# Patient Record
Sex: FEMALE | Race: WHITE | NOT HISPANIC OR LATINO | Employment: OTHER | URBAN - METROPOLITAN AREA
[De-identification: names, ages, dates, MRNs, and addresses within clinical notes are randomized per-mention and may not be internally consistent; named-entity substitution may affect disease eponyms.]

---

## 2017-02-02 ENCOUNTER — DOCUMENTATION ONLY (OUTPATIENT)
Dept: REHABILITATION | Facility: HOSPITAL | Age: 52
End: 2017-02-02

## 2017-02-02 NOTE — PROGRESS NOTES
PHYSICAL THERAPY DISCHARGE SUMMARY     Name: Brigid Jernigan  Clinic Number: [id number]    Diagnosis: MD, tibial fracture  Physician: clyde cedeño MD  Treatment Orders: PT Eval and Treat  Past Medical History   Diagnosis Date    Abnormal EKG     Myotonic dystrophy     Pacemaker     PAF (paroxysmal atrial fibrillation)     Sleep apnea        Initial visit: 912/16  Date of Last visit: 11/9/16  Date of Discharge Note:  2/2/17  Total Visits Received: 10  Missed Visits: 0  ASSESSMENT   Status Towards Goals Met:  Pt met all short term goals, 1 long term goal.    Goals Not achieved and why: Pt was making good progress but use up her approved insurance visits and did not return to therapy.    Discharge reason : insurance limitations    GOALS: Short Term Goals: 4 weeks  1.Report decreased L knee pain < / = 2 /10 to increase tolerance for ADLs. Met 10/3/16  2. Pt to demonstrate sit to stand transfer with CGA and RW in order to improve mobility at home. Met 10/3/16  3. Increased L knee extension ROM to > or = 0 degrees in order to improve ability to ambulate. Met 10/3/16  4. Pt to demonstrate stand pivot transfer with CGA in order to improve ability to perform ADLs at home. Met 10/3/16  5. Pt to tolerate HEP to improve ROM and independence with ADL's. Met 10/3/16  6. Pt to ambulate > or = 25 ft with RW and CGA in order to improve mobility in the home environment. Met 10/3/16      Long Term Goals: 8 weeks  1.Report decreased L knee pain < / = 1 /10 to increase tolerance for ADLs.  2.Pt to ambulate > or = 50 ft with RW and CGA in order to improve mobility in the home environment. Met 10/3/16  3.Pt to demonstrate sit to stand transfer with Mod I and RW in order to improve independence with mobility.   4. Pt to ambulate > or = 50 ft with RW and Mod I in order to improve mobility in the home environment  5. Pt to be Independent with HEP to improve ROM and independence with ADL's            PLAN   This patient is discharged  from Physical Therapy Services.       David Brock, PT 2/2/2017

## 2017-05-09 ENCOUNTER — CLINICAL SUPPORT (OUTPATIENT)
Dept: REHABILITATION | Facility: HOSPITAL | Age: 52
End: 2017-05-09
Attending: STUDENT IN AN ORGANIZED HEALTH CARE EDUCATION/TRAINING PROGRAM
Payer: COMMERCIAL

## 2017-05-09 DIAGNOSIS — R29.898 DECREASED ROM OF NECK: ICD-10-CM

## 2017-05-09 DIAGNOSIS — R53.1 DECREASED STRENGTH: ICD-10-CM

## 2017-05-09 DIAGNOSIS — Z74.09 IMPAIRED FUNCTIONAL MOBILITY, BALANCE, GAIT, AND ENDURANCE: ICD-10-CM

## 2017-05-09 PROCEDURE — 97163 PT EVAL HIGH COMPLEX 45 MIN: CPT | Mod: PO | Performed by: PHYSICAL THERAPIST

## 2017-05-09 PROCEDURE — 97110 THERAPEUTIC EXERCISES: CPT | Mod: PO | Performed by: PHYSICAL THERAPIST

## 2017-05-09 NOTE — PROGRESS NOTES
OUTPATIENT NEUROLOGICAL REHABILITATION  PHYSICAL THERAPY EVALUATION    Name: Brigid Jernigan  Clinic Number: [id number]    Diagnosis: G71.11 (ICD-10-CM) - Myotonic dystrophy  Physician: Leslee Moore MD  Treatment Orders: PT Eval and Treat  Past Medical History:   Diagnosis Date    Abnormal EKG     Myotonic dystrophy     Pacemaker     PAF (paroxysmal atrial fibrillation)     Sleep apnea        Evaluation Date: 5/9/2017  Visit #: 1  Plan of care expiration: 7/25/2017  Precautions: universal, fall, poor head control    History   Medical Diagnosis: myotonic dystrophy  PT Diagnosis: decreased head control, decreased strength, decreased motor control, impaired balance, impaired gait, decreased ROM  Chief complaint: poor head control, poor mobility, fall risk  History of Present Illness: Brigid is a 51 y.o. female that presents to [facility] secondary to impairments due to myotonic dystrophy, Dx 2007. PMHx: Pacemaker in 2008, cataract surgery. Broke tibia/ fibula 2016. Broken wrist 2016. Psoriatic arthritis      Prior Therapy: prior PT for broken tibia/fibula  Social History: puzzles, pt is cared for by sister  Place of Residence (Steps/Adaptations/Levels)/ assistance available: lives with sister, sister assists with care of 5-6 family members with same dx, uses outside lift to enter home, elevator inside  Previous functional status includes: prior to broken LE, pt did not use AD for ambulation, head control was not a significant issue  Current functional status:  RW use for ~9 months, unable to hold head up for >1 min  DME owned: CPAP, RW, elevating toilet seat, up easy to rise from chairs, bilateral AFOs  Work/Job description:  NA      Subjective   Pt stated goals:  to walk without anything  Family present/states: getting needed equipment  Pain: denies    Objective   - Follows commands: yes   - Speech: slurred speech    Mental status: alert, oriented to person, place, and  time  Appearance: Casually dressed  Behavior:  calm and cooperative  Attention Span and Concentration:  Normal    Dominant hand:  right     Posture Alignment :rounded shoulders, poor to no head control    Sensation:  Light Touch: Intact           Proprioception:   Intact    Tone: 0 - No increase in muscle tone  Limbs/muscles affected: hypotonic in BLE and cervical regions    Visual/Auditory: denies changes     Coordination:   - fine motor: NT  - UE coordination: NT    - LE coordination:  Unable to perform alternate toe taps    ROM:   CERVICAL PROM:  Flexion: excessive  Extension: excessive  Rotation:  Right= 5 degrees, left= 11 degrees  SB= 0 degrees [place]    UPPER EXTREMITY--AROM/PROM  (R) UE: limited as follows: shoulder limitations due to forward head  (L) UE: limited as follows: shoulder limitations due to forward head      RANGE OF MOTION--LOWER EXTREMITIES  (R) LE Hip: equal bilaterally   Knee: equal bilaterally   Ankle: equal bilaterally    (L) LE: Hip: equal bilaterally   Knee: equal bilaterally   Ankle: equal bilaterally    Strength: manual muscle test grades below   Cervical strength:   Extension: 2-/5  Deep cervical flexors: 2-/5  Flexion: 3/5  Rotation: 2-/5     Upper Extremity Strength  BUE strength at least 3/5    Lower Extremity Strength   RLE LLE   Hip Flexion: 3+/5 3+/5   Hip Extension:  2/5 2/5   Hip Abduction: NT NT   Hip Adduction: NT NT   Knee Extension: 4+/5 3+/5   Knee Flexion: 4/5 4-/5   Ankle Dorsiflexion: 0/5 0/5   Ankle Plantarflexion: 0/5 0/5   Ankle Inversion: 0/5 0/5   Ankle Eversion: 0/5 0/5     Abdominal Strength: 2-/5       Evaluation   Single Limb Stance R LE unable  (<10 sec = HIGH FALL RISK)   Single Limb Stance L LE unable  (<10 sec = HIGH FALL RISK)   30 second Chair Rise NT   5 times sit-stand NT     Postural control:  MCTSIB:  1. Eyes Open/feet together/Firm: >30 seconds with RW  2. Eyes Closed/feet together/Firm: 5 seconds with RW  3. Eyes Open/feet together/Foam: NT  4. Eyes  Closed/feet together/Foam: NT  Static sitting balance with BUE support= fair  Dynamic sitting balance with UE support= fair    Gait Assessment:   - AD used: RW  - Assistance: close supervision/ CGA  - Distance: 125 ft  - Curb: NT  - Ramp:  NT    Tinetti Balance Assessment  Balance:  1. Sitting Balance 0   Leans/ slides in chair= 0   Steady= 1  2. Rises from chair 1   Unable without help= 0   Able, uses arms= 1   Able without use of arms= 2  3. Attempts to rise 1   Unable without help= 0   Able, >1 attmept required-= 1   Able, 1 attempt= 2  4. Immediate standing balance (1st 5 seconds) 0   Unsteady (swaggers, moves feet, trunk sway)= 0   Steady but uses walker or other support= 1   Narrow base of support without walker or support= 2  5. Nudged 0   Begins to fall= 0   Staggers, grabs, catches self= 1   Steady= 2  6. Standing balance 1   Unsteady= 0   Steady but wide LEYDI or uses AD=1   Narrow LEYDI without AD=2  7. Eyes closed 0   Unsteady= 0   Steady= 1  8. Turning 360 degrees 0   Discontinuous steps= 0   Continuous steps= 1   Unsteady= 0   Steady= 1  9. Sitting down 0   Unsafe= 0   Uses arms or not in a smooth motion= 1   Safe, smooth motion= 2  Balance score= 3/16  Gait:  1. Initiation 0   hesitates or multiple attempts to start= 0   No hesitancy= 1  2. Step length 2   Step to= 0   One foot passes= 1   reciprocal pattern= 2  3. Step height 2    Neither foot clears floor= 0   One foot clears floor= 1   Both feet clear floor= 2  4. Step symmetry 1   Not symmetrical= 0   Appears symmetrical= 1  5. Step continuity 1   Not continuous= 0   Appears continuous= 1  6. Path 0   Marked deviation= 0   Mild/moderate deviation or uses A.D.= 1   Straight without A.D.= 2  7. Trunk 0   Marked sway or uses A.D.= 0   No sway, but flexes knees or back, spread arms out while walking= 1   No sway, no flexion, no use of UE, no use of A.D.= 2  8. Walking stance 0   Heels apart= 0   Heels almost touching while walking= 1  Gait score=  6/12  Total score= 9/28 , high fall risk       GAIT DEVIATIONS:  Brigid displays the following deviations with ambulation: moderate sway, decreased LE control    Impairments contributing to deviations: impaired motor control, decreased strength, impaired motor control.     Endurance Deficit: good for eval, fair cervical endurance with chin tuck      Evaluation   Timed Up and Go NT   Self Selected Walking Speed NT   Fast Walking Speed NT     Functional Mobility (Bed mobility, transfers)  Bed mobility: supervision  Supine to sit: Supervision  Sit to supine: supervision  Rolling: supervision  Transfers to bed: supervision  Transfers to toilet: min A per family  Sit to stand:  min A  Stand pivot:  Min A  Car transfers: NT  Wheelchair mobility: NT  Floor transfers: D per family    Written Home Exercises Provided: eval- chin tucks  Pt demo good understanding of the education provided. Brigid demonstrated fair return demonstration of activities.     Education provided re:role of PT, goals for PT, scheduling - pt verbalized understanding.     Brigid and family memebers verbalized good understanding of education provided.   Pt has no cultural, educational or language barriers to learning provided.    Pt participated in the following treatment today:  therapeutic exercise x 10 minutes to address cervical stability:   Instruction in chin tucks- performed with mod A in supine  Scapular retraction in supine    Assessment   This is a 51 y.o. female referred to outpatient physical therapy and presents with a medical diagnosis of myotonic dystrophy and demonstrates limitations as described in the problem list. Due to pt's deficits, pt is at significant risk for falls. Pt demonstrates a decrease in head control over the past several months. Pt is unable to hold head upright for an extended period of time (<5 min). PT is warranted to address impairments to improve safe mobility and head control. PT will assess pt for appropriate  DME as indicated. Pt's condition is unstable and unpredictable.     History  Co-morbidities and personal factors that may impact the plan of care Examination  Body Structures and Functions, activity limitations and participation restrictions that may impact the plan of care Clinical Presentation   Decision Making/ Complexity Score   Co-morbidities/ Personal Factors:   Pacemaker 2008, cataract surgery, Broke tibia/ fibula 2016, Broken wrist 2016, Psoriatic arthritis, family assistance is limited due caring for multiple people   Body Regions/ Body Systems:   Cervical, UE, LE ROM; strength, gait, sensation, proprioception, balance    Activity limitations/ Participation Restrictions:   1. Fall Risk - impaired balance   2. Weakness   3. Impaired muscle tone  4. Poor head control  5. Decreased ROM  6. Gait deviations   7. Decreased ambulation   8. Decreased activity tolerance   9. Decreased independence with daily activities   10. Requires skilled supervision to complete and progress HEP   11. Requires skilled PT for DME/ orthotic reccommendations         unstable high     Pt rehab potential is Fair. Pt will benefit from continuing skilled outpatient physical therapy to address the deficits listed below in the problem list, provide pt/family education and to maximize pt's level of independence in the home and community environment.     Pt's spiritual, cultural and educational needs considered and pt agreeable to plan of care and goals as stated below:     GOALS:   Short term goals: 6 weeks, pt agrees to goals set.  1. Pt will perform chin tucks in supine with supervision to improve independent with HEP.  2. Assist pt with obtaining appropriate cervical brace to improve head control.   3. Pt will demonstrate improved knee ext strength on left to 4/5 to improve safety with ambulation and sit<>stand.    4. Pt will demonstrate improved cervical rotation PROM to 20 degrees to improve functional head control.   5. Pt will  "perform sit<>stand consistently from 22" height surface with supervision to improve independence.     Long term goals: 12 weeks, pt agrees to goals set  6. Pt will perform basic HEP for gross strengthening with supervision to deter worsening of functional limitations.  7. Pt will demonstrate improved bilateral hip strength to 3/5 to improve balance and independence with mobility.   8. Pt will demonstrate improved cervical extension strength to 3+/5 to improve head control.   9. Pt will demonstrate improved cervical deep flexor strength to 3+/5 to improve head control.   10. Pt will demonstrate improve balance and gait by scoring 14/28 on Tinetti.  11. Pt will demonstrate improved cervical PROM for rotation to 40 degrees to improve functional head control.      Plan   Outpatient physical therapy 1-2 times weekly for 12 weeks to include: Pt Education, HEP, therapeutic exercises, gait training, neuromuscular re-education, therapeutic activities, manual therapy,  and modalities PRN to achieve established goals. Pt may be seen by PTA as part of the rehabilitation team.       Yadi Mercado, PT  05/09/2017      I certify the need for these services furnished under this plan of treatment and while under my care.  ____________________________________ Physician/Referring Practitioner   Date of Signature        "

## 2017-05-18 PROBLEM — R53.1 DECREASED STRENGTH: Status: ACTIVE | Noted: 2017-05-18

## 2017-05-18 PROBLEM — Z74.09 IMPAIRED FUNCTIONAL MOBILITY, BALANCE, GAIT, AND ENDURANCE: Status: ACTIVE | Noted: 2017-05-18

## 2017-05-18 PROBLEM — R29.898 DECREASED ROM OF NECK: Status: ACTIVE | Noted: 2017-05-18

## 2017-05-19 NOTE — PLAN OF CARE
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  Physician: Leslee Moore MD  Treatment Orders: PT Eval and Treat  Past Medical History:   Diagnosis Date    Abnormal EKG     Myotonic dystrophy     Pacemaker     PAF (paroxysmal atrial fibrillation)     Sleep apnea        Evaluation Date: 5/9/2017  Visit #: 1  Plan of care expiration: 7/25/2017  Precautions: universal, fall, poor head control    History   Medical Diagnosis: myotonic dystrophy  PT Diagnosis: decreased head control, decreased strength, decreased motor control, impaired balance, impaired gait, decreased ROM  Chief complaint: poor head control, poor mobility, fall risk  History of Present Illness: Brigid is a 51 y.o. female that presents to [facility] secondary to impairments due to myotonic dystrophy, Dx 2007. PMHx: Pacemaker in 2008, cataract surgery. Broke tibia/ fibula 2016. Broken wrist 2016. Psoriatic arthritis      Prior Therapy: prior PT for broken tibia/fibula  Social History: puzzles, pt is cared for by sister  Place of Residence (Steps/Adaptations/Levels)/ assistance available: lives with sister, sister assists with care of 5-6 family members with same dx, uses outside lift to enter home, elevator inside  Previous functional status includes: prior to broken LE, pt did not use AD for ambulation, head control was not a significant issue  Current functional status:  RW use for ~9 months, unable to hold head up for >1 min  DME owned: CPAP, RW, elevating toilet seat, up easy to rise from chairs, bilateral AFOs  Work/Job description:  NA      Subjective   Pt stated goals:  to walk without anything  Family present/states: getting needed equipment  Pain: denies    Objective   - Follows commands: yes   - Speech: slurred speech    Mental status: alert, oriented to person, place, and  time  Appearance: Casually dressed  Behavior:  calm and cooperative  Attention Span and Concentration:  Normal    Dominant hand:  right     Posture Alignment :rounded shoulders, poor to no head control    Sensation:  Light Touch: Intact           Proprioception:   Intact    Tone: 0 - No increase in muscle tone  Limbs/muscles affected: hypotonic in BLE and cervical regions    Visual/Auditory: denies changes     Coordination:   - fine motor: NT  - UE coordination: NT    - LE coordination:  Unable to perform alternate toe taps    ROM:   CERVICAL PROM:  Flexion: excessive  Extension: excessive  Rotation:  Right= 5 degrees, left= 11 degrees  SB= 0 degrees [place]    UPPER EXTREMITY--AROM/PROM  (R) UE: limited as follows: shoulder limitations due to forward head  (L) UE: limited as follows: shoulder limitations due to forward head      RANGE OF MOTION--LOWER EXTREMITIES  (R) LE Hip: equal bilaterally   Knee: equal bilaterally   Ankle: equal bilaterally    (L) LE: Hip: equal bilaterally   Knee: equal bilaterally   Ankle: equal bilaterally    Strength: manual muscle test grades below   Cervical strength:   Extension: 2-/5  Deep cervical flexors: 2-/5  Flexion: 3/5  Rotation: 2-/5     Upper Extremity Strength  BUE strength at least 3/5    Lower Extremity Strength   RLE LLE   Hip Flexion: 3+/5 3+/5   Hip Extension:  2/5 2/5   Hip Abduction: NT NT   Hip Adduction: NT NT   Knee Extension: 4+/5 3+/5   Knee Flexion: 4/5 4-/5   Ankle Dorsiflexion: 0/5 0/5   Ankle Plantarflexion: 0/5 0/5   Ankle Inversion: 0/5 0/5   Ankle Eversion: 0/5 0/5     Abdominal Strength: 2-/5       Evaluation   Single Limb Stance R LE unable  (<10 sec = HIGH FALL RISK)   Single Limb Stance L LE unable  (<10 sec = HIGH FALL RISK)   30 second Chair Rise NT   5 times sit-stand NT     Postural control:  MCTSIB:  1. Eyes Open/feet together/Firm: >30 seconds with RW  2. Eyes Closed/feet together/Firm: 5 seconds with RW  3. Eyes Open/feet together/Foam: NT  4. Eyes  Closed/feet together/Foam: NT  Static sitting balance with BUE support= fair  Dynamic sitting balance with UE support= fair    Gait Assessment:   - AD used: RW  - Assistance: close supervision/ CGA  - Distance: 125 ft  - Curb: NT  - Ramp:  NT    Tinetti Balance Assessment  Balance:  1. Sitting Balance 0   Leans/ slides in chair= 0   Steady= 1  2. Rises from chair 1   Unable without help= 0   Able, uses arms= 1   Able without use of arms= 2  3. Attempts to rise 1   Unable without help= 0   Able, >1 attmept required-= 1   Able, 1 attempt= 2  4. Immediate standing balance (1st 5 seconds) 0   Unsteady (swaggers, moves feet, trunk sway)= 0   Steady but uses walker or other support= 1   Narrow base of support without walker or support= 2  5. Nudged 0   Begins to fall= 0   Staggers, grabs, catches self= 1   Steady= 2  6. Standing balance 1   Unsteady= 0   Steady but wide LEYDI or uses AD=1   Narrow LEYDI without AD=2  7. Eyes closed 0   Unsteady= 0   Steady= 1  8. Turning 360 degrees 0   Discontinuous steps= 0   Continuous steps= 1   Unsteady= 0   Steady= 1  9. Sitting down 0   Unsafe= 0   Uses arms or not in a smooth motion= 1   Safe, smooth motion= 2  Balance score= 3/16  Gait:  1. Initiation 0   hesitates or multiple attempts to start= 0   No hesitancy= 1  2. Step length 2   Step to= 0   One foot passes= 1   reciprocal pattern= 2  3. Step height 2    Neither foot clears floor= 0   One foot clears floor= 1   Both feet clear floor= 2  4. Step symmetry 1   Not symmetrical= 0   Appears symmetrical= 1  5. Step continuity 1   Not continuous= 0   Appears continuous= 1  6. Path 0   Marked deviation= 0   Mild/moderate deviation or uses A.D.= 1   Straight without A.D.= 2  7. Trunk 0   Marked sway or uses A.D.= 0   No sway, but flexes knees or back, spread arms out while walking= 1   No sway, no flexion, no use of UE, no use of A.D.= 2  8. Walking stance 0   Heels apart= 0   Heels almost touching while walking= 1  Gait score=  6/12  Total score= 9/28 , high fall risk       GAIT DEVIATIONS:  Brigid displays the following deviations with ambulation: moderate sway, decreased LE control    Impairments contributing to deviations: impaired motor control, decreased strength, impaired motor control.     Endurance Deficit: good for eval, fair cervical endurance with chin tuck      Evaluation   Timed Up and Go NT   Self Selected Walking Speed NT   Fast Walking Speed NT     Functional Mobility (Bed mobility, transfers)  Bed mobility: supervision  Supine to sit: Supervision  Sit to supine: supervision  Rolling: supervision  Transfers to bed: supervision  Transfers to toilet: min A per family  Sit to stand:  min A  Stand pivot:  Min A  Car transfers: NT  Wheelchair mobility: NT  Floor transfers: D per family    Written Home Exercises Provided: eval- chin tucks  Pt demo good understanding of the education provided. Brigid demonstrated fair return demonstration of activities.     Education provided re:role of PT, goals for PT, scheduling - pt verbalized understanding.     Brigid and family memebers verbalized good understanding of education provided.   Pt has no cultural, educational or language barriers to learning provided.    Pt participated in the following treatment today:  therapeutic exercise x 10 minutes to address cervical stability:   Instruction in chin tucks- performed with mod A in supine  Scapular retraction in supine    Assessment   This is a 51 y.o. female referred to outpatient physical therapy and presents with a medical diagnosis of myotonic dystrophy and demonstrates limitations as described in the problem list. Due to pt's deficits, pt is at significant risk for falls. Pt demonstrates a decrease in head control over the past several months. Pt is unable to hold head upright for an extended period of time (<5 min). PT is warranted to address impairments to improve safe mobility and head control. PT will assess pt for appropriate  DME as indicated. Pt's condition is unstable and unpredictable.     History  Co-morbidities and personal factors that may impact the plan of care Examination  Body Structures and Functions, activity limitations and participation restrictions that may impact the plan of care Clinical Presentation   Decision Making/ Complexity Score   Co-morbidities/ Personal Factors:   Pacemaker 2008, cataract surgery, Broke tibia/ fibula 2016, Broken wrist 2016, Psoriatic arthritis, family assistance is limited due caring for multiple people   Body Regions/ Body Systems:   Cervical, UE, LE ROM; strength, gait, sensation, proprioception, balance    Activity limitations/ Participation Restrictions:   1. Fall Risk - impaired balance   2. Weakness   3. Impaired muscle tone  4. Poor head control  5. Decreased ROM  6. Gait deviations   7. Decreased ambulation   8. Decreased activity tolerance   9. Decreased independence with daily activities   10. Requires skilled supervision to complete and progress HEP   11. Requires skilled PT for DME/ orthotic reccommendations         unstable high     Pt rehab potential is Fair. Pt will benefit from continuing skilled outpatient physical therapy to address the deficits listed below in the problem list, provide pt/family education and to maximize pt's level of independence in the home and community environment.     Pt's spiritual, cultural and educational needs considered and pt agreeable to plan of care and goals as stated below:     GOALS:   Short term goals: 6 weeks, pt agrees to goals set.  1. Pt will perform chin tucks in supine with supervision to improve independent with HEP.  2. Assist pt with obtaining appropriate cervical brace to improve head control.   3. Pt will demonstrate improved knee ext strength on left to 4/5 to improve safety with ambulation and sit<>stand.    4. Pt will demonstrate improved cervical rotation PROM to 20 degrees to improve functional head control.   5. Pt will  "perform sit<>stand consistently from 22" height surface with supervision to improve independence.     Long term goals: 12 weeks, pt agrees to goals set  6. Pt will perform basic HEP for gross strengthening with supervision to deter worsening of functional limitations.  7. Pt will demonstrate improved bilateral hip strength to 3/5 to improve balance and independence with mobility.   8. Pt will demonstrate improved cervical extension strength to 3+/5 to improve head control.   9. Pt will demonstrate improved cervical deep flexor strength to 3+/5 to improve head control.   10. Pt will demonstrate improve balance and gait by scoring 14/28 on Tinetti.  11. Pt will demonstrate improved cervical PROM for rotation to 40 degrees to improve functional head control.      Plan   Outpatient physical therapy 1-2 times weekly for 12 weeks to include: Pt Education, HEP, therapeutic exercises, gait training, neuromuscular re-education, therapeutic activities, manual therapy,  and modalities PRN to achieve established goals. Pt may be seen by PTA as part of the rehabilitation team.       Yadi Mercado, PT  05/09/2017      I certify the need for these services furnished under this plan of treatment and while under my care.  ____________________________________ Physician/Referring Practitioner   Date of Signature          "

## 2017-06-05 ENCOUNTER — CLINICAL SUPPORT (OUTPATIENT)
Dept: REHABILITATION | Facility: HOSPITAL | Age: 52
End: 2017-06-05
Attending: STUDENT IN AN ORGANIZED HEALTH CARE EDUCATION/TRAINING PROGRAM
Payer: COMMERCIAL

## 2017-06-05 DIAGNOSIS — R53.1 DECREASED STRENGTH: ICD-10-CM

## 2017-06-05 DIAGNOSIS — R29.898 DECREASED ROM OF NECK: ICD-10-CM

## 2017-06-05 DIAGNOSIS — Z74.09 IMPAIRED FUNCTIONAL MOBILITY, BALANCE, GAIT, AND ENDURANCE: ICD-10-CM

## 2017-06-05 PROCEDURE — 97110 THERAPEUTIC EXERCISES: CPT | Mod: PO | Performed by: PHYSICAL THERAPIST

## 2017-06-05 PROCEDURE — 97140 MANUAL THERAPY 1/> REGIONS: CPT | Mod: PO | Performed by: PHYSICAL THERAPIST

## 2017-06-05 NOTE — PROGRESS NOTES
Physical Therapy Progress Note     Name: Brigid Jernigan  Clinic Number: [id number]  Diagnosis: G71.11 (ICD-10-CM) - Myotonic dystrophy  Physician: Leslee Moore MD  Treatment Orders: PT Eval and Treat  Past Medical History:   Diagnosis Date    Abnormal EKG     Myotonic dystrophy     Pacemaker     PAF (paroxysmal atrial fibrillation)     Sleep apnea        Precautions: standard  Visit #: 2  Date of Eval: 5/9/2017  Plan of Care Expiration: 7/25/2017    Subjective   Pt reports: no new complaints, pt's sister present during treatment. Pt reports inconsistent compliance with HEP.  Pain Scale:  denies    Objective     Patient received individual therapy to increase strength, endurance, ROM, flexibility, posture, core stabilization and head control with activities as follows:     Pt participated in therapeutic exercises x 24 minutes to address ROM and cervical alignment:   Supine: Chin tucks, mod A 2 x 10   Cervical SB PROM   Cervical rotation PROM   pec stretching   PROM RUE ER   Lat stretch with shoulder at 90 degrees flex with LTR   Right shoulder flexion AAROM to 90 degrees or above   Scapular retraction 2 x 10  Sitting: cervical extension with scap retraction with trunk support 2 x 10   Rows with cues to hold head up YTB 10x    Pt participated in manual therapy x 20 minutes to address cervical and right shoulder mobility to improve head control:   Inhibitive distraction for cervical alignment  Cervical upslides (decreased mobility right>left) to improve rotation  Right pec release  Right subscapularis release    Written Home Exercises: eval- chin tucks  Pt demo fair understanding of the education provided. Brigid demonstrated fair return demonstration of activities.     Education provided re: POC, HEP    Pt has no cultural, educational or language barriers to learning provided.    Assessment   Brigid tolerated treatment well. Pt reported pain/ stretching  "with right shoulder ER, cervical rotation, and pec stretching. Pt reported difficulty completing tasks in sitting due to decreased endurance for holding head up. Pt tolerated ~10 min sitting edge of mat with intermittent head control. PT used tball for trunk support when pt was sitting edge of mat. PT discussed pt obtaining [facility]- Cervical Thoracic Halo Brace to immediately address head control. Pt is agreeable to brace. Pt can benefit from continued skilled PT to address impairments to improve head control and improve safety and independence with mobility.     Pt prognosis is Good. Pt will continue to benefit from skilled outpatient physical therapy to address the deficits listed in the problem list, provide pt/family education and to maximize pt's level of independence in the home and community environment.     Activity limitations/ Participation Restrictions:   1. Fall Risk - impaired balance   2. Weakness   3. Impaired muscle tone  4. Poor head control  5. Decreased ROM  6. Gait deviations   7. Decreased ambulation   8. Decreased activity tolerance   9. Decreased independence with daily activities   10. Requires skilled supervision to complete and progress HEP   11. Requires skilled PT for DME/ orthotic recommendations    Pt's spiritual, cultural and educational needs considered and pt agreeable to plan of care and GOALS as stated below:   Short term goals: 6 weeks, pt agrees to goals set.  1. Pt will perform chin tucks in supine with supervision to improve independent with HEP.  2. Assist pt with obtaining appropriate cervical brace to improve head control.   3. Pt will demonstrate improved knee ext strength on left to 4/5 to improve safety with ambulation and sit<>stand.    4. Pt will demonstrate improved cervical rotation PROM to 20 degrees to improve functional head control.   5. Pt will perform sit<>stand consistently from 22" height surface with supervision to improve independence.      Long " term goals: 12 weeks, pt agrees to goals set  6. Pt will perform basic HEP for gross strengthening with supervision to deter worsening of functional limitations.  7. Pt will demonstrate improved bilateral hip strength to 3/5 to improve balance and independence with mobility.   8. Pt will demonstrate improved cervical extension strength to 3+/5 to improve head control.   9. Pt will demonstrate improved cervical deep flexor strength to 3+/5 to improve head control.   10. Pt will demonstrate improve balance and gait by scoring 14/28 on Tinetti.  11. Pt will demonstrate improved cervical PROM for rotation to 40 degrees to improve functional head control.     Plan   Continue PT 2x weekly under established Plan of Care, with treatment to include: pt education, HEP, therapeutic exercises, neuromuscular re-education/balance exercises, therapeutic activities, joint mobilizations, manual therapy, gait training PRN, and modalities PRN, to work towards established goals. Pt may be seen by PTA to carry out plan of care.     Yadi Mercado, PT   06/05/2017

## 2017-06-07 ENCOUNTER — CLINICAL SUPPORT (OUTPATIENT)
Dept: REHABILITATION | Facility: HOSPITAL | Age: 52
End: 2017-06-07
Attending: STUDENT IN AN ORGANIZED HEALTH CARE EDUCATION/TRAINING PROGRAM
Payer: COMMERCIAL

## 2017-06-07 DIAGNOSIS — R53.1 DECREASED STRENGTH: Primary | ICD-10-CM

## 2017-06-07 DIAGNOSIS — R29.898 DECREASED ROM OF NECK: ICD-10-CM

## 2017-06-07 DIAGNOSIS — Z74.09 IMPAIRED FUNCTIONAL MOBILITY, BALANCE, GAIT, AND ENDURANCE: ICD-10-CM

## 2017-06-07 PROCEDURE — 97110 THERAPEUTIC EXERCISES: CPT | Mod: PO | Performed by: PHYSICAL THERAPIST

## 2017-06-07 PROCEDURE — 97140 MANUAL THERAPY 1/> REGIONS: CPT | Mod: PO | Performed by: PHYSICAL THERAPIST

## 2017-06-07 NOTE — PROGRESS NOTES
"                                                    Physical Therapy Progress Note     Name: Brigid Jernigan  Clinic Number: [id number]  Diagnosis: G71.11 (ICD-10-CM) - Myotonic dystrophy  Physician: Leslee Moore MD  Treatment Orders: PT Eval and Treat  Past Medical History:   Diagnosis Date    Abnormal EKG     Myotonic dystrophy     Pacemaker     PAF (paroxysmal atrial fibrillation)     Sleep apnea        Precautions: standard  Visit #: 3  Date of Eval: 5/9/2017  Plan of Care Expiration: 7/25/2017    Subjective   Pt reports: "my back is kind of hurting"    Pain Scale:  Not reported     Objective     Patient received individual therapy to increase strength, endurance, ROM, flexibility, posture, core stabilization and head control with activities as follows:     Pt participated in therapeutic exercises x 40 minutes to address ROM and cervical alignment:   Supine:    Cervical SB PROM   Cervical rotation PROM   pec stretching   PROM RUE ER   Right shoulder flexion AAROM to 90 degrees or above   Scapular retraction 2 x 10   Suboccipital release    2 trials passing beach ball back and forth encouraging trunk and neck rotation ~ 3 minutes each trial until fatigue , wedge behind pt     NP Today:    Chin tucks, mod A 2 x 10   Lat stretch with shoulder at 90 degrees flex with LTR    Sitting: L and R Side bending against manual resistance 1 x 10   Extension against manual resistance 1 x 10    Rows with cues to hold head up YTB 10x    Pt participated in manual therapy x 10 minutes to address cervical and right shoulder mobility to improve head control:     Cervical upslides (decreased mobility right>left) to improve rotation  Right pec release  Right subscapularis release  Right scapular release    Written Home Exercises: eval- chin tucks  Pt demo fair understanding of the education provided. Brigid demonstrated fair return demonstration of activities.     Education provided re: POC, HEP    Pt has no cultural, " "educational or language barriers to learning provided.    Assessment   Brigid tolerated treatment well. With utilization of manual therapy, pt began to show improved cervical and thoracic rotation. Pt continues to have decreased mobility on R > L. Pt demonstrated a willingness to participate in therapy today and was encouraged to continue HEP. Pt can benefit from continued skilled PT to address impairments to improve head control and improve safety and independence with mobility.     Pt prognosis is Good. Pt will continue to benefit from skilled outpatient physical therapy to address the deficits listed in the problem list, provide pt/family education and to maximize pt's level of independence in the home and community environment.     Activity limitations/ Participation Restrictions:   1. Fall Risk - impaired balance   2. Weakness   3. Impaired muscle tone  4. Poor head control  5. Decreased ROM  6. Gait deviations   7. Decreased ambulation   8. Decreased activity tolerance   9. Decreased independence with daily activities   10. Requires skilled supervision to complete and progress HEP   11. Requires skilled PT for DME/ orthotic recommendations    Pt's spiritual, cultural and educational needs considered and pt agreeable to plan of care and GOALS as stated below:   Short term goals: 6 weeks, pt agrees to goals set.  1. Pt will perform chin tucks in supine with supervision to improve independent with HEP.  2. Assist pt with obtaining appropriate cervical brace to improve head control.   3. Pt will demonstrate improved knee ext strength on left to 4/5 to improve safety with ambulation and sit<>stand.    4. Pt will demonstrate improved cervical rotation PROM to 20 degrees to improve functional head control.   5. Pt will perform sit<>stand consistently from 22" height surface with supervision to improve independence.      Long term goals: 12 weeks, pt agrees to goals set  6. Pt will perform basic HEP for gross " strengthening with supervision to deter worsening of functional limitations.  7. Pt will demonstrate improved bilateral hip strength to 3/5 to improve balance and independence with mobility.   8. Pt will demonstrate improved cervical extension strength to 3+/5 to improve head control.   9. Pt will demonstrate improved cervical deep flexor strength to 3+/5 to improve head control.   10. Pt will demonstrate improve balance and gait by scoring 14/28 on Tinetti.  11. Pt will demonstrate improved cervical PROM for rotation to 40 degrees to improve functional head control.     Plan   Continue PT 2x weekly under established Plan of Care, with treatment to include: pt education, HEP, therapeutic exercises, neuromuscular re-education/balance exercises, therapeutic activities, joint mobilizations, manual therapy, gait training PRN, and modalities PRN, to work towards established goals. Pt may be seen by PTA to carry out plan of care.     Suresh Montiel, SPT   06/07/2017    I, Yadi Mercado, JORDYT, certify that I was present in the room directing the student in service delivery and guiding them using my skilled judgment. As the co-signing therapist I have reviewed the students documentation and am responsible for the treatment, assessment, and plan.     Yadi Mercado DPT  6/7/2017

## 2017-06-12 ENCOUNTER — CLINICAL SUPPORT (OUTPATIENT)
Dept: REHABILITATION | Facility: HOSPITAL | Age: 52
End: 2017-06-12
Attending: STUDENT IN AN ORGANIZED HEALTH CARE EDUCATION/TRAINING PROGRAM
Payer: COMMERCIAL

## 2017-06-12 DIAGNOSIS — R53.1 DECREASED STRENGTH: Primary | ICD-10-CM

## 2017-06-12 DIAGNOSIS — R29.898 DECREASED ROM OF NECK: ICD-10-CM

## 2017-06-12 DIAGNOSIS — Z74.09 IMPAIRED FUNCTIONAL MOBILITY, BALANCE, GAIT, AND ENDURANCE: ICD-10-CM

## 2017-06-12 PROCEDURE — 97110 THERAPEUTIC EXERCISES: CPT | Mod: PO | Performed by: PHYSICAL THERAPIST

## 2017-06-12 NOTE — PROGRESS NOTES
"                                                    Physical Therapy Progress Note     Name: Brigid Jernigan  Clinic Number: [id number]  Diagnosis: G71.11 (ICD-10-CM) - Myotonic dystrophy  Physician: Leslee Moore MD  Treatment Orders: PT Eval and Treat  Past Medical History:   Diagnosis Date    Abnormal EKG     Myotonic dystrophy     Pacemaker     PAF (paroxysmal atrial fibrillation)     Sleep apnea        Precautions: standard  Visit #: 4  Date of Eval: 5/9/2017  Plan of Care Expiration: 7/25/2017    Subjective   Pt reports: "I am doin ok"    Pain Scale:  Not reported     Objective     Patient received individual therapy to increase strength, endurance, ROM, flexibility, posture, core stabilization and head control with activities as follows:     Pt participated in therapeutic exercises x 45 minutes to address ROM and cervical alignment:   Supine:    Cervical SB PROM with overpressure    Cervical rotation PROM with overpressure    pec stretching   Suboccipital release    2 x 10 situps from wedge    2 x 2 minutes lying on foam roller placed on spine with wedge      NP Today:    Chin tucks, mod A 2 x 10   Lat stretch with shoulder at 90 degrees flex with LTR   PROM RUE ER    Right shoulder flexion AAROM to 90 degrees or above   2 trials passing beach ball back and forth encouraging trunk and neck rotation ~ 3 minutes each trial until fatigue , wedge behind pt    Sitting: L and R Side bending against manual resistance 1 x 10   Extension against manual resistance 1 x 10    Rows with cues to hold head up YTB 2 x 10   Rotation 2 x 10 with 1000 gram ball     Pt participated in manual therapy x 0 minutes to address cervical and right shoulder mobility to improve head control:     Cervical upslides (decreased mobility right>left) to improve rotation  Right pec release  Right subscapularis release  Right scapular release    Written Home Exercises: eval- chin tucks  Pt demo fair understanding of the education " "provided. Brigid demonstrated fair return demonstration of activities.     Education provided re: POC, HEP    Pt has no cultural, educational or language barriers to learning provided.    Assessment   Brigid tolerated treatment well. Pt completed core strengthening and rows with yellow theraband with improved sitting posture and cervical - thoracic extension. Pt shows slight improvement in cervical side bending and rotation. Pt is still has severe cervical ROM deficits and can benefit from continued skilled PT to address impairments to improve head control and improve safety and independence with mobility.     Pt prognosis is Good. Pt will continue to benefit from skilled outpatient physical therapy to address the deficits listed in the problem list, provide pt/family education and to maximize pt's level of independence in the home and community environment.     Activity limitations/ Participation Restrictions:   1. Fall Risk - impaired balance   2. Weakness   3. Impaired muscle tone  4. Poor head control  5. Decreased ROM  6. Gait deviations   7. Decreased ambulation   8. Decreased activity tolerance   9. Decreased independence with daily activities   10. Requires skilled supervision to complete and progress HEP   11. Requires skilled PT for DME/ orthotic recommendations    Pt's spiritual, cultural and educational needs considered and pt agreeable to plan of care and GOALS as stated below:   Short term goals: 6 weeks, pt agrees to goals set.  1. Pt will perform chin tucks in supine with supervision to improve independent with HEP.  2. Assist pt with obtaining appropriate cervical brace to improve head control.   3. Pt will demonstrate improved knee ext strength on left to 4/5 to improve safety with ambulation and sit<>stand.    4. Pt will demonstrate improved cervical rotation PROM to 20 degrees to improve functional head control.   5. Pt will perform sit<>stand consistently from 22" height surface with " supervision to improve independence.      Long term goals: 12 weeks, pt agrees to goals set  6. Pt will perform basic HEP for gross strengthening with supervision to deter worsening of functional limitations.  7. Pt will demonstrate improved bilateral hip strength to 3/5 to improve balance and independence with mobility.   8. Pt will demonstrate improved cervical extension strength to 3+/5 to improve head control.   9. Pt will demonstrate improved cervical deep flexor strength to 3+/5 to improve head control.   10. Pt will demonstrate improve balance and gait by scoring 14/28 on Tinetti.  11. Pt will demonstrate improved cervical PROM for rotation to 40 degrees to improve functional head control.     Plan   Continue PT 2x weekly under established Plan of Care, with treatment to include: pt education, HEP, therapeutic exercises, neuromuscular re-education/balance exercises, therapeutic activities, joint mobilizations, manual therapy, gait training PRN, and modalities PRN, to work towards established goals. Pt may be seen by PTA to carry out plan of care.     Suresh Montiel, SPT   06/12/2017    I, Yadi Mercado, DPT, certify that I was present in the room directing the student in service delivery and guiding them using my skilled judgment. As the co-signing therapist I have reviewed the students documentation and am responsible for the treatment, assessment, and plan.     Yadi Mercado DPT  6/12/2017

## 2017-06-14 ENCOUNTER — CLINICAL SUPPORT (OUTPATIENT)
Dept: REHABILITATION | Facility: HOSPITAL | Age: 52
End: 2017-06-14
Attending: STUDENT IN AN ORGANIZED HEALTH CARE EDUCATION/TRAINING PROGRAM
Payer: COMMERCIAL

## 2017-06-14 DIAGNOSIS — R29.898 DECREASED ROM OF NECK: ICD-10-CM

## 2017-06-14 DIAGNOSIS — R53.1 DECREASED STRENGTH: Primary | ICD-10-CM

## 2017-06-14 DIAGNOSIS — Z74.09 IMPAIRED FUNCTIONAL MOBILITY, BALANCE, GAIT, AND ENDURANCE: ICD-10-CM

## 2017-06-14 PROCEDURE — 97110 THERAPEUTIC EXERCISES: CPT | Mod: PO | Performed by: PHYSICAL THERAPIST

## 2017-06-14 NOTE — PROGRESS NOTES
"                                                    Physical Therapy Progress Note     Name: Brigid Jernigan  Clinic Number: [id number]  Diagnosis: G71.11 (ICD-10-CM) - Myotonic dystrophy  Physician: Leslee Moore MD  Treatment Orders: PT Eval and Treat  Past Medical History:   Diagnosis Date    Abnormal EKG     Myotonic dystrophy     Pacemaker     PAF (paroxysmal atrial fibrillation)     Sleep apnea        Precautions: standard  Visit #: 5  Date of Eval: 5/9/2017  Plan of Care Expiration: 7/25/2017    Subjective   Pt reports: "I am a little sore "    Pain Scale:  Not reported     Objective     Patient received individual therapy to increase strength, endurance, ROM, flexibility, posture, core stabilization and head control with activities as follows:     Pt participated in therapeutic exercises x 45 minutes to address ROM and cervical alignment:   Supine:    Cervical SB PROM with overpressure    Cervical rotation PROM with overpressure    pec stretching   Suboccipital release    2 x 10 situps with ~ 15 degree incline    2 x 2 minutes lying on half foam roller placed on spine with pec stretch and overpressure of neck into extension    Lat stretch with shoulder at 90 degrees flex with LTR   Right shoulder flexion AAROM to 90 degrees or above   Thoracic rotation with overpressure while maintaining upright sitting posture      Sitting: L and R Side bending against manual resistance 1 x 10   Extension against manual resistance 1 x 10    Rows with cues to hold head up OTB 2 x 10      NP Today:   Rotation 2 x 10 with 1000 gram ball    Chin tucks, mod A 2 x 10   PROM RUE ER    2 trials passing beach ball back and forth encouraging trunk and neck rotation  ~ 3 minutes each trial until fatigue , wedge behind pt    Written Home Exercises: eval- chin tucks  Pt demo fair understanding of the education provided. Brigid demonstrated fair return demonstration of activities.     Education provided re: POC, HEP    Pt has no " "cultural, educational or language barriers to learning provided.    Assessment   Brigid tolerated treatment fairly well. Pt continues to show small improvements in sitting posture, requiring less verbal and tactile cuing than previously. Pt appears to be gaining some L and R cervical side bending ROM. Pt was encouraged to focus on periodically self correcting sitting posture at home. Pt was able to demonstrate proper sitting posture when asked. Pt will continue to benefit from skilled PT to address impairments to improve head control and improve safety and independence with mobility.     Pt prognosis is Good. Pt will continue to benefit from skilled outpatient physical therapy to address the deficits listed in the problem list, provide pt/family education and to maximize pt's level of independence in the home and community environment.     Activity limitations/ Participation Restrictions:   1. Fall Risk - impaired balance   2. Weakness   3. Impaired muscle tone  4. Poor head control  5. Decreased ROM  6. Gait deviations   7. Decreased ambulation   8. Decreased activity tolerance   9. Decreased independence with daily activities   10. Requires skilled supervision to complete and progress HEP   11. Requires skilled PT for DME/ orthotic recommendations    Pt's spiritual, cultural and educational needs considered and pt agreeable to plan of care and GOALS as stated below:   Short term goals: 6 weeks, pt agrees to goals set.  1. Pt will perform chin tucks in supine with supervision to improve independent with HEP.  2. Assist pt with obtaining appropriate cervical brace to improve head control.   3. Pt will demonstrate improved knee ext strength on left to 4/5 to improve safety with ambulation and sit<>stand.    4. Pt will demonstrate improved cervical rotation PROM to 20 degrees to improve functional head control.   5. Pt will perform sit<>stand consistently from 22" height surface with supervision to improve " independence.      Long term goals: 12 weeks, pt agrees to goals set  6. Pt will perform basic HEP for gross strengthening with supervision to deter worsening of functional limitations.  7. Pt will demonstrate improved bilateral hip strength to 3/5 to improve balance and independence with mobility.   8. Pt will demonstrate improved cervical extension strength to 3+/5 to improve head control.   9. Pt will demonstrate improved cervical deep flexor strength to 3+/5 to improve head control.   10. Pt will demonstrate improve balance and gait by scoring 14/28 on Tinetti.  11. Pt will demonstrate improved cervical PROM for rotation to 40 degrees to improve functional head control.     Plan   Continue PT 2x weekly under established Plan of Care, with treatment to include: pt education, HEP, therapeutic exercises, neuromuscular re-education/balance exercises, therapeutic activities, joint mobilizations, manual therapy, gait training PRN, and modalities PRN, to work towards established goals. Pt may be seen by PTA to carry out plan of care.     Suresh Montiel, SPT   06/14/2017    I, Yadi Mercado, DPT, certify that I was present in the room directing the student in service delivery and guiding them using my skilled judgment. As the co-signing therapist I have reviewed the students documentation and am responsible for the treatment, assessment, and plan.     Yadi Mercado DPT  6/14/2017

## 2017-06-26 ENCOUNTER — CLINICAL SUPPORT (OUTPATIENT)
Dept: REHABILITATION | Facility: HOSPITAL | Age: 52
End: 2017-06-26
Attending: STUDENT IN AN ORGANIZED HEALTH CARE EDUCATION/TRAINING PROGRAM
Payer: COMMERCIAL

## 2017-06-26 DIAGNOSIS — R29.898 DECREASED ROM OF NECK: ICD-10-CM

## 2017-06-26 DIAGNOSIS — R53.1 DECREASED STRENGTH: ICD-10-CM

## 2017-06-26 DIAGNOSIS — Z74.09 IMPAIRED FUNCTIONAL MOBILITY, BALANCE, GAIT, AND ENDURANCE: ICD-10-CM

## 2017-06-26 PROCEDURE — 97110 THERAPEUTIC EXERCISES: CPT | Mod: PO | Performed by: PHYSICAL THERAPIST

## 2017-06-26 NOTE — PROGRESS NOTES
"                                                    Physical Therapy Progress Note     Name: Brigid Jernigan  Clinic Number: [id number]  Diagnosis: G71.11 (ICD-10-CM) - Myotonic dystrophy  Physician: Leslee Moore MD  Treatment Orders: PT Eval and Treat  Past Medical History:   Diagnosis Date    Abnormal EKG     Myotonic dystrophy     Pacemaker     PAF (paroxysmal atrial fibrillation)     Sleep apnea        Precautions: standard  Visit #: 6  Date of Eval: 5/9/2017  Plan of Care Expiration: 7/25/2017    Subjective   Pt reports: " I practiced sitting up once last week. That was it. "    Pain Scale:  Not reported     Objective     Patient received individual therapy to increase strength, endurance, ROM, flexibility, posture, core stabilization and head control with activities as follows:     Pt participated in therapeutic exercises x 45 minutes to address ROM and cervical alignment:   Supine:    Cervical SB PROM with overpressure    Cervical rotation PROM with overpressure    Cervical Extension with overpressure    pec stretching   Suboccipital release    Chin tucks, 1 x 10   L and R Side bending against manual resistance 1 x 10   Extension against manual resistance 1 x 10    Subscapularis release         Sitting:    Rows with verbal cues to hold head up OTB 3 x 10 - rows were completed with shoulder abduction w/ OTB   Completed 1 x 10 scaption w/ OTB          NP Today:   Rotation 2 x 10 with 1000 gram ball    2 trials passing beach ball back and forth encouraging trunk and neck rotation  ~ 3 minutes each trial until fatigue ,  wedge behind pt   2 x 10 situps with ~ 15 degree incline    2 x 2 minutes lying on half foam roller placed on spine with pec stretch and overpressure of neck into extension    Lat stretch with shoulder at 90 degrees flex with LTR   Right shoulder flexion AAROM to 90 degrees or above   Thoracic rotation with overpressure while maintaining upright sitting posture     Written Home " Exercises: eval- chin tucks, rows with OTB  Pt demo fair understanding of the education provided. Brigid demonstrated fair return demonstration of activities.     Education provided re: POC, HEP    Pt has no cultural, educational or language barriers to learning provided.    Assessment   Brigid tolerated treatment fairly well. Pt has gained increase ROM with R cervical side bending, moving past midline. Pt required less VC to correct sitting posture today. While assessing scapular movement in scaption, pt's R scapula was noted to have decreased upward rotation compared to L. Also, pt's R subscapularis was significantly tighter than L, a likely cause of limited ROM within her R shoulder. Provided pt with OTB to complete sitting rows at home, and instructed to perform 1 x 10 twice a day. Pt will continue to benefit from skilled PT to address impairments to improve head control and improve safety and independence with mobility.     Pt prognosis is Good. Pt will continue to benefit from skilled outpatient physical therapy to address the deficits listed in the problem list, provide pt/family education and to maximize pt's level of independence in the home and community environment.     Activity limitations/ Participation Restrictions:   1. Fall Risk - impaired balance   2. Weakness   3. Impaired muscle tone  4. Poor head control  5. Decreased ROM  6. Gait deviations   7. Decreased ambulation   8. Decreased activity tolerance   9. Decreased independence with daily activities   10. Requires skilled supervision to complete and progress HEP   11. Requires skilled PT for DME/ orthotic recommendations    Pt's spiritual, cultural and educational needs considered and pt agreeable to plan of care and GOALS as stated below:   Short term goals: 6 weeks, pt agrees to goals set.  1. Pt will perform chin tucks in supine with supervision to improve independent with HEP.  2. Assist pt with obtaining appropriate cervical brace to  "improve head control.   3. Pt will demonstrate improved knee ext strength on left to 4/5 to improve safety with ambulation and sit<>stand.    4. Pt will demonstrate improved cervical rotation PROM to 20 degrees to improve functional head control.   5. Pt will perform sit<>stand consistently from 22" height surface with supervision to improve independence.      Long term goals: 12 weeks, pt agrees to goals set  6. Pt will perform basic HEP for gross strengthening with supervision to deter worsening of functional limitations.  7. Pt will demonstrate improved bilateral hip strength to 3/5 to improve balance and independence with mobility.   8. Pt will demonstrate improved cervical extension strength to 3+/5 to improve head control.   9. Pt will demonstrate improved cervical deep flexor strength to 3+/5 to improve head control.   10. Pt will demonstrate improve balance and gait by scoring 14/28 on Tinetti.  11. Pt will demonstrate improved cervical PROM for rotation to 40 degrees to improve functional head control.     Plan   Continue PT 2x weekly under established Plan of Care, with treatment to include: pt education, HEP, therapeutic exercises, neuromuscular re-education/balance exercises, therapeutic activities, joint mobilizations, manual therapy, gait training PRN, and modalities PRN, to work towards established goals. Pt may be seen by PTA to carry out plan of care.       Suresh Montiel, SPT 6/26/2017    I, JORDY HensonT, certify that I was present in the room directing the student in service delivery and guiding them using my skilled judgment. As the co-signing therapist I have reviewed the students documentation and am responsible for the treatment, assessment, and plan.     Yadi Mercado DPT  6/26/2017  "

## 2017-06-28 ENCOUNTER — CLINICAL SUPPORT (OUTPATIENT)
Dept: REHABILITATION | Facility: HOSPITAL | Age: 52
End: 2017-06-28
Attending: STUDENT IN AN ORGANIZED HEALTH CARE EDUCATION/TRAINING PROGRAM
Payer: COMMERCIAL

## 2017-06-28 DIAGNOSIS — R53.1 DECREASED STRENGTH: ICD-10-CM

## 2017-06-28 DIAGNOSIS — Z74.09 IMPAIRED FUNCTIONAL MOBILITY, BALANCE, GAIT, AND ENDURANCE: ICD-10-CM

## 2017-06-28 DIAGNOSIS — R29.898 DECREASED ROM OF NECK: ICD-10-CM

## 2017-06-28 PROCEDURE — 97110 THERAPEUTIC EXERCISES: CPT | Mod: PO | Performed by: PHYSICAL THERAPIST

## 2017-06-28 NOTE — PROGRESS NOTES
"                                                    Physical Therapy Progress Note     Name: Brigid Jernigan  Clinic Number: [id number]  Diagnosis: G71.11 (ICD-10-CM) - Myotonic dystrophy  Physician: Leslee Moore MD  Treatment Orders: PT Eval and Treat  Past Medical History:   Diagnosis Date    Abnormal EKG     Myotonic dystrophy     Pacemaker     PAF (paroxysmal atrial fibrillation)     Sleep apnea        Precautions: standard  Visit #: 7  Date of Eval: 5/9/2017  Plan of Care Expiration: 7/25/2017    Subjective   Pt reports: " I did my exercises once yesterday. "    Pain Scale:  Not reported     Objective     Patient received individual therapy to increase strength, endurance, ROM, flexibility, posture, core stabilization and head control with activities as follows:     Pt participated in therapeutic exercises x 45 minutes to address ROM and cervical alignment:       Supine:    pec stretching lying on foam roller ~ 5 minutes    2 x 15 situps with ~ 15 degree incline       Sitting:    Rows with verbal cues to hold head up OTB 3 x 15   Completed 1 x 15 scaption w/ OTB   1 x 8 biceps 3# BUE   1 x 8 tricep extension 3# BUE         Standing:    Scaption with 3# 1 x 8 (used 1 UE for support)    Stood with BUE support for ~ 5 minutes *focus on standing straight   Side step onto 2" block onto R LE 1 x 10    Using Ballet bar practiced standing w/ good posture and taking side steps over ~ 60 feet        NP Today:   Rotation 2 x 10 with 1000 gram ball    2 trials passing beach ball back and forth encouraging trunk and neck rotation  ~ 3 minutes each trial until fatigue , wedge behind pt   Lat stretch with shoulder at 90 degrees flex with LTR   Right shoulder flexion AAROM to 90 degrees or above   Thoracic rotation with overpressure while maintaining upright sitting posture    Cervical SB PROM with overpressure    Cervical rotation PROM with overpressure    Cervical Extension with overpressure    Suboccipital release " "   Chin tucks, 1 x 10   L and R Side bending against manual resistance 1 x 10   Extension against manual resistance 1 x 10    Subscapularis release     Written Home Exercises: eval- chin tucks, rows with OTB  Pt demo fair understanding of the education provided. Brigid demonstrated fair return demonstration of activities.     Education provided re: POC, HEP    Pt has no cultural, educational or language barriers to learning provided.    Assessment   Brigid tolerated treatment fairly well. Pt showed improved sitting posture while completing rows today. Pt reported that all exercises completed in standing were very "tiring". Pt required numerous verbal cues to correct standing posture, and also encouragement to place body weight into L LE. Pt did not report pain withimn the L LE but stated it felt "weak". Plan to begin incorporating more standing activities to encourage weight bearing in L LE and to promote strengthening to aid in ADLs. Pt will continue to benefit from skilled PT to address impairments to improve head control and improve safety and independence with mobility.     Pt prognosis is Good. Pt will continue to benefit from skilled outpatient physical therapy to address the deficits listed in the problem list, provide pt/family education and to maximize pt's level of independence in the home and community environment.     Activity limitations/ Participation Restrictions:   1. Fall Risk - impaired balance   2. Weakness   3. Impaired muscle tone  4. Poor head control  5. Decreased ROM  6. Gait deviations   7. Decreased ambulation   8. Decreased activity tolerance   9. Decreased independence with daily activities   10. Requires skilled supervision to complete and progress HEP   11. Requires skilled PT for DME/ orthotic recommendations    Pt's spiritual, cultural and educational needs considered and pt agreeable to plan of care and GOALS as stated below:   Short term goals: 6 weeks, pt agrees to goals " "set.  1. Pt will perform chin tucks in supine with supervision to improve independent with HEP.  2. Assist pt with obtaining appropriate cervical brace to improve head control.   3. Pt will demonstrate improved knee ext strength on left to 4/5 to improve safety with ambulation and sit<>stand.    4. Pt will demonstrate improved cervical rotation PROM to 20 degrees to improve functional head control.   5. Pt will perform sit<>stand consistently from 22" height surface with supervision to improve independence.      Long term goals: 12 weeks, pt agrees to goals set  6. Pt will perform basic HEP for gross strengthening with supervision to deter worsening of functional limitations.  7. Pt will demonstrate improved bilateral hip strength to 3/5 to improve balance and independence with mobility.   8. Pt will demonstrate improved cervical extension strength to 3+/5 to improve head control.   9. Pt will demonstrate improved cervical deep flexor strength to 3+/5 to improve head control.   10. Pt will demonstrate improve balance and gait by scoring 14/28 on Tinetti.  11. Pt will demonstrate improved cervical PROM for rotation to 40 degrees to improve functional head control.     Plan   Continue PT 2x weekly under established Plan of Care, with treatment to include: pt education, HEP, therapeutic exercises, neuromuscular re-education/balance exercises, therapeutic activities, joint mobilizations, manual therapy, gait training PRN, and modalities PRN, to work towards established goals. Pt may be seen by PTA to carry out plan of care.       Suresh Montiel, SPT 6/28/2017    I, JORDY HensonT, certify that I was present in the room directing the student in service delivery and guiding them using my skilled judgment. As the co-signing therapist I have reviewed the students documentation and am responsible for the treatment, assessment, and plan.     Yadi Mercado DPT  6/28/2017  "

## 2017-07-05 ENCOUNTER — CLINICAL SUPPORT (OUTPATIENT)
Dept: REHABILITATION | Facility: HOSPITAL | Age: 52
End: 2017-07-05
Attending: STUDENT IN AN ORGANIZED HEALTH CARE EDUCATION/TRAINING PROGRAM
Payer: COMMERCIAL

## 2017-07-05 DIAGNOSIS — Z74.09 IMPAIRED FUNCTIONAL MOBILITY, BALANCE, GAIT, AND ENDURANCE: ICD-10-CM

## 2017-07-05 DIAGNOSIS — G71.11 MYOTONIC DYSTROPHY: Primary | ICD-10-CM

## 2017-07-05 DIAGNOSIS — R53.1 DECREASED STRENGTH: ICD-10-CM

## 2017-07-05 DIAGNOSIS — R29.898 DECREASED ROM OF NECK: ICD-10-CM

## 2017-07-05 PROCEDURE — 97110 THERAPEUTIC EXERCISES: CPT | Mod: PO | Performed by: PHYSICAL THERAPIST

## 2017-07-05 NOTE — PROGRESS NOTES
"                                                    Physical Therapy Progress Note     Name: Brigid Jernigan  Clinic Number: [id number]  Diagnosis: G71.11 (ICD-10-CM) - Myotonic dystrophy  Physician: Leslee Moore MD  Treatment Orders: PT Eval and Treat  Past Medical History:   Diagnosis Date    Abnormal EKG     Myotonic dystrophy     Pacemaker     PAF (paroxysmal atrial fibrillation)     Sleep apnea        Precautions: standard  Visit #: 8  Date of Eval: 5/9/2017  Plan of Care Expiration: 7/25/2017    Subjective   Pt reports: " I had a good fourth of July. "    Pain Scale:  Not reported     Objective     Patient received individual therapy to increase strength, endurance, ROM, flexibility, posture, core stabilization and head control with activities as follows:     Pt participated in therapeutic exercises x 45 minutes to address ROM and cervical alignment:     Cervical rotation PROM: 40 degrees (L) ; 38 degrees (R)      RLE eval 5/9/17 RLE 7/5/17 LLE eval 5/9/17 LLE 7/5/17   Hip Flexion: 3+/5 3+/5 3+/5 3+/5   Hip Extension:  2/5 3+/5 2/5 3+/5   Hip Abduction: NT 3+/5 NT 3+/5   Hip Adduction: NT 4/5 NT 3+/5   Knee Extension: 4+/5 5/5 3+/5 3+/5   Knee Flexion: 4/5 4+/5 4-/5 4-/5   Ankle Dorsiflexion: 0/5 3/5 0/5 3/5   Ankle Plantarflexion: 0/5 3/5 0/5 3/5   Ankle Inversion: 0/5 3+/5 0/5 3+/5   Ankle Eversion: 0/5 2+/5 0/5 2+/5       Sit to Stand from 22" = Completed w/ use of hands. Required 2 attemps    Chin Tucks in Supine: Y/N? Y    Tinetti Balance Assessment  Balance:  1. Sitting Balance 0   Leans/ slides in chair= 0   Steady= 1  2. Rises from chair 1   Unable without help= 0   Able, uses arms= 1   Able without use of arms= 2  3. Attempts to rise 1   Unable without help= 0   Able, >1 attmept required-= 1   Able, 1 attempt= 2  4. Immediate standing balance (1st 5 seconds) 1   Unsteady (swaggers, moves feet, trunk sway)= 0   Steady but uses walker or other support= 1   Narrow base of support without walker " "or support= 2  5. Nudged 1   Begins to fall= 0   Staggers, grabs, catches self= 1   Steady= 2  6. Standing balance 1   Unsteady= 0   Steady but wide LEYDI or uses AD=1   Narrow LEYDI without AD=2  7. Eyes closed 1   Unsteady= 0   Steady= 1  8. Turning 360 degrees 0   Discontinuous steps= 0   Continuous steps= 1   Unsteady= 0   Steady= 1  9. Sitting down 1   Unsafe= 0   Uses arms or not in a smooth motion= 1   Safe, smooth motion= 2  Balance score= 7  Gait:  1. Initiation 1   hesitates or multiple attempts to start= 0   No hesitancy= 1  2. Step length 1   Step to= 0   One foot passes= 1   reciprocal pattern= 2  3. Step height 2    Neither foot clears floor= 0   One foot clears floor= 1   Both feet clear floor= 2  4. Step symmetry 0   Not symmetrical= 0   Appears symmetrical= 1  5. Step continuity 1   Not continuous= 0   Appears continuous= 1  6. Path 0   Marked deviation= 0   Mild/moderate deviation or uses A.D.= 1   Straight without A.D.= 2  7. Trunk 0   Marked sway or uses A.D.= 0   No sway, but flexes knees or back, spread arms out while walking= 1   No sway, no flexion, no use of UE, no use of A.D.= 2  8. Walking stance 0   Heels apart= 0   Heels almost touching while walking= 1  Gait score= 5  Total score= 12/28 , high fall risk        Supine:    2 x 10 SLR    2 x 10 LAQ     Leg press 105# 2 x 10      NP Today:    pec stretching lying on foam roller ~ 5 minutes    2 x 15 situps with ~ 15 degree incline       Sitting:       NP Today:    Rows with verbal cues to hold head up OTB 3 x 15   Completed 1 x 15 scaption w/ OTB   1 x 8 biceps 3# BUE   1 x 8 tricep extension 3# BUE     Standing:    NP Today:    Scaption with 3# 1 x 8 (used 1 UE for support)    Stood with BUE support for ~ 5 minutes *focus on standing straight   Side step onto 2" block onto R LE 1 x 10    Using Ballet bar practiced standing w/ good posture and taking side steps over ~ 60 feet    Rotation 2 x 10 with 1000 gram ball    2 trials passing beach ball " back and forth encouraging trunk and neck rotation  ~ 3 minutes each trial until fatigue , wedge behind pt   Lat stretch with shoulder at 90 degrees flex with LTR   Right shoulder flexion AAROM to 90 degrees or above   Thoracic rotation with overpressure while maintaining upright sitting posture    Cervical SB PROM with overpressure    Cervical rotation PROM with overpressure    Cervical Extension with overpressure    Suboccipital release    Chin tucks, 1 x 10   L and R Side bending against manual resistance 1 x 10   Extension against manual resistance 1 x 10    Subscapularis release     Written Home Exercises: eval- chin tucks, rows with OTB  Pt demo fair understanding of the education provided. Brigid demonstrated fair return demonstration of activities.     Education provided re: POC, HEP    Pt has no cultural, educational or language barriers to learning provided.    Assessment   Brigid tolerated treatment fairly well. Pt has improved bilateral LE strength, cervical ROM in rotation, and was able to move from sit to stand with supervision. Added a new strength goal to reflect pt's improved LE strength. Pt has improved her tinetti score to a 12/28, keeping her within the high risk for falls category. Overall, pt is progressing towards her goals and believe pt will continue to benefit from UE and LE strengthening to improve her tinetti score, overall mobility in ADLs, and to address impairments to improve head control and improve safety and independence with mobility.     Pt prognosis is Good. Pt will continue to benefit from skilled outpatient physical therapy to address the deficits listed in the problem list, provide pt/family education and to maximize pt's level of independence in the home and community environment.     Activity limitations/ Participation Restrictions:   1. Fall Risk - impaired balance   2. Weakness   3. Impaired muscle tone  4. Poor head control  5. Decreased ROM  6. Gait deviations  "  7. Decreased ambulation   8. Decreased activity tolerance   9. Decreased independence with daily activities   10. Requires skilled supervision to complete and progress HEP   11. Requires skilled PT for DME/ orthotic recommendations    Pt's spiritual, cultural and educational needs considered and pt agreeable to plan of care and GOALS as stated below:   Short term goals: 6 weeks, pt agrees to goals set.  1. Pt will perform chin tucks in supine with supervision to improve independent with HEP. Met 7/5/17  2. Assist pt with obtaining appropriate cervical brace to improve head control. Ongoing- requested script for brace at least 2x  3. Pt will demonstrate improved knee ext strength on left to 4/5 to improve safety with ambulation and sit<>stand.  same  4. Pt will demonstrate improved cervical rotation PROM to 20 degrees to improve functional head control. Met 7/5/17  5. Pt will perform sit<>stand consistently from 22" height surface with supervision to improve independence. Met 7/5/17     Long term goals: 12 weeks, pt agrees to goals set  6. Pt will perform basic HEP for gross strengthening with supervision to deter worsening of functional limitations. Ongoing  7. Pt will demonstrate improved bilateral hip strength to 3/5 to improve balance and independence with mobility. Met 7/5/17  8. New 7/5/17: Pt will demonstrate improved bilateral hip strength to 4/5 to improve balance and independence with mobility.   9. Pt will demonstrate improved cervical extension strength to 3+/5 to improve head control.   10. Pt will demonstrate improved cervical deep flexor strength to 3+/5 to improve head control.   11. Pt will demonstrate improve balance and gait by scoring 14/28 on Tinetti. Improved- 12/28  12. Pt will demonstrate improved cervical PROM for rotation to 40 degrees to improve functional head control. Improved-  40 degrees (L) ; 38 degrees (R)     Plan   Continue PT 2x weekly under established Plan of Care, with " treatment to include: pt education, HEP, therapeutic exercises, neuromuscular re-education/balance exercises, therapeutic activities, joint mobilizations, manual therapy, gait training PRN, and modalities PRN, to work towards established goals. Pt may be seen by PTA to carry out plan of care.       Suresh Montiel, SPT 7/5/2017    I, Yadi Mercado, DPT, certify that I was present in the room directing the student in service delivery and guiding them using my skilled judgment. As the co-signing therapist I have reviewed the students documentation and am responsible for the treatment, assessment, and plan.     Yadi Mercado DPT  7/5/2017

## 2017-07-05 NOTE — PLAN OF CARE
"                                                    Physical Therapy Progress Note     Name: Brigid Jernigan  Clinic Number: [id number]  Diagnosis: G71.11 (ICD-10-CM) - Myotonic dystrophy  Physician: Leslee Moore MD  Treatment Orders: PT Eval and Treat  Past Medical History:   Diagnosis Date    Abnormal EKG     Myotonic dystrophy     Pacemaker     PAF (paroxysmal atrial fibrillation)     Sleep apnea        Precautions: standard  Visit #: 8  Date of Eval: 5/9/2017  Plan of Care Expiration: 7/25/2017    Subjective   Pt reports: " I had a good fourth of July. "    Pain Scale:  Not reported     Objective     Patient received individual therapy to increase strength, endurance, ROM, flexibility, posture, core stabilization and head control with activities as follows:     Pt participated in therapeutic exercises x 45 minutes to address ROM and cervical alignment:     Cervical rotation PROM: 40 degrees (L) ; 38 degrees (R)      RLE eval 5/9/17 RLE 7/5/17 LLE eval 5/9/17 LLE 7/5/17   Hip Flexion: 3+/5 3+/5 3+/5 3+/5   Hip Extension:  2/5 3+/5 2/5 3+/5   Hip Abduction: NT 3+/5 NT 3+/5   Hip Adduction: NT 4/5 NT 3+/5   Knee Extension: 4+/5 5/5 3+/5 3+/5   Knee Flexion: 4/5 4+/5 4-/5 4-/5   Ankle Dorsiflexion: 0/5 3/5 0/5 3/5   Ankle Plantarflexion: 0/5 3/5 0/5 3/5   Ankle Inversion: 0/5 3+/5 0/5 3+/5   Ankle Eversion: 0/5 2+/5 0/5 2+/5       Sit to Stand from 22" = Completed w/ use of hands. Required 2 attemps    Chin Tucks in Supine: Y/N? Y    Tinetti Balance Assessment  Balance:  1. Sitting Balance 0   Leans/ slides in chair= 0   Steady= 1  2. Rises from chair 1   Unable without help= 0   Able, uses arms= 1   Able without use of arms= 2  3. Attempts to rise 1   Unable without help= 0   Able, >1 attmept required-= 1   Able, 1 attempt= 2  4. Immediate standing balance (1st 5 seconds) 1   Unsteady (swaggers, moves feet, trunk sway)= 0   Steady but uses walker or other support= 1   Narrow base of support without walker " "or support= 2  5. Nudged 1   Begins to fall= 0   Staggers, grabs, catches self= 1   Steady= 2  6. Standing balance 1   Unsteady= 0   Steady but wide LEYDI or uses AD=1   Narrow LEYDI without AD=2  7. Eyes closed 1   Unsteady= 0   Steady= 1  8. Turning 360 degrees 0   Discontinuous steps= 0   Continuous steps= 1   Unsteady= 0   Steady= 1  9. Sitting down 1   Unsafe= 0   Uses arms or not in a smooth motion= 1   Safe, smooth motion= 2  Balance score= 7  Gait:  1. Initiation 1   hesitates or multiple attempts to start= 0   No hesitancy= 1  2. Step length 1   Step to= 0   One foot passes= 1   reciprocal pattern= 2  3. Step height 2    Neither foot clears floor= 0   One foot clears floor= 1   Both feet clear floor= 2  4. Step symmetry 0   Not symmetrical= 0   Appears symmetrical= 1  5. Step continuity 1   Not continuous= 0   Appears continuous= 1  6. Path 0   Marked deviation= 0   Mild/moderate deviation or uses A.D.= 1   Straight without A.D.= 2  7. Trunk 0   Marked sway or uses A.D.= 0   No sway, but flexes knees or back, spread arms out while walking= 1   No sway, no flexion, no use of UE, no use of A.D.= 2  8. Walking stance 0   Heels apart= 0   Heels almost touching while walking= 1  Gait score= 5  Total score= 12/28 , high fall risk        Supine:    2 x 10 SLR    2 x 10 LAQ     Leg press 105# 2 x 10      NP Today:    pec stretching lying on foam roller ~ 5 minutes    2 x 15 situps with ~ 15 degree incline       Sitting:       NP Today:    Rows with verbal cues to hold head up OTB 3 x 15   Completed 1 x 15 scaption w/ OTB   1 x 8 biceps 3# BUE   1 x 8 tricep extension 3# BUE     Standing:    NP Today:    Scaption with 3# 1 x 8 (used 1 UE for support)    Stood with BUE support for ~ 5 minutes *focus on standing straight   Side step onto 2" block onto R LE 1 x 10    Using Ballet bar practiced standing w/ good posture and taking side steps over ~ 60 feet    Rotation 2 x 10 with 1000 gram ball    2 trials passing beach ball " back and forth encouraging trunk and neck rotation  ~ 3 minutes each trial until fatigue , wedge behind pt   Lat stretch with shoulder at 90 degrees flex with LTR   Right shoulder flexion AAROM to 90 degrees or above   Thoracic rotation with overpressure while maintaining upright sitting posture    Cervical SB PROM with overpressure    Cervical rotation PROM with overpressure    Cervical Extension with overpressure    Suboccipital release    Chin tucks, 1 x 10   L and R Side bending against manual resistance 1 x 10   Extension against manual resistance 1 x 10    Subscapularis release     Written Home Exercises: eval- chin tucks, rows with OTB  Pt demo fair understanding of the education provided. Brigid demonstrated fair return demonstration of activities.     Education provided re: POC, HEP    Pt has no cultural, educational or language barriers to learning provided.    Assessment   Brigid tolerated treatment fairly well. Pt has improved bilateral LE strength, cervical ROM in rotation, and was able to move from sit to stand with supervision. Added a new strength goal to reflect pt's improved LE strength. Pt has improved her tinetti score to a 12/28, keeping her within the high risk for falls category. Overall, pt is progressing towards her goals and believe pt will continue to benefit from UE and LE strengthening to improve her tinetti score, overall mobility in ADLs, and to address impairments to improve head control and improve safety and independence with mobility.     Pt prognosis is Good. Pt will continue to benefit from skilled outpatient physical therapy to address the deficits listed in the problem list, provide pt/family education and to maximize pt's level of independence in the home and community environment.     Activity limitations/ Participation Restrictions:   1. Fall Risk - impaired balance   2. Weakness   3. Impaired muscle tone  4. Poor head control  5. Decreased ROM  6. Gait deviations  "  7. Decreased ambulation   8. Decreased activity tolerance   9. Decreased independence with daily activities   10. Requires skilled supervision to complete and progress HEP   11. Requires skilled PT for DME/ orthotic recommendations    Pt's spiritual, cultural and educational needs considered and pt agreeable to plan of care and GOALS as stated below:   Short term goals: 6 weeks, pt agrees to goals set.  1. Pt will perform chin tucks in supine with supervision to improve independent with HEP. Met 7/5/17  2. Assist pt with obtaining appropriate cervical brace to improve head control. Ongoing- requested script for brace at least 2x  3. Pt will demonstrate improved knee ext strength on left to 4/5 to improve safety with ambulation and sit<>stand.  same  4. Pt will demonstrate improved cervical rotation PROM to 20 degrees to improve functional head control. Met 7/5/17  5. Pt will perform sit<>stand consistently from 22" height surface with supervision to improve independence. Met 7/5/17     Long term goals: 12 weeks, pt agrees to goals set  6. Pt will perform basic HEP for gross strengthening with supervision to deter worsening of functional limitations. Ongoing  7. Pt will demonstrate improved bilateral hip strength to 3/5 to improve balance and independence with mobility. Met 7/5/17  8. New 7/5/17: Pt will demonstrate improved bilateral hip strength to 4/5 to improve balance and independence with mobility.   9. Pt will demonstrate improved cervical extension strength to 3+/5 to improve head control.   10. Pt will demonstrate improved cervical deep flexor strength to 3+/5 to improve head control.   11. Pt will demonstrate improve balance and gait by scoring 14/28 on Tinetti. Improved- 12/28  12. Pt will demonstrate improved cervical PROM for rotation to 40 degrees to improve functional head control. Improved-  40 degrees (L) ; 38 degrees (R)     Plan   Continue PT 2x weekly under established Plan of Care, with " treatment to include: pt education, HEP, therapeutic exercises, neuromuscular re-education/balance exercises, therapeutic activities, joint mobilizations, manual therapy, gait training PRN, and modalities PRN, to work towards established goals. Pt may be seen by PTA to carry out plan of care.       Suresh Montiel, SPT 7/5/2017    I, Yadi Mercado, DPT, certify that I was present in the room directing the student in service delivery and guiding them using my skilled judgment. As the co-signing therapist I have reviewed the students documentation and am responsible for the treatment, assessment, and plan.     Yadi Mercado DPT  7/5/2017

## 2017-07-06 PROBLEM — I10 ESSENTIAL HYPERTENSION: Status: ACTIVE | Noted: 2017-07-06

## 2017-07-06 PROBLEM — Z00.00 REGULAR CHECK-UP: Status: ACTIVE | Noted: 2017-07-06

## 2017-07-06 PROBLEM — E78.5 HYPERLIPIDEMIA: Status: ACTIVE | Noted: 2017-07-06

## 2017-07-07 ENCOUNTER — CLINICAL SUPPORT (OUTPATIENT)
Dept: REHABILITATION | Facility: HOSPITAL | Age: 52
End: 2017-07-07
Attending: STUDENT IN AN ORGANIZED HEALTH CARE EDUCATION/TRAINING PROGRAM
Payer: COMMERCIAL

## 2017-07-07 DIAGNOSIS — R29.898 DECREASED ROM OF NECK: ICD-10-CM

## 2017-07-07 DIAGNOSIS — R53.1 DECREASED STRENGTH: ICD-10-CM

## 2017-07-07 DIAGNOSIS — G71.11 MYOTONIC DYSTROPHY: Primary | ICD-10-CM

## 2017-07-07 DIAGNOSIS — Z74.09 IMPAIRED FUNCTIONAL MOBILITY, BALANCE, GAIT, AND ENDURANCE: ICD-10-CM

## 2017-07-07 PROCEDURE — 97110 THERAPEUTIC EXERCISES: CPT | Mod: PO | Performed by: PHYSICAL THERAPIST

## 2017-07-07 NOTE — PROGRESS NOTES
"                                                    Physical Therapy Progress Note     Name: Brigid Jernigan  Clinic Number: [id number]  Diagnosis: G71.11 (ICD-10-CM) - Myotonic dystrophy  Physician: Leslee Moore MD  Treatment Orders: PT Eval and Treat  Past Medical History:   Diagnosis Date    Abnormal EKG     Myotonic dystrophy     Pacemaker     PAF (paroxysmal atrial fibrillation)     Sleep apnea        Precautions: standard  Visit #: 9  Date of Eval: 5/9/2017  Plan of Care Expiration: 7/25/2017    Subjective   Pt reports: "I am tired . "    Pain Scale:  Not reported     Objective     Patient received individual therapy to increase strength, endurance, ROM, flexibility, posture, core stabilization and head control with activities as follows:     Pt participated in therapeutic exercises x 30 minutes to address ROM and cervical alignment:      Supine:   1 x 10 SLR   1 x 10 LAQ   pec stretching lying on foam roller ~ 5 minutes   Leg press 105# 1 x 10  Cervical SB PROM with overpressure   Cervical rotation PROM with overpressure   Suboccipital release      NP Today:   2 x 15 situps with ~ 15 degree incline      Sitting:   Rows with verbal cues to hold head up OTB 1 x 15   Thoracic/ trunk rotation X 10 L and R      NP Today:   Completed 1 x 15 scaption w/ OTB  1 x 8 biceps 3# BUE  1 x 8 tricep extension 3# BUE      Standing:   ~ 3 minutes practiced moving from sitting to standing without use of UE     NP Today:   Scaption with 3# 1 x 8 (used 1 UE for support)   Stood with BUE support for ~ 5 minutes *focus on standing straight  Side step onto 2" block onto R LE 1 x 10   Using Ballet bar practiced standing w/ good posture and taking side steps over ~ 60 feet   Rotation 2 x 10 with 1000 gram ball   2 trials passing beach ball back and forth encouraging trunk and neck rotation  ~ 3 minutes each trial until fatigue , wedge behind pt  Lat stretch with shoulder at 90 degrees flex with LTR  Right shoulder flexion " AAROM to 90 degrees or above  Thoracic rotation with overpressure while maintaining upright sitting posture   Cervical Extension with overpressure   Chin tucks, 1 x 10  L and R Side bending against manual resistance 1 x 10  Extension against manual resistance 1 x 10   Subscapularis release     Written Home Exercises: eval- chin tucks, rows with OTB  Pt demo fair understanding of the education provided. Brigid demonstrated fair return demonstration of activities.     Education provided re: POC, HEP    Pt has no cultural, educational or language barriers to learning provided.    Assessment   Brigid tolerated treatment. Pt continues to show small improvement in sitting posture, however still requires extensive verbal cueing to ambulate with an upright posture. Pt lacked motivation today, and begrudgingly participated. Pt will continue to benefit from LE strengthening to encourage more weight bearing in L LE and to possibly correct gait abnormality (pt's gait resembles an antalgic gait).     Pt prognosis is Good. Pt will continue to benefit from skilled outpatient physical therapy to address the deficits listed in the problem list, provide pt/family education and to maximize pt's level of independence in the home and community environment.     Activity limitations/ Participation Restrictions:   1. Fall Risk - impaired balance   2. Weakness   3. Impaired muscle tone  4. Poor head control  5. Decreased ROM  6. Gait deviations   7. Decreased ambulation   8. Decreased activity tolerance   9. Decreased independence with daily activities   10. Requires skilled supervision to complete and progress HEP   11. Requires skilled PT for DME/ orthotic recommendations    Pt's spiritual, cultural and educational needs considered and pt agreeable to plan of care and GOALS as stated below:   Short term goals: 6 weeks, pt agrees to goals set.  1. Pt will perform chin tucks in supine with supervision to improve independent with HEP. Met  "7/5/17  2. Assist pt with obtaining appropriate cervical brace to improve head control. Ongoing- requested script for brace at least 2x  3. Pt will demonstrate improved knee ext strength on left to 4/5 to improve safety with ambulation and sit<>stand.  same  4. Pt will demonstrate improved cervical rotation PROM to 20 degrees to improve functional head control. Met 7/5/17  5. Pt will perform sit<>stand consistently from 22" height surface with supervision to improve independence. Met 7/5/17     Long term goals: 12 weeks, pt agrees to goals set  6. Pt will perform basic HEP for gross strengthening with supervision to deter worsening of functional limitations. Ongoing  7. Pt will demonstrate improved bilateral hip strength to 3/5 to improve balance and independence with mobility. Met 7/5/17  8. New 7/5/17: Pt will demonstrate improved bilateral hip strength to 4/5 to improve balance and independence with mobility.   9. Pt will demonstrate improved cervical extension strength to 3+/5 to improve head control.   10. Pt will demonstrate improved cervical deep flexor strength to 3+/5 to improve head control.   11. Pt will demonstrate improve balance and gait by scoring 14/28 on Tinetti. Improved- 12/28  12. Pt will demonstrate improved cervical PROM for rotation to 40 degrees to improve functional head control. Improved-  40 degrees (L) ; 38 degrees (R)     Plan   Continue PT 2x weekly under established Plan of Care, with treatment to include: pt education, HEP, therapeutic exercises, neuromuscular re-education/balance exercises, therapeutic activities, joint mobilizations, manual therapy, gait training PRN, and modalities PRN, to work towards established goals. Pt may be seen by PTA to carry out plan of care.       Suresh Montiel, SPT 7/7/2017    I, Yadi Mercado, DPT, certify that I was present in the room directing the student in service delivery and guiding them using my skilled judgment. As the co-signing therapist I " have reviewed the students documentation and am responsible for the treatment, assessment, and plan.     Yadi Mercado DPT  7/7/2017

## 2017-07-11 PROBLEM — Z45.010 PACEMAKER AT END OF BATTERY LIFE: Status: ACTIVE | Noted: 2017-07-11

## 2017-07-12 ENCOUNTER — CLINICAL SUPPORT (OUTPATIENT)
Dept: REHABILITATION | Facility: HOSPITAL | Age: 52
End: 2017-07-12
Attending: STUDENT IN AN ORGANIZED HEALTH CARE EDUCATION/TRAINING PROGRAM
Payer: COMMERCIAL

## 2017-07-12 DIAGNOSIS — R29.898 DECREASED ROM OF NECK: ICD-10-CM

## 2017-07-12 DIAGNOSIS — Z74.09 IMPAIRED FUNCTIONAL MOBILITY, BALANCE, GAIT, AND ENDURANCE: ICD-10-CM

## 2017-07-12 DIAGNOSIS — R53.1 DECREASED STRENGTH: ICD-10-CM

## 2017-07-12 PROCEDURE — 97110 THERAPEUTIC EXERCISES: CPT | Mod: PO | Performed by: PHYSICAL THERAPIST

## 2017-07-12 NOTE — PROGRESS NOTES
"                                                    Physical Therapy Progress Note     Name: Brigid Jernigan  Clinic Number: [id number]  Diagnosis: G71.11 (ICD-10-CM) - Myotonic dystrophy  Physician: Leslee Moore MD  Treatment Orders: PT Eval and Treat  Past Medical History:   Diagnosis Date    Abnormal EKG     Myotonic dystrophy     Pacemaker     PAF (paroxysmal atrial fibrillation)     Sleep apnea        Precautions: standard  Visit #: 10  Date of Eval: 5/9/2017  Plan of Care Expiration: 7/25/2017    Subjective   Pt reports: "I am doin good today! "    Pain Scale:  Not reported     Objective     Patient received individual therapy to increase strength, endurance, ROM, flexibility, posture, core stabilization and head control with activities as follows:     Pt participated in therapeutic exercises x 50 minutes to address ROM and cervical alignment:      Supine:   Leg press 105# 2 x 10  2 x 1 minute SLR hamstring stretch each LE  2 x 1 minute quad stretch in sidelying each LE          NP Today:   2 x 15 situps with ~ 15 degree incline   1 x 10 SLR   1 x 10 LAQ   pec stretching lying on foam roller ~ 5 minutes  Cervical SB PROM with overpressure   Cervical rotation PROM with overpressure   Suboccipital release      Sitting:   3 x 15 Rows with verbal cues to hold head up @ 5#  1 x 8 D2 UE w/ 2# in L Ue. *could not complete exercise w/ R UE due to limited ROM  R Scapular abduction stretch X 30 seconds  X 8 minutes Nu Stepper @ 6.5 resistance      NP Today:   Completed 1 x 15 scaption w/ OTB  1 x 8 biceps 3# BUE  1 x 8 tricep extension 3# BUE   Thoracic/ trunk rotation X 10 L and R      Standing: n/a      Written Home Exercises: eval- chin tucks, rows with OTB  Pt demo fair understanding of the education provided. Brigid demonstrated fair return demonstration of activities.     Education provided re: POC, HEP    Pt has no cultural, educational or language barriers to learning provided.    Assessment   Brigid " tolerated treatment. Pt continues to show small improvement in sitting posture, especially while completing row exercise w/ 5# weight. Pt is only able to achieve 90 degrees of knee flexion in sidelying with forceful overpressure and will continue to benefit from quad stretching as her tight quads are likely contributing to her poor standing posture. While ambulating out of therapy, pt showed increased weight bearing in L LE, showing small improvement in her gait pattern. Believe pt will continue to benefit from skilled physical therapy to address muscle tension throughout as well as general weakness that impact her gait, sitting posture, and standing posture.     Pt prognosis is Good. Pt will continue to benefit from skilled outpatient physical therapy to address the deficits listed in the problem list, provide pt/family education and to maximize pt's level of independence in the home and community environment.     Activity limitations/ Participation Restrictions:   1. Fall Risk - impaired balance   2. Weakness   3. Impaired muscle tone  4. Poor head control  5. Decreased ROM  6. Gait deviations   7. Decreased ambulation   8. Decreased activity tolerance   9. Decreased independence with daily activities   10. Requires skilled supervision to complete and progress HEP   11. Requires skilled PT for DME/ orthotic recommendations    Pt's spiritual, cultural and educational needs considered and pt agreeable to plan of care and GOALS as stated below:   Short term goals: 6 weeks, pt agrees to goals set.  1. Pt will perform chin tucks in supine with supervision to improve independent with HEP. Met 7/5/17  2. Assist pt with obtaining appropriate cervical brace to improve head control. Ongoing- requested script for brace at least 2x  3. Pt will demonstrate improved knee ext strength on left to 4/5 to improve safety with ambulation and sit<>stand.  same  4. Pt will demonstrate improved cervical rotation PROM to 20 degrees to  "improve functional head control. Met 7/5/17  5. Pt will perform sit<>stand consistently from 22" height surface with supervision to improve independence. Met 7/5/17     Long term goals: 12 weeks, pt agrees to goals set  6. Pt will perform basic HEP for gross strengthening with supervision to deter worsening of functional limitations. Ongoing  7. Pt will demonstrate improved bilateral hip strength to 3/5 to improve balance and independence with mobility. Met 7/5/17  8. New 7/5/17: Pt will demonstrate improved bilateral hip strength to 4/5 to improve balance and independence with mobility.   9. Pt will demonstrate improved cervical extension strength to 3+/5 to improve head control.   10. Pt will demonstrate improved cervical deep flexor strength to 3+/5 to improve head control.   11. Pt will demonstrate improve balance and gait by scoring 14/28 on Tinetti. Improved- 12/28  12. Pt will demonstrate improved cervical PROM for rotation to 40 degrees to improve functional head control. Improved-  40 degrees (L) ; 38 degrees (R)     Plan   Continue PT 2x weekly under established Plan of Care, with treatment to include: pt education, HEP, therapeutic exercises, neuromuscular re-education/balance exercises, therapeutic activities, joint mobilizations, manual therapy, gait training PRN, and modalities PRN, to work towards established goals. Pt may be seen by PTA to carry out plan of care.       Suresh Montiel, SPT 7/12/2017    I, Yadi Mercado, DPT, certify that I was present in the room directing the student in service delivery and guiding them using my skilled judgment. As the co-signing therapist I have reviewed the students documentation and am responsible for the treatment, assessment, and plan.     Yadi Mercado DPT  7/12/2017  "

## 2017-07-18 ENCOUNTER — CLINICAL SUPPORT (OUTPATIENT)
Dept: REHABILITATION | Facility: HOSPITAL | Age: 52
End: 2017-07-18
Attending: STUDENT IN AN ORGANIZED HEALTH CARE EDUCATION/TRAINING PROGRAM
Payer: COMMERCIAL

## 2017-07-18 DIAGNOSIS — R29.898 DECREASED ROM OF NECK: ICD-10-CM

## 2017-07-18 DIAGNOSIS — Z74.09 IMPAIRED FUNCTIONAL MOBILITY, BALANCE, GAIT, AND ENDURANCE: ICD-10-CM

## 2017-07-18 DIAGNOSIS — R53.1 DECREASED STRENGTH: ICD-10-CM

## 2017-07-18 PROCEDURE — 97110 THERAPEUTIC EXERCISES: CPT | Mod: PO | Performed by: PHYSICAL THERAPIST

## 2017-07-18 NOTE — PROGRESS NOTES
"                                                    Physical Therapy Progress Note     Name: Brigid Jernigan  Clinic Number: [id number]  Diagnosis: G71.11 (ICD-10-CM) - Myotonic dystrophy  Physician: Leslee Moore MD  Treatment Orders: PT Eval and Treat  Past Medical History:   Diagnosis Date    Abnormal EKG     Myotonic dystrophy     Pacemaker     PAF (paroxysmal atrial fibrillation)     Sleep apnea        Precautions: standard  Visit #: 11  Date of Eval: 5/9/2017  Plan of Care Expiration: 8/22/2017  POC:     Subjective   Pt reports: "I am doing pretty good "    Pain Scale:  Not reported     Objective     Patient received individual therapy to increase strength, endurance, ROM, flexibility, posture, core stabilization and head control with activities as follows:     Pt participated in therapeutic exercises x 50 minutes to address ROM and cervical alignment:      Supine:   Leg press 105# 1 x 10  Single leg press 45# 1 x 10 BLE  2 x 1 minute SLR hamstring stretch each LE  2 x 1 minute quad stretch in sidelying each LE    LAQ 2 x 15 BLE  Cervical SB PROM with overpressure   Cervical rotation PROM with overpressure   Suboccipital release       NP Today:   2 x 15 situps with ~ 15 degree incline   1 x 10 SLR   1 x 10 LAQ   pec stretching lying on foam roller ~ 5 minutes    Standing:  *Attempted high knees w/i // bars  *pt fell to L following sudden L knee collapse after completing 3 steps w/i // bars     Sitting:      NP Today:   Completed 1 x 15 scaption w/ OTB  1 x 8 biceps 3# BUE  1 x 8 tricep extension 3# BUE   Thoracic/ trunk rotation X 10 L and R   3 x 15 Rows with verbal cues to hold head up @ 5#  1 x 8 D2 UE w/ 2# in L Ue. *could not complete exercise w/ R UE due to limited ROM  R Scapular abduction stretch X 30 seconds  X 8 minutes Nu Stepper @ 6.5 resistance      Written Home Exercises: eval- chin tucks, rows with OTB  Pt demo fair understanding of the education provided. Brigid demonstrated fair " return demonstration of activities.     Education provided re: POC, HEP    Pt has no cultural, educational or language barriers to learning provided.    Assessment   Brigid tolerated treatment. Pt's pacemaker was replaced on Friday 7/14/17 and therefore R shoulder flexion above 90 degrees and lifting > 8 lbs was contraindicated. Pt had moderate increase in hip and knee ROM during SL HSS and quad stretch in sidelying. Pt's strength continues to improve, as demonstrated by improved tolerance using leg press and ability to complete single leg press. Attempted to complete high knee activity w/i // bars to improve hip flexor strength today, however following 3 steps, pt's L knee suddenly collapsed causing her to fall to her L, pt was unable to regain standing balance. Believe pt's LOB was related to excessive verbal cuing causing some distraction, and possibly advancing patient from single leg press to high knee activity w/o sufficient recovery time. Overall, pt did tolerate all activity well and believe pt will continue to benefit from skilled physical therapy to address muscle tension throughout as well as general weakness that impact her gait, sitting posture, and standing posture. POC extended x 4 weeks to address remaining impairments.    Pt prognosis is Good. Pt will continue to benefit from skilled outpatient physical therapy to address the deficits listed in the problem list, provide pt/family education and to maximize pt's level of independence in the home and community environment.     Activity limitations/ Participation Restrictions:   1. Fall Risk - impaired balance   2. Weakness   3. Impaired muscle tone  4. Poor head control  5. Decreased ROM  6. Gait deviations   7. Decreased ambulation   8. Decreased activity tolerance   9. Decreased independence with daily activities   10. Requires skilled supervision to complete and progress HEP   11. Requires skilled PT for DME/ orthotic recommendations    Pt's  "spiritual, cultural and educational needs considered and pt agreeable to plan of care and GOALS as stated below:   Short term goals: 6 weeks, pt agrees to goals set.  1. Pt will perform chin tucks in supine with supervision to improve independent with HEP. Met 7/5/17  2. Assist pt with obtaining appropriate cervical brace to improve head control. Ongoing- requested script for brace at least 2x  3. Pt will demonstrate improved knee ext strength on left to 4/5 to improve safety with ambulation and sit<>stand.  same  4. Pt will demonstrate improved cervical rotation PROM to 20 degrees to improve functional head control. Met 7/5/17  5. Pt will perform sit<>stand consistently from 22" height surface with supervision to improve independence. Met 7/5/17     Long term goals: 12 weeks, pt agrees to goals set  6. Pt will perform basic HEP for gross strengthening with supervision to deter worsening of functional limitations. Ongoing  7. Pt will demonstrate improved bilateral hip strength to 3/5 to improve balance and independence with mobility. Met 7/5/17  8. New 7/5/17: Pt will demonstrate improved bilateral hip strength to 4/5 to improve balance and independence with mobility.   9. Pt will demonstrate improved cervical extension strength to 3+/5 to improve head control.   10. Pt will demonstrate improved cervical deep flexor strength to 3+/5 to improve head control.   11. Pt will demonstrate improve balance and gait by scoring 14/28 on Tinetti. Improved- 12/28  12. Pt will demonstrate improved cervical PROM for rotation to 40 degrees to improve functional head control. Improved-  40 degrees (L) ; 38 degrees (R)     Plan   Continue PT 2x weekly under established Plan of Care, with treatment to include: pt education, HEP, therapeutic exercises, neuromuscular re-education/balance exercises, therapeutic activities, joint mobilizations, manual therapy, gait training PRN, and modalities PRN, to work towards established goals. Pt " may be seen by PTA to carry out plan of care.       Suresh Montiel, SPT 7/18/2017    I, Yadi Mercado, DPT, certify that I was present in the room directing the student in service delivery and guiding them using my skilled judgment. As the co-signing therapist I have reviewed the students documentation and am responsible for the treatment, assessment, and plan.     Yadi Mercado, LUL  7/18/2017

## 2017-07-19 NOTE — PLAN OF CARE
"                                                    Physical Therapy Progress Note     Name: Brigid Jernigan  Clinic Number: [id number]  Diagnosis: G71.11 (ICD-10-CM) - Myotonic dystrophy  Physician: Leslee Moore MD  Treatment Orders: PT Eval and Treat  Past Medical History:   Diagnosis Date    Abnormal EKG     Myotonic dystrophy     Pacemaker     PAF (paroxysmal atrial fibrillation)     Sleep apnea        Precautions: standard  Visit #: 11  Date of Eval: 5/9/2017  Plan of Care Expiration: 8/22/2017  POC:     Subjective   Pt reports: "I am doing pretty good "    Pain Scale:  Not reported     Objective     Patient received individual therapy to increase strength, endurance, ROM, flexibility, posture, core stabilization and head control with activities as follows:     Pt participated in therapeutic exercises x 50 minutes to address ROM and cervical alignment:      Supine:   Leg press 105# 1 x 10  Single leg press 45# 1 x 10 BLE  2 x 1 minute SLR hamstring stretch each LE  2 x 1 minute quad stretch in sidelying each LE    LAQ 2 x 15 BLE  Cervical SB PROM with overpressure   Cervical rotation PROM with overpressure   Suboccipital release       NP Today:   2 x 15 situps with ~ 15 degree incline   1 x 10 SLR   1 x 10 LAQ   pec stretching lying on foam roller ~ 5 minutes    Standing:  *Attempted high knees w/i // bars  *pt fell to L following sudden L knee collapse after completing 3 steps w/i // bars     Sitting:      NP Today:   Completed 1 x 15 scaption w/ OTB  1 x 8 biceps 3# BUE  1 x 8 tricep extension 3# BUE   Thoracic/ trunk rotation X 10 L and R   3 x 15 Rows with verbal cues to hold head up @ 5#  1 x 8 D2 UE w/ 2# in L Ue. *could not complete exercise w/ R UE due to limited ROM  R Scapular abduction stretch X 30 seconds  X 8 minutes Nu Stepper @ 6.5 resistance      Written Home Exercises: eval- chin tucks, rows with OTB  Pt demo fair understanding of the education provided. Brigid demonstrated fair " return demonstration of activities.     Education provided re: POC, HEP    Pt has no cultural, educational or language barriers to learning provided.    Assessment   Brigid tolerated treatment. Pt's pacemaker was replaced on Friday 7/14/17 and therefore R shoulder flexion above 90 degrees and lifting > 8 lbs was contraindicated. Pt had moderate increase in hip and knee ROM during SL HSS and quad stretch in sidelying. Pt's strength continues to improve, as demonstrated by improved tolerance using leg press and ability to complete single leg press. Attempted to complete high knee activity w/i // bars to improve hip flexor strength today, however following 3 steps, pt's L knee suddenly collapsed causing her to fall to her L, pt was unable to regain standing balance. Believe pt's LOB was related to excessive verbal cuing causing some distraction, and possibly advancing patient from single leg press to high knee activity w/o sufficient recovery time. Overall, pt did tolerate all activity well and believe pt will continue to benefit from skilled physical therapy to address muscle tension throughout as well as general weakness that impact her gait, sitting posture, and standing posture. POC extended x 4 weeks to address remaining impairments.    Pt prognosis is Good. Pt will continue to benefit from skilled outpatient physical therapy to address the deficits listed in the problem list, provide pt/family education and to maximize pt's level of independence in the home and community environment.     Activity limitations/ Participation Restrictions:   1. Fall Risk - impaired balance   2. Weakness   3. Impaired muscle tone  4. Poor head control  5. Decreased ROM  6. Gait deviations   7. Decreased ambulation   8. Decreased activity tolerance   9. Decreased independence with daily activities   10. Requires skilled supervision to complete and progress HEP   11. Requires skilled PT for DME/ orthotic recommendations    Pt's  "spiritual, cultural and educational needs considered and pt agreeable to plan of care and GOALS as stated below:   Short term goals: 6 weeks, pt agrees to goals set.  1. Pt will perform chin tucks in supine with supervision to improve independent with HEP. Met 7/5/17  2. Assist pt with obtaining appropriate cervical brace to improve head control. Ongoing- requested script for brace at least 2x  3. Pt will demonstrate improved knee ext strength on left to 4/5 to improve safety with ambulation and sit<>stand.  same  4. Pt will demonstrate improved cervical rotation PROM to 20 degrees to improve functional head control. Met 7/5/17  5. Pt will perform sit<>stand consistently from 22" height surface with supervision to improve independence. Met 7/5/17     Long term goals: 12 weeks, pt agrees to goals set  6. Pt will perform basic HEP for gross strengthening with supervision to deter worsening of functional limitations. Ongoing  7. Pt will demonstrate improved bilateral hip strength to 3/5 to improve balance and independence with mobility. Met 7/5/17  8. New 7/5/17: Pt will demonstrate improved bilateral hip strength to 4/5 to improve balance and independence with mobility.   9. Pt will demonstrate improved cervical extension strength to 3+/5 to improve head control.   10. Pt will demonstrate improved cervical deep flexor strength to 3+/5 to improve head control.   11. Pt will demonstrate improve balance and gait by scoring 14/28 on Tinetti. Improved- 12/28  12. Pt will demonstrate improved cervical PROM for rotation to 40 degrees to improve functional head control. Improved-  40 degrees (L) ; 38 degrees (R)     Plan   Continue PT 2x weekly under established Plan of Care, with treatment to include: pt education, HEP, therapeutic exercises, neuromuscular re-education/balance exercises, therapeutic activities, joint mobilizations, manual therapy, gait training PRN, and modalities PRN, to work towards established goals. Pt " may be seen by PTA to carry out plan of care.       Suresh Montiel, SPT 7/18/2017    I, Yadi Mercado, DPT, certify that I was present in the room directing the student in service delivery and guiding them using my skilled judgment. As the co-signing therapist I have reviewed the students documentation and am responsible for the treatment, assessment, and plan.     Yadi Mercado, LUL  7/18/2017

## 2017-07-21 ENCOUNTER — CLINICAL SUPPORT (OUTPATIENT)
Dept: REHABILITATION | Facility: HOSPITAL | Age: 52
End: 2017-07-21
Attending: STUDENT IN AN ORGANIZED HEALTH CARE EDUCATION/TRAINING PROGRAM
Payer: COMMERCIAL

## 2017-07-21 DIAGNOSIS — R53.1 DECREASED STRENGTH: ICD-10-CM

## 2017-07-21 DIAGNOSIS — G71.11 MYOTONIC DYSTROPHY: Primary | ICD-10-CM

## 2017-07-21 DIAGNOSIS — R29.898 DECREASED ROM OF NECK: ICD-10-CM

## 2017-07-21 DIAGNOSIS — Z74.09 IMPAIRED FUNCTIONAL MOBILITY, BALANCE, GAIT, AND ENDURANCE: ICD-10-CM

## 2017-07-21 PROCEDURE — 97110 THERAPEUTIC EXERCISES: CPT | Mod: PO | Performed by: PHYSICAL THERAPIST

## 2017-07-21 NOTE — PROGRESS NOTES
"                                                    Physical Therapy Progress Note     Name: Brigid Jernigan  Clinic Number: [id number]  Diagnosis: G71.11 (ICD-10-CM) - Myotonic dystrophy  Physician: Leslee Moore MD  Treatment Orders: PT Eval and Treat  Past Medical History:   Diagnosis Date    Abnormal EKG     Myotonic dystrophy     Pacemaker     PAF (paroxysmal atrial fibrillation)     Sleep apnea        Precautions: standard  Visit #: 12  Date of Eval: 5/9/2017  Plan of Care Expiration: 8/22/2017      Subjective   Pt reports: "im doin ok I guess, just the same as I always am"    Pain Scale:  Not reported     Objective     Patient received individual therapy to increase strength, endurance, ROM, flexibility, posture, core stabilization and head control with activities as follows:     Pt participated in therapeutic exercises x 40 minutes to address ROM and cervical alignment:      Supine:   2 x 1 minute SLR hamstring stretch each LE  LAQ 2 x 15 BLE w/ 1.5# (L LE required min assist to complete)   SLR 2 x 15 w/ 1.5# (L Leg unable to complete with ankle weight)   Sitting edge of mat focus on good sitting posture completing 1 x 10 knee extension BLE   1 x 10 sit to stand from greta mat with min assist and contact guard     NP Today:   Cervical SB PROM with overpressure   Cervical rotation PROM with overpressure   Suboccipital release  pec stretching lying on foam roller ~ 5 minutes  2 x 1 minute quad stretch in sidelying each LE      Sitting:      NP Today:   Completed 1 x 15 scaption w/ OTB  Leg press 105# 1 x 10  Single leg press 45# 1 x 10 BLE  1 x 8 biceps 3# BUE  1 x 8 tricep extension 3# BUE   Thoracic/ trunk rotation X 10 L and R   3 x 15 Rows with verbal cues to hold head up @ 5#  1 x 8 D2 UE w/ 2# in L Ue. *could not complete exercise w/ R UE due to limited ROM  R Scapular abduction stretch X 30 seconds  X 8 minutes Nu Stepper @ 6.5 resistance      Written Home Exercises: eval- chin tucks, rows with " OTB  Pt demo fair understanding of the education provided. Brigid demonstrated fair return demonstration of activities.     Education provided re: POC, HEP    Pt has no cultural, educational or language barriers to learning provided.    Assessment   Brigid tolerated treatment fairly well today. Pt's sister reported pt was found to have a hairline fracture of left clavicle, no overhead lifting currently and limited UE use for 4-6 weeks. No other restrictions were noted. Her L LE continues to show a larger strength deficit compared to RLE. Pt's sitting posture has improved tremendously as she is able to sit with mild trunk extension without receiving verbal or tactile cues. Pt's activity tolerance remains low, but believe she still has great potential for strength and endurance improvements.     Pt prognosis is Good. Pt will continue to benefit from skilled outpatient physical therapy to address the deficits listed in the problem list, provide pt/family education and to maximize pt's level of independence in the home and community environment.     Activity limitations/ Participation Restrictions:   1. Fall Risk - impaired balance   2. Weakness   3. Impaired muscle tone  4. Poor head control  5. Decreased ROM  6. Gait deviations   7. Decreased ambulation   8. Decreased activity tolerance   9. Decreased independence with daily activities   10. Requires skilled supervision to complete and progress HEP   11. Requires skilled PT for DME/ orthotic recommendations    Pt's spiritual, cultural and educational needs considered and pt agreeable to plan of care and GOALS as stated below:   Short term goals: 6 weeks, pt agrees to goals set.  1. Pt will perform chin tucks in supine with supervision to improve independent with HEP. Met 7/5/17  2. Assist pt with obtaining appropriate cervical brace to improve head control. Ongoing- requested script for brace at least 2x  3. Pt will demonstrate improved knee ext strength on left to  "4/5 to improve safety with ambulation and sit<>stand.  same  4. Pt will demonstrate improved cervical rotation PROM to 20 degrees to improve functional head control. Met 7/5/17  5. Pt will perform sit<>stand consistently from 22" height surface with supervision to improve independence. Met 7/5/17     Long term goals: 12 weeks, pt agrees to goals set  6. Pt will perform basic HEP for gross strengthening with supervision to deter worsening of functional limitations. Ongoing  7. Pt will demonstrate improved bilateral hip strength to 3/5 to improve balance and independence with mobility. Met 7/5/17  8. New 7/5/17: Pt will demonstrate improved bilateral hip strength to 4/5 to improve balance and independence with mobility.   9. Pt will demonstrate improved cervical extension strength to 3+/5 to improve head control.   10. Pt will demonstrate improved cervical deep flexor strength to 3+/5 to improve head control.   11. Pt will demonstrate improve balance and gait by scoring 14/28 on Tinetti. Improved- 12/28  12. Pt will demonstrate improved cervical PROM for rotation to 40 degrees to improve functional head control. Improved-  40 degrees (L) ; 38 degrees (R)     Plan   Continue PT 2x weekly under established Plan of Care, with treatment to include: pt education, HEP, therapeutic exercises, neuromuscular re-education/balance exercises, therapeutic activities, joint mobilizations, manual therapy, gait training PRN, and modalities PRN, to work towards established goals. Pt may be seen by PTA to carry out plan of care.       Suresh Montiel, SPT 7/21/2017    I, JORDY HensonT, certify that I was present in the room directing the student in service delivery and guiding them using my skilled judgment. As the co-signing therapist I have reviewed the students documentation and am responsible for the treatment, assessment, and plan.     Yadi Mercado DPT  7/21/2017  "

## 2017-07-24 ENCOUNTER — CLINICAL SUPPORT (OUTPATIENT)
Dept: REHABILITATION | Facility: HOSPITAL | Age: 52
End: 2017-07-24
Attending: STUDENT IN AN ORGANIZED HEALTH CARE EDUCATION/TRAINING PROGRAM
Payer: COMMERCIAL

## 2017-07-24 DIAGNOSIS — R29.898 DECREASED ROM OF NECK: ICD-10-CM

## 2017-07-24 DIAGNOSIS — G71.11 MYOTONIC DYSTROPHY: Primary | ICD-10-CM

## 2017-07-24 DIAGNOSIS — Z74.09 IMPAIRED FUNCTIONAL MOBILITY, BALANCE, GAIT, AND ENDURANCE: ICD-10-CM

## 2017-07-24 DIAGNOSIS — R53.1 DECREASED STRENGTH: ICD-10-CM

## 2017-07-24 PROCEDURE — 97110 THERAPEUTIC EXERCISES: CPT | Mod: PO | Performed by: PHYSICAL THERAPIST

## 2017-07-24 NOTE — PROGRESS NOTES
"                                                    Physical Therapy Progress Note     Name: Brigid Jernigan  Clinic Number: [id number]  Diagnosis: G71.11 (ICD-10-CM) - Myotonic dystrophy  Physician: Leslee Moore MD  Treatment Orders: PT Eval and Treat  Past Medical History:   Diagnosis Date    Abnormal EKG     Myotonic dystrophy     Pacemaker     PAF (paroxysmal atrial fibrillation)     Sleep apnea        Precautions: standard  Visit #: 13  Date of Eval: 5/9/2017  Plan of Care Expiration: 8/22/2017      Subjective   Pt reports: "how are you doing today?"    Pain Scale:  5/10 L Shoulder pain      Objective     Patient received individual therapy to increase strength, endurance, ROM, flexibility, posture, core stabilization and head control with activities as follows:     Pt participated in therapeutic exercises x 47 minutes to address ROM and cervical alignment:      Supine:   2 x 1 minute SLR hamstring stretch each LE  Cervical SB PROM with overpressure   LAQ 2 x 15 BLE w/ 2# (L LE required min assist to complete)   SLR 2 x 15 (L Leg unable to complete with ankle weight)   2 x 15 bridges  *attempted leg press -> pt reported L shoulder pain to severe to complete exercise     Sitting:   X 8 minutes Nu Stepper @ 6.5 resistance       NP Today:   Cervical rotation PROM with overpressure   Suboccipital release  pec stretching lying on foam roller ~ 5 minutes  2 x 1 minute quad stretch in sidelying each LE    Completed 1 x 15 scaption w/ OTB  Leg press 105# 1 x 10  Single leg press 45# 1 x 10 BLE  1 x 8 biceps 3# BUE  1 x 8 tricep extension 3# BUE   Thoracic/ trunk rotation X 10 L and R   3 x 15 Rows with verbal cues to hold head up @ 5#  1 x 8 D2 UE w/ 2# in L Ue. *could not complete exercise w/ R UE due to limited ROM  R Scapular abduction stretch X 30 seconds    Written Home Exercises: eval- chin tucks, rows with OTB  Pt demo fair understanding of the education provided. Brigid demonstrated fair return " demonstration of activities.     Education provided re: POC, HEP    Pt has no cultural, educational or language barriers to learning provided.    Assessment   Brigid tolerated treatment fairly well. Pt is very cautious of her L shoulder, guarding with most exercises. Pt has begun to show an increase in hamstring flexibility with SLR. Pt did not report any increase in L UE pain using the Nu Stepper, and tolerated resistance well. Pt continues to show improvement in LE strength. Regarding posture, pt still requires numerous VC to stand upright. As pain within L UE resides, pt will benefit from a return to completing UE strengthening exercises.     Pt prognosis is Good. Pt will continue to benefit from skilled outpatient physical therapy to address the deficits listed in the problem list, provide pt/family education and to maximize pt's level of independence in the home and community environment.     Activity limitations/ Participation Restrictions:   1. Fall Risk - impaired balance   2. Weakness   3. Impaired muscle tone  4. Poor head control  5. Decreased ROM  6. Gait deviations   7. Decreased ambulation   8. Decreased activity tolerance   9. Decreased independence with daily activities   10. Requires skilled supervision to complete and progress HEP   11. Requires skilled PT for DME/ orthotic recommendations    Pt's spiritual, cultural and educational needs considered and pt agreeable to plan of care and GOALS as stated below:   Short term goals: 6 weeks, pt agrees to goals set.  1. Pt will perform chin tucks in supine with supervision to improve independent with HEP. Met 7/5/17  2. Assist pt with obtaining appropriate cervical brace to improve head control. Ongoing- requested script for brace at least 2x  3. Pt will demonstrate improved knee ext strength on left to 4/5 to improve safety with ambulation and sit<>stand.  same  4. Pt will demonstrate improved cervical rotation PROM to 20 degrees to improve functional  "head control. Met 7/5/17  5. Pt will perform sit<>stand consistently from 22" height surface with supervision to improve independence. Met 7/5/17     Long term goals: 12 weeks, pt agrees to goals set  6. Pt will perform basic HEP for gross strengthening with supervision to deter worsening of functional limitations. Ongoing  7. Pt will demonstrate improved bilateral hip strength to 3/5 to improve balance and independence with mobility. Met 7/5/17  8. New 7/5/17: Pt will demonstrate improved bilateral hip strength to 4/5 to improve balance and independence with mobility.   9. Pt will demonstrate improved cervical extension strength to 3+/5 to improve head control.   10. Pt will demonstrate improved cervical deep flexor strength to 3+/5 to improve head control.   11. Pt will demonstrate improve balance and gait by scoring 14/28 on Tinetti. Improved- 12/28  12. Pt will demonstrate improved cervical PROM for rotation to 40 degrees to improve functional head control. Improved-  40 degrees (L) ; 38 degrees (R)     Plan   Continue PT 2x weekly under established Plan of Care, with treatment to include: pt education, HEP, therapeutic exercises, neuromuscular re-education/balance exercises, therapeutic activities, joint mobilizations, manual therapy, gait training PRN, and modalities PRN, to work towards established goals. Pt may be seen by PTA to carry out plan of care.       Suresh Montiel, SPT 7/24/2017    I, Yadi Mercado, DPT, certify that I was present in the room directing the student in service delivery and guiding them using my skilled judgment. As the co-signing therapist I have reviewed the students documentation and am responsible for the treatment, assessment, and plan.     Yadi Mercado DPT  7/24/2017  "

## 2017-07-27 ENCOUNTER — CLINICAL SUPPORT (OUTPATIENT)
Dept: REHABILITATION | Facility: HOSPITAL | Age: 52
End: 2017-07-27
Attending: STUDENT IN AN ORGANIZED HEALTH CARE EDUCATION/TRAINING PROGRAM
Payer: COMMERCIAL

## 2017-07-27 DIAGNOSIS — R29.898 DECREASED ROM OF NECK: ICD-10-CM

## 2017-07-27 DIAGNOSIS — G71.11 MYOTONIC DYSTROPHY: Primary | ICD-10-CM

## 2017-07-27 DIAGNOSIS — Z74.09 IMPAIRED FUNCTIONAL MOBILITY, BALANCE, GAIT, AND ENDURANCE: ICD-10-CM

## 2017-07-27 DIAGNOSIS — R53.1 DECREASED STRENGTH: ICD-10-CM

## 2017-07-27 PROBLEM — S42.001A FRACTURE OF RIGHT CLAVICLE: Status: ACTIVE | Noted: 2017-07-27

## 2017-07-27 PROBLEM — T14.8XXA BRUISE: Status: ACTIVE | Noted: 2017-07-27

## 2017-07-27 PROCEDURE — 97110 THERAPEUTIC EXERCISES: CPT | Mod: PO | Performed by: PHYSICAL THERAPIST

## 2017-07-27 NOTE — PROGRESS NOTES
"                                                    Physical Therapy Progress Note     Name: Brigid Jernigan  Clinic Number: [id number]  Diagnosis: G71.11 (ICD-10-CM) - Myotonic dystrophy  Physician: Leslee Moore MD  Treatment Orders: PT Eval and Treat  Past Medical History:   Diagnosis Date    Abnormal EKG     Myotonic dystrophy     Pacemaker     PAF (paroxysmal atrial fibrillation)     Sleep apnea        Precautions: standard  Visit #: 14  Date of Eval: 5/9/2017  Plan of Care Expiration: 8/22/2017      Subjective   Pt reports: "uh I'm good today"    Pain Scale: not assessed      Objective     Patient received individual therapy to increase strength, endurance, ROM, flexibility, posture, core stabilization and head control with activities as follows:     Pt participated in therapeutic exercises x 45 minutes to address ROM and cervical alignment:      Supine:   2 x 1 minute SLR hamstring stretch each LE  2 x 1 minute quad stretch in sidelying (unable to complete stretch lying on L side due to shoulder pain)  LAQ 2 x 15 BLE w/ 2.5# (L LE required min assist to complete)   2 x 15 bridges      Sitting:   X 8 minutes Nu Stepper @ 6.5 resistance     2 x 15 rows with OTB    *patient provided education moving from low mat to standing     NP Today:   Cervical rotation PROM with overpressure   Suboccipital release  pec stretching lying on foam roller ~ 5 minutes  2 x 1 minute quad stretch in sidelying each LE    Leg press 105# 1 x 10  Single leg press 45# 1 x 10 BLE  1 x 8 biceps 3# BUE  1 x 8 tricep extension 3# BUE   Thoracic/ trunk rotation X 10 L and R   3 x 15 Rows with verbal cues to hold head up @ 5#  1 x 8 D2 UE w/ 2# in L Ue. *could not complete exercise w/ R UE due to limited ROM  R Scapular abduction stretch X 30 seconds    Written Home Exercises: eval- chin tucks, rows with OTB, hip flexor and knee extensor stretch in sidelying  Pt demo fair understanding of the education provided. Brigid demonstrated " fair return demonstration of activities.     Education provided re: POC, HEP    Pt has no cultural, educational or language barriers to learning provided.    Assessment   Brigdi tolerated treatment well today. She reported a noticeable reduction in her shoulder pain and was able to complete rows sitting edge of bed today. Patient continues to lack significant flexibility within her hip flexors and knee extensors and was provided with a HEP showing how to stretch these at home with assistance in sidelying. Pt continues to ambulate in and out of gym with a significant forward flexed posture (i.e. significant thoracic kyphosis, rounded shoulders, and cervical flexion) and limited single leg stance time on L lower extremity and will continue to benefit in physical therapy aimed at increasing lower extremity strength and stretching to correct poor posture.     Pt prognosis is Good. Pt will continue to benefit from skilled outpatient physical therapy to address the deficits listed in the problem list, provide pt/family education and to maximize pt's level of independence in the home and community environment.     Activity limitations/ Participation Restrictions:   1. Fall Risk - impaired balance   2. Weakness   3. Impaired muscle tone  4. Poor head control  5. Decreased ROM  6. Gait deviations   7. Decreased ambulation   8. Decreased activity tolerance   9. Decreased independence with daily activities   10. Requires skilled supervision to complete and progress HEP   11. Requires skilled PT for DME/ orthotic recommendations    Pt's spiritual, cultural and educational needs considered and pt agreeable to plan of care and GOALS as stated below:   Short term goals: 6 weeks, pt agrees to goals set.  1. Pt will perform chin tucks in supine with supervision to improve independent with HEP. Met 7/5/17  2. Assist pt with obtaining appropriate cervical brace to improve head control. Ongoing- requested script for brace at least  "2x  3. Pt will demonstrate improved knee ext strength on left to 4/5 to improve safety with ambulation and sit<>stand.  same  4. Pt will demonstrate improved cervical rotation PROM to 20 degrees to improve functional head control. Met 7/5/17  5. Pt will perform sit<>stand consistently from 22" height surface with supervision to improve independence. Met 7/5/17     Long term goals: 12 weeks, pt agrees to goals set  6. Pt will perform basic HEP for gross strengthening with supervision to deter worsening of functional limitations. Ongoing  7. Pt will demonstrate improved bilateral hip strength to 3/5 to improve balance and independence with mobility. Met 7/5/17  8. New 7/5/17: Pt will demonstrate improved bilateral hip strength to 4/5 to improve balance and independence with mobility.   9. Pt will demonstrate improved cervical extension strength to 3+/5 to improve head control.   10. Pt will demonstrate improved cervical deep flexor strength to 3+/5 to improve head control.   11. Pt will demonstrate improve balance and gait by scoring 14/28 on Tinetti. Improved- 12/28  12. Pt will demonstrate improved cervical PROM for rotation to 40 degrees to improve functional head control. Improved-  40 degrees (L) ; 38 degrees (R)     Plan   Continue PT 2x weekly under established Plan of Care, with treatment to include: pt education, HEP, therapeutic exercises, neuromuscular re-education/balance exercises, therapeutic activities, joint mobilizations, manual therapy, gait training PRN, and modalities PRN, to work towards established goals. Pt may be seen by PTA to carry out plan of care.       Suresh Montiel, SPT 7/27/2017    I, Yadi Mercado, DPT, certify that I was present in the room directing the student in service delivery and guiding them using my skilled judgment. As the co-signing therapist I have reviewed the students documentation and am responsible for the treatment, assessment, and plan.     Yadi Mercado, " DPT  7/27/2017

## 2017-07-31 ENCOUNTER — CLINICAL SUPPORT (OUTPATIENT)
Dept: REHABILITATION | Facility: HOSPITAL | Age: 52
End: 2017-07-31
Attending: STUDENT IN AN ORGANIZED HEALTH CARE EDUCATION/TRAINING PROGRAM
Payer: COMMERCIAL

## 2017-07-31 DIAGNOSIS — G71.11 MYOTONIC DYSTROPHY: Primary | ICD-10-CM

## 2017-07-31 DIAGNOSIS — R29.898 DECREASED ROM OF NECK: ICD-10-CM

## 2017-07-31 DIAGNOSIS — R53.1 DECREASED STRENGTH: ICD-10-CM

## 2017-07-31 DIAGNOSIS — Z74.09 IMPAIRED FUNCTIONAL MOBILITY, BALANCE, GAIT, AND ENDURANCE: ICD-10-CM

## 2017-07-31 PROCEDURE — 97110 THERAPEUTIC EXERCISES: CPT | Mod: PO | Performed by: PHYSICAL THERAPIST

## 2017-07-31 NOTE — PROGRESS NOTES
"                                                    Physical Therapy Progress Note     Name: Brigid Jernigan  Clinic Number: [id number]  Diagnosis: G71.11 (ICD-10-CM) - Myotonic dystrophy  Physician: Leslee Moore MD  Treatment Orders: PT Eval and Treat  Past Medical History:   Diagnosis Date    Abnormal EKG     Myotonic dystrophy     Pacemaker     PAF (paroxysmal atrial fibrillation)     Sleep apnea        Precautions: standard  Visit #: 15  Date of Eval: 5/9/2017  Plan of Care Expiration: 9/19/2017      Subjective   Pt reports: "I'm doin good"    Pain Scale: not assessed/ no significant complaints during session    Objective     Patient received individual therapy to increase strength, endurance, ROM, flexibility, posture, core stabilization and head control with activities as follows:     Pt participated in therapeutic exercises x 50 minutes to address ROM and cervical alignment:     Cervical rotation PROM: 44 degrees (L) ; 38 degrees (R)        RLE eval 5/9/17 RLE 7/5/17 RLE  7/31/17 LLE eval 5/9/17 LLE 7/5/17 LLE 7/31/17   Hip Flexion: 3+/5 3+/5 3+/5 3+/5 3+/5 4/5   Hip Extension:  2/5 3+/5 4/5 2/5 3+/5 4/5   Hip Abduction: NT 3+/5 3+/5 NT 3+/5 4-/5   Hip Adduction: NT 4/5 4+/5 NT 3+/5 4+/5   Knee Extension: 4+/5 5/5 4+/5 3+/5 3+/5 4/4   Knee Flexion: 4/5 4+/5 4+/5 4-/5 4-/5 4-/5   Ankle Dorsiflexion: 0/5 3/5 3/5 0/5 3/5 3/5   Ankle Plantarflexion: 0/5 3/5 3/5 0/5 3/5 3/5   Ankle Inversion: 0/5 3+/5 4/5 0/5 3+/5 3+/5   Ankle Eversion: 0/5 2+/5 2+/5 0/5 2+/5 3/5   Cervical Extensors   3+/5      Deep Cervical Flexors    4+/5                Sit to Stand from 22" = Completed w/ use of hands. Required 4 attemps     Chin Tucks in Supine: Y/N? Y    Tinetti Balance Assessment  Balance:  1. Sitting Balance 1   Leans/ slides in chair= 0   Steady= 1  2. Rises from chair 1   Unable without help= 0   Able, uses arms= 1   Able without use of arms= 2  3. Attempts to rise 1   Unable without help= 0   Able, >1 attmept " required-= 1   Able, 1 attempt= 2  4. Immediate standing balance (1st 5 seconds) 1   Unsteady (swaggers, moves feet, trunk sway)= 0   Steady but uses walker or other support= 1   Narrow base of support without walker or support= 2  5. Nudged 0   Begins to fall= 0   Staggers, grabs, catches self= 1   Steady= 2  6. Standing balance 1   Unsteady= 0   Steady but wide LEYDI or uses AD=1   Narrow LEYDI without AD=2  7. Eyes closed 0   Unsteady= 0   Steady= 1  8. Turning 360 degrees 0   Discontinuous steps= 0   Continuous steps= 1   Unsteady= 0   Steady= 1  9. Sitting down 2   Unsafe= 0   Uses arms or not in a smooth motion= 1   Safe, smooth motion= 2  Balance score= 7  Gait:  1. Initiation 1   hesitates or multiple attempts to start= 0   No hesitancy= 1  2. Step length 1   Step to= 0   One foot passes= 1   reciprocal pattern= 2  3. Step height 2    Neither foot clears floor= 0   One foot clears floor= 1   Both feet clear floor= 2  4. Step symmetry 0   Not symmetrical= 0   Appears symmetrical= 1  5. Step continuity 0   Not continuous= 0   Appears continuous= 1  6. Path 1   Marked deviation= 0   Mild/moderate deviation or uses A.D.= 1   Straight without A.D.= 2  7. Trunk 0   Marked sway or uses A.D.= 0   No sway, but flexes knees or back, spread arms out while walking= 1   No sway, no flexion, no use of UE, no use of A.D.= 2  8. Walking stance 0   Heels apart= 0   Heels almost touching while walking= 1  Gait score= 5  Total score= 12 , High fall risk    Supine:   1 x 1 minute SLR hamstring stretch each LE  1 x 1 minute quad stretch in sidelying (unable to complete stretch lying on L side due to shoulder pain)    Sitting:   Leg press 105# 1 x 10    NP Today:   X 8 minutes Nu Stepper @ 6.5 resistance     2 x 15 rows with OTB  LAQ 2 x 15 BLE w/ 2.5# (L LE required min assist to complete)   2 x 15 bridges  Cervical rotation PROM with overpressure   Suboccipital release  pec stretching lying on foam roller ~ 5 minutes  2 x 1 minute  quad stretch in sidelying each LE    Single leg press 45# 1 x 10 BLE  1 x 8 biceps 3# BUE  1 x 8 tricep extension 3# BUE   Thoracic/ trunk rotation X 10 L and R   3 x 15 Rows with verbal cues to hold head up @ 5#  1 x 8 D2 UE w/ 2# in L Ue. *could not complete exercise w/ R UE due to limited ROM  R Scapular abduction stretch X 30 seconds    Written Home Exercises: assisted quad/ hip flexor stretch in side lying  eval- chin tucks, rows with OTB, hip flexor and knee extensor stretch in sidelying  Pt demo fair understanding of the education provided. Brigid demonstrated fair return demonstration of activities.     Education provided re: POC, HEP    Pt has no cultural, educational or language barriers to learning provided.    Assessment   Brigid tolerated her reassessment well today. Her overall LE strength has mildly improved bilaterally and her L cervical ROM has gained an additional 4 degrees to 44 degrees. Her R cervical rotation remains unchanged and her ankle strength remains unchanged. Her tinetti score also remains unchanged, however she has begun to move from sit > stand and stand > sit with much better control. Despite these small advancements in strength and cervical ROM, believe patient is progressing well considering her diagnosis, age, and 2 x weekly engagement in physical therapy. If patient begins completing more exercises and stretches at home, in addition to therapy, she will make larger advancements as she has potential. POC extended x 4 weeks to allow for progress as needed. Pt noted to present with decreased bilateral ankle stability (right>left). Pt is noted to demonstrate significant supination/ IV with sit>stand with current OTC AFOs. This in combination with history of osteoporosis puts pt at risk for injury. Injury can be prevented by pt obtaining custom bilateral solid ankle AFOs.     Pt prognosis is Good. Pt will continue to benefit from skilled outpatient physical therapy to address the  "deficits listed in the problem list, provide pt/family education and to maximize pt's level of independence in the home and community environment.     Activity limitations/ Participation Restrictions:   1. Fall Risk - impaired balance   2. Weakness   3. Impaired muscle tone  4. Poor head control  5. Decreased ROM  6. Gait deviations   7. Decreased ambulation   8. Decreased activity tolerance   9. Decreased independence with daily activities   10. Requires skilled supervision to complete and progress HEP   11. Requires skilled PT for DME/ orthotic recommendations    Pt's spiritual, cultural and educational needs considered and pt agreeable to plan of care and GOALS as stated below:   Short term goals: 6 weeks, pt agrees to goals set. (As of 7/31/17)  1. Pt will perform chin tucks in supine with supervision to improve independent with HEP. Met 7/5/17  2. Assist pt with obtaining appropriate cervical brace to improve head control. Ongoing- requested script for brace  3. Pt will demonstrate improved knee ext strength on left to 4/5 to improve safety with ambulation and sit<>stand.  same  4. Pt will demonstrate improved cervical rotation PROM to 20 degrees to improve functional head control. Met 7/5/17  5. Pt will perform sit<>stand consistently from 22" height surface with supervision to improve independence. Met 7/5/17     Long term goals: 12 weeks, pt agrees to goals set  6. Pt will perform basic HEP for gross strengthening with supervision to deter worsening of functional limitations. Ongoing  7. Pt will demonstrate improved bilateral hip strength to 3/5 to improve balance and independence with mobility. Met 7/5/17  8. New 7/5/17: Pt will demonstrate improved bilateral hip strength to 4/5 to improve balance and independence with mobility.   9. Pt will demonstrate improved cervical extension strength to 3+/5 to improve head control. Met 7/31/17  10. Pt will demonstrate improved cervical deep flexor strength to 3+/5 to " improve head control. Met 7/31/17   11. Pt will demonstrate improve balance and gait by scoring 14/28 on Tinetti. Same- 12/28  12. Pt will demonstrate improved cervical PROM for rotation to 40 degrees to improve functional head control. Improved-  44 degrees (L) ; 38 degrees (R)     Plan   Continue PT 2x weekly under established Plan of Care, with treatment to include: pt education, HEP, therapeutic exercises, neuromuscular re-education/balance exercises, therapeutic activities, joint mobilizations, manual therapy, gait training PRN, and modalities PRN, to work towards established goals. Pt may be seen by PTA to carry out plan of care.       Suresh Montiel, SPT 7/31/2017    I, Yadi Mercado, DPT, certify that I was present in the room directing the student in service delivery and guiding them using my skilled judgment. As the co-signing therapist I have reviewed the students documentation and am responsible for the treatment, assessment, and plan.     Yadi Mercado DPT  7/31/2017

## 2017-07-31 NOTE — PLAN OF CARE
"                                                    Physical Therapy Progress Note     Name: Brigid Jernigan  Clinic Number: [id number]  Diagnosis: G71.11 (ICD-10-CM) - Myotonic dystrophy  Physician: Leslee Moore MD  Treatment Orders: PT Eval and Treat  Past Medical History:   Diagnosis Date    Abnormal EKG     Myotonic dystrophy     Pacemaker     PAF (paroxysmal atrial fibrillation)     Sleep apnea        Precautions: standard  Visit #: 15  Date of Eval: 5/9/2017  Plan of Care Expiration: 9/19/2017      Subjective   Pt reports: "I'm doin good"    Pain Scale: not assessed/ no significant complaints during session    Objective     Patient received individual therapy to increase strength, endurance, ROM, flexibility, posture, core stabilization and head control with activities as follows:     Pt participated in therapeutic exercises x 50 minutes to address ROM and cervical alignment:     Cervical rotation PROM: 44 degrees (L) ; 38 degrees (R)        RLE eval 5/9/17 RLE 7/5/17 RLE  7/31/17 LLE eval 5/9/17 LLE 7/5/17 LLE 7/31/17   Hip Flexion: 3+/5 3+/5 3+/5 3+/5 3+/5 4/5   Hip Extension:  2/5 3+/5 4/5 2/5 3+/5 4/5   Hip Abduction: NT 3+/5 3+/5 NT 3+/5 4-/5   Hip Adduction: NT 4/5 4+/5 NT 3+/5 4+/5   Knee Extension: 4+/5 5/5 4+/5 3+/5 3+/5 4/4   Knee Flexion: 4/5 4+/5 4+/5 4-/5 4-/5 4-/5   Ankle Dorsiflexion: 0/5 3/5 3/5 0/5 3/5 3/5   Ankle Plantarflexion: 0/5 3/5 3/5 0/5 3/5 3/5   Ankle Inversion: 0/5 3+/5 4/5 0/5 3+/5 3+/5   Ankle Eversion: 0/5 2+/5 2+/5 0/5 2+/5 3/5   Cervical Extensors   3+/5      Deep Cervical Flexors    4+/5                Sit to Stand from 22" = Completed w/ use of hands. Required 4 attemps     Chin Tucks in Supine: Y/N? Y    Tinetti Balance Assessment  Balance:  1. Sitting Balance 1   Leans/ slides in chair= 0   Steady= 1  2. Rises from chair 1   Unable without help= 0   Able, uses arms= 1   Able without use of arms= 2  3. Attempts to rise 1   Unable without help= 0   Able, >1 attmept " required-= 1   Able, 1 attempt= 2  4. Immediate standing balance (1st 5 seconds) 1   Unsteady (swaggers, moves feet, trunk sway)= 0   Steady but uses walker or other support= 1   Narrow base of support without walker or support= 2  5. Nudged 0   Begins to fall= 0   Staggers, grabs, catches self= 1   Steady= 2  6. Standing balance 1   Unsteady= 0   Steady but wide LEYDI or uses AD=1   Narrow LEYDI without AD=2  7. Eyes closed 0   Unsteady= 0   Steady= 1  8. Turning 360 degrees 0   Discontinuous steps= 0   Continuous steps= 1   Unsteady= 0   Steady= 1  9. Sitting down 2   Unsafe= 0   Uses arms or not in a smooth motion= 1   Safe, smooth motion= 2  Balance score= 7  Gait:  1. Initiation 1   hesitates or multiple attempts to start= 0   No hesitancy= 1  2. Step length 1   Step to= 0   One foot passes= 1   reciprocal pattern= 2  3. Step height 2    Neither foot clears floor= 0   One foot clears floor= 1   Both feet clear floor= 2  4. Step symmetry 0   Not symmetrical= 0   Appears symmetrical= 1  5. Step continuity 0   Not continuous= 0   Appears continuous= 1  6. Path 1   Marked deviation= 0   Mild/moderate deviation or uses A.D.= 1   Straight without A.D.= 2  7. Trunk 0   Marked sway or uses A.D.= 0   No sway, but flexes knees or back, spread arms out while walking= 1   No sway, no flexion, no use of UE, no use of A.D.= 2  8. Walking stance 0   Heels apart= 0   Heels almost touching while walking= 1  Gait score= 5  Total score= 12 , High fall risk    Supine:   1 x 1 minute SLR hamstring stretch each LE  1 x 1 minute quad stretch in sidelying (unable to complete stretch lying on L side due to shoulder pain)    Sitting:   Leg press 105# 1 x 10    NP Today:   X 8 minutes Nu Stepper @ 6.5 resistance     2 x 15 rows with OTB  LAQ 2 x 15 BLE w/ 2.5# (L LE required min assist to complete)   2 x 15 bridges  Cervical rotation PROM with overpressure   Suboccipital release  pec stretching lying on foam roller ~ 5 minutes  2 x 1 minute  quad stretch in sidelying each LE    Single leg press 45# 1 x 10 BLE  1 x 8 biceps 3# BUE  1 x 8 tricep extension 3# BUE   Thoracic/ trunk rotation X 10 L and R   3 x 15 Rows with verbal cues to hold head up @ 5#  1 x 8 D2 UE w/ 2# in L Ue. *could not complete exercise w/ R UE due to limited ROM  R Scapular abduction stretch X 30 seconds    Written Home Exercises: assisted quad/ hip flexor stretch in side lying  eval- chin tucks, rows with OTB, hip flexor and knee extensor stretch in sidelying  Pt demo fair understanding of the education provided. Brigid demonstrated fair return demonstration of activities.     Education provided re: POC, HEP    Pt has no cultural, educational or language barriers to learning provided.    Assessment   Brigid tolerated her reassessment well today. Her overall LE strength has mildly improved bilaterally and her L cervical ROM has gained an additional 4 degrees to 44 degrees. Her R cervical rotation remains unchanged and her ankle strength remains unchanged. Her tinetti score also remains unchanged, however she has begun to move from sit > stand and stand > sit with much better control. Despite these small advancements in strength and cervical ROM, believe patient is progressing well considering her diagnosis, age, and 2 x weekly engagement in physical therapy. If patient begins completing more exercises and stretches at home, in addition to therapy, she will make larger advancements as she has potential. POC extended x 4 weeks to allow for progress as needed. Pt noted to present with decreased bilateral ankle stability (right>left). Pt is noted to demonstrate significant supination/ IV with sit>stand with current OTC AFOs. This in combination with history of osteoporosis puts pt at risk for injury. Injury can be prevented by pt obtaining custom bilateral solid ankle AFOs.     Pt prognosis is Good. Pt will continue to benefit from skilled outpatient physical therapy to address the  "deficits listed in the problem list, provide pt/family education and to maximize pt's level of independence in the home and community environment.     Activity limitations/ Participation Restrictions:   1. Fall Risk - impaired balance   2. Weakness   3. Impaired muscle tone  4. Poor head control  5. Decreased ROM  6. Gait deviations   7. Decreased ambulation   8. Decreased activity tolerance   9. Decreased independence with daily activities   10. Requires skilled supervision to complete and progress HEP   11. Requires skilled PT for DME/ orthotic recommendations    Pt's spiritual, cultural and educational needs considered and pt agreeable to plan of care and GOALS as stated below:   Short term goals: 6 weeks, pt agrees to goals set. (As of 7/31/17)  1. Pt will perform chin tucks in supine with supervision to improve independent with HEP. Met 7/5/17  2. Assist pt with obtaining appropriate cervical brace to improve head control. Ongoing- requested script for brace  3. Pt will demonstrate improved knee ext strength on left to 4/5 to improve safety with ambulation and sit<>stand.  same  4. Pt will demonstrate improved cervical rotation PROM to 20 degrees to improve functional head control. Met 7/5/17  5. Pt will perform sit<>stand consistently from 22" height surface with supervision to improve independence. Met 7/5/17     Long term goals: 12 weeks, pt agrees to goals set  6. Pt will perform basic HEP for gross strengthening with supervision to deter worsening of functional limitations. Ongoing  7. Pt will demonstrate improved bilateral hip strength to 3/5 to improve balance and independence with mobility. Met 7/5/17  8. New 7/5/17: Pt will demonstrate improved bilateral hip strength to 4/5 to improve balance and independence with mobility.   9. Pt will demonstrate improved cervical extension strength to 3+/5 to improve head control. Met 7/31/17  10. Pt will demonstrate improved cervical deep flexor strength to 3+/5 to " improve head control. Met 7/31/17   11. Pt will demonstrate improve balance and gait by scoring 14/28 on Tinetti. Same- 12/28  12. Pt will demonstrate improved cervical PROM for rotation to 40 degrees to improve functional head control. Improved-  44 degrees (L) ; 38 degrees (R)     Plan   Continue PT 2x weekly under established Plan of Care, with treatment to include: pt education, HEP, therapeutic exercises, neuromuscular re-education/balance exercises, therapeutic activities, joint mobilizations, manual therapy, gait training PRN, and modalities PRN, to work towards established goals. Pt may be seen by PTA to carry out plan of care.       Suresh Montiel, SPT 7/31/2017    I, Yadi Mercado, DPT, certify that I was present in the room directing the student in service delivery and guiding them using my skilled judgment. As the co-signing therapist I have reviewed the students documentation and am responsible for the treatment, assessment, and plan.     Yadi Mercado DPT  7/31/2017

## 2017-08-02 ENCOUNTER — CLINICAL SUPPORT (OUTPATIENT)
Dept: REHABILITATION | Facility: HOSPITAL | Age: 52
End: 2017-08-02
Attending: STUDENT IN AN ORGANIZED HEALTH CARE EDUCATION/TRAINING PROGRAM
Payer: COMMERCIAL

## 2017-08-02 DIAGNOSIS — R53.1 DECREASED STRENGTH: ICD-10-CM

## 2017-08-02 DIAGNOSIS — G71.11 MYOTONIC DYSTROPHY: Primary | ICD-10-CM

## 2017-08-02 DIAGNOSIS — R29.898 DECREASED ROM OF NECK: ICD-10-CM

## 2017-08-02 DIAGNOSIS — Z74.09 IMPAIRED FUNCTIONAL MOBILITY, BALANCE, GAIT, AND ENDURANCE: ICD-10-CM

## 2017-08-02 PROCEDURE — 97110 THERAPEUTIC EXERCISES: CPT | Mod: PO | Performed by: PHYSICAL THERAPIST

## 2017-08-02 NOTE — PROGRESS NOTES
"                                                    Physical Therapy Progress Note     Name: Brigid Jernigan  Clinic Number: [id number]  Diagnosis: G71.11 (ICD-10-CM) - Myotonic dystrophy  Physician: Leslee Moore MD  Treatment Orders: PT Eval and Treat  Past Medical History:   Diagnosis Date    Abnormal EKG     Myotonic dystrophy     Pacemaker     PAF (paroxysmal atrial fibrillation)     Sleep apnea        Precautions: standard  Visit #: 16  Date of Eval: 5/9/2017  Plan of Care Expiration: 9/19/2017      Subjective   Pt reports: no new complaints  Pain Scale: denies at rest, reports TTP at left shoulder    Objective   Pt was >20 min late, accommodations made    Patient received individual therapy to increase strength, endurance, ROM, flexibility, posture, core stabilization and head control with activities as follows:     Pt participated in therapeutic exercises x 23 minutes to address ROM and cervical alignment:   Supine: Chin Tucks 20x, mod VC for correct technique, AAROM   Gentle upper trap stretch   Gentle cervical rotation   2 x 15 bridges   LTR with tball (feet strapped together)   Sit to Stand from 23" using UE support on Tball= 10x with min A, able to fully stand 3x, min A    Pt participated in the following supervised treatment with PT tech x 15 min  X 8 minutes Nu Stepper @ 6.5 resistance   2 x 15 rows with OTB      NP today:   1 x 1 minute SLR hamstring stretch each LE  1 x 1 minute quad stretch in sidelying (unable to complete stretch lying on L side due to shoulder pain)    LAQ 2 x 15 BLE w/ 2.5# (L LE required min assist to complete)   Suboccipital release  pec stretching lying on foam roller ~ 5 minutes  1 x 8 biceps 3# BUE  1 x 8 tricep extension 3# BUE   Thoracic/ trunk rotation X 10 L and R   1 x 8 D2 UE w/ 2# in L Ue. *could not complete exercise w/ R UE due to limited ROM  R Scapular abduction stretch X 30 seconds    Written Home Exercises: assisted quad/ hip flexor stretch in side " "lying  eval- chin tucks, rows with OTB, hip flexor and knee extensor stretch in sidelying  Pt demo fair understanding of the education provided. Brigid demonstrated fair return demonstration of activities.     Education provided re: POC, HEP    Pt has no cultural, educational or language barriers to learning provided.    Assessment   Brigid tolerated treatment well.  Pt was able to demonstrate improved hip clearance when performing bridging, indicating improved strength and ROM. PT focused on improving core and LE strength and stability to address sit<>stand (pt required assistance- due to inability to use LUE- to rise from waiting room chair). Pt demonstrated improved ability to perform sit<>stand with good anterior pelvic tilt from 23" height mat. Pt required assistance for shorter surfaces. Pt can benefit from continued skilled PT to address impairments listed below to improve independence and safety with mobility and help deter further functional decline.     Pt prognosis is Good. Pt will continue to benefit from skilled outpatient physical therapy to address the deficits listed in the problem list, provide pt/family education and to maximize pt's level of independence in the home and community environment.     Activity limitations/ Participation Restrictions:   1. Fall Risk - impaired balance   2. Weakness   3. Impaired muscle tone  4. Poor head control  5. Decreased ROM  6. Gait deviations   7. Decreased ambulation   8. Decreased activity tolerance   9. Decreased independence with daily activities   10. Requires skilled supervision to complete and progress HEP   11. Requires skilled PT for DME/ orthotic recommendations    Pt's spiritual, cultural and educational needs considered and pt agreeable to plan of care and GOALS as stated below:   Short term goals: 6 weeks, pt agrees to goals set. (As of 7/31/17)  1. Pt will perform chin tucks in supine with supervision to improve independent with HEP. Met " "7/5/17  2. Assist pt with obtaining appropriate cervical brace to improve head control. Ongoing- requested script for brace  3. Pt will demonstrate improved knee ext strength on left to 4/5 to improve safety with ambulation and sit<>stand.  same  4. Pt will demonstrate improved cervical rotation PROM to 20 degrees to improve functional head control. Met 7/5/17  5. Pt will perform sit<>stand consistently from 22" height surface with supervision to improve independence. Met 7/5/17     Long term goals: 12 weeks, pt agrees to goals set  6. Pt will perform basic HEP for gross strengthening with supervision to deter worsening of functional limitations. Ongoing  7. Pt will demonstrate improved bilateral hip strength to 3/5 to improve balance and independence with mobility. Met 7/5/17  8. New 7/5/17: Pt will demonstrate improved bilateral hip strength to 4/5 to improve balance and independence with mobility.   9. Pt will demonstrate improved cervical extension strength to 3+/5 to improve head control. Met 7/31/17  10. Pt will demonstrate improved cervical deep flexor strength to 3+/5 to improve head control. Met 7/31/17   11. Pt will demonstrate improve balance and gait by scoring 14/28 on Tinetti. Same- 12/28  12. Pt will demonstrate improved cervical PROM for rotation to 40 degrees to improve functional head control. Improved-  44 degrees (L) ; 38 degrees (R)     Plan   Continue PT 2x weekly under established Plan of Care, with treatment to include: pt education, HEP, therapeutic exercises, neuromuscular re-education/balance exercises, therapeutic activities, joint mobilizations, manual therapy, gait training PRN, and modalities PRN, to work towards established goals. Pt may be seen by PTA to carry out plan of care.       Yadi Mercado DPT  8/2/2017  "

## 2017-08-07 ENCOUNTER — CLINICAL SUPPORT (OUTPATIENT)
Dept: REHABILITATION | Facility: HOSPITAL | Age: 52
End: 2017-08-07
Attending: STUDENT IN AN ORGANIZED HEALTH CARE EDUCATION/TRAINING PROGRAM
Payer: COMMERCIAL

## 2017-08-07 DIAGNOSIS — Z74.09 IMPAIRED FUNCTIONAL MOBILITY, BALANCE, GAIT, AND ENDURANCE: ICD-10-CM

## 2017-08-07 DIAGNOSIS — R29.898 DECREASED ROM OF NECK: ICD-10-CM

## 2017-08-07 DIAGNOSIS — R53.1 DECREASED STRENGTH: ICD-10-CM

## 2017-08-07 PROCEDURE — 97110 THERAPEUTIC EXERCISES: CPT | Mod: PO | Performed by: PHYSICAL THERAPIST

## 2017-08-07 NOTE — PROGRESS NOTES
"                                                    Physical Therapy Progress Note     Name: Brigid Jernigan  Clinic Number: [id number]  Diagnosis: G71.11 (ICD-10-CM) - Myotonic dystrophy  Physician: Luis Felipe Vo MD  Treatment Orders: PT Eval and Treat  Past Medical History:   Diagnosis Date    Abnormal EKG     Myotonic dystrophy     Pacemaker     PAF (paroxysmal atrial fibrillation)     Sleep apnea        Precautions: standard  Visit #: 17  Date of Eval: 5/9/2017  Plan of Care Expiration: 9/19/2017      Subjective   Pt reports: no new complaints  Pain Scale: denies at rest, reports TTP at left shoulder    Objective       Patient received individual therapy to increase strength, endurance, ROM, flexibility, posture, core stabilization and head control with activities as follows:     Pt participated in therapeutic exercises x 47 minutes to address ROM and cervical alignment:   Supine: Chin Tucks 20x, mod VC for correct technique, AAROM   Gentle upper trap stretch   gentle pec stretch   AAROM cervical rotation   2 x 10 bridge with ball   LTR with tball (LE strapped together)10x   Suboccipital release   Sit to Stand from 23" using UE support on Tball= 10x with min A- CGA    X 10 minutes Nu Stepper @ 6.0 resistance     Sitting: 2 x 15 rows with GTB   2 x 15 shoulder ext with OTB      NP today:   1 x 1 minute SLR hamstring stretch each LE  1 x 1 minute quad stretch in sidelying (unable to complete stretch lying on L side due to shoulder pain)  LAQ 2 x 15 BLE w/ 2.5# (L LE required min assist to complete)   Suboccipital release  pec stretching lying on foam roller ~ 5 minutes  1 x 8 biceps 3# BUE  1 x 8 tricep extension 3# BUE     Written Home Exercises: assisted quad/ hip flexor stretch in side lying  eval- chin tucks, rows with OTB, hip flexor and knee extensor stretch in sidelying  Pt demo fair understanding of the education provided. Brigid demonstrated fair return demonstration of activities.     Education " provided re: POC, HEP    Pt has no cultural, educational or language barriers to learning provided.    Assessment   Brigid tolerated treatment well.  Pt demonstrated improved ability to perform sit<>stand with UE support on large tball. Pt was not able to perform static standing without UE support due to weakness. Pt continues to demonstrate poor head control in standing (significant forward head and/ or cervical flexion). Pt is able to partially correct, but cannot maintain (especially once she begins to move around). Pt can benefit from continued skilled PT to address impairments listed below to improve independence and safety with mobility and help deter further functional decline.     Pt prognosis is Good. Pt will continue to benefit from skilled outpatient physical therapy to address the deficits listed in the problem list, provide pt/family education and to maximize pt's level of independence in the home and community environment.     Activity limitations/ Participation Restrictions:   1. Fall Risk - impaired balance   2. Weakness   3. Impaired muscle tone  4. Poor head control  5. Decreased ROM  6. Gait deviations   7. Decreased ambulation   8. Decreased activity tolerance   9. Decreased independence with daily activities   10. Requires skilled supervision to complete and progress HEP   11. Requires skilled PT for DME/ orthotic recommendations    Pt's spiritual, cultural and educational needs considered and pt agreeable to plan of care and GOALS as stated below:   Short term goals: 6 weeks, pt agrees to goals set. (As of 7/31/17)  1. Pt will perform chin tucks in supine with supervision to improve independent with HEP. Met 7/5/17  2. Assist pt with obtaining appropriate cervical brace to improve head control. Ongoing- requested script for brace  3. Pt will demonstrate improved knee ext strength on left to 4/5 to improve safety with ambulation and sit<>stand.  same  4. Pt will demonstrate improved cervical  "rotation PROM to 20 degrees to improve functional head control. Met 7/5/17  5. Pt will perform sit<>stand consistently from 22" height surface with supervision to improve independence. Met 7/5/17     Long term goals: 12 weeks, pt agrees to goals set  6. Pt will perform basic HEP for gross strengthening with supervision to deter worsening of functional limitations. Ongoing  7. Pt will demonstrate improved bilateral hip strength to 3/5 to improve balance and independence with mobility. Met 7/5/17  8. New 7/5/17: Pt will demonstrate improved bilateral hip strength to 4/5 to improve balance and independence with mobility.   9. Pt will demonstrate improved cervical extension strength to 3+/5 to improve head control. Met 7/31/17  10. Pt will demonstrate improved cervical deep flexor strength to 3+/5 to improve head control. Met 7/31/17   11. Pt will demonstrate improve balance and gait by scoring 14/28 on Tinetti. Same- 12/28  12. Pt will demonstrate improved cervical PROM for rotation to 40 degrees to improve functional head control. Improved-  44 degrees (L) ; 38 degrees (R)     Plan   Continue PT 2x weekly under established Plan of Care, with treatment to include: pt education, HEP, therapeutic exercises, neuromuscular re-education/balance exercises, therapeutic activities, joint mobilizations, manual therapy, gait training PRN, and modalities PRN, to work towards established goals. Pt may be seen by PTA to carry out plan of care.       Yadi Mercado DPT  8/7/2017  "

## 2017-08-14 ENCOUNTER — CLINICAL SUPPORT (OUTPATIENT)
Dept: REHABILITATION | Facility: HOSPITAL | Age: 52
End: 2017-08-14
Attending: STUDENT IN AN ORGANIZED HEALTH CARE EDUCATION/TRAINING PROGRAM
Payer: COMMERCIAL

## 2017-08-14 DIAGNOSIS — Z74.09 IMPAIRED FUNCTIONAL MOBILITY, BALANCE, GAIT, AND ENDURANCE: ICD-10-CM

## 2017-08-14 DIAGNOSIS — R29.898 DECREASED ROM OF NECK: ICD-10-CM

## 2017-08-14 DIAGNOSIS — G71.11 MYOTONIC DYSTROPHY: Primary | ICD-10-CM

## 2017-08-14 DIAGNOSIS — R53.1 DECREASED STRENGTH: ICD-10-CM

## 2017-08-14 PROCEDURE — 97110 THERAPEUTIC EXERCISES: CPT | Mod: PO | Performed by: PHYSICAL THERAPIST

## 2017-08-14 NOTE — PROGRESS NOTES
"                                                    Physical Therapy Progress Note     Name: Brigid Jernigan  Clinic Number: [id number]  Diagnosis: G71.11 (ICD-10-CM) - Myotonic dystrophy  Physician: Luis Felipe Vo MD  Treatment Orders: PT Eval and Treat  Past Medical History:   Diagnosis Date    Abnormal EKG     Myotonic dystrophy     Pacemaker     PAF (paroxysmal atrial fibrillation)     Sleep apnea        Precautions: standard  Visit #: 18  Date of Eval: 5/9/2017  Plan of Care Expiration: 9/19/2017      Subjective   Pt reports: no new complaints  Pain Scale: denies at rest, reports continued left shoulder pain with increased use of UE, none reported at rest    Objective       Patient received individual therapy to increase strength, endurance, ROM, flexibility, posture, core stabilization and head control with activities as follows:     Pt participated in therapeutic exercises x 42 minutes to address ROM and cervical alignment:   Supine: Chin Tucks 20x, min VC for correct technique, AAROM   Gentle upper trap stretch   gentle pec stretch   AAROM cervical rotation   2 x 10 bridge with ball   LTR with tball 10x   SLR 2# R, 0# L10x   Suboccipital release   Sit to Stand (full unsupported standing) from 23" using UE support on Tball= 10x with min A- CGA    Sitting: 2 x 15 rows with GTB   2 x 10 shoulder ext with GTB    NP today:   X 10 minutes Nu Stepper @ 6.0 resistance     Written Home Exercises: assisted quad/ hip flexor stretch in side lying  eval- chin tucks, rows with OTB, hip flexor and knee extensor stretch in sidelying  Pt demo fair understanding of the education provided. Brigid demonstrated fair return demonstration of activities.     Education provided re: POC, HEP    Pt has no cultural, educational or language barriers to learning provided.    Assessment   Brigid tolerated treatment well. Pt was able to perform lower trunk rotation without having legs strapped together on ball. Pt also " demonstrated improved ability to perform full upright standing without support. Pt is unable to maintain unsupported standing for 30 sec, but is able to obtain with minimal assist. Pt demonstrated good tolerance to increased resistance for shoulder extension exercise and addition of SLR. Pt demonstrates a quad lag on left without resistance. Pt demonstrated decreased forward flexed/ rounded shoulders posture at conclusion of session. Forward head continues. Pt can benefit from continued skilled PT to address impairments listed below to improve independence and safety with mobility and help deter further functional decline.     Pt prognosis is Good. Pt will continue to benefit from skilled outpatient physical therapy to address the deficits listed in the problem list, provide pt/family education and to maximize pt's level of independence in the home and community environment.     Activity limitations/ Participation Restrictions:   1. Fall Risk - impaired balance   2. Weakness   3. Impaired muscle tone  4. Poor head control  5. Decreased ROM  6. Gait deviations   7. Decreased ambulation   8. Decreased activity tolerance   9. Decreased independence with daily activities   10. Requires skilled supervision to complete and progress HEP   11. Requires skilled PT for DME/ orthotic recommendations    Pt's spiritual, cultural and educational needs considered and pt agreeable to plan of care and GOALS as stated below:   Short term goals: 6 weeks, pt agrees to goals set. (As of 7/31/17)  1. Pt will perform chin tucks in supine with supervision to improve independent with HEP. Met 7/5/17  2. Assist pt with obtaining appropriate cervical brace to improve head control. Ongoing- requested script for brace  3. Pt will demonstrate improved knee ext strength on left to 4/5 to improve safety with ambulation and sit<>stand.  same  4. Pt will demonstrate improved cervical rotation PROM to 20 degrees to improve functional head control.  "Met 7/5/17  5. Pt will perform sit<>stand consistently from 22" height surface with supervision to improve independence. Met 7/5/17     Long term goals: 12 weeks, pt agrees to goals set  6. Pt will perform basic HEP for gross strengthening with supervision to deter worsening of functional limitations. Ongoing  7. Pt will demonstrate improved bilateral hip strength to 3/5 to improve balance and independence with mobility. Met 7/5/17  8. New 7/5/17: Pt will demonstrate improved bilateral hip strength to 4/5 to improve balance and independence with mobility.   9. Pt will demonstrate improved cervical extension strength to 3+/5 to improve head control. Met 7/31/17  10. Pt will demonstrate improved cervical deep flexor strength to 3+/5 to improve head control. Met 7/31/17   11. Pt will demonstrate improve balance and gait by scoring 14/28 on Tinetti. Same- 12/28  12. Pt will demonstrate improved cervical PROM for rotation to 40 degrees to improve functional head control. Improved-  44 degrees (L) ; 38 degrees (R)     Plan   Continue PT 2x weekly under established Plan of Care, with treatment to include: pt education, HEP, therapeutic exercises, neuromuscular re-education/balance exercises, therapeutic activities, joint mobilizations, manual therapy, gait training PRN, and modalities PRN, to work towards established goals. Pt may be seen by PTA to carry out plan of care.       Yadi Mercado DPT  8/14/2017  "

## 2017-08-17 ENCOUNTER — CLINICAL SUPPORT (OUTPATIENT)
Dept: REHABILITATION | Facility: HOSPITAL | Age: 52
End: 2017-08-17
Attending: STUDENT IN AN ORGANIZED HEALTH CARE EDUCATION/TRAINING PROGRAM
Payer: COMMERCIAL

## 2017-08-17 DIAGNOSIS — Z74.09 IMPAIRED FUNCTIONAL MOBILITY, BALANCE, GAIT, AND ENDURANCE: ICD-10-CM

## 2017-08-17 DIAGNOSIS — R29.898 DECREASED ROM OF NECK: ICD-10-CM

## 2017-08-17 DIAGNOSIS — G71.11 MYOTONIC DYSTROPHY: Primary | ICD-10-CM

## 2017-08-17 DIAGNOSIS — R53.1 DECREASED STRENGTH: ICD-10-CM

## 2017-08-17 PROCEDURE — 97110 THERAPEUTIC EXERCISES: CPT | Mod: PO | Performed by: PHYSICAL THERAPIST

## 2017-08-17 NOTE — PROGRESS NOTES
"                                                    Physical Therapy Progress Note     Name: Brigid Jernigan  Clinic Number: [id number]  Diagnosis: G71.11 (ICD-10-CM) - Myotonic dystrophy  Physician: Luis Felipe Vo MD  Treatment Orders: PT Eval and Treat  Past Medical History:   Diagnosis Date    Abnormal EKG     Myotonic dystrophy     Pacemaker     PAF (paroxysmal atrial fibrillation)     Sleep apnea        Precautions: standard  Visit #: 19  Date of Eval: 5/9/2017  Plan of Care Expiration: 9/19/2017      Subjective   Pt reports: no new complaints  Pain Scale: denies at rest, reports continued left shoulder pain with increased use of UE, none reported at rest    Objective       Patient received individual therapy to increase strength, endurance, ROM, flexibility, posture, core stabilization and head control with activities as follows:     Pt participated in therapeutic exercises x 38 minutes to address ROM and cervical alignment:   Supine: Chin Tucks 20x, min VC for correct technique, AAROM   Gentle upper trap stretch   gentle pec stretch   AAROM cervical rotation   2 x 10 bridge with ball   LTR with tball 10x   SLR 3# R, 1# L10x   Suboccipital release   Sit to Stand (full unsupported standing) from 23" using UE support on Tball= 10x with close supervision    Sitting: 2 x 15 rows with GTB   2 x 10 shoulder ext with GTB    NP today:   X 10 minutes Nu Stepper @ 6.0 resistance     Written Home Exercises: assisted quad/ hip flexor stretch in side lying  eval- chin tucks, rows with OTB, hip flexor and knee extensor stretch in sidelying  Pt demo fair understanding of the education provided. Brigid demonstrated fair return demonstration of activities.     Education provided re: POC, HEP    Pt has no cultural, educational or language barriers to learning provided.    Assessment   Brigid tolerated treatment well. Pt was giovani to tolerate lifting 1# on LLE for SLR, noted quad lag. Pt continues to demonstrate good " "improvements with ability to perform sit<>stand, but has difficulty performing from heights <23". Pt required mod assist for sit>stand from armless chair at conclusion of session today. Pt's sister provided with green tband to advance pt's HEP at home. Pt is demonstrating decreased thoracic kyphosis, but forward head continues. Pt can benefit from continued skilled PT to address impairments listed below to improve independence and safety with mobility and help deter further functional decline.     Pt prognosis is Good. Pt will continue to benefit from skilled outpatient physical therapy to address the deficits listed in the problem list, provide pt/family education and to maximize pt's level of independence in the home and community environment.     Activity limitations/ Participation Restrictions:   1. Fall Risk - impaired balance   2. Weakness   3. Impaired muscle tone  4. Poor head control  5. Decreased ROM  6. Gait deviations   7. Decreased ambulation   8. Decreased activity tolerance   9. Decreased independence with daily activities   10. Requires skilled supervision to complete and progress HEP   11. Requires skilled PT for DME/ orthotic recommendations    Pt's spiritual, cultural and educational needs considered and pt agreeable to plan of care and GOALS as stated below:   Short term goals: 6 weeks, pt agrees to goals set. (As of 7/31/17)  1. Pt will perform chin tucks in supine with supervision to improve independent with HEP. Met 7/5/17  2. Assist pt with obtaining appropriate cervical brace to improve head control. Ongoing- requested script for brace  3. Pt will demonstrate improved knee ext strength on left to 4/5 to improve safety with ambulation and sit<>stand.  same  4. Pt will demonstrate improved cervical rotation PROM to 20 degrees to improve functional head control. Met 7/5/17  5. Pt will perform sit<>stand consistently from 22" height surface with supervision to improve independence. Met " 7/5/17     Long term goals: 12 weeks, pt agrees to goals set  6. Pt will perform basic HEP for gross strengthening with supervision to deter worsening of functional limitations. Ongoing  7. Pt will demonstrate improved bilateral hip strength to 3/5 to improve balance and independence with mobility. Met 7/5/17  8. New 7/5/17: Pt will demonstrate improved bilateral hip strength to 4/5 to improve balance and independence with mobility.   9. Pt will demonstrate improved cervical extension strength to 3+/5 to improve head control. Met 7/31/17  10. Pt will demonstrate improved cervical deep flexor strength to 3+/5 to improve head control. Met 7/31/17   11. Pt will demonstrate improve balance and gait by scoring 14/28 on Tinetti. Same- 12/28  12. Pt will demonstrate improved cervical PROM for rotation to 40 degrees to improve functional head control. Improved-  44 degrees (L) ; 38 degrees (R)     Plan   Continue PT 2x weekly under established Plan of Care, with treatment to include: pt education, HEP, therapeutic exercises, neuromuscular re-education/balance exercises, therapeutic activities, joint mobilizations, manual therapy, gait training PRN, and modalities PRN, to work towards established goals. Pt may be seen by PTA to carry out plan of care.       Yadi Mercado DPT  8/17/2017

## 2017-08-21 ENCOUNTER — CLINICAL SUPPORT (OUTPATIENT)
Dept: REHABILITATION | Facility: HOSPITAL | Age: 52
End: 2017-08-21
Attending: STUDENT IN AN ORGANIZED HEALTH CARE EDUCATION/TRAINING PROGRAM
Payer: COMMERCIAL

## 2017-08-21 DIAGNOSIS — Z74.09 IMPAIRED FUNCTIONAL MOBILITY, BALANCE, GAIT, AND ENDURANCE: ICD-10-CM

## 2017-08-21 DIAGNOSIS — R29.898 DECREASED ROM OF NECK: ICD-10-CM

## 2017-08-21 DIAGNOSIS — R53.1 DECREASED STRENGTH: ICD-10-CM

## 2017-08-21 DIAGNOSIS — G71.11 MYOTONIC DYSTROPHY: Primary | ICD-10-CM

## 2017-08-21 PROCEDURE — 97110 THERAPEUTIC EXERCISES: CPT | Mod: PO | Performed by: PHYSICAL THERAPIST

## 2017-08-21 NOTE — PROGRESS NOTES
"                                                    Physical Therapy Progress Note     Name: Brigid Jernigan  Clinic Number: [id number]  Diagnosis: G71.11 (ICD-10-CM) - Myotonic dystrophy  Physician: Luis Felipe Vo MD  Treatment Orders: PT Eval and Treat  Past Medical History:   Diagnosis Date    Abnormal EKG     Myotonic dystrophy     Pacemaker     PAF (paroxysmal atrial fibrillation)     Sleep apnea        Precautions: standard  Visit #: 20  Date of Eval: 5/9/2017  Plan of Care Expiration: 9/19/2017      Subjective   Pt reports: no new complaints  Pain Scale: denies    Objective       Patient received individual therapy to increase strength, endurance, ROM, flexibility, posture, core stabilization and head control with activities as follows:     Pt participated in therapeutic exercises x 48 minutes to address ROM and cervical alignment:   Supine: Chin Tucks 20x with cervical isometric, min VC for correct technique, AAROM   Gentle upper trap stretch   gentle pec stretch   Gentle scalene stretch   AAROM cervical rotation   2 x 10 bridge with ball   LTR with tball 10x   SLR 3# R, 1# L10x   Suboccipital release   Sit to Stand (full unsupported standing) from 22" using UE support on Tball= 10x with close supervision   Static standing without UE support= 5 sec average    Sitting: 2 x 10 rows with GTB   2 x 10 shoulder ext with GTB    NP today:   X 10 minutes Nu Stepper @ 6.0 resistance     Written Home Exercises: assisted quad/ hip flexor stretch in side lying  eval- chin tucks, rows with OTB, hip flexor and knee extensor stretch in sidelying  Pt demo fair understanding of the education provided. Brigid demonstrated fair return demonstration of activities.     Education provided re: POC, HEP    Pt has no cultural, educational or language barriers to learning provided.    Assessment   Brigid tolerated treatment well. Pt was able to stand from a shorter surface height today (22"). Pt continues to have difficulty " "standing from average chair surface height due to decreased strength and motor control.  Pt is demonstrating decreased thoracic kyphosis especially in sitting, but forward head continues. Pt can benefit from continued skilled PT to address impairments listed below to improve independence and safety with mobility and help deter further functional decline.     Pt prognosis is Good. Pt will continue to benefit from skilled outpatient physical therapy to address the deficits listed in the problem list, provide pt/family education and to maximize pt's level of independence in the home and community environment.     Activity limitations/ Participation Restrictions:   1. Fall Risk - impaired balance   2. Weakness   3. Impaired muscle tone  4. Poor head control  5. Decreased ROM  6. Gait deviations   7. Decreased ambulation   8. Decreased activity tolerance   9. Decreased independence with daily activities   10. Requires skilled supervision to complete and progress HEP   11. Requires skilled PT for DME/ orthotic recommendations    Pt's spiritual, cultural and educational needs considered and pt agreeable to plan of care and GOALS as stated below:   Short term goals: 6 weeks, pt agrees to goals set. (As of 7/31/17)  1. Pt will perform chin tucks in supine with supervision to improve independent with HEP. Met 7/5/17  2. Assist pt with obtaining appropriate cervical brace to improve head control. Ongoing- requested script for brace  3. Pt will demonstrate improved knee ext strength on left to 4/5 to improve safety with ambulation and sit<>stand.  same  4. Pt will demonstrate improved cervical rotation PROM to 20 degrees to improve functional head control. Met 7/5/17  5. Pt will perform sit<>stand consistently from 22" height surface with supervision to improve independence. Met 7/5/17     Long term goals: 12 weeks, pt agrees to goals set  6. Pt will perform basic HEP for gross strengthening with supervision to deter worsening " of functional limitations. Ongoing  7. Pt will demonstrate improved bilateral hip strength to 3/5 to improve balance and independence with mobility. Met 7/5/17  8. New 7/5/17: Pt will demonstrate improved bilateral hip strength to 4/5 to improve balance and independence with mobility.   9. Pt will demonstrate improved cervical extension strength to 3+/5 to improve head control. Met 7/31/17  10. Pt will demonstrate improved cervical deep flexor strength to 3+/5 to improve head control. Met 7/31/17   11. Pt will demonstrate improve balance and gait by scoring 14/28 on Tinetti. Same- 12/28  12. Pt will demonstrate improved cervical PROM for rotation to 40 degrees to improve functional head control. Improved-  44 degrees (L) ; 38 degrees (R)     Plan   Continue PT 2x weekly under established Plan of Care, with treatment to include: pt education, HEP, therapeutic exercises, neuromuscular re-education/balance exercises, therapeutic activities, joint mobilizations, manual therapy, gait training PRN, and modalities PRN, to work towards established goals. Pt may be seen by PTA to carry out plan of care.       Yadi Mercado DPT  8/21/2017

## 2017-08-24 ENCOUNTER — CLINICAL SUPPORT (OUTPATIENT)
Dept: REHABILITATION | Facility: HOSPITAL | Age: 52
End: 2017-08-24
Attending: STUDENT IN AN ORGANIZED HEALTH CARE EDUCATION/TRAINING PROGRAM
Payer: COMMERCIAL

## 2017-08-24 DIAGNOSIS — G71.11 MYOTONIC DYSTROPHY: Primary | ICD-10-CM

## 2017-08-24 DIAGNOSIS — Z74.09 IMPAIRED FUNCTIONAL MOBILITY, BALANCE, GAIT, AND ENDURANCE: ICD-10-CM

## 2017-08-24 DIAGNOSIS — R53.1 DECREASED STRENGTH: ICD-10-CM

## 2017-08-24 DIAGNOSIS — R29.898 DECREASED ROM OF NECK: ICD-10-CM

## 2017-08-24 PROCEDURE — 97110 THERAPEUTIC EXERCISES: CPT | Mod: PO | Performed by: PHYSICAL THERAPIST

## 2017-08-24 PROCEDURE — 97530 THERAPEUTIC ACTIVITIES: CPT | Mod: PO | Performed by: PHYSICAL THERAPIST

## 2017-08-24 NOTE — PROGRESS NOTES
"                                                    Physical Therapy Progress Note     Name: Brigid Jernigan  Clinic Number: [id number]  Diagnosis: G71.11 (ICD-10-CM) - Myotonic dystrophy  Physician: Luis Felipe Vo MD  Treatment Orders: PT Eval and Treat  Past Medical History:   Diagnosis Date    Abnormal EKG     Myotonic dystrophy     Pacemaker     PAF (paroxysmal atrial fibrillation)     Sleep apnea        Precautions: standard  Visit #: 21  Date of Eval: 5/9/2017  Plan of Care Expiration: 9/19/2017      Subjective   Pt reports: no new complaints  Pain Scale: denies    Objective       Patient received individual therapy to increase strength, endurance, ROM, flexibility, posture, core stabilization and head control with activities as follows:     Pt participated in therapeutic exercises x 23 minutes to address ROM and cervical alignment:   Supine: Chin Tucks 20x with cervical isometric, min VC for correct technique,AAROM   Gentle upper trap stretch   gentle pec stretch   Gentle scalene stretch   AAROM cervical rotation   2 x 10 bridge with ball   LTR with tball 15x   SLR 3# R 15x, 1# L10x   Suboccipital release      NP today:   Sitting: 2 x 10 rows with GTB   2 x 10 shoulder ext with GTB  X 10 minutes Nu Stepper @ 6.0 resistance     Pt participated in therapeutic activity x 16 minutes  To improve posture, standing balance, and sit,>stand ability  Standing: Sit to Stand (full unsupported standing) from 22" using UE support on Tball= 10x with min-CGA     Static standing without UE support with fair- balance <30 sec   Standing at counter with UE support- reaching tasks eye level- fair balance x 10 min, mod VC/TC to improve WB on LLE   scap squeezes with hands on wall- min-mod A for facilitation    Written Home Exercises: assisted quad/ hip flexor stretch in side lying  eval- chin tucks, rows with OTB, hip flexor and knee extensor stretch in sidelying  Pt demo fair understanding of the education provided. " Brigid demonstrated fair return demonstration of activities.     Education provided re: POC, HEP    Pt has no cultural, educational or language barriers to learning provided.    Assessment   Brigid tolerated treatment well. PT addressed more standing today to incorporate improved posture in standing. Pt demonstrates decreased thoracic kyphosis in sitting, but this increases in standing. Forward head is present in any position. Pt continues to have difficulty standing from average chair surface height due to decreased strength and motor control.  Pt continues to demonstrate poor cervical mobility, limiting ability to attain a midline position. Pt tolerated the increase in standing activities during session well. Pt can benefit from continued skilled PT to address impairments listed below to improve independence and safety with mobility and help deter further functional decline.     Pt prognosis is Good. Pt will continue to benefit from skilled outpatient physical therapy to address the deficits listed in the problem list, provide pt/family education and to maximize pt's level of independence in the home and community environment.     Activity limitations/ Participation Restrictions:   1. Fall Risk - impaired balance   2. Weakness   3. Impaired muscle tone  4. Poor head control  5. Decreased ROM  6. Gait deviations   7. Decreased ambulation   8. Decreased activity tolerance   9. Decreased independence with daily activities   10. Requires skilled supervision to complete and progress HEP   11. Requires skilled PT for DME/ orthotic recommendations    Pt's spiritual, cultural and educational needs considered and pt agreeable to plan of care and GOALS as stated below:   Short term goals: 6 weeks, pt agrees to goals set. (As of 7/31/17)  1. Pt will perform chin tucks in supine with supervision to improve independent with HEP. Met 7/5/17  2. Assist pt with obtaining appropriate cervical brace to improve head control.  "Ongoing- requested script for brace  3. Pt will demonstrate improved knee ext strength on left to 4/5 to improve safety with ambulation and sit<>stand.  same  4. Pt will demonstrate improved cervical rotation PROM to 20 degrees to improve functional head control. Met 7/5/17  5. Pt will perform sit<>stand consistently from 22" height surface with supervision to improve independence. Met 7/5/17     Long term goals: 12 weeks, pt agrees to goals set  6. Pt will perform basic HEP for gross strengthening with supervision to deter worsening of functional limitations. Ongoing  7. Pt will demonstrate improved bilateral hip strength to 3/5 to improve balance and independence with mobility. Met 7/5/17  8. New 7/5/17: Pt will demonstrate improved bilateral hip strength to 4/5 to improve balance and independence with mobility.   9. Pt will demonstrate improved cervical extension strength to 3+/5 to improve head control. Met 7/31/17  10. Pt will demonstrate improved cervical deep flexor strength to 3+/5 to improve head control. Met 7/31/17   11. Pt will demonstrate improve balance and gait by scoring 14/28 on Tinetti. Same- 12/28  12. Pt will demonstrate improved cervical PROM for rotation to 40 degrees to improve functional head control. Improved-  44 degrees (L) ; 38 degrees (R)     Plan   Continue PT 2x weekly under established Plan of Care, with treatment to include: pt education, HEP, therapeutic exercises, neuromuscular re-education/balance exercises, therapeutic activities, joint mobilizations, manual therapy, gait training PRN, and modalities PRN, to work towards established goals. Pt may be seen by PTA to carry out plan of care.       Yadi Mercado DPT  8/24/2017  "

## 2017-08-31 ENCOUNTER — CLINICAL SUPPORT (OUTPATIENT)
Dept: REHABILITATION | Facility: HOSPITAL | Age: 52
End: 2017-08-31
Attending: STUDENT IN AN ORGANIZED HEALTH CARE EDUCATION/TRAINING PROGRAM
Payer: COMMERCIAL

## 2017-08-31 DIAGNOSIS — G71.11 MYOTONIC DYSTROPHY: Primary | ICD-10-CM

## 2017-08-31 DIAGNOSIS — R53.1 DECREASED STRENGTH: ICD-10-CM

## 2017-08-31 DIAGNOSIS — Z74.09 IMPAIRED FUNCTIONAL MOBILITY, BALANCE, GAIT, AND ENDURANCE: ICD-10-CM

## 2017-08-31 DIAGNOSIS — R29.898 DECREASED ROM OF NECK: ICD-10-CM

## 2017-08-31 PROCEDURE — 97530 THERAPEUTIC ACTIVITIES: CPT | Mod: PO | Performed by: PHYSICAL THERAPIST

## 2017-08-31 PROCEDURE — 97110 THERAPEUTIC EXERCISES: CPT | Mod: PO | Performed by: PHYSICAL THERAPIST

## 2017-08-31 NOTE — PROGRESS NOTES
"                                                    Physical Therapy Progress Note     Name: Brigid Jernigan  Clinic Number: [id number]  Diagnosis: G71.11 (ICD-10-CM) - Myotonic dystrophy  Physician: Luis Felipe Vo MD  Treatment Orders: PT Eval and Treat  Past Medical History:   Diagnosis Date    Abnormal EKG     Myotonic dystrophy     Pacemaker     PAF (paroxysmal atrial fibrillation)     Sleep apnea        Precautions: standard  Visit #: 22  Date of Eval: 5/9/2017  Plan of Care Expiration: 9/19/2017  Extended POC: 10/17/17      Subjective   Pt reports: per sister, pt went to MD was cleared to resume normal use of LUE, left clavicle demonstrated good healing.   Pain Scale: denies    Objective       Patient received individual therapy to increase strength, endurance, ROM, flexibility, posture, core stabilization and head control with activities as follows:     Pt participated in therapeutic exercises x 34 minutes to address ROM and cervical alignment:   Supine:upper trap stretch   gentle pec stretch   SCM stretch   AAROM cervical rotation   2 x 10 bridge with ball   LTR with tball 15x   Suboccipital release    Cervical rotation AROM in supine: L= 44 deg, R= 4 deg    Strength:   Hip flex: B 4-/5  Hip abd: B 4-/5  Hip ext: R 3/5, L 4-/5  Knee ext: R=5/5, L= 4-/5    NP today:   Sitting: 2 x 10 rows with GTB   2 x 10 shoulder ext with GTB  Chin Tucks 20x with cervical isometric, min VC for correct technique,AAROM  SLR 3# R 15x, 1# L10x  X 10 minutes Nu Stepper @ 6.0 resistance, BUE/LE    Pt participated in therapeutic activity x 12 minutes  To improve posture, standing balance, and sit,>stand ability  Standing: Sit to Stand (full unsupported standing) from 21" using UE support on Tball= 5x with min A    Tinetti Balance Assessment  Balance:  1. Sitting Balance 1   Leans/ slides in chair= 0   Steady= 1  2. Rises from chair 1   Unable without help= 0   Able, uses arms= 1   Able without use of arms= 2  3. Attempts " to rise 1   Unable without help= 0   Able, >1 attmept required-= 1   Able, 1 attempt= 2  4. Immediate standing balance (1st 5 seconds) 1   Unsteady (swaggers, moves feet, trunk sway)= 0   Steady but uses walker or other support= 1   Narrow base of support without walker or support= 2  5. Nudged 1   Begins to fall= 0   Staggers, grabs, catches self= 1   Steady= 2  6. Standing balance 1   Unsteady= 0   Steady but wide LEYDI or uses AD=1   Narrow LEYDI without AD=2  7. Eyes closed 1   Unsteady= 0   Steady= 1  8. Turning 360 degrees 0   Discontinuous steps= 0   Continuous steps= 1   Unsteady= 0   Steady= 1  9. Sitting down 1   Unsafe= 0   Uses arms or not in a smooth motion= 1   Safe, smooth motion= 2  Balance score= 8/16  Gait:  1. Initiation 1   hesitates or multiple attempts to start= 0   No hesitancy= 1  2. Step length 2   Step to= 0   One foot passes= 1   reciprocal pattern= 2  3. Step height 2    Neither foot clears floor= 0   One foot clears floor= 1   Both feet clear floor= 2  4. Step symmetry 1   Not symmetrical= 0   Appears symmetrical= 1  5. Step continuity 1   Not continuous= 0   Appears continuous= 1  6. Path 1   Marked deviation= 0   Mild/moderate deviation or uses A.D.= 1   Straight without A.D.= 2  7. Trunk 0   Marked sway or uses A.D.= 0   No sway, but flexes knees or back, spread arms out while walking= 1   No sway, no flexion, no use of UE, no use of A.D.= 2  8. Walking stance 1   Heels apart= 0   Heels almost touching while walking= 1  Gait score= 9/12  Total score= 17/28 , high fall risk    NP today: Standing at counter with UE support- reaching tasks eye level- fair balance x 10 min, mod VC/TC to improve WB on LLE   scap squeezes with hands on wall- min-mod A for facilitation    Written Home Exercises: assisted quad/ hip flexor stretch in side lying  eval- chin tucks, rows with OTB, hip flexor and knee extensor stretch in sidelying  Pt demo fair understanding of the education provided. Brigid  "demonstrated fair return demonstration of activities.     Education provided re: POC, HEP    Pt has no cultural, educational or language barriers to learning provided.    Assessment   Assessment period: 8/2/17 to 8/31/17. Brigid tolerates treatments well. Pt continues to have difficulty standing from average chair surface height due to decreased strength and motor control.  PT has progressed pt to practice standing from a 21" height. Pt met goal for improving balance/ ambulation via Tinetti (pt still classified as a high fall risk). Goal revised to decrease fall risk to moderate. Pt is demonstrating a good improvement in BLE strength. Impairments remain in bilateral hip ext and abd and left knee extension. Pt continues to demonstrate poor cervical mobility, limiting ability to attain a midline position. Head is held in slight left rotation- may by due to recent muscle guarding due to left clavicular fx. Per family, pt is cleared to resume normal activity with the LUE, fx demonstrated good healing. PT will begin more aggressive cervical ROM for rotation as tolerated. Pt is demonstrating a good improvement with decreased thoracic kyphosis, but significant forward head posture continues.  Initially PT recommended a cervical thoracic halo to address poor posture, now pt is more appropriate to receive a [place] collar to decrease forward head. Pt has not received custom AFOs yet either. PT recommended bilateral custom AFOs to decrease supination that occurs with sit<>stand and during gait. Pt's current AFOs do not prevent this motion. Pt can benefit from continued skilled PT to address impairments listed below to improve independence and safety with mobility and help deter further functional decline. POC extended x 4 weeks to allow for continued functional progress to improve consistency with pt's independence and decrease fall risk.     Pt prognosis is Good. Pt will continue to benefit from skilled outpatient physical " "therapy to address the deficits listed in the problem list, provide pt/family education and to maximize pt's level of independence in the home and community environment.     Activity limitations/ Participation Restrictions:   1. Fall Risk - impaired balance   2. Weakness   3. Impaired muscle tone  4. Poor head control  5. Decreased ROM  6. Gait deviations   7. Decreased ambulation   8. Decreased activity tolerance   9. Decreased independence with daily activities   10. Requires skilled supervision to complete and progress HEP   11. Requires skilled PT for DME/ orthotic recommendations    Pt's spiritual, cultural and educational needs considered and pt agreeable to plan of care and GOALS as stated below:   Short term goals: 6 weeks, pt agrees to goals set. (As of 8/31/17)  1. Pt will perform chin tucks in supine with supervision to improve independent with HEP. Met 7/5/17  2. Assist pt with obtaining appropriate cervical brace to improve head control. Ongoing- pt more appropriate for [place] Collar vs cervical thoracic halo  3. Pt will demonstrate improved knee ext strength on left to 4/5 to improve safety with ambulation and sit<>stand. partially met- met on R (5/5), L=4-/5  4. Pt will demonstrate improved cervical rotation PROM to 20 degrees to improve functional head control. Met 7/5/17  5. Pt will perform sit<>stand consistently from 22" height surface with supervision to improve independence. Met 7/5/17    Revised 8/31/17:  Pt will perform sit<>stand from 19" height chair to improve independence with sitting in various chairs around her home and decreased need for "pop up seat".- pt can stand from 21" height with min A, 22" height with supervision     Long term goals: 12 weeks, pt agrees to goals set  6. Pt will perform basic HEP for gross strengthening with supervision to deter worsening of functional limitations. Met 8/31/17- pt's sister reports good compliance with HEP  7. Pt will demonstrate improved bilateral " hip strength to 3/5 to improve balance and independence with mobility. Met 7/5/17  8. New 7/5/17: Pt will demonstrate improved bilateral hip strength to 4/5 to improve balance and independence with mobility. Improved- hip flex and abd 4-/5 W[place]KA, hip ext R= 3/5, L=4-/5  9. Pt will demonstrate improved cervical extension strength to 3+/5 to improve head control. Met 7/31/17  10. Pt will demonstrate improved cervical deep flexor strength to 3+/5 to improve head control. Met 7/31/17   11. Pt will demonstrate improve balance and gait by scoring 14/28 on Tinetti. Met 8/31/17- 17/28    Revised 8/31/17: pt will demonstrate improved balance/ gait and decreased fall risk to moderate by scoring 19/28 on Tinetti Assessment.   12. Pt will demonstrate improved cervical PROM for rotation to 40 degrees to improve functional head control. Improved-  44 degrees (L) ; 4 degrees (R) (decline on right)     Plan   Continue PT 2x weekly under established Plan of Care, with treatment to include: pt education, HEP, therapeutic exercises, neuromuscular re-education/balance exercises, therapeutic activities, joint mobilizations, manual therapy, gait training PRN, and modalities PRN, to work towards established goals. Pt may be seen by PTA to carry out plan of care.       Yadi Mercado DPT  8/31/2017

## 2017-09-01 NOTE — PLAN OF CARE
"                                                    Physical Therapy Progress Note     Name: Brigid Jernigan  Clinic Number: [id number]  Diagnosis: G71.11 (ICD-10-CM) - Myotonic dystrophy  Physician: Luis Felipe Vo MD  Treatment Orders: PT Eval and Treat  Past Medical History:   Diagnosis Date    Abnormal EKG     Myotonic dystrophy     Pacemaker     PAF (paroxysmal atrial fibrillation)     Sleep apnea        Precautions: standard  Visit #: 22  Date of Eval: 5/9/2017  Plan of Care Expiration: 9/19/2017  Extended POC: 10/17/17      Subjective   Pt reports: per sister, pt went to MD was cleared to resume normal use of LUE, left clavicle demonstrated good healing.   Pain Scale: denies    Objective       Patient received individual therapy to increase strength, endurance, ROM, flexibility, posture, core stabilization and head control with activities as follows:     Pt participated in therapeutic exercises x 34 minutes to address ROM and cervical alignment:   Supine:upper trap stretch   gentle pec stretch   SCM stretch   AAROM cervical rotation   2 x 10 bridge with ball   LTR with tball 15x   Suboccipital release    Cervical rotation AROM in supine: L= 44 deg, R= 4 deg    Strength:   Hip flex: B 4-/5  Hip abd: B 4-/5  Hip ext: R 3/5, L 4-/5  Knee ext: R=5/5, L= 4-/5    NP today:   Sitting: 2 x 10 rows with GTB   2 x 10 shoulder ext with GTB  Chin Tucks 20x with cervical isometric, min VC for correct technique,AAROM  SLR 3# R 15x, 1# L10x  X 10 minutes Nu Stepper @ 6.0 resistance, BUE/LE    Pt participated in therapeutic activity x 12 minutes  To improve posture, standing balance, and sit,>stand ability  Standing: Sit to Stand (full unsupported standing) from 21" using UE support on Tball= 5x with min A    Tinetti Balance Assessment  Balance:  1. Sitting Balance 1   Leans/ slides in chair= 0   Steady= 1  2. Rises from chair 1   Unable without help= 0   Able, uses arms= 1   Able without use of arms= 2  3. Attempts " to rise 1   Unable without help= 0   Able, >1 attmept required-= 1   Able, 1 attempt= 2  4. Immediate standing balance (1st 5 seconds) 1   Unsteady (swaggers, moves feet, trunk sway)= 0   Steady but uses walker or other support= 1   Narrow base of support without walker or support= 2  5. Nudged 1   Begins to fall= 0   Staggers, grabs, catches self= 1   Steady= 2  6. Standing balance 1   Unsteady= 0   Steady but wide LEYDI or uses AD=1   Narrow LEYDI without AD=2  7. Eyes closed 1   Unsteady= 0   Steady= 1  8. Turning 360 degrees 0   Discontinuous steps= 0   Continuous steps= 1   Unsteady= 0   Steady= 1  9. Sitting down 1   Unsafe= 0   Uses arms or not in a smooth motion= 1   Safe, smooth motion= 2  Balance score= 8/16  Gait:  1. Initiation 1   hesitates or multiple attempts to start= 0   No hesitancy= 1  2. Step length 2   Step to= 0   One foot passes= 1   reciprocal pattern= 2  3. Step height 2    Neither foot clears floor= 0   One foot clears floor= 1   Both feet clear floor= 2  4. Step symmetry 1   Not symmetrical= 0   Appears symmetrical= 1  5. Step continuity 1   Not continuous= 0   Appears continuous= 1  6. Path 1   Marked deviation= 0   Mild/moderate deviation or uses A.D.= 1   Straight without A.D.= 2  7. Trunk 0   Marked sway or uses A.D.= 0   No sway, but flexes knees or back, spread arms out while walking= 1   No sway, no flexion, no use of UE, no use of A.D.= 2  8. Walking stance 1   Heels apart= 0   Heels almost touching while walking= 1  Gait score= 9/12  Total score= 17/28 , high fall risk    NP today: Standing at counter with UE support- reaching tasks eye level- fair balance x 10 min, mod VC/TC to improve WB on LLE   scap squeezes with hands on wall- min-mod A for facilitation    Written Home Exercises: assisted quad/ hip flexor stretch in side lying  eval- chin tucks, rows with OTB, hip flexor and knee extensor stretch in sidelying  Pt demo fair understanding of the education provided. Brigid  "demonstrated fair return demonstration of activities.     Education provided re: POC, HEP    Pt has no cultural, educational or language barriers to learning provided.    Assessment   Assessment period: 8/2/17 to 8/31/17. Brigid tolerates treatments well. Pt continues to have difficulty standing from average chair surface height due to decreased strength and motor control.  PT has progressed pt to practice standing from a 21" height. Pt met goal for improving balance/ ambulation via Tinetti (pt still classified as a high fall risk). Goal revised to decrease fall risk to moderate. Pt is demonstrating a good improvement in BLE strength. Impairments remain in bilateral hip ext and abd and left knee extension. Pt continues to demonstrate poor cervical mobility, limiting ability to attain a midline position. Head is held in slight left rotation- may by due to recent muscle guarding due to left clavicular fx. Per family, pt is cleared to resume normal activity with the LUE, fx demonstrated good healing. PT will begin more aggressive cervical ROM for rotation as tolerated. Pt is demonstrating a good improvement with decreased thoracic kyphosis, but significant forward head posture continues.  Initially PT recommended a cervical thoracic halo to address poor posture, now pt is more appropriate to receive a [place] collar to decrease forward head. Pt has not received custom AFOs yet either. PT recommended bilateral custom AFOs to decrease supination that occurs with sit<>stand and during gait. Pt's current AFOs do not prevent this motion. Pt can benefit from continued skilled PT to address impairments listed below to improve independence and safety with mobility and help deter further functional decline. POC extended x 4 weeks to allow for continued functional progress to improve consistency with pt's independence and decrease fall risk.     Pt prognosis is Good. Pt will continue to benefit from skilled outpatient physical " "therapy to address the deficits listed in the problem list, provide pt/family education and to maximize pt's level of independence in the home and community environment.     Activity limitations/ Participation Restrictions:   1. Fall Risk - impaired balance   2. Weakness   3. Impaired muscle tone  4. Poor head control  5. Decreased ROM  6. Gait deviations   7. Decreased ambulation   8. Decreased activity tolerance   9. Decreased independence with daily activities   10. Requires skilled supervision to complete and progress HEP   11. Requires skilled PT for DME/ orthotic recommendations    Pt's spiritual, cultural and educational needs considered and pt agreeable to plan of care and GOALS as stated below:   Short term goals: 6 weeks, pt agrees to goals set. (As of 8/31/17)  1. Pt will perform chin tucks in supine with supervision to improve independent with HEP. Met 7/5/17  2. Assist pt with obtaining appropriate cervical brace to improve head control. Ongoing- pt more appropriate for [place] Collar vs cervical thoracic halo  3. Pt will demonstrate improved knee ext strength on left to 4/5 to improve safety with ambulation and sit<>stand. partially met- met on R (5/5), L=4-/5  4. Pt will demonstrate improved cervical rotation PROM to 20 degrees to improve functional head control. Met 7/5/17  5. Pt will perform sit<>stand consistently from 22" height surface with supervision to improve independence. Met 7/5/17    Revised 8/31/17:  Pt will perform sit<>stand from 19" height chair to improve independence with sitting in various chairs around her home and decreased need for "pop up seat".- pt can stand from 21" height with min A, 22" height with supervision     Long term goals: 12 weeks, pt agrees to goals set  6. Pt will perform basic HEP for gross strengthening with supervision to deter worsening of functional limitations. Met 8/31/17- pt's sister reports good compliance with HEP  7. Pt will demonstrate improved bilateral " hip strength to 3/5 to improve balance and independence with mobility. Met 7/5/17  8. New 7/5/17: Pt will demonstrate improved bilateral hip strength to 4/5 to improve balance and independence with mobility. Improved- hip flex and abd 4-/5 [place], hip ext R= 3/5, L=4-/5  9. Pt will demonstrate improved cervical extension strength to 3+/5 to improve head control. Met 7/31/17  10. Pt will demonstrate improved cervical deep flexor strength to 3+/5 to improve head control. Met 7/31/17   11. Pt will demonstrate improve balance and gait by scoring 14/28 on Tinetti. Met 8/31/17- 17/28    Revised 8/31/17: pt will demonstrate improved balance/ gait and decreased fall risk to moderate by scoring 19/28 on Tinetti Assessment.   12. Pt will demonstrate improved cervical PROM for rotation to 40 degrees to improve functional head control. Improved-  44 degrees (L) ; 4 degrees (R) (decline on right)     Plan   Continue PT 2x weekly under established Plan of Care, with treatment to include: pt education, HEP, therapeutic exercises, neuromuscular re-education/balance exercises, therapeutic activities, joint mobilizations, manual therapy, gait training PRN, and modalities PRN, to work towards established goals. Pt may be seen by PTA to carry out plan of care.       Yadi Mercado DPT  8/31/2017

## 2017-09-07 ENCOUNTER — CLINICAL SUPPORT (OUTPATIENT)
Dept: REHABILITATION | Facility: HOSPITAL | Age: 52
End: 2017-09-07
Attending: INTERNAL MEDICINE
Payer: COMMERCIAL

## 2017-09-07 DIAGNOSIS — Z74.09 IMPAIRED FUNCTIONAL MOBILITY, BALANCE, GAIT, AND ENDURANCE: ICD-10-CM

## 2017-09-07 DIAGNOSIS — R53.1 DECREASED STRENGTH: ICD-10-CM

## 2017-09-07 DIAGNOSIS — G71.11 MYOTONIC DYSTROPHY: Primary | ICD-10-CM

## 2017-09-07 DIAGNOSIS — R29.898 DECREASED ROM OF NECK: ICD-10-CM

## 2017-09-07 PROCEDURE — 97110 THERAPEUTIC EXERCISES: CPT | Mod: PO | Performed by: PHYSICAL THERAPIST

## 2017-09-07 PROCEDURE — 97530 THERAPEUTIC ACTIVITIES: CPT | Mod: PO | Performed by: PHYSICAL THERAPIST

## 2017-09-07 NOTE — PROGRESS NOTES
"                                                    Physical Therapy Progress Note     Name: Brigid Jernigan  Clinic Number: [id number]  Diagnosis: G71.11 (ICD-10-CM) - Myotonic dystrophy  Physician: Luis Felipe Vo MD  Treatment Orders: PT Eval and Treat  Past Medical History:   Diagnosis Date    Abnormal EKG     Myotonic dystrophy     Pacemaker     PAF (paroxysmal atrial fibrillation)     Sleep apnea        Precautions: standard  Visit #: 23  Date of Eval: 5/9/2017  Extended POC: 10/17/17      Subjective   Pt reports: per sister, pt has not been consistently performing HEP  Pain Scale: denies    Objective       Patient received individual therapy to increase strength, endurance, ROM, flexibility, posture, core stabilization and head control with activities as follows:     Pt participated in therapeutic exercises x 30 minutes to address ROM and cervical alignment:   Supine:upper trap stretch    pec stretch   SCM stretch   AAROM cervical rotation   2 x 10 bridge with ball   LTR with tball 15x   Suboccipital release   SLR 3# R 15x, 1# L10x    NP today:   Sitting: 2 x 10 rows with GTB   2 x 10 shoulder ext with GTB  Chin Tucks 20x with cervical isometric, min VC for correct technique,AAROM  SLR 3# R 15x, 1# L10x  X 10 minutes Nu Stepper @ 6.0 resistance, BUE/LE    Pt participated in therapeutic activity x 10 minutes  To improve posture, standing balance, and sit,>stand ability  Standing: Sit to Stand (full unsupported standing) from 23"- 21" using UE support on Tball= 10x with min A   Standing at counter with UE support- reaching tasks eye level- fair balance x 10 min, cues for RUE use to encourage increased weight bearing on LLE. Guiding A needed for reaching (for ER)    NP today: scap squeezes with hands on wall- min-mod A for facilitation    Written Home Exercises: assisted quad/ hip flexor stretch in side lying  eval- chin tucks, rows with OTB, hip flexor and knee extensor stretch in sidelying  Pt demo fair " "understanding of the education provided. Brigid demonstrated fair return demonstration of activities.     Education provided re: POC, HEP    Pt has no cultural, educational or language barriers to learning provided.    Assessment   Brigid tolerated treatment well. Pt continues to present with significant ROM limitations in right cervical rotation and bilateral side bending. Pt had increased difficulty standing from a 21" height, PT had to adjust to a higher height, then progress to lower. Pt demonstrated good performance of resisted SLR. PT is recommending a [place] Collar to improve head positioning. PT recommends bilateral custom AFOs to decrease supination that occurs with sit<>stand and during gait. Pt's current AFOs do not prevent this motion. Pt can benefit from continued skilled PT to address impairments listed below to improve independence and safety with mobility and help deter further functional decline.     Pt prognosis is Good. Pt will continue to benefit from skilled outpatient physical therapy to address the deficits listed in the problem list, provide pt/family education and to maximize pt's level of independence in the home and community environment.     Activity limitations/ Participation Restrictions:   1. Fall Risk - impaired balance   2. Weakness   3. Impaired muscle tone  4. Poor head control  5. Decreased ROM  6. Gait deviations   7. Decreased ambulation   8. Decreased activity tolerance   9. Decreased independence with daily activities   10. Requires skilled supervision to complete and progress HEP   11. Requires skilled PT for DME/ orthotic recommendations    Pt's spiritual, cultural and educational needs considered and pt agreeable to plan of care and GOALS as stated below:   Short term goals: 6 weeks, pt agrees to goals set. (As of 8/31/17)  1. Pt will perform chin tucks in supine with supervision to improve independent with HEP. Met 7/5/17  2. Assist pt with obtaining appropriate cervical " "brace to improve head control. Ongoing- pt more appropriate for [place] Collar vs cervical thoracic halo  3. Pt will demonstrate improved knee ext strength on left to 4/5 to improve safety with ambulation and sit<>stand. partially met- met on R (5/5), L=4-/5  4. Pt will demonstrate improved cervical rotation PROM to 20 degrees to improve functional head control. Met 7/5/17  5. Pt will perform sit<>stand consistently from 22" height surface with supervision to improve independence. Met 7/5/17    Revised 8/31/17:  Pt will perform sit<>stand from 19" height chair to improve independence with sitting in various chairs around her home and decreased need for "pop up seat".- pt can stand from 21" height with min A, 22" height with supervision     Long term goals: 12 weeks, pt agrees to goals set  6. Pt will perform basic HEP for gross strengthening with supervision to deter worsening of functional limitations. Met 8/31/17- pt's sister reports good compliance with HEP  7. Pt will demonstrate improved bilateral hip strength to 3/5 to improve balance and independence with mobility. Met 7/5/17  8. New 7/5/17: Pt will demonstrate improved bilateral hip strength to 4/5 to improve balance and independence with mobility. Improved- hip flex and abd 4-/5 WALESKA, hip ext R= 3/5, L=4-/5  9. Pt will demonstrate improved cervical extension strength to 3+/5 to improve head control. Met 7/31/17  10. Pt will demonstrate improved cervical deep flexor strength to 3+/5 to improve head control. Met 7/31/17   11. Pt will demonstrate improve balance and gait by scoring 14/28 on Tinetti. Met 8/31/17- 17/28    Revised 8/31/17: pt will demonstrate improved balance/ gait and decreased fall risk to moderate by scoring 19/28 on Tinetti Assessment.   12. Pt will demonstrate improved cervical PROM for rotation to 40 degrees to improve functional head control. Improved-  44 degrees (L) ; 4 degrees (R) (decline on right)     Plan   Continue PT 2x weekly under " established Plan of Care, with treatment to include: pt education, HEP, therapeutic exercises, neuromuscular re-education/balance exercises, therapeutic activities, joint mobilizations, manual therapy, gait training PRN, and modalities PRN, to work towards established goals. Pt may be seen by PTA to carry out plan of care.       Yadi Mercado DPT  9/7/2017

## 2017-09-11 ENCOUNTER — DOCUMENTATION ONLY (OUTPATIENT)
Dept: REHABILITATION | Facility: HOSPITAL | Age: 52
End: 2017-09-11

## 2017-09-11 ENCOUNTER — CLINICAL SUPPORT (OUTPATIENT)
Dept: REHABILITATION | Facility: HOSPITAL | Age: 52
End: 2017-09-11
Attending: COLON & RECTAL SURGERY
Payer: COMMERCIAL

## 2017-09-11 DIAGNOSIS — R29.898 DECREASED ROM OF NECK: ICD-10-CM

## 2017-09-11 DIAGNOSIS — Z74.09 IMPAIRED FUNCTIONAL MOBILITY, BALANCE, GAIT, AND ENDURANCE: ICD-10-CM

## 2017-09-11 DIAGNOSIS — R53.1 DECREASED STRENGTH: Primary | ICD-10-CM

## 2017-09-11 PROCEDURE — 97110 THERAPEUTIC EXERCISES: CPT | Mod: PO

## 2017-09-11 NOTE — PROGRESS NOTES
"                                                    Physical Therapy Progress Note     Name: Brigid Jernigan  Clinic Number: [id number]  Diagnosis: G71.11 (ICD-10-CM) - Myotonic dystrophy  Physician: Luis Felipe Vo MD  Treatment Orders: PT Eval and Treat  Past Medical History:   Diagnosis Date    Abnormal EKG     Myotonic dystrophy     Pacemaker     PAF (paroxysmal atrial fibrillation)     Sleep apnea        Precautions: standard  Visit #: 24  Date of Eval: 5/9/2017  Extended POC: 10/17/17      Subjective   Pt reports: " I'm doing pretty good."   Pain Scale: denies    Objective       Patient received individual therapy to increase strength, endurance, ROM, flexibility, posture, core stabilization and head control with activities as follows:     Pt participated in therapeutic exercises x 30 minutes to address ROM and cervical alignment:   X 10 minutes Nu Stepper @ 7.0 resistance, BUE/LE    Supine:upper trap stretch    pec stretch   SCM stretch   AAROM cervical rotation   Suboccipital release   2 x 10 bridge with ball   LTR with tball 15x   SLR 3# R 15x, 1# L10x      Written Home Exercises: assisted quad/ hip flexor stretch in side lying  eval- chin tucks, rows with OTB, hip flexor and knee extensor stretch in sidelying  Pt demo fair understanding of the education provided. Brigid demonstrated fair return demonstration of activities.     Education provided re: POC, HEP    Pt has no cultural, educational or language barriers to learning provided.    Assessment   Brigid tolerated treatment well and cont to be motivated.  Pt was able to increase resistance on the stepper this afternoon and cont with cervical stretching and strengthening.  Pt with very limited rotation to the R today.  No new exercises noted.  Cont with POC.     Pt prognosis is Good. Pt will continue to benefit from skilled outpatient physical therapy to address the deficits listed in the problem list, provide pt/family education and to maximize " "pt's level of independence in the home and community environment.     Activity limitations/ Participation Restrictions:   1. Fall Risk - impaired balance   2. Weakness   3. Impaired muscle tone  4. Poor head control  5. Decreased ROM  6. Gait deviations   7. Decreased ambulation   8. Decreased activity tolerance   9. Decreased independence with daily activities   10. Requires skilled supervision to complete and progress HEP   11. Requires skilled PT for DME/ orthotic recommendations    Pt's spiritual, cultural and educational needs considered and pt agreeable to plan of care and GOALS as stated below:   Short term goals: 6 weeks, pt agrees to goals set. (As of 8/31/17)  1. Pt will perform chin tucks in supine with supervision to improve independent with HEP. Met 7/5/17  2. Assist pt with obtaining appropriate cervical brace to improve head control. Ongoing- pt more appropriate for [place] Collar vs cervical thoracic halo  3. Pt will demonstrate improved knee ext strength on left to 4/5 to improve safety with ambulation and sit<>stand. partially met- met on R (5/5), L=4-/5  4. Pt will demonstrate improved cervical rotation PROM to 20 degrees to improve functional head control. Met 7/5/17  5. Pt will perform sit<>stand consistently from 22" height surface with supervision to improve independence. Met 7/5/17    Revised 8/31/17:  Pt will perform sit<>stand from 19" height chair to improve independence with sitting in various chairs around her home and decreased need for "pop up seat".- pt can stand from 21" height with min A, 22" height with supervision     Long term goals: 12 weeks, pt agrees to goals set  6. Pt will perform basic HEP for gross strengthening with supervision to deter worsening of functional limitations. Met 8/31/17- pt's sister reports good compliance with HEP  7. Pt will demonstrate improved bilateral hip strength to 3/5 to improve balance and independence with mobility. Met 7/5/17  8. New 7/5/17: Pt " will demonstrate improved bilateral hip strength to 4/5 to improve balance and independence with mobility. Improved- hip flex and abd 4-/5 W[place], hip ext R= 3/5, L=4-/5  9. Pt will demonstrate improved cervical extension strength to 3+/5 to improve head control. Met 7/31/17  10. Pt will demonstrate improved cervical deep flexor strength to 3+/5 to improve head control. Met 7/31/17   11. Pt will demonstrate improve balance and gait by scoring 14/28 on Tinetti. Met 8/31/17- 17/28    Revised 8/31/17: pt will demonstrate improved balance/ gait and decreased fall risk to moderate by scoring 19/28 on Tinetti Assessment.   12. Pt will demonstrate improved cervical PROM for rotation to 40 degrees to improve functional head control. Improved-  44 degrees (L) ; 4 degrees (R) (decline on right)     Plan   Continue PT 2x weekly under established Plan of Care, with treatment to include: pt education, HEP, therapeutic exercises, neuromuscular re-education/balance exercises, therapeutic activities, joint mobilizations, manual therapy, gait training PRN, and modalities PRN, to work towards established goals. Pt may be seen by PTA to carry out plan of care.       Yadi Mercado DPT  9/11/2017

## 2017-09-11 NOTE — PROGRESS NOTES
"I, JORDY HensonT, met with Mae Caicedo PTA to discuss this pt's POC. Address head positioning via inhibitive distraction and PROM as tolerated. Caution with overpressure on right shoulder due to pacemaker. Pt is also addressing core/ LE strength. Address sit<>stand from 21" height mat with use of large tball. Practice static standing balance without UE as tolerated. Practice standing at countertop with reaching tasks.     Yadi Mercado DPT  9/11/2017    Face to face consultation with supervision PT held on 09/11/2017    Mae Caicedo PTA      "

## 2017-09-18 ENCOUNTER — CLINICAL SUPPORT (OUTPATIENT)
Dept: REHABILITATION | Facility: HOSPITAL | Age: 52
End: 2017-09-18
Attending: INTERNAL MEDICINE
Payer: COMMERCIAL

## 2017-09-18 DIAGNOSIS — Z74.09 IMPAIRED FUNCTIONAL MOBILITY, BALANCE, GAIT, AND ENDURANCE: ICD-10-CM

## 2017-09-18 DIAGNOSIS — R53.1 DECREASED STRENGTH: Primary | ICD-10-CM

## 2017-09-18 DIAGNOSIS — R29.898 DECREASED ROM OF NECK: ICD-10-CM

## 2017-09-18 PROCEDURE — 97110 THERAPEUTIC EXERCISES: CPT | Mod: PO

## 2017-09-18 NOTE — PROGRESS NOTES
"                                                    Physical Therapy Progress Note     Name: Brigid Jernigan  Clinic Number: [id number]  Diagnosis: G71.11 (ICD-10-CM) - Myotonic dystrophy  Physician: Luis Felipe Vo MD  Treatment Orders: PT Eval and Treat  Past Medical History:   Diagnosis Date    Abnormal EKG     Myotonic dystrophy     Pacemaker     PAF (paroxysmal atrial fibrillation)     Sleep apnea        Precautions: standard  Visit #: 25  Date of Eval: 5/9/2017  Extended POC: 10/17/17      Subjective   Pt reports: " I'm disappointed in the SAints."  Sister reported : " Have you heard anything about the braces or AFO's?"  Pain Scale: denies    Objective       Patient received individual therapy to increase strength, endurance, ROM, flexibility, posture, core stabilization and head control with activities as follows:     Pt participated in therapeutic exercises x 30 minutes to address ROM and cervical alignment:   X 10 minutes Nu Stepper @ 7.0 resistance, BUE/LE    Supine:upper trap stretch    pec stretch   SCM stretch   AAROM cervical rotation   Suboccipital release   2 x 10 bridge with ball   LTR with tball 15x   SLR 3# R 15x, 1# L10x      Written Home Exercises: assisted quad/ hip flexor stretch in side lying  eval- chin tucks, rows with OTB, hip flexor and knee extensor stretch in sidelying  Pt demo fair understanding of the education provided. Brigid demonstrated fair return demonstration of activities.     Education provided re: POC, HEP    Pt has no cultural, educational or language barriers to learning provided.    Assessment   Brigid tolerated treatment well and cont to be motivated.  Pt was able to increase resistance on the stepper this afternoon and cont with cervical stretching and strengthening.  Pt was able to increase reps on B LE SLR in supine.    Cont with POC.     Pt prognosis is Good. Pt will continue to benefit from skilled outpatient physical therapy to address the deficits listed " "in the problem list, provide pt/family education and to maximize pt's level of independence in the home and community environment.     Activity limitations/ Participation Restrictions:   1. Fall Risk - impaired balance   2. Weakness   3. Impaired muscle tone  4. Poor head control  5. Decreased ROM  6. Gait deviations   7. Decreased ambulation   8. Decreased activity tolerance   9. Decreased independence with daily activities   10. Requires skilled supervision to complete and progress HEP   11. Requires skilled PT for DME/ orthotic recommendations    Pt's spiritual, cultural and educational needs considered and pt agreeable to plan of care and GOALS as stated below:   Short term goals: 6 weeks, pt agrees to goals set. (As of 8/31/17)  1. Pt will perform chin tucks in supine with supervision to improve independent with HEP. Met 7/5/17  2. Assist pt with obtaining appropriate cervical brace to improve head control. Ongoing- pt more appropriate for [place] Collar vs cervical thoracic halo  3. Pt will demonstrate improved knee ext strength on left to 4/5 to improve safety with ambulation and sit<>stand. partially met- met on R (5/5), L=4-/5  4. Pt will demonstrate improved cervical rotation PROM to 20 degrees to improve functional head control. Met 7/5/17  5. Pt will perform sit<>stand consistently from 22" height surface with supervision to improve independence. Met 7/5/17    Revised 8/31/17:  Pt will perform sit<>stand from 19" height chair to improve independence with sitting in various chairs around her home and decreased need for "pop up seat".- pt can stand from 21" height with min A, 22" height with supervision     Long term goals: 12 weeks, pt agrees to goals set  6. Pt will perform basic HEP for gross strengthening with supervision to deter worsening of functional limitations. Met 8/31/17- pt's sister reports good compliance with HEP  7. Pt will demonstrate improved bilateral hip strength to 3/5 to improve balance " and independence with mobility. Met 7/5/17  8. New 7/5/17: Pt will demonstrate improved bilateral hip strength to 4/5 to improve balance and independence with mobility. Improved- hip flex and abd 4-/5 WAL[place]KA, hip ext R= 3/5, L=4-/5  9. Pt will demonstrate improved cervical extension strength to 3+/5 to improve head control. Met 7/31/17  10. Pt will demonstrate improved cervical deep flexor strength to 3+/5 to improve head control. Met 7/31/17   11. Pt will demonstrate improve balance and gait by scoring 14/28 on Tinetti. Met 8/31/17- 17/28    Revised 8/31/17: pt will demonstrate improved balance/ gait and decreased fall risk to moderate by scoring 19/28 on Tinetti Assessment.   12. Pt will demonstrate improved cervical PROM for rotation to 40 degrees to improve functional head control. Improved-  44 degrees (L) ; 4 degrees (R) (decline on right)     Plan   Continue PT 2x weekly under established Plan of Care, with treatment to include: pt education, HEP, therapeutic exercises, neuromuscular re-education/balance exercises, therapeutic activities, joint mobilizations, manual therapy, gait training PRN, and modalities PRN, to work towards established goals. Pt may be seen by PTA to carry out plan of care.       Yadi Mercado DPT  9/18/2017

## 2017-09-21 ENCOUNTER — CLINICAL SUPPORT (OUTPATIENT)
Dept: REHABILITATION | Facility: HOSPITAL | Age: 52
End: 2017-09-21
Attending: INTERNAL MEDICINE
Payer: COMMERCIAL

## 2017-09-21 DIAGNOSIS — R53.1 DECREASED STRENGTH: ICD-10-CM

## 2017-09-21 DIAGNOSIS — R29.898 DECREASED ROM OF NECK: ICD-10-CM

## 2017-09-21 DIAGNOSIS — Z74.09 IMPAIRED FUNCTIONAL MOBILITY, BALANCE, GAIT, AND ENDURANCE: ICD-10-CM

## 2017-09-21 PROCEDURE — 97110 THERAPEUTIC EXERCISES: CPT | Mod: PO | Performed by: PHYSICAL THERAPIST

## 2017-09-21 NOTE — PROGRESS NOTES
Physical Therapy Progress Note     Name: Brigid Jernigan  Clinic Number: [id number]  Diagnosis: G71.11 (ICD-10-CM) - Myotonic dystrophy  Physician: Luis Felipe Vo MD  Treatment Orders: PT Eval and Treat  Past Medical History:   Diagnosis Date    Abnormal EKG     Myotonic dystrophy     Pacemaker     PAF (paroxysmal atrial fibrillation)     Sleep apnea        Precautions: standard  Visit #: 26  Date of Eval: 5/9/2017  Extended POC: 10/17/17      Subjective   Pt reports: no new complaints  Sister reported: new PCP is willing to write scripts for AFOs and cervical collar, PT provided info.  Pain Scale: denies    Objective       Patient received individual therapy to increase strength, endurance, ROM, flexibility, posture, core stabilization and head control with activities as follows:     Pt participated in therapeutic exercises x 45 minutes to address ROM and cervical alignment:   X 10 minutes [place]  Stepper @ 8.0 resistance, BUE/LE    Supine:upper trap stretch    pec stretch   AAROM cervical rotation   Suboccipital release   2 x 10 bridge with ball   LTR with tball 15x   SLR 3# R 15x, 1# L15x   standing at countertop reaching >eye level LUE without A, mod A for RUE due to decreased ROM and motor control.      Written Home Exercises: assisted quad/ hip flexor stretch in side lying  eval- chin tucks, rows with OTB, hip flexor and knee extensor stretch in sidelying  Pt demo fair understanding of the education provided. Brigid demonstrated fair return demonstration of activities.     Education provided re: POC, HEP    Pt has no cultural, educational or language barriers to learning provided.    Assessment   Brigid tolerated treatment well.  Pt demonstrates improved tolerance to SLR on LLE and was able to tolerate increased repititions. Pt demonstrates good ability to reach >eye level with LUE, but required moderate assistance for reaching with RUE. Rounded  "shoulders posture and decreased UE motor control/ coordination limits height of reach. Pt demonstrates poor RUE ER with reaching. Pt continues to demonstrate decreased ability to stand from an average chair height surface (20"- 23"). Pt can benefit from continued skilled PT to address remaining impairments to improve independence with mobility, decrease fall risk, and maintain mobility.     Pt prognosis is Good. Pt will continue to benefit from skilled outpatient physical therapy to address the deficits listed in the problem list, provide pt/family education and to maximize pt's level of independence in the home and community environment.     Activity limitations/ Participation Restrictions:   1. Fall Risk - impaired balance   2. Weakness   3. Impaired muscle tone  4. Poor head control  5. Decreased ROM  6. Gait deviations   7. Decreased ambulation   8. Decreased activity tolerance   9. Decreased independence with daily activities   10. Requires skilled supervision to complete and progress HEP   11. Requires skilled PT for DME/ orthotic recommendations    Pt's spiritual, cultural and educational needs considered and pt agreeable to plan of care and GOALS as stated below:   Short term goals: 6 weeks, pt agrees to goals set. (As of 8/31/17)  1. Pt will perform chin tucks in supine with supervision to improve independent with HEP. Met 7/5/17  2. Assist pt with obtaining appropriate cervical brace to improve head control. Ongoing- pt more appropriate for [place] Collar vs cervical thoracic halo  3. Pt will demonstrate improved knee ext strength on left to 4/5 to improve safety with ambulation and sit<>stand. partially met- met on R (5/5), L=4-/5  4. Pt will demonstrate improved cervical rotation PROM to 20 degrees to improve functional head control. Met 7/5/17  5. Pt will perform sit<>stand consistently from 22" height surface with supervision to improve independence. Met 7/5/17    Revised 8/31/17:  Pt will perform " "sit<>stand from 19" height chair to improve independence with sitting in various chairs around her home and decreased need for "pop up seat".- pt can stand from 21" height with min A, 22" height with supervision     Long term goals: 12 weeks, pt agrees to goals set  6. Pt will perform basic HEP for gross strengthening with supervision to deter worsening of functional limitations. Met 8/31/17- pt's sister reports good compliance with HEP  7. Pt will demonstrate improved bilateral hip strength to 3/5 to improve balance and independence with mobility. Met 7/5/17  8. New 7/5/17: Pt will demonstrate improved bilateral hip strength to 4/5 to improve balance and independence with mobility. Improved- hip flex and abd 4-/5 WAL[place], hip ext R= 3/5, L=4-/5  9. Pt will demonstrate improved cervical extension strength to 3+/5 to improve head control. Met 7/31/17  10. Pt will demonstrate improved cervical deep flexor strength to 3+/5 to improve head control. Met 7/31/17   11. Pt will demonstrate improve balance and gait by scoring 14/28 on Tinetti. Met 8/31/17- 17/28    Revised 8/31/17: pt will demonstrate improved balance/ gait and decreased fall risk to moderate by scoring 19/28 on Tinetti Assessment.   12. Pt will demonstrate improved cervical PROM for rotation to 40 degrees to improve functional head control. Improved-  44 degrees (L) ; 4 degrees (R) (decline on right)     Plan   Continue PT 2x weekly under established Plan of Care, with treatment to include: pt education, HEP, therapeutic exercises, neuromuscular re-education/balance exercises, therapeutic activities, joint mobilizations, manual therapy, gait training PRN, and modalities PRN, to work towards established goals. Pt may be seen by PTA to carry out plan of care.       Yadi Mercado, LUL  9/21/2017  "

## 2017-09-25 ENCOUNTER — CLINICAL SUPPORT (OUTPATIENT)
Dept: REHABILITATION | Facility: HOSPITAL | Age: 52
End: 2017-09-25
Attending: INTERNAL MEDICINE
Payer: COMMERCIAL

## 2017-09-25 DIAGNOSIS — R29.898 DECREASED ROM OF NECK: ICD-10-CM

## 2017-09-25 DIAGNOSIS — R53.1 DECREASED STRENGTH: ICD-10-CM

## 2017-09-25 DIAGNOSIS — Z74.09 IMPAIRED FUNCTIONAL MOBILITY, BALANCE, GAIT, AND ENDURANCE: ICD-10-CM

## 2017-09-25 PROCEDURE — 97110 THERAPEUTIC EXERCISES: CPT | Mod: PO | Performed by: PHYSICAL THERAPIST

## 2017-09-25 NOTE — PROGRESS NOTES
"                                                    Physical Therapy Progress Note     Name: Brigid Jernigan  Clinic Number: [id number]  Diagnosis: G71.11 (ICD-10-CM) - Myotonic dystrophy  Physician: Luis Felipe Vo MD  Treatment Orders: PT Eval and Treat  Past Medical History:   Diagnosis Date    Abnormal EKG     Myotonic dystrophy     Pacemaker     PAF (paroxysmal atrial fibrillation)     Sleep apnea        Precautions: standard  Visit #: 27  Date of Eval: 5/9/2017  Extended POC: 10/17/17      Subjective   Pt reports: no new complaints  Sister reported: she has not provided MD with DME info yet  Pain Scale: denies    Objective       Patient received individual therapy to increase strength, endurance, ROM, flexibility, posture, core stabilization and head control with activities as follows:     Pt participated in therapeutic exercises x 43 minutes to address ROM and cervical alignment:   X 10 minutes Scifit  Stepper @ 8.0 resistance, BUE/LE    Supine:upper trap stretch    pec stretch   AAROM cervical rotation   Suboccipital release   2 x 10 bridge with ball   LTR with tball 15x   SLR 3# R 15x, 1.5# L15x    sit<>stand from 22-21" mat with UE on tball, min A  Static standing without UE support x 15 sec prior to LOB    NP today:    standing at countertop reaching >eye level LUE without A, mod A for RUE due to decreased ROM and motor control.      Written Home Exercises: assisted quad/ hip flexor stretch in side lying  eval- chin tucks, rows with OTB, hip flexor and knee extensor stretch in sidelying  Pt demo fair understanding of the education provided. Brigid demonstrated fair return demonstration of activities.     Education provided re: POC, HEP    Pt has no cultural, educational or language barriers to learning provided.    Assessment   Brigid tolerated treatment well. Pt demonstrated good tolerance to increased resistance for left SLR. Pt demonstrated improved static unsupported standing balance to 15 " "sec.  Pt continues to demonstrate decreased ability to stand from an average chair height surface (20"- 23"). Pt can benefit from continued skilled PT to address remaining impairments to improve independence with mobility, decrease fall risk, and maintain mobility.     Pt prognosis is Good. Pt will continue to benefit from skilled outpatient physical therapy to address the deficits listed in the problem list, provide pt/family education and to maximize pt's level of independence in the home and community environment.     Activity limitations/ Participation Restrictions:   1. Fall Risk - impaired balance   2. Weakness   3. Impaired muscle tone  4. Poor head control  5. Decreased ROM  6. Gait deviations   7. Decreased ambulation   8. Decreased activity tolerance   9. Decreased independence with daily activities   10. Requires skilled supervision to complete and progress HEP   11. Requires skilled PT for DME/ orthotic recommendations    Pt's spiritual, cultural and educational needs considered and pt agreeable to plan of care and GOALS as stated below:   Short term goals: 6 weeks, pt agrees to goals set. (As of 8/31/17)  1. Pt will perform chin tucks in supine with supervision to improve independent with HEP. Met 7/5/17  2. Assist pt with obtaining appropriate cervical brace to improve head control. Ongoing- pt more appropriate for [place]lar vs cervical thoracic halo  3. Pt will demonstrate improved knee ext strength on left to 4/5 to improve safety with ambulation and sit<>stand. partially met- met on R (5/5), L=4-/5  4. Pt will demonstrate improved cervical rotation PROM to 20 degrees to improve functional head control. Met 7/5/17  5. Pt will perform sit<>stand consistently from 22" height surface with supervision to improve independence. Met 7/5/17    Revised 8/31/17:  Pt will perform sit<>stand from 19" height chair to improve independence with sitting in various chairs around her home and decreased need for " ""pop up seat".- pt can stand from 21" height with min A, 22" height with supervision     Long term goals: 12 weeks, pt agrees to goals set  6. Pt will perform basic HEP for gross strengthening with supervision to deter worsening of functional limitations. Met 8/31/17- pt's sister reports good compliance with HEP  7. Pt will demonstrate improved bilateral hip strength to 3/5 to improve balance and independence with mobility. Met 7/5/17  8. New 7/5/17: Pt will demonstrate improved bilateral hip strength to 4/5 to improve balance and independence with mobility. Improved- hip flex and abd 4-/5 [place], hip ext R= 3/5, L=4-/5  9. Pt will demonstrate improved cervical extension strength to 3+/5 to improve head control. Met 7/31/17  10. Pt will demonstrate improved cervical deep flexor strength to 3+/5 to improve head control. Met 7/31/17   11. Pt will demonstrate improve balance and gait by scoring 14/28 on Tinetti. Met 8/31/17- 17/28    Revised 8/31/17: pt will demonstrate improved balance/ gait and decreased fall risk to moderate by scoring 19/28 on Tinetti Assessment.   12. Pt will demonstrate improved cervical PROM for rotation to 40 degrees to improve functional head control. Improved-  44 degrees (L) ; 4 degrees (R) (decline on right)     Plan   Continue PT 2x weekly under established Plan of Care, with treatment to include: pt education, HEP, therapeutic exercises, neuromuscular re-education/balance exercises, therapeutic activities, joint mobilizations, manual therapy, gait training PRN, and modalities PRN, to work towards established goals. Pt may be seen by PTA to carry out plan of care.       Yadi Mercado, LUL  9/25/2017  "

## 2017-09-28 ENCOUNTER — CLINICAL SUPPORT (OUTPATIENT)
Dept: REHABILITATION | Facility: HOSPITAL | Age: 52
End: 2017-09-28
Attending: INTERNAL MEDICINE
Payer: COMMERCIAL

## 2017-09-28 DIAGNOSIS — R29.898 DECREASED ROM OF NECK: ICD-10-CM

## 2017-09-28 DIAGNOSIS — R53.1 DECREASED STRENGTH: ICD-10-CM

## 2017-09-28 DIAGNOSIS — Z74.09 IMPAIRED FUNCTIONAL MOBILITY, BALANCE, GAIT, AND ENDURANCE: ICD-10-CM

## 2017-09-28 PROCEDURE — 97110 THERAPEUTIC EXERCISES: CPT | Mod: PO | Performed by: PHYSICAL THERAPIST

## 2017-09-28 NOTE — PLAN OF CARE
"                                                    Physical Therapy Progress Note     Name: Brigid Jernigan  Clinic Number: [id number]  Diagnosis: G71.11 (ICD-10-CM) - Myotonic dystrophy  Physician: Luis Felipe Vo MD  Treatment Orders: PT Eval and Treat  Past Medical History:   Diagnosis Date    Abnormal EKG     Myotonic dystrophy     Pacemaker     PAF (paroxysmal atrial fibrillation)     Sleep apnea        Precautions: standard  Visit #: 28  Date of Eval: 5/9/2017  Extended POC: 11/14/17      Subjective   Pt reports: no new complaints  Sister reported: PCP is requesting LMN for requested DME, reports pt has poor compliance with HEP  Pain Scale: denies    Objective       Patient received individual therapy to increase strength, endurance, ROM, flexibility, posture, core stabilization and head control with activities as follows:     Pt participated in therapeutic exercises x 42 minutes to address ROM and cervical alignment:   X 10 minutes Scifit  Stepper @ 8.0 resistance, BUE/LE  Strength: knee ext: L- 4/5   Hip flex: R- 4-/5, L-4+/5   Hip ext: R- 3+/5, L- 3/5   Hip  Abd: R- 4/5, L- 4/5    Cervical AROM in supine: rotation: R= 5 deg>10 deg, L=30 deg>34 deg  Increased after PROM    Supine:upper trap stretch    pec stretch   AAROM cervical rotation   Suboccipital release   2 x 10 bridge with ball   LTR with tball 15x   SLR 3# 15x    Sit<>stand from 20" height mat with BUE use, CGA(multiple tries for success >5x)  Sit<>stand from 21" height mat with supervision    Tinetti Balance Assessment  Balance:  1. Sitting Balance 1   Leans/ slides in chair= 0   Steady= 1  2. Rises from chair 1   Unable without help= 0   Able, uses arms= 1   Able without use of arms= 2  3. Attempts to rise 1   Unable without help= 0   Able, >1 attmept required-= 1   Able, 1 attempt= 2  4. Immediate standing balance (1st 5 seconds) 1   Unsteady (swaggers, moves feet, trunk sway)= 0   Steady but uses walker or other support= 1   Narrow base " "of support without walker or support= 2  5. Nudged 0   Begins to fall= 0   Staggers, grabs, catches self= 1   Steady= 2  6. Standing balance 1   Unsteady= 0   Steady but wide LEYDI or uses AD=1   Narrow LEYDI without AD=2  7. Eyes closed 1   Unsteady= 0   Steady= 1  8. Turning 360 degrees 2   Discontinuous steps= 0   Continuous steps= 1   Unsteady= 0   Steady= 1  9. Sitting down 1   Unsafe= 0   Uses arms or not in a smooth motion= 1   Safe, smooth motion= 2  Balance score= 9/16  Gait:  1. Initiation 1   hesitates or multiple attempts to start= 0   No hesitancy= 1  2. Step length 2   Step to= 0   One foot passes= 1   reciprocal pattern= 2  3. Step height 2    Neither foot clears floor= 0   One foot clears floor= 1   Both feet clear floor= 2  4. Step symmetry 1   Not symmetrical= 0   Appears symmetrical= 1  5. Step continuity 1   Not continuous= 0   Appears continuous= 1  6. Path 1   Marked deviation= 0   Mild/moderate deviation or uses A.D.= 1   Straight without A.D.= 2  7. Trunk 0   Marked sway or uses A.D.= 0   No sway, but flexes knees or back, spread arms out while walking= 1   No sway, no flexion, no use of UE, no use of A.D.= 2  8. Walking stance 0   Heels apart= 0   Heels almost touching while walking= 1  Gait score= 8/12  Total score= 17/28 , high fall risk      NP  sit<>stand from 22-21" mat with UE on tball, min A  Static standing without UE support x 15 sec prior to LOB  standing at countertop reaching >eye level LUE without A, mod A for RUE due to decreased ROM and motor control.  LTR with tball 15x      Written Home Exercises: assisted quad/ hip flexor stretch in side lying  eval- chin tucks, rows with OTB, hip flexor and knee extensor stretch in sidelying  Pt demo fair understanding of the education provided. Brigid demonstrated fair return demonstration of activities.     Education provided re: POC, HEP    Pt has no cultural, educational or language barriers to learning provided.    Assessment " "  Assessment period: 9/7/17 to 9/28/17. Brigid tolerates treatments well. Pt's sister reports a decline in pt's participation with HEP. This is evident with cervical rotation ROM. Pt is holding head to left of midline (likely due to previous clavicular fx) due to muscle guarding/ tightness. Pt was encouraged to increase participation in AROM of cervical rotation to maintain progress made during PT sessions. Pt met goal for left knee ext strength, but did not meet goals for hip strength. Pt did demonstrate improvements in hip strength (albert. abd and flexion). Pt is demonstrating a good improvement with sit<>stand from lower heights. Pt can consistently stand from 21" height mat with BUE use and multiple attempts. PT is progressing pt to lower heights to address sit<>stand from normal height chairs. Pt can benefit from continued skilled PT to address remaining impairments to improve independence with mobility, decrease fall risk, and maintain mobility. POC extended x 4 weeks to address remaining goals to improve consistency of progress with mobility and to decrease pt's fall risk.     Pt prognosis is Good. Pt will continue to benefit from skilled outpatient physical therapy to address the deficits listed in the problem list, provide pt/family education and to maximize pt's level of independence in the home and community environment.     Activity limitations/ Participation Restrictions:   1. Fall Risk - impaired balance   2. Weakness   3. Impaired muscle tone  4. Poor head control  5. Decreased ROM  6. Gait deviations   7. Decreased ambulation   8. Decreased activity tolerance   9. Decreased independence with daily activities   10. Requires skilled supervision to complete and progress HEP   11. Requires skilled PT for DME/ orthotic recommendations    Pt's spiritual, cultural and educational needs considered and pt agreeable to plan of care and GOALS as stated below:   Short term goals: 6 weeks, pt agrees to goals set. " "(As of 9/28/17)  1. Pt will perform chin tucks in supine with supervision to improve independent with HEP. Met 7/5/17  2. Assist pt with obtaining appropriate cervical brace to improve head control. Ongoing- pt more appropriate for [place]lar vs cervical thoracic halo, LMN to be sent to PCP  3. Pt will demonstrate improved knee ext strength on left to 4/5 to improve safety with ambulation and sit<>stand. Met 9/28/17 L=4/5  4. Pt will demonstrate improved cervical rotation PROM to 20 degrees to improve functional head control. Met 7/5/17  5. Pt will perform sit<>stand consistently from 22" height surface with supervision to improve independence. Met 7/5/17    Revised 8/31/17:  Pt will perform sit<>stand from 19" height chair to improve independence with sitting in various chairs around her home and decreased need for "pop up seat". Improved- pt can stand from 21" height with BUE and a few attempts without A, 20" with CGA and multiple attempts (>5x)     Long term goals: 12 weeks, pt agrees to goals set  6. Pt will perform basic HEP for gross strengthening with supervision to deter worsening of functional limitations. Met 8/31/17- pt's sister reports poor current compliance  7. Pt will demonstrate improved bilateral hip strength to 3/5 to improve balance and independence with mobility. Met 7/5/17  8. New 7/5/17: Pt will demonstrate improved bilateral hip strength to 4/5 to improve balance and independence with mobility. Improved- hip flex R=4-/5, L=4+/5, hip ext R= 3+/5, L=3/5, hip abd B=4/5  9. Pt will demonstrate improved cervical extension strength to 3+/5 to improve head control. Met 7/31/17  10. Pt will demonstrate improved cervical deep flexor strength to 3+/5 to improve head control. Met 7/31/17   11. Pt will demonstrate improve balance and gait by scoring 14/28 on Tinetti. Met 8/31/17- 17/28    Revised 8/31/17: pt will demonstrate improved balance/ gait and decreased fall risk to moderate by scoring 19/28 on " Tinetti Assessment. same  12. Pt will demonstrate improved cervical PROM for rotation to 40 degrees to improve functional head control. Inconsistent-  30 degrees (L) ; 5 degrees (R). ROM increased by ~5 degrees after PROM.     Plan   Continue PT 2x weekly under established Plan of Care, with treatment to include: pt education, HEP, therapeutic exercises, neuromuscular re-education/balance exercises, therapeutic activities, joint mobilizations, manual therapy, gait training PRN, and modalities PRN, to work towards established goals. Pt may be seen by PTA to carry out plan of care.       Yadi Mercado DPT  9/28/2017

## 2017-10-02 ENCOUNTER — CLINICAL SUPPORT (OUTPATIENT)
Dept: REHABILITATION | Facility: HOSPITAL | Age: 52
End: 2017-10-02
Attending: STUDENT IN AN ORGANIZED HEALTH CARE EDUCATION/TRAINING PROGRAM
Payer: COMMERCIAL

## 2017-10-02 DIAGNOSIS — R53.1 DECREASED STRENGTH: ICD-10-CM

## 2017-10-02 DIAGNOSIS — Z74.09 IMPAIRED FUNCTIONAL MOBILITY, BALANCE, GAIT, AND ENDURANCE: ICD-10-CM

## 2017-10-02 DIAGNOSIS — R29.898 DECREASED ROM OF NECK: ICD-10-CM

## 2017-10-02 PROCEDURE — 97110 THERAPEUTIC EXERCISES: CPT | Mod: PO | Performed by: PHYSICAL THERAPIST

## 2017-10-02 NOTE — PROGRESS NOTES
"                                                  Physical Therapy Progress Note      Name: Brigid Jernigan  Clinic Number: [id number]  Diagnosis: G71.11 (ICD-10-CM) - Myotonic dystrophy  Physician: Luis Felipe Vo MD  Treatment Orders: PT Eval and Treat       Past Medical History:   Diagnosis Date    Abnormal EKG      Myotonic dystrophy      Pacemaker      PAF (paroxysmal atrial fibrillation)      Sleep apnea           Precautions: standard  Visit #: 29  Date of Eval: 5/9/2017  Extended POC: 11/14/17        Subjective   Pt reports: no new complaints  Sister reported: PCP is requesting LMN for requested DME, reports pt has poor compliance with HEP  Pain Scale: denies     Objective         Patient received individual therapy to increase strength, endurance, ROM, flexibility, posture, core stabilization and head control with activities as follows:      Pt participated in therapeutic exercises x 41 minutes to address ROM and cervical alignment:         Supine:upper trap stretch              pec stretch              AAROM cervical rotation              Suboccipital release              2 x 10 bridge with ball              LTR with tball 15x              SLR 3# 15x    Static standing without UE support x 10 sec prior to LOB  standing at countertop reaching >eye level min- mod A for RUE due to decreased ROM and motor control.    NP today:   Sit<>stand from 20" height mat with BUE use, CGA(multiple tries for success >5x)  Sit<>stand from 21" height mat with supervision   X 10 minutes Scifit  Stepper @ 8.0 resistance, BUE/LE          Written Home Exercises: assisted quad/ hip flexor stretch in side lying  eval- chin tucks, rows with OTB, hip flexor and knee extensor stretch in sidelying  Pt demo fair understanding of the education provided. Brigid demonstrated fair return demonstration of activities.      Education provided re: POC, HEP     Pt has no cultural, educational or language barriers to learning " provided.     Assessment   Brigid tolerated treatment well. Pt continues to have difficulty with reaching above eye level with RUE due to poor motor control and shoulder ROM. Static standing balance without UE support is ~same. Pt is demonstrating improved standing balance at countertop. Pt continues to demonstrate decreased cervical extension strength and endurance in sitting or standing. Pt can benefit from continued skilled PT to address remaining impairments to improve independence with mobility, decrease fall risk, and maintain mobility.      Pt prognosis is Good. Pt will continue to benefit from skilled outpatient physical therapy to address the deficits listed in the problem list, provide pt/family education and to maximize pt's level of independence in the home and community environment.      Activity limitations/ Participation Restrictions:   1. Fall Risk - impaired balance   2. Weakness   3. Impaired muscle tone  4. Poor head control  5. Decreased ROM  6. Gait deviations   7. Decreased ambulation   8. Decreased activity tolerance   9. Decreased independence with daily activities   10. Requires skilled supervision to complete and progress HEP   11. Requires skilled PT for DME/ orthotic recommendations     Pt's spiritual, cultural and educational needs considered and pt agreeable to plan of care and GOALS as stated below:   Short term goals: 6 weeks, pt agrees to goals set. (As of 9/28/17)  1. Pt will perform chin tucks in supine with supervision to improve independent with HEP. Met 7/5/17  2. Assist pt with obtaining appropriate cervical brace to improve head control. Ongoing- pt more appropriate for [place] Collar vs cervical thoracic halo, LMN to be sent to PCP  3. Pt will demonstrate improved knee ext strength on left to 4/5 to improve safety with ambulation and sit<>stand. Met 9/28/17 L=4/5  4. Pt will demonstrate improved cervical rotation PROM to 20 degrees to improve functional head control. Met  "7/5/17  5. Pt will perform sit<>stand consistently from 22" height surface with supervision to improve independence. Met 7/5/17                Revised 8/31/17:  Pt will perform sit<>stand from 19" height chair to improve independence with sitting in various chairs around her home and decreased need for "pop up seat". Improved- pt can stand from 21" height with BUE and a few attempts without A, 20" with CGA and multiple attempts (>5x)     Long term goals: 12 weeks, pt agrees to goals set  6. Pt will perform basic HEP for gross strengthening with supervision to deter worsening of functional limitations. Met 8/31/17- pt's sister reports poor current compliance  7. Pt will demonstrate improved bilateral hip strength to 3/5 to improve balance and independence with mobility. Met 7/5/17  8. New 7/5/17: Pt will demonstrate improved bilateral hip strength to 4/5 to improve balance and independence with mobility. Improved- hip flex R=4-/5, L=4+/5, hip ext R= 3+/5, L=3/5, hip abd B=4/5  9. Pt will demonstrate improved cervical extension strength to 3+/5 to improve head control. Met 7/31/17  10. Pt will demonstrate improved cervical deep flexor strength to 3+/5 to improve head control. Met 7/31/17   11. Pt will demonstrate improve balance and gait by scoring 14/28 on Tinetti. Met 8/31/17- 17/28                Revised 8/31/17: pt will demonstrate improved balance/ gait and decreased fall risk to moderate by scoring 19/28 on Tinetti Assessment. same  12. Pt will demonstrate improved cervical PROM for rotation to 40 degrees to improve functional head control. Inconsistent-  30 degrees (L) ; 5 degrees (R). ROM increased by ~5 degrees after PROM.     Plan   Continue PT 2x weekly under established Plan of Care, with treatment to include: pt education, HEP, therapeutic exercises, neuromuscular re-education/balance exercises, therapeutic activities, joint mobilizations, manual therapy, gait training PRN, and modalities PRN, to work " towards established goals. Pt may be seen by PTA to carry out plan of care.         Yadi Mercado, LUL  9/28/2017

## 2017-10-05 ENCOUNTER — CLINICAL SUPPORT (OUTPATIENT)
Dept: REHABILITATION | Facility: HOSPITAL | Age: 52
End: 2017-10-05
Attending: STUDENT IN AN ORGANIZED HEALTH CARE EDUCATION/TRAINING PROGRAM
Payer: COMMERCIAL

## 2017-10-05 DIAGNOSIS — Z74.09 IMPAIRED FUNCTIONAL MOBILITY, BALANCE, GAIT, AND ENDURANCE: ICD-10-CM

## 2017-10-05 DIAGNOSIS — R29.898 DECREASED ROM OF NECK: ICD-10-CM

## 2017-10-05 DIAGNOSIS — R53.1 DECREASED STRENGTH: ICD-10-CM

## 2017-10-05 PROCEDURE — 97110 THERAPEUTIC EXERCISES: CPT | Mod: PO | Performed by: PHYSICAL THERAPIST

## 2017-10-05 NOTE — PROGRESS NOTES
"                                                  Physical Therapy Progress Note      Name: Brigid Jernigan  Clinic Number: [id number]  Diagnosis: G71.11 (ICD-10-CM) - Myotonic dystrophy  Physician: Luis Felipe Vo MD  Treatment Orders: PT Eval and Treat       Past Medical History:   Diagnosis Date    Abnormal EKG      Myotonic dystrophy      Pacemaker      PAF (paroxysmal atrial fibrillation)      Sleep apnea           Precautions: standard  Visit #: 30  Date of Eval: 5/9/2017  Extended POC: 11/14/17        Subjective   Pt reports: "I'vev been taking some steps around the house without my walker"  Sister reported: pt has poor compliance with HEP  Pain Scale: denies     Objective         Patient received individual therapy to increase strength, endurance, ROM, flexibility, posture, core stabilization and head control with activities as follows:      Pt participated in therapeutic exercises x 45 minutes to address ROM and cervical alignment:    X 10 minutes Scifit  Stepper @ 8.0 resistance, BUE/LE    Supine:upper trap stretch              pec stretch              AAROM cervical rotation              resisted cervical ext OTB 10x              10x bridge with feet on ball              LTR with tball 15x              SLR 3# 15x    Static standing without UE support 1 min x 3  Sit<>stand from 21" height mat to RW with CGA and max VC- 5x    NP today:   Sit<>stand from 20" height mat with BUE use, CGA(multiple tries for success >5x)  standing at countertop reaching >eye level min- mod A for RUE due to decreased ROM and motor control.      Written Home Exercises: assisted quad/ hip flexor stretch in side lying  eval- chin tucks, rows with OTB, hip flexor and knee extensor stretch in sidelying  Pt demo fair understanding of the education provided. Brigid demonstrated fair return demonstration of activities.      Education provided re: POC, HEP     Pt has no cultural, educational or language barriers to learning " provided.     Assessment   Brigid tolerated treatment well. She demonstrated a good improvement in static standing balance without UE support. Pt requires use of RW to initially attain balance after standing. Pt continues to have difficulty with standing from average height chairs. Resisted cervical extension was added to treatment to address improving head control in standing/ sitting. Pt can benefit from continued skilled PT to address remaining impairments to improve independence with mobility, decrease fall risk, and maintain mobility.      Pt prognosis is Good. Pt will continue to benefit from skilled outpatient physical therapy to address the deficits listed in the problem list, provide pt/family education and to maximize pt's level of independence in the home and community environment.      Activity limitations/ Participation Restrictions:   1. Fall Risk - impaired balance   2. Weakness   3. Impaired muscle tone  4. Poor head control  5. Decreased ROM  6. Gait deviations   7. Decreased ambulation   8. Decreased activity tolerance   9. Decreased independence with daily activities   10. Requires skilled supervision to complete and progress HEP   11. Requires skilled PT for DME/ orthotic recommendations     Pt's spiritual, cultural and educational needs considered and pt agreeable to plan of care and GOALS as stated below:   Short term goals: 6 weeks, pt agrees to goals set. (As of 9/28/17)  1. Pt will perform chin tucks in supine with supervision to improve independent with HEP. Met 7/5/17  2. Assist pt with obtaining appropriate cervical brace to improve head control. Ongoing- pt more appropriate for [place] Collar vs cervical thoracic halo, LMN to be sent to PCP  3. Pt will demonstrate improved knee ext strength on left to 4/5 to improve safety with ambulation and sit<>stand. Met 9/28/17 L=4/5  4. Pt will demonstrate improved cervical rotation PROM to 20 degrees to improve functional head control. Met  "7/5/17  5. Pt will perform sit<>stand consistently from 22" height surface with supervision to improve independence. Met 7/5/17                Revised 8/31/17:  Pt will perform sit<>stand from 19" height chair to improve independence with sitting in various chairs around her home and decreased need for "pop up seat". Improved- pt can stand from 21" height with BUE and a few attempts without A, 20" with CGA and multiple attempts (>5x)     Long term goals: 12 weeks, pt agrees to goals set  6. Pt will perform basic HEP for gross strengthening with supervision to deter worsening of functional limitations. Met 8/31/17- pt's sister reports poor current compliance  7. Pt will demonstrate improved bilateral hip strength to 3/5 to improve balance and independence with mobility. Met 7/5/17  8. New 7/5/17: Pt will demonstrate improved bilateral hip strength to 4/5 to improve balance and independence with mobility. Improved- hip flex R=4-/5, L=4+/5, hip ext R= 3+/5, L=3/5, hip abd B=4/5  9. Pt will demonstrate improved cervical extension strength to 3+/5 to improve head control. Met 7/31/17  10. Pt will demonstrate improved cervical deep flexor strength to 3+/5 to improve head control. Met 7/31/17   11. Pt will demonstrate improve balance and gait by scoring 14/28 on Tinetti. Met 8/31/17- 17/28                Revised 8/31/17: pt will demonstrate improved balance/ gait and decreased fall risk to moderate by scoring 19/28 on Tinetti Assessment. same  12. Pt will demonstrate improved cervical PROM for rotation to 40 degrees to improve functional head control. Inconsistent-  30 degrees (L) ; 5 degrees (R). ROM increased by ~5 degrees after PROM.     Plan   Continue PT 2x weekly under established Plan of Care, with treatment to include: pt education, HEP, therapeutic exercises, neuromuscular re-education/balance exercises, therapeutic activities, joint mobilizations, manual therapy, gait training PRN, and modalities PRN, to work " towards established goals. Pt may be seen by PTA to carry out plan of care.         Yadi Mercado DPT  10/5/2017

## 2017-10-09 ENCOUNTER — CLINICAL SUPPORT (OUTPATIENT)
Dept: REHABILITATION | Facility: HOSPITAL | Age: 52
End: 2017-10-09
Attending: STUDENT IN AN ORGANIZED HEALTH CARE EDUCATION/TRAINING PROGRAM
Payer: COMMERCIAL

## 2017-10-09 DIAGNOSIS — R29.898 DECREASED ROM OF NECK: ICD-10-CM

## 2017-10-09 DIAGNOSIS — Z74.09 IMPAIRED FUNCTIONAL MOBILITY, BALANCE, GAIT, AND ENDURANCE: ICD-10-CM

## 2017-10-09 DIAGNOSIS — R53.1 DECREASED STRENGTH: ICD-10-CM

## 2017-10-09 PROBLEM — Z00.00 REGULAR CHECK-UP: Status: RESOLVED | Noted: 2017-07-06 | Resolved: 2017-10-09

## 2017-10-09 PROCEDURE — 97110 THERAPEUTIC EXERCISES: CPT | Mod: PO | Performed by: PHYSICAL THERAPIST

## 2017-10-09 NOTE — PROGRESS NOTES
"                                                  Physical Therapy Progress Note      Name: Brigid Jernigan  Clinic Number: [id number]  Diagnosis: G71.11 (ICD-10-CM) - Myotonic dystrophy  Physician: Luis Felipe Vo MD  Treatment Orders: PT Eval and Treat       Past Medical History:   Diagnosis Date    Abnormal EKG      Myotonic dystrophy      Pacemaker      PAF (paroxysmal atrial fibrillation)      Sleep apnea           Precautions: standard  Visit #: 31  Date of Eval: 5/9/2017  Extended POC: 11/14/17        Subjective   Pt reports: no new complaints  Sister reported: pt has poor compliance with HEP  Pain Scale: denies     Objective         Patient received individual therapy to increase strength, endurance, ROM, flexibility, posture, core stabilization and head control with activities as follows:      Pt participated in therapeutic exercises x 53 minutes to address ROM and cervical alignment:    X 10 minutes Scifit  Stepper @ 8.0 resistance, BUE/LE    Supine:upper trap stretch              pec stretch              AAROM cervical rotation              resisted cervical ext OTB 2 x 10              2 x 10 bridge with feet on ball              LTR with tball 15x              SLR 3# 15x  Sitting: rows with GTB 2 x 10   shoulder ext GTB 2 x 10    Sit<>stand from 21" height mat with large Tball with CGA- min A and max VC- 5x  Sit<>stadn from 21" height mat to RW with UE support, CGA- close supervision, no VC- 5x      NP today:   Sit<>stand from 20" height mat with BUE use, CGA(multiple tries for success >5x)  standing at countertop reaching >eye level min- mod A for RUE due to decreased ROM and motor control.  Static standing without UE support 1 min x 3      Written Home Exercises: assisted quad/ hip flexor stretch in side lying  eval- chin tucks, rows with OTB, hip flexor and knee extensor stretch in sidelying  Pt demo fair understanding of the education provided. Brigid demonstrated fair return demonstration of " "activities.      Education provided re: POC, HEP     Pt has no cultural, educational or language barriers to learning provided.     Assessment   Brigid tolerated treatment well. Pt demonstrates improved consistency with performing sit>stand from lower surfaces. Pt requires multiple attempts to complete. Pt can benefit from continued skilled PT to address remaining impairments to improve independence with mobility, decrease fall risk, and maintain mobility.      Pt prognosis is Good. Pt will continue to benefit from skilled outpatient physical therapy to address the deficits listed in the problem list, provide pt/family education and to maximize pt's level of independence in the home and community environment.      Activity limitations/ Participation Restrictions:   1. Fall Risk - impaired balance   2. Weakness   3. Impaired muscle tone  4. Poor head control  5. Decreased ROM  6. Gait deviations   7. Decreased ambulation   8. Decreased activity tolerance   9. Decreased independence with daily activities   10. Requires skilled supervision to complete and progress HEP   11. Requires skilled PT for DME/ orthotic recommendations     Pt's spiritual, cultural and educational needs considered and pt agreeable to plan of care and GOALS as stated below:   Short term goals: 6 weeks, pt agrees to goals set. (As of 9/28/17)  1. Pt will perform chin tucks in supine with supervision to improve independent with HEP. Met 7/5/17  2. Assist pt with obtaining appropriate cervical brace to improve head control. Ongoing- pt more appropriate for [place] Collar vs cervical thoracic halo, LMN to be sent to PCP  3. Pt will demonstrate improved knee ext strength on left to 4/5 to improve safety with ambulation and sit<>stand. Met 9/28/17 L=4/5  4. Pt will demonstrate improved cervical rotation PROM to 20 degrees to improve functional head control. Met 7/5/17  5. Pt will perform sit<>stand consistently from 22" height surface with supervision " "to improve independence. Met 7/5/17                Revised 8/31/17:  Pt will perform sit<>stand from 19" height chair to improve independence with sitting in various chairs around her home and decreased need for "pop up seat". Improved- pt can stand from 21" height with BUE and a few attempts without A, 20" with CGA and multiple attempts (>5x)     Long term goals: 12 weeks, pt agrees to goals set  6. Pt will perform basic HEP for gross strengthening with supervision to deter worsening of functional limitations. Met 8/31/17- pt's sister reports poor current compliance  7. Pt will demonstrate improved bilateral hip strength to 3/5 to improve balance and independence with mobility. Met 7/5/17  8. New 7/5/17: Pt will demonstrate improved bilateral hip strength to 4/5 to improve balance and independence with mobility. Improved- hip flex R=4-/5, L=4+/5, hip ext R= 3+/5, L=3/5, hip abd B=4/5  9. Pt will demonstrate improved cervical extension strength to 3+/5 to improve head control. Met 7/31/17  10. Pt will demonstrate improved cervical deep flexor strength to 3+/5 to improve head control. Met 7/31/17   11. Pt will demonstrate improve balance and gait by scoring 14/28 on Tinetti. Met 8/31/17- 17/28                Revised 8/31/17: pt will demonstrate improved balance/ gait and decreased fall risk to moderate by scoring 19/28 on Tinetti Assessment. same  12. Pt will demonstrate improved cervical PROM for rotation to 40 degrees to improve functional head control. Inconsistent-  30 degrees (L) ; 5 degrees (R). ROM increased by ~5 degrees after PROM.     Plan   Continue PT 2x weekly under established Plan of Care, with treatment to include: pt education, HEP, therapeutic exercises, neuromuscular re-education/balance exercises, therapeutic activities, joint mobilizations, manual therapy, gait training PRN, and modalities PRN, to work towards established goals. Pt may be seen by PTA to carry out plan of care.         Yadi " LUL Mercado  10/9/2017

## 2017-10-12 ENCOUNTER — CLINICAL SUPPORT (OUTPATIENT)
Dept: REHABILITATION | Facility: HOSPITAL | Age: 52
End: 2017-10-12
Attending: STUDENT IN AN ORGANIZED HEALTH CARE EDUCATION/TRAINING PROGRAM
Payer: COMMERCIAL

## 2017-10-12 DIAGNOSIS — R53.1 DECREASED STRENGTH: ICD-10-CM

## 2017-10-12 DIAGNOSIS — Z74.09 IMPAIRED FUNCTIONAL MOBILITY, BALANCE, GAIT, AND ENDURANCE: ICD-10-CM

## 2017-10-12 DIAGNOSIS — R29.898 DECREASED ROM OF NECK: ICD-10-CM

## 2017-10-12 PROCEDURE — 97110 THERAPEUTIC EXERCISES: CPT | Mod: PO | Performed by: PHYSICAL THERAPIST

## 2017-10-12 NOTE — PROGRESS NOTES
"                                                  Physical Therapy Progress Note      Name: Brigid Jernigan  Clinic Number: [id number]  Diagnosis: G71.11 (ICD-10-CM) - Myotonic dystrophy  Physician: Luis Felipe oV MD  Treatment Orders: PT Eval and Treat       Past Medical History:   Diagnosis Date    Abnormal EKG      Myotonic dystrophy      Pacemaker      PAF (paroxysmal atrial fibrillation)      Sleep apnea           Precautions: standard  Visit #: 31  Date of Eval: 5/9/2017  Extended POC: 11/14/17        Subjective   Pt reports: no new complaints  Sister reported: pt has poor compliance with HEP  Pain Scale: denies     Objective         Patient received individual therapy to increase strength, endurance, ROM, flexibility, posture, core stabilization and head control with activities as follows:      Pt participated in therapeutic exercises x 38 minutes to address ROM and cervical alignment:    X 10 minutes Scifit  Stepper @ 8.0 resistance, BUE/LE    Supine:upper trap stretch              pec stretch              AAROM cervical rotation              resisted cervical ext OTB 2 x 10              2 x 10 bridge with feet on ball              LTR with tball 15x              SLR 3# 15x   Leg press 100# 10x  Sit>stand from 20.5" height mat with multiple trials, min A        NP today:   standing at countertop reaching >eye level min- mod A for RUE due to decreased ROM and motor control.  Static standing without UE support 1 min x 3  X 10 minutes Scifit  Stepper @ 8.0 resistance, BUE/LE  Sitting: rows with GTB 2 x 10   shoulder ext GTB 2 x 10    Written Home Exercises: assisted quad/ hip flexor stretch in side lying  eval- chin tucks, rows with OTB, hip flexor and knee extensor stretch in sidelying  Pt demo fair understanding of the education provided. Brigid demonstrated fair return demonstration of activities.      Education provided re: POC, HEP     Pt has no cultural, educational or language barriers to " "learning provided.     Assessment   Brigid tolerated treatment well. Pt was able to successfully stand from a lower surface today.  Pt required multiple attempts to complete. Pt requires assistance to maintain forward lean. Pt can benefit from continued skilled PT to address remaining impairments to improve independence with mobility, decrease fall risk, and maintain mobility.      Pt prognosis is Good. Pt will continue to benefit from skilled outpatient physical therapy to address the deficits listed in the problem list, provide pt/family education and to maximize pt's level of independence in the home and community environment.      Activity limitations/ Participation Restrictions:   1. Fall Risk - impaired balance   2. Weakness   3. Impaired muscle tone  4. Poor head control  5. Decreased ROM  6. Gait deviations   7. Decreased ambulation   8. Decreased activity tolerance   9. Decreased independence with daily activities   10. Requires skilled supervision to complete and progress HEP   11. Requires skilled PT for DME/ orthotic recommendations     Pt's spiritual, cultural and educational needs considered and pt agreeable to plan of care and GOALS as stated below:   Short term goals: 6 weeks, pt agrees to goals set. (As of 9/28/17)  1. Pt will perform chin tucks in supine with supervision to improve independent with HEP. Met 7/5/17  2. Assist pt with obtaining appropriate cervical brace to improve head control. Ongoing- pt more appropriate for [place] Collar vs cervical thoracic halo, LMN to be sent to PCP  3. Pt will demonstrate improved knee ext strength on left to 4/5 to improve safety with ambulation and sit<>stand. Met 9/28/17 L=4/5  4. Pt will demonstrate improved cervical rotation PROM to 20 degrees to improve functional head control. Met 7/5/17  5. Pt will perform sit<>stand consistently from 22" height surface with supervision to improve independence. Met 7/5/17                Revised 8/31/17:  Pt will " "perform sit<>stand from 19" height chair to improve independence with sitting in various chairs around her home and decreased need for "pop up seat". Improved- pt can stand from 21" height with BUE and a few attempts without A, 20" with CGA and multiple attempts (>5x)     Long term goals: 12 weeks, pt agrees to goals set  6. Pt will perform basic HEP for gross strengthening with supervision to deter worsening of functional limitations. Met 8/31/17- pt's sister reports poor current compliance  7. Pt will demonstrate improved bilateral hip strength to 3/5 to improve balance and independence with mobility. Met 7/5/17  8. New 7/5/17: Pt will demonstrate improved bilateral hip strength to 4/5 to improve balance and independence with mobility. Improved- hip flex R=4-/5, L=4+/5, hip ext R= 3+/5, L=3/5, hip abd B=4/5  9. Pt will demonstrate improved cervical extension strength to 3+/5 to improve head control. Met 7/31/17  10. Pt will demonstrate improved cervical deep flexor strength to 3+/5 to improve head control. Met 7/31/17   11. Pt will demonstrate improve balance and gait by scoring 14/28 on Tinetti. Met 8/31/17- 17/28                Revised 8/31/17: pt will demonstrate improved balance/ gait and decreased fall risk to moderate by scoring 19/28 on Tinetti Assessment. same  12. Pt will demonstrate improved cervical PROM for rotation to 40 degrees to improve functional head control. Inconsistent-  30 degrees (L) ; 5 degrees (R). ROM increased by ~5 degrees after PROM.     Plan   Continue PT 2x weekly under established Plan of Care, with treatment to include: pt education, HEP, therapeutic exercises, neuromuscular re-education/balance exercises, therapeutic activities, joint mobilizations, manual therapy, gait training PRN, and modalities PRN, to work towards established goals. Pt may be seen by PTA to carry out plan of care.         Yadi Mercado DPT  10/12/2017           "

## 2017-10-16 ENCOUNTER — CLINICAL SUPPORT (OUTPATIENT)
Dept: REHABILITATION | Facility: HOSPITAL | Age: 52
End: 2017-10-16
Attending: STUDENT IN AN ORGANIZED HEALTH CARE EDUCATION/TRAINING PROGRAM
Payer: COMMERCIAL

## 2017-10-16 DIAGNOSIS — Z74.09 IMPAIRED FUNCTIONAL MOBILITY, BALANCE, GAIT, AND ENDURANCE: ICD-10-CM

## 2017-10-16 DIAGNOSIS — R53.1 DECREASED STRENGTH: ICD-10-CM

## 2017-10-16 DIAGNOSIS — R29.898 DECREASED ROM OF NECK: ICD-10-CM

## 2017-10-16 NOTE — PROGRESS NOTES
"                                                  Physical Therapy Progress Note      Name: Brigid Jernigan  Clinic Number: [id number]  Diagnosis: G71.11 (ICD-10-CM) - Myotonic dystrophy  Physician: Luis Felipe Vo MD  Treatment Orders: PT Eval and Treat       Past Medical History:   Diagnosis Date    Abnormal EKG      Myotonic dystrophy      Pacemaker      PAF (paroxysmal atrial fibrillation)      Sleep apnea           Precautions: standard  Visit #: 32  Date of Eval: 5/9/2017  Extended POC: 11/14/17        Subjective   Pt reports: no new complaints  Sister reported: pt has poor compliance with HEP  Pain Scale: denies     Objective         Patient received individual therapy to increase strength, endurance, ROM, flexibility, posture, core stabilization and head control with activities as follows:      Pt participated in therapeutic exercises x 55 minutes to address ROM and cervical alignment:   X 10 minutes Scifit  Stepper @ 8.0 resistance, BUE/LE  Supine:upper trap stretch              pec stretch              AAROM cervical rotation              resisted cervical ext OTB 2 x 10              2 x 10 bridge with feet on ball              LTR with tball 15x              SLR 3# 15x     Sit>stand from 20.5" height mat with multiple trials, CGA    Sitting: rows with GTB 2 x 10   shoulder ext GTB 2 x 10        NP today:   standing at countertop reaching >eye level min- mod A for RUE due to decreased ROM and motor control.  Static standing without UE support 1 min x 3  Leg press 100# 10x    Written Home Exercises: assisted quad/ hip flexor stretch in side lying  eval- chin tucks, rows with OTB, hip flexor and knee extensor stretch in sidelying  Pt demo fair understanding of the education provided. Brigid demonstrated fair return demonstration of activities.      Education provided re: POC, HEP     Pt has no cultural, educational or language barriers to learning provided.     Assessment   Brigid tolerated " "treatment well. Pt demonstrated improved ability to rise from shorted surface (20.5") with decreased attempts. Pt required more than 1 attempt to complete. Pt also demonstrates improved upright head and sitting posture when performing rowing with resistance.  Pt can benefit from continued skilled PT to address remaining impairments to improve independence with mobility, decrease fall risk, and maintain mobility.      Pt prognosis is Good. Pt will continue to benefit from skilled outpatient physical therapy to address the deficits listed in the problem list, provide pt/family education and to maximize pt's level of independence in the home and community environment.      Activity limitations/ Participation Restrictions:   1. Fall Risk - impaired balance   2. Weakness   3. Impaired muscle tone  4. Poor head control  5. Decreased ROM  6. Gait deviations   7. Decreased ambulation   8. Decreased activity tolerance   9. Decreased independence with daily activities   10. Requires skilled supervision to complete and progress HEP   11. Requires skilled PT for DME/ orthotic recommendations     Pt's spiritual, cultural and educational needs considered and pt agreeable to plan of care and GOALS as stated below:   Short term goals: 6 weeks, pt agrees to goals set. (As of 9/28/17)  1. Pt will perform chin tucks in supine with supervision to improve independent with HEP. Met 7/5/17  2. Assist pt with obtaining appropriate cervical brace to improve head control. Ongoing- pt more appropriate for [place] Collar vs cervical thoracic halo, LMN to be sent to PCP  3. Pt will demonstrate improved knee ext strength on left to 4/5 to improve safety with ambulation and sit<>stand. Met 9/28/17 L=4/5  4. Pt will demonstrate improved cervical rotation PROM to 20 degrees to improve functional head control. Met 7/5/17  5. Pt will perform sit<>stand consistently from 22" height surface with supervision to improve independence. Met 7/5/17             " "   Revised 8/31/17:  Pt will perform sit<>stand from 19" height chair to improve independence with sitting in various chairs around her home and decreased need for "pop up seat". Improved- pt can stand from 21" height with BUE and a few attempts without A, 20" with CGA and multiple attempts (>5x)     Long term goals: 12 weeks, pt agrees to goals set  6. Pt will perform basic HEP for gross strengthening with supervision to deter worsening of functional limitations. Met 8/31/17- pt's sister reports poor current compliance  7. Pt will demonstrate improved bilateral hip strength to 3/5 to improve balance and independence with mobility. Met 7/5/17  8. New 7/5/17: Pt will demonstrate improved bilateral hip strength to 4/5 to improve balance and independence with mobility. Improved- hip flex R=4-/5, L=4+/5, hip ext R= 3+/5, L=3/5, hip abd B=4/5  9. Pt will demonstrate improved cervical extension strength to 3+/5 to improve head control. Met 7/31/17  10. Pt will demonstrate improved cervical deep flexor strength to 3+/5 to improve head control. Met 7/31/17   11. Pt will demonstrate improve balance and gait by scoring 14/28 on Tinetti. Met 8/31/17- 17/28                Revised 8/31/17: pt will demonstrate improved balance/ gait and decreased fall risk to moderate by scoring 19/28 on Tinetti Assessment. same  12. Pt will demonstrate improved cervical PROM for rotation to 40 degrees to improve functional head control. Inconsistent-  30 degrees (L) ; 5 degrees (R). ROM increased by ~5 degrees after PROM.     Plan   Continue PT 2x weekly under established Plan of Care, with treatment to include: pt education, HEP, therapeutic exercises, neuromuscular re-education/balance exercises, therapeutic activities, joint mobilizations, manual therapy, gait training PRN, and modalities PRN, to work towards established goals. Pt may be seen by PTA to carry out plan of care.         Yadi Mercado DPT  10/16/2017           "

## 2017-10-19 ENCOUNTER — CLINICAL SUPPORT (OUTPATIENT)
Dept: REHABILITATION | Facility: HOSPITAL | Age: 52
End: 2017-10-19
Attending: STUDENT IN AN ORGANIZED HEALTH CARE EDUCATION/TRAINING PROGRAM
Payer: COMMERCIAL

## 2017-10-19 DIAGNOSIS — Z74.09 IMPAIRED FUNCTIONAL MOBILITY, BALANCE, GAIT, AND ENDURANCE: ICD-10-CM

## 2017-10-19 DIAGNOSIS — R29.898 DECREASED ROM OF NECK: ICD-10-CM

## 2017-10-19 DIAGNOSIS — R53.1 DECREASED STRENGTH: ICD-10-CM

## 2017-10-19 PROCEDURE — 97110 THERAPEUTIC EXERCISES: CPT | Mod: PO | Performed by: PHYSICAL THERAPIST

## 2017-10-19 NOTE — PROGRESS NOTES
"                                                  Physical Therapy Progress Note      Name: Brigid Jernigan  Clinic Number: [id number]  Diagnosis: G71.11 (ICD-10-CM) - Myotonic dystrophy  Physician: Luis Felipe Vo MD  Treatment Orders: PT Eval and Treat       Past Medical History:   Diagnosis Date    Abnormal EKG      Myotonic dystrophy      Pacemaker      PAF (paroxysmal atrial fibrillation)      Sleep apnea           Precautions: standard  Visit #: 33  Date of Eval: 5/9/2017  Extended POC: 11/14/17        Subjective   Pt reports: no new complaints  Sister reported: pt has poor compliance with HEP  Pain Scale: denies     Objective         Patient received individual therapy to increase strength, endurance, ROM, flexibility, posture, core stabilization and head control with activities as follows:      Pt participated in therapeutic exercises x 43 minutes to address ROM and cervical alignment:   X 10 minutes Scifit  Stepper @ 8.0 resistance, BUE/LE  Supine:upper trap stretch              pec stretch NP              AAROM cervical rotation              resisted cervical ext GTB 2 x 10              2 x 10 bridge with feet on ball              LTR with tball 15x NP              SLR 3# 15x     Sit>stand from 20.5" height mat with multiple trials, CGA  Static standing without UE support 1 min x 4    NP today:   standing at countertop reaching >eye level min- mod A for RUE due to decreased ROM and motor control.  Leg press 100# 10x    Sitting: rows with GTB 2 x 10   shoulder ext GTB 2 x 10    Written Home Exercises: assisted quad/ hip flexor stretch in side lying  eval- chin tucks, rows with OTB, hip flexor and knee extensor stretch in sidelying  Pt demo fair understanding of the education provided. Brigid demonstrated fair return demonstration of activities.      Education provided re: POC, HEP     Pt has no cultural, educational or language barriers to learning provided.     Assessment   Brigid tolerated " "treatment well. Pt continues to demonstrate improved ability to rise from shorted surface (20.5") with decreased attempts. Decrease in number of attempts and verbal cues as motor learning progressed throughout trials. Performed static standing without UE support with improved posture: decreased kyphosis and forward head position. Pt demonstrates increased weight bearing on RLE in standing. Pt can benefit from continued skilled PT to address remaining impairments to improve independence with mobility, decrease fall risk, and maintain mobility.      Pt prognosis is Good. Pt will continue to benefit from skilled outpatient physical therapy to address the deficits listed in the problem list, provide pt/family education and to maximize pt's level of independence in the home and community environment.      Activity limitations/ Participation Restrictions:   1. Fall Risk - impaired balance   2. Weakness   3. Impaired muscle tone  4. Poor head control  5. Decreased ROM  6. Gait deviations   7. Decreased ambulation   8. Decreased activity tolerance   9. Decreased independence with daily activities   10. Requires skilled supervision to complete and progress HEP   11. Requires skilled PT for DME/ orthotic recommendations     Pt's spiritual, cultural and educational needs considered and pt agreeable to plan of care and GOALS as stated below:   Short term goals: 6 weeks, pt agrees to goals set. (As of 9/28/17)  1. Pt will perform chin tucks in supine with supervision to improve independent with HEP. Met 7/5/17  2. Assist pt with obtaining appropriate cervical brace to improve head control. Ongoing- pt more appropriate for [place] Collar vs cervical thoracic halo, LMN to be sent to PCP  3. Pt will demonstrate improved knee ext strength on left to 4/5 to improve safety with ambulation and sit<>stand. Met 9/28/17 L=4/5  4. Pt will demonstrate improved cervical rotation PROM to 20 degrees to improve functional head control. Met " "7/5/17  5. Pt will perform sit<>stand consistently from 22" height surface with supervision to improve independence. Met 7/5/17                Revised 8/31/17:  Pt will perform sit<>stand from 19" height chair to improve independence with sitting in various chairs around her home and decreased need for "pop up seat". Improved- pt can stand from 21" height with BUE and a few attempts without A, 20" with CGA and multiple attempts (>5x)     Long term goals: 12 weeks, pt agrees to goals set  6. Pt will perform basic HEP for gross strengthening with supervision to deter worsening of functional limitations. Met 8/31/17- pt's sister reports poor current compliance  7. Pt will demonstrate improved bilateral hip strength to 3/5 to improve balance and independence with mobility. Met 7/5/17  8. New 7/5/17: Pt will demonstrate improved bilateral hip strength to 4/5 to improve balance and independence with mobility. Improved- hip flex R=4-/5, L=4+/5, hip ext R= 3+/5, L=3/5, hip abd B=4/5  9. Pt will demonstrate improved cervical extension strength to 3+/5 to improve head control. Met 7/31/17  10. Pt will demonstrate improved cervical deep flexor strength to 3+/5 to improve head control. Met 7/31/17   11. Pt will demonstrate improve balance and gait by scoring 14/28 on Tinetti. Met 8/31/17- 17/28                Revised 8/31/17: pt will demonstrate improved balance/ gait and decreased fall risk to moderate by scoring 19/28 on Tinetti Assessment. same  12. Pt will demonstrate improved cervical PROM for rotation to 40 degrees to improve functional head control. Inconsistent-  30 degrees (L) ; 5 degrees (R). ROM increased by ~5 degrees after PROM.     Plan   Continue PT 2x weekly under established Plan of Care, with treatment to include: pt education, HEP, therapeutic exercises, neuromuscular re-education/balance exercises, therapeutic activities, joint mobilizations, manual therapy, gait training PRN, and modalities PRN, to work " towards established goals. Pt may be seen by PTA to carry out plan of care.      Aretha Oliva, SPT       I, Yadi Mercado, LUL, certify that I was present in the room directing the student in service delivery and guiding them using my skilled judgment. As the co-signing therapist I have reviewed the students documentation and am responsible for the treatment, assessment, and plan.     Yadi Mercado DPT  10/20/2017        Yadi Mercado DPT  10/19/2017

## 2017-10-23 ENCOUNTER — CLINICAL SUPPORT (OUTPATIENT)
Dept: REHABILITATION | Facility: HOSPITAL | Age: 52
End: 2017-10-23
Attending: STUDENT IN AN ORGANIZED HEALTH CARE EDUCATION/TRAINING PROGRAM
Payer: COMMERCIAL

## 2017-10-23 DIAGNOSIS — Z74.09 IMPAIRED FUNCTIONAL MOBILITY, BALANCE, GAIT, AND ENDURANCE: ICD-10-CM

## 2017-10-23 DIAGNOSIS — R53.1 DECREASED STRENGTH: ICD-10-CM

## 2017-10-23 DIAGNOSIS — R29.898 DECREASED ROM OF NECK: ICD-10-CM

## 2017-10-23 PROCEDURE — 97110 THERAPEUTIC EXERCISES: CPT | Mod: PO | Performed by: PHYSICAL THERAPIST

## 2017-10-23 NOTE — PROGRESS NOTES
"                                                  Physical Therapy Progress Note      Name: Brigid Jernigan  Clinic Number: [id number]  Diagnosis: G71.11 (ICD-10-CM) - Myotonic dystrophy  Physician: Luis Felipe Vo MD  Treatment Orders: PT Eval and Treat       Past Medical History:   Diagnosis Date    Abnormal EKG      Myotonic dystrophy      Pacemaker      PAF (paroxysmal atrial fibrillation)      Sleep apnea           Precautions: standard  Visit #: 34  Date of Eval: 5/9/2017  Extended POC: 11/14/17        Subjective   Pt reports: no new complaints  Sister reported: pt has poor compliance with HEP  Pain Scale: denies     Objective         Patient received individual therapy to increase strength, endurance, ROM, flexibility, posture, core stabilization and head control with activities as follows:      Pt participated in therapeutic exercises x 50 minutes to address ROM and cervical alignment:   X 10 minutes Scifit  Stepper @ 8.0 resistance, BUE/LE  Supine:upper trap stretch              pec stretch NP              AAROM cervical rotation              SLR 3# 15x     Sit>stand from 20.5" height mat with multiple trials, CGA  Static standing without UE support 1 min x 4    Sitting: rows with GTB 2 x 10   shoulder ext GTB 2 x 10    NP today:   standing at countertop reaching >eye level min- mod A for RUE due to decreased ROM and motor control.  Leg press 100# 10x  resisted cervical ext GTB 2 x 10              2 x 10 bridge with feet on ball              LTR with tball 15x     Written Home Exercises: assisted quad/ hip flexor stretch in side lying  eval- chin tucks, rows with OTB, hip flexor and knee extensor stretch in sidelying  Pt demo fair understanding of the education provided. Brigid demonstrated fair return demonstration of activities.      Education provided re: POC, HEP     Pt has no cultural, educational or language barriers to learning provided.     Assessment   Brigid tolerated treatment well. Pt " with increased tightness of L upper trap today. Performed static standing without UE support with improved posture: decreased kyphosis and forward head position. Demonstrated carry over from previous treatment for proper mechanics to transition from sitting>standing as she only required 2-3 attempts today before being able to stand erect. Pt demonstrates increased weight bearing on RLE in standing. Today pt needed increased rest breaks when performing seated shoulder extension and row exercises, returning to rounded shoulder posture every 2-3 reps. Pt can benefit from continued skilled PT to address remaining impairments to improve independence with mobility, decrease fall risk, and maintain mobility.      Pt prognosis is Good. Pt will continue to benefit from skilled outpatient physical therapy to address the deficits listed in the problem list, provide pt/family education and to maximize pt's level of independence in the home and community environment.      Activity limitations/ Participation Restrictions:   1. Fall Risk - impaired balance   2. Weakness   3. Impaired muscle tone  4. Poor head control  5. Decreased ROM  6. Gait deviations   7. Decreased ambulation   8. Decreased activity tolerance   9. Decreased independence with daily activities   10. Requires skilled supervision to complete and progress HEP   11. Requires skilled PT for DME/ orthotic recommendations     Pt's spiritual, cultural and educational needs considered and pt agreeable to plan of care and GOALS as stated below:   Short term goals: 6 weeks, pt agrees to goals set. (As of 9/28/17)  1. Pt will perform chin tucks in supine with supervision to improve independent with HEP. Met 7/5/17  2. Assist pt with obtaining appropriate cervical brace to improve head control. Ongoing- pt more appropriate for [place] Collar vs cervical thoracic halo, LMN to be sent to PCP  3. Pt will demonstrate improved knee ext strength on left to 4/5 to improve safety with  "ambulation and sit<>stand. Met 9/28/17 L=4/5  4. Pt will demonstrate improved cervical rotation PROM to 20 degrees to improve functional head control. Met 7/5/17  5. Pt will perform sit<>stand consistently from 22" height surface with supervision to improve independence. Met 7/5/17                Revised 8/31/17:  Pt will perform sit<>stand from 19" height chair to improve independence with sitting in various chairs around her home and decreased need for "pop up seat". Improved- pt can stand from 21" height with BUE and a few attempts without A, 20" with CGA and multiple attempts (>5x)     Long term goals: 12 weeks, pt agrees to goals set  6. Pt will perform basic HEP for gross strengthening with supervision to deter worsening of functional limitations. Met 8/31/17- pt's sister reports poor current compliance  7. Pt will demonstrate improved bilateral hip strength to 3/5 to improve balance and independence with mobility. Met 7/5/17  8. New 7/5/17: Pt will demonstrate improved bilateral hip strength to 4/5 to improve balance and independence with mobility. Improved- hip flex R=4-/5, L=4+/5, hip ext R= 3+/5, L=3/5, hip abd B=4/5  9. Pt will demonstrate improved cervical extension strength to 3+/5 to improve head control. Met 7/31/17  10. Pt will demonstrate improved cervical deep flexor strength to 3+/5 to improve head control. Met 7/31/17   11. Pt will demonstrate improve balance and gait by scoring 14/28 on Tinetti. Met 8/31/17- 17/28                Revised 8/31/17: pt will demonstrate improved balance/ gait and decreased fall risk to moderate by scoring 19/28 on Tinetti Assessment. same  12. Pt will demonstrate improved cervical PROM for rotation to 40 degrees to improve functional head control. Inconsistent-  30 degrees (L) ; 5 degrees (R). ROM increased by ~5 degrees after PROM.     Plan   Continue PT 2x weekly under established Plan of Care, with treatment to include: pt education, HEP, therapeutic exercises, " neuromuscular re-education/balance exercises, therapeutic activities, joint mobilizations, manual therapy, gait training PRN, and modalities PRN, to work towards established goals. Pt may be seen by PTA to carry out plan of care.      Aretha Oliva, SPT       I, Yadi Mercado, JORDYT, certify that I was present in the room directing the student in service delivery and guiding them using my skilled judgment. As the co-signing therapist I have reviewed the students documentation and am responsible for the treatment, assessment, and plan.     Yadi Mercado DPT  10/23/2017

## 2017-10-30 ENCOUNTER — DOCUMENTATION ONLY (OUTPATIENT)
Dept: REHABILITATION | Facility: HOSPITAL | Age: 52
End: 2017-10-30

## 2017-10-30 ENCOUNTER — TELEPHONE (OUTPATIENT)
Dept: REHABILITATION | Facility: HOSPITAL | Age: 52
End: 2017-10-30

## 2017-11-02 ENCOUNTER — CLINICAL SUPPORT (OUTPATIENT)
Dept: REHABILITATION | Facility: HOSPITAL | Age: 52
End: 2017-11-02
Payer: COMMERCIAL

## 2017-11-02 DIAGNOSIS — R53.1 DECREASED STRENGTH: ICD-10-CM

## 2017-11-02 DIAGNOSIS — Z74.09 IMPAIRED FUNCTIONAL MOBILITY, BALANCE, GAIT, AND ENDURANCE: ICD-10-CM

## 2017-11-02 DIAGNOSIS — R29.898 DECREASED ROM OF NECK: ICD-10-CM

## 2017-11-02 PROCEDURE — 97110 THERAPEUTIC EXERCISES: CPT | Mod: PO | Performed by: PHYSICAL THERAPIST

## 2017-11-02 NOTE — PROGRESS NOTES
"                                                  Physical Therapy Progress Note      Name: Brigid Jernigan  Clinic Number: [id number]  Diagnosis: G71.11 (ICD-10-CM) - Myotonic dystrophy  Physician: Luis Felipe Vo MD  Treatment Orders: PT Eval and Treat       Past Medical History:   Diagnosis Date    Abnormal EKG      Myotonic dystrophy      Pacemaker      PAF (paroxysmal atrial fibrillation)      Sleep apnea           Precautions: standard  Visit #: 35  Date of Eval: 5/9/2017  Extended POC: 1/25/18        Subjective   Pt reports: no new complaints  Sister reported: pt has poor compliance with HEP  Pain Scale: denies     Objective         Patient received individual therapy to increase strength, endurance, ROM, flexibility, posture, core stabilization and head control with activities as follows:      Pt participated in therapeutic exercises x 51 minutes to address ROM and cervical alignment:     RLE  Hip ABD 4/5  Hip ADD 4-/5  Hip flex 3+/5  Hip ext: 3+/5    LLE:  Hip ABD 4/5  Hip ADD 4-/5  Hip flex: 4-/5  Hip Ext: 3+/5    Cervical Rotation:  R Rot: 13 degrees  L Rot: 45 degrees      Supine:upper trap stretch              pec stretch NP              AAROM cervical rotation              SLR 3# 15x     Sit>stand from 19" height mat with >10 trials, CGA  Sit>stand from 20" height mat with ~3 trials, CGA  Static standing without UE support x 1 min     Sitting: rows with GTB 2 x 10   shoulder ext GTB 2 x 10    Leg press 100# 10x    NP today:   X 10 minutes Scifit  Stepper @ 8.0 resistance, BUE/LE  standing at countertop reaching >eye level min- mod A for RUE due to decreased ROM and motor control.  resisted cervical ext GTB 2 x 10              2 x 10 bridge with feet on ball              LTR with tball 15x     Tinetti Balance Assessment  Balance:  1. Sitting Balance 1   Leans/ slides in chair= 0   Steady= 1  2. Rises from chair 1   Unable without help= 0   Able, uses arms= 1   Able without use of arms= 2  3. " Attempts to rise 1   Unable without help= 0   Able, >1 attmept required-= 1   Able, 1 attempt= 2  4. Immediate standing balance (1st 5 seconds) 1   Unsteady (swaggers, moves feet, trunk sway)= 0   Steady but uses walker or other support= 1   Narrow base of support without walker or support= 2  5. Nudged 1   Begins to fall= 0   Staggers, grabs, catches self= 1   Steady= 2  6. Standing balance 1   Unsteady= 0   Steady but wide LEYDI or uses AD=1   Narrow LEYDI without AD=2  7. Eyes closed 0   Unsteady= 0   Steady= 1  8. Turning 360 degrees 2   Discontinuous steps= 0   Continuous steps= 1   Unsteady= 0   Steady= 1  9. Sitting down 1   Unsafe= 0   Uses arms or not in a smooth motion= 1   Safe, smooth motion= 2  Balance score= 9  Gait:  1. Initiation 1   hesitates or multiple attempts to start= 0   No hesitancy= 1  2. Step length 2   Step to= 0   One foot passes= 1   reciprocal pattern= 2  3. Step height 2   Neither foot clears floor= 0   One foot clears floor= 1   Both feet clear floor= 2  4. Step symmetry 1   Not symmetrical= 0   Appears symmetrical= 1  5. Step continuity 1   Not continuous= 0   Appears continuous= 1  6. Path 1   Marked deviation= 0   Mild/moderate deviation or uses A.D.= 1   Straight without A.D.= 2  7. Trunk 0   Marked sway or uses A.D.= 0   No sway, but flexes knees or back, spread arms out while walking= 1   No sway, no flexion, no use of UE, no use of A.D.= 2  8. Walking stance 1   Heels apart= 0   Heels almost touching while walking= 1  Gait score= 9  Total score= 18 , high fall risk           Written Home Exercises: assisted quad/ hip flexor stretch in side lying  eval- chin tucks, rows with OTB, hip flexor and knee extensor stretch in sidelying  Pt demo fair understanding of the education provided. Brigid demonstrated fair return demonstration of activities.      Education provided re: POC, HEP     Pt has no cultural, educational or language barriers to learning provided.     Assessment   Brigid  tolerated treatment well. Pt has made improvements in ability to raise from a low surface and cervical PROM, but remains about the same when assessing hip strength, balance, and gait. Pt has poor compliance with HEP per sister over the last 2 weeks. Requires verbal cues for anterior weight shift and foot placement when raising from a lower surface. Pt continues to progress towards goals but requires extended amount of time to meet additional goals. POC extended for 12 weeks until 1/25/18. Pt can benefit from continued skilled PT to address remaining impairments to improve independence with mobility, decrease fall risk, and maintain mobility.      Pt prognosis is Good. Pt will continue to benefit from skilled outpatient physical therapy to address the deficits listed in the problem list, provide pt/family education and to maximize pt's level of independence in the home and community environment.      Activity limitations/ Participation Restrictions:   1. Fall Risk - impaired balance   2. Weakness   3. Impaired muscle tone  4. Poor head control  5. Decreased ROM  6. Gait deviations   7. Decreased ambulation   8. Decreased activity tolerance   9. Decreased independence with daily activities   10. Requires skilled supervision to complete and progress HEP   11. Requires skilled PT for DME/ orthotic recommendations     Pt's spiritual, cultural and educational needs considered and pt agreeable to plan of care and GOALS as stated below:   Short term goals: 6 weeks, pt agrees to goals set. (As of 11/2/17)  1. Pt will perform chin tucks in supine with supervision to improve independent with HEP. Met 7/5/17  2. Assist pt with obtaining appropriate cervical brace to improve head control. Ongoing- pt more appropriate for [facility], pt to be measured next week  3. Pt will demonstrate improved knee ext strength on left to 4/5 to improve safety with ambulation and sit<>stand. Met 9/28/17 L=4/5  4. Pt will demonstrate improved  "cervical rotation PROM to 20 degrees to improve functional head control. Met 7/5/17  5. Pt will perform sit<>stand consistently from 22" height surface with supervision to improve independence. Met 7/5/17                Revised 8/31/17:  Pt will perform sit<>stand from 19" height chair to improve independence with sitting in various chairs around her home and decreased need for "pop up seat". improved- pt can stand from 19" height with BUE and >10 attempts without A, 20" with CGA and 2-3 attempts     Long term goals: 12 weeks, pt agrees to goals set  6. Pt will perform basic HEP for gross strengthening with supervision to deter worsening of functional limitations. Met 8/31/17- pt's sister reports poor current compliance  7. Pt will demonstrate improved bilateral hip strength to 3/5 to improve balance and independence with mobility. Met 7/5/17  8. New 7/5/17: Pt will demonstrate improved bilateral hip strength to 4/5 to improve balance and independence with mobility. Same/slight decline- hip flex R=3+/5, L=4-/5, hip ext R= 3+/5, L=3+/5, hip abd B=4/5  9.       Pt will demonstrate improved cervical extension strength to 3+/5 to improve head control. Met 7/31/17  10. Pt will demonstrate improved cervical deep flexor strength to 3+/5 to improve head control. Met 7/31/17   11. Pt will demonstrate improve balance and gait by scoring 14/28 on Tinetti. Met 8/31/17- 17/28                Revised 8/31/17: pt will demonstrate improved balance/ gait and decreased fall risk to moderate by scoring 19/28 on Tinetti Assessment. Improved- 18/28          12. Pt will demonstrate improved cervical PROM for rotation to 40 degrees to improve functional head control. Improved- 45 degrees (L) ; 13 degrees (R).    Plan   Continue PT 2x weekly under established Plan of Care, with treatment to include: pt education, HEP, therapeutic exercises, neuromuscular re-education/balance exercises, therapeutic activities, joint mobilizations, manual " therapy, gait training PRN, and modalities PRN, to work towards established goals. Pt may be seen by PTA to carry out plan of care.      Aretha Oliva, SPT       I, JORDY HensonT, certify that I was present in the room directing the student in service delivery and guiding them using my skilled judgment. As the co-signing therapist I have reviewed the students documentation and am responsible for the treatment, assessment, and plan.     Yadi Mercado DPT  11/2/2017

## 2017-11-06 ENCOUNTER — CLINICAL SUPPORT (OUTPATIENT)
Dept: REHABILITATION | Facility: HOSPITAL | Age: 52
End: 2017-11-06
Payer: COMMERCIAL

## 2017-11-06 ENCOUNTER — DOCUMENTATION ONLY (OUTPATIENT)
Dept: REHABILITATION | Facility: HOSPITAL | Age: 52
End: 2017-11-06

## 2017-11-06 DIAGNOSIS — Z74.09 IMPAIRED FUNCTIONAL MOBILITY, BALANCE, GAIT, AND ENDURANCE: ICD-10-CM

## 2017-11-06 DIAGNOSIS — R53.1 DECREASED STRENGTH: ICD-10-CM

## 2017-11-06 DIAGNOSIS — R29.898 DECREASED ROM OF NECK: ICD-10-CM

## 2017-11-06 PROCEDURE — 97110 THERAPEUTIC EXERCISES: CPT | Mod: PO

## 2017-11-06 NOTE — PROGRESS NOTES
"I, JORDY HensonT, met with Mae Caicedo PTA to discuss this pt's POC. Continue to address cervical ROM (extension and rotation. Address resisted cervical extension, row and periscapular strengthening to address forward head posture. Continue to address LE/ core strengthening to improve sit<>stand from lower height surfaces (19-20")    Yadi Mercado DPT  11/6/2017      "

## 2017-11-06 NOTE — PROGRESS NOTES
"                                                  Physical Therapy Progress Note      Name: Brigid Jernigan  Clinic Number: [id number]  Diagnosis: G71.11 (ICD-10-CM) - Myotonic dystrophy  Physician: Luis Felipe Vo MD  Treatment Orders: PT Eval and Treat       Past Medical History:   Diagnosis Date    Abnormal EKG      Myotonic dystrophy      Pacemaker      PAF (paroxysmal atrial fibrillation)      Sleep apnea           Precautions: standard  Visit #: 36  Date of Eval: 5/9/2017  Extended POC: 1/25/18        Subjective   Pt reports: " I had to miss last week, I'm sorry about that."  Sister reported: " Can you tell me what appointment I have on Thursday, if its at 4 I can't make it."   Pain Scale: denies     Objective         Patient received individual therapy to increase strength, endurance, ROM, flexibility, posture, core stabilization and head control with activities as follows:      Pt participated in therapeutic exercises x 45 minutes to address ROM and cervical alignment:   X 11 min on Sci Fit recumbent stepper.  Level 8    Supine:upper trap stretch              pec stretch              AAROM cervical rotation              B LE SLR 3# 15x      Sitting: rows with GTB 2 x 10   shoulder ext GTB 2 x 10    Leg press 100# 20x    Written Home Exercises: assisted quad/ hip flexor stretch in side lying  eval- chin tucks, rows with OTB, hip flexor and knee extensor stretch in sidelying  Pt demo fair understanding of the education provided. Brigid demonstrated fair return demonstration of activities.      Education provided re: POC, HEP     Pt has no cultural, educational or language barriers to learning provided.     Assessment   Brigid tolerated treatment well and did not have any problems.  Pt was able to increase repetitions on leg press and resistance on the leg press.   Pt can benefit from continued skilled PT to address remaining impairments to improve independence with mobility, decrease fall risk, and " "maintain mobility.      Pt prognosis is Good. Pt will continue to benefit from skilled outpatient physical therapy to address the deficits listed in the problem list, provide pt/family education and to maximize pt's level of independence in the home and community environment.      Activity limitations/ Participation Restrictions:   1. Fall Risk - impaired balance   2. Weakness   3. Impaired muscle tone  4. Poor head control  5. Decreased ROM  6. Gait deviations   7. Decreased ambulation   8. Decreased activity tolerance   9. Decreased independence with daily activities   10. Requires skilled supervision to complete and progress HEP   11. Requires skilled PT for DME/ orthotic recommendations     Pt's spiritual, cultural and educational needs considered and pt agreeable to plan of care and GOALS as stated below:   Short term goals: 6 weeks, pt agrees to goals set. (As of 11/2/17)  1. Pt will perform chin tucks in supine with supervision to improve independent with HEP. Met 7/5/17  2. Assist pt with obtaining appropriate cervical brace to improve head control. Ongoing- pt more appropriate for [facility], pt to be measured next week  3. Pt will demonstrate improved knee ext strength on left to 4/5 to improve safety with ambulation and sit<>stand. Met 9/28/17 L=4/5  4. Pt will demonstrate improved cervical rotation PROM to 20 degrees to improve functional head control. Met 7/5/17  5. Pt will perform sit<>stand consistently from 22" height surface with supervision to improve independence. Met 7/5/17                Revised 8/31/17:  Pt will perform sit<>stand from 19" height chair to improve independence with sitting in various chairs around her home and decreased need for "pop up seat". improved- pt can stand from 19" height with BUE and >10 attempts without A, 20" with CGA and 2-3 attempts     Long term goals: 12 weeks, pt agrees to goals set  6. Pt will perform basic HEP for gross strengthening with supervision to " deter worsening of functional limitations. Met 8/31/17- pt's sister reports poor current compliance  7. Pt will demonstrate improved bilateral hip strength to 3/5 to improve balance and independence with mobility. Met 7/5/17  8. New 7/5/17: Pt will demonstrate improved bilateral hip strength to 4/5 to improve balance and independence with mobility. Same/slight decline- hip flex R=3+/5, L=4-/5, hip ext R= 3+/5, L=3+/5, hip abd B=4/5  9.       Pt will demonstrate improved cervical extension strength to 3+/5 to improve head control. Met 7/31/17  10. Pt will demonstrate improved cervical deep flexor strength to 3+/5 to improve head control. Met 7/31/17   11. Pt will demonstrate improve balance and gait by scoring 14/28 on Tinetti. Met 8/31/17- 17/28                Revised 8/31/17: pt will demonstrate improved balance/ gait and decreased fall risk to moderate by scoring 19/28 on Tinetti Assessment. Improved- 18/28          12. Pt will demonstrate improved cervical PROM for rotation to 40 degrees to improve functional head control. Improved- 45 degrees (L) ; 13 degrees (R).    Plan   Continue PT 2x weekly under established Plan of Care, with treatment to include: pt education, HEP, therapeutic exercises, neuromuscular re-education/balance exercises, therapeutic activities, joint mobilizations, manual therapy, gait training PRN, and modalities PRN, to work towards established goals. Pt may be seen by PTA to carry out plan of care.      Aertha Oliva, SPT       I, Yadi Mercado DPT, certify that I was present in the room directing the student in service delivery and guiding them using my skilled judgment. As the co-signing therapist I have reviewed the students documentation and am responsible for the treatment, assessment, and plan.     Yadi Mercado DPT  11/6/2017

## 2017-11-08 ENCOUNTER — CLINICAL SUPPORT (OUTPATIENT)
Dept: REHABILITATION | Facility: HOSPITAL | Age: 52
End: 2017-11-08
Payer: COMMERCIAL

## 2017-11-08 DIAGNOSIS — R53.1 DECREASED STRENGTH: ICD-10-CM

## 2017-11-08 DIAGNOSIS — Z74.09 IMPAIRED FUNCTIONAL MOBILITY, BALANCE, GAIT, AND ENDURANCE: ICD-10-CM

## 2017-11-08 DIAGNOSIS — R29.898 DECREASED ROM OF NECK: ICD-10-CM

## 2017-11-08 PROCEDURE — 97110 THERAPEUTIC EXERCISES: CPT | Mod: PO

## 2017-11-08 NOTE — PROGRESS NOTES
"                                                  Physical Therapy Progress Note      Name: Brigid Jernigan  Clinic Number: [id number]  Diagnosis: G71.11 (ICD-10-CM) - Myotonic dystrophy  Physician: Luis Felipe Vo MD  Treatment Orders: PT Eval and Treat       Past Medical History:   Diagnosis Date    Abnormal EKG      Myotonic dystrophy      Pacemaker      PAF (paroxysmal atrial fibrillation)      Sleep apnea           Precautions: standard  Visit #: 37  Date of Eval: 5/9/2017  Extended POC: 1/25/18        Subjective   Pt reports: " I'm all mixed up with the day and times since I changed it."   Sister reported: " She got measured for her new AFOs and neck  braces yesterday."   Pain Scale: denies     Objective         Patient received individual therapy to increase strength, endurance, ROM, flexibility, posture, core stabilization and head control with activities as follows:      Pt participated in therapeutic exercises x 45 minutes to address ROM and cervical alignment:   X 10 min on Sci Fit recumbent stepper.  Level 8    Supine:             B LE SLR 4# 15x    B LE SAQ over bolster with #4 lb on R LE, #3 on LLE cuff weights x 10 reps    2 x 10 bridge with feet on ball            LTR with tball 15x    DKTC with green swiss ball x 15 reps       Written Home Exercises: assisted quad/ hip flexor stretch in side lying  eval- chin tucks, rows with OTB, hip flexor and knee extensor stretch in sidelying  Pt demo fair understanding of the education provided. Brigid demonstrated fair return demonstration of activities.      Education provided re: POC, HEP     Pt has no cultural, educational or language barriers to learning provided.     Assessment   Brigid tolerated treatment well and did not have any problems.  Pt was able to increase weight on B LE SLR and was able to begin DKTC with green swiss ball to increase core strengthening.   Pt can benefit from continued skilled PT to address remaining impairments to " "improve independence with mobility, decrease fall risk, and maintain mobility.      Pt prognosis is Good. Pt will continue to benefit from skilled outpatient physical therapy to address the deficits listed in the problem list, provide pt/family education and to maximize pt's level of independence in the home and community environment.      Activity limitations/ Participation Restrictions:   1. Fall Risk - impaired balance   2. Weakness   3. Impaired muscle tone  4. Poor head control  5. Decreased ROM  6. Gait deviations   7. Decreased ambulation   8. Decreased activity tolerance   9. Decreased independence with daily activities   10. Requires skilled supervision to complete and progress HEP   11. Requires skilled PT for DME/ orthotic recommendations     Pt's spiritual, cultural and educational needs considered and pt agreeable to plan of care and GOALS as stated below:   Short term goals: 6 weeks, pt agrees to goals set. (As of 11/2/17)  1. Pt will perform chin tucks in supine with supervision to improve independent with HEP. Met 7/5/17  2. Assist pt with obtaining appropriate cervical brace to improve head control. Ongoing- pt more appropriate for [facility], pt to be measured next week  3. Pt will demonstrate improved knee ext strength on left to 4/5 to improve safety with ambulation and sit<>stand. Met 9/28/17 L=4/5  4. Pt will demonstrate improved cervical rotation PROM to 20 degrees to improve functional head control. Met 7/5/17  5. Pt will perform sit<>stand consistently from 22" height surface with supervision to improve independence. Met 7/5/17                Revised 8/31/17:  Pt will perform sit<>stand from 19" height chair to improve independence with sitting in various chairs around her home and decreased need for "pop up seat". improved- pt can stand from 19" height with BUE and >10 attempts without A, 20" with CGA and 2-3 attempts     Long term goals: 12 weeks, pt agrees to goals set  6. Pt will " perform basic HEP for gross strengthening with supervision to deter worsening of functional limitations. Met 8/31/17- pt's sister reports poor current compliance  7. Pt will demonstrate improved bilateral hip strength to 3/5 to improve balance and independence with mobility. Met 7/5/17  8. New 7/5/17: Pt will demonstrate improved bilateral hip strength to 4/5 to improve balance and independence with mobility. Same/slight decline- hip flex R=3+/5, L=4-/5, hip ext R= 3+/5, L=3+/5, hip abd B=4/5  9.       Pt will demonstrate improved cervical extension strength to 3+/5 to improve head control. Met 7/31/17  10. Pt will demonstrate improved cervical deep flexor strength to 3+/5 to improve head control. Met 7/31/17   11. Pt will demonstrate improve balance and gait by scoring 14/28 on Tinetti. Met 8/31/17- 17/28                Revised 8/31/17: pt will demonstrate improved balance/ gait and decreased fall risk to moderate by scoring 19/28 on Tinetti Assessment. Improved- 18/28          12. Pt will demonstrate improved cervical PROM for rotation to 40 degrees to improve functional head control. Improved- 45 degrees (L) ; 13 degrees (R).    Plan   Continue PT 2x weekly under established Plan of Care, with treatment to include: pt education, HEP, therapeutic exercises, neuromuscular re-education/balance exercises, therapeutic activities, joint mobilizations, manual therapy, gait training PRN, and modalities PRN, to work towards established goals. Pt may be seen by PTA to carry out plan of care.   Mae Caicedo, IRIS

## 2017-11-13 ENCOUNTER — CLINICAL SUPPORT (OUTPATIENT)
Dept: REHABILITATION | Facility: HOSPITAL | Age: 52
End: 2017-11-13
Payer: COMMERCIAL

## 2017-11-13 DIAGNOSIS — Z74.09 IMPAIRED FUNCTIONAL MOBILITY, BALANCE, GAIT, AND ENDURANCE: ICD-10-CM

## 2017-11-13 DIAGNOSIS — R53.1 DECREASED STRENGTH: ICD-10-CM

## 2017-11-13 DIAGNOSIS — R29.898 DECREASED ROM OF NECK: ICD-10-CM

## 2017-11-13 PROCEDURE — 97110 THERAPEUTIC EXERCISES: CPT | Mod: PO | Performed by: PHYSICAL THERAPIST

## 2017-11-13 NOTE — PROGRESS NOTES
Physical Therapy Progress Note      Name: Brigid Jernigan  Clinic Number: [id number]  Diagnosis: G71.11 (ICD-10-CM) - Myotonic dystrophy  Physician: Luis Felipe Vo MD  Treatment Orders: PT Eval and Treat       Past Medical History:   Diagnosis Date    Abnormal EKG      Myotonic dystrophy      Pacemaker      PAF (paroxysmal atrial fibrillation)      Sleep apnea           Precautions: standard  Visit #: 38  Date of Eval: 5/9/2017  Extended POC: 1/25/18        Subjective   Pt reports: Pt reports she is not compliant with HEP.  Sister reported:   Pain Scale: denies     Objective       Patient received individual therapy to increase strength, endurance, ROM, flexibility, posture, core stabilization and head control with activities as follows:      Pt participated in therapeutic exercises x 55 minutes to address ROM and cervical alignment:   X 8 min on Sci Fit recumbent stepper.  Level 9    Supine: AAROM cervical   cervical distraction   Upper trap stretch   pec stretch   GTB cervical extension x 20             B LE SLR 4# 15x  Sit<>stand from 20 inch height mat with multiple trials, CGA  Static standing without UE support 1 minute x 3    Sitting: pull downs with GTB 2 x 10   Rows with GTB 2 x 10      Written Home Exercises: assisted quad/ hip flexor stretch in side lying  eval- chin tucks, rows with OTB, hip flexor and knee extensor stretch in sidelying  Pt demo fair understanding of the education provided. Brigid demonstrated fair return demonstration of activities.      Education provided re: POC, HEP     Pt has no cultural, educational or language barriers to learning provided.     Assessment   Brigid tolerated treatment well and did not have any problems. Pt continues to require multiple attempts to perform sit<>stand from 20 inch surface or less. While standing pt tends to increase weight bearing on RLE by staggering LLE further away from body, verbal  "cues to correct LE positioning. Pt demonstrated fair upright posture while performing seated exercises today. Pt can benefit from continued skilled PT to address remaining impairments to improve independence with mobility, decrease fall risk, and maintain mobility.      Pt prognosis is Good. Pt will continue to benefit from skilled outpatient physical therapy to address the deficits listed in the problem list, provide pt/family education and to maximize pt's level of independence in the home and community environment.      Activity limitations/ Participation Restrictions:   Fall Risk - impaired balance   Weakness  Impaired muscle tonePoor head control  Decreased ROM  Gait deviations   Decreased ambulation   Decreased activity tolerance   Decreased independence with daily activities   Requires skilled supervision to complete and progress HEP   Requires skilled PT for DME/ orthotic recommendations     Pt's spiritual, cultural and educational needs considered and pt agreeable to plan of care and GOALS as stated below:   Short term goals: 6 weeks, pt agrees to goals set. (As of 11/2/17)  1. Pt will perform chin tucks in supine with supervision to improve independent with HEP. Met 7/5/17  2. Assist pt with obtaining appropriate cervical brace to improve head control. Ongoing- pt more appropriate for [facility], pt to be measured next week  3. Pt will demonstrate improved knee ext strength on left to 4/5 to improve safety with ambulation and sit<>stand. Met 9/28/17 L=4/5  4. Pt will demonstrate improved cervical rotation PROM to 20 degrees to improve functional head control. Met 7/5/17  5. Pt will perform sit<>stand consistently from 22" height surface with supervision to improve independence. Met 7/5/17                Revised 8/31/17:  Pt will perform sit<>stand from 19" height chair to improve independence with sitting in various chairs around her home and decreased need for "pop up seat". improved- pt can stand from " "19" height with BUE and >10 attempts without A, 20" with CGA and 2-3 attempts     Long term goals: 12 weeks, pt agrees to goals set  6. Pt will perform basic HEP for gross strengthening with supervision to deter worsening of functional limitations. Met 8/31/17- pt's sister reports poor current compliance  7. Pt will demonstrate improved bilateral hip strength to 3/5 to improve balance and independence with mobility. Met 7/5/17  8. New 7/5/17: Pt will demonstrate improved bilateral hip strength to 4/5 to improve balance and independence with mobility. Same/slight decline- hip flex R=3+/5, L=4-/5, hip ext R= 3+/5, L=3+/5, hip abd B=4/5  9.       Pt will demonstrate improved cervical extension strength to 3+/5 to improve head control. Met 7/31/17  10. Pt will demonstrate improved cervical deep flexor strength to 3+/5 to improve head control. Met 7/31/17   11. Pt will demonstrate improve balance and gait by scoring 14/28 on Tinetti. Met 8/31/17- 17/28                Revised 8/31/17: pt will demonstrate improved balance/ gait and decreased fall risk to moderate by scoring 19/28 on Tinetti Assessment. Improved- 18/28          12. Pt will demonstrate improved cervical PROM for rotation to 40 degrees to improve functional head control. Improved- 45 degrees (L) ; 13 degrees (R).    Plan   Continue PT 2x weekly under established Plan of Care, with treatment to include: pt education, HEP, therapeutic exercises, neuromuscular re-education/balance exercises, therapeutic activities, joint mobilizations, manual therapy, gait training PRN, and modalities PRN, to work towards established goals. Pt may be seen by PTA to carry out plan of care.     Aretha Oliva, SPT                   "

## 2017-11-20 ENCOUNTER — CLINICAL SUPPORT (OUTPATIENT)
Dept: REHABILITATION | Facility: HOSPITAL | Age: 52
End: 2017-11-20
Payer: COMMERCIAL

## 2017-11-20 DIAGNOSIS — R29.898 DECREASED ROM OF NECK: ICD-10-CM

## 2017-11-20 DIAGNOSIS — Z74.09 IMPAIRED FUNCTIONAL MOBILITY, BALANCE, GAIT, AND ENDURANCE: ICD-10-CM

## 2017-11-20 DIAGNOSIS — R53.1 DECREASED STRENGTH: ICD-10-CM

## 2017-11-20 PROCEDURE — 97110 THERAPEUTIC EXERCISES: CPT | Mod: PO

## 2017-11-20 NOTE — PROGRESS NOTES
"                                                    Physical Therapy Progress Note      Name: Brigid Jernigan  Clinic Number: [id number]  Diagnosis: G71.11 (ICD-10-CM) - Myotonic dystrophy  Physician: Luis Felipe Vo MD  Treatment Orders: PT Eval and Treat       Past Medical History:   Diagnosis Date    Abnormal EKG      Myotonic dystrophy      Pacemaker      PAF (paroxysmal atrial fibrillation)      Sleep apnea           Precautions: standard  Visit #: 39  Date of Eval: 5/9/2017  Extended POC: 1/25/18        Subjective   Pt reports: " I'm lazy I don't do anything at home."  Sister reported: " Is she approved for more appointments yet?"   Pain Scale: denies     Objective       Patient received individual therapy to increase strength, endurance, ROM, flexibility, posture, core stabilization and head control with activities as follows:      Pt participated in therapeutic exercises x 45 minutes to address ROM and cervical alignment:   X 10 min on Sci Fit recumbent stepper.  Level 9    Supine: AAROM cervical   cervical distraction   Upper trap stretch   pec stretch             B LE SLR 4# 15x  Sit to stand from low mat with Mod A to elevate B hips  Sit<>stand from gold chair  X 5 trials with 2 HR and CGA  Static standing without UE support 30 sec x 5 trials    Sitting: pull downs with GTB 2 x 10   Rows with GTB 2 x 10      Written Home Exercises: assisted quad/ hip flexor stretch in side lying  eval- chin tucks, rows with OTB, hip flexor and knee extensor stretch in sidelying  Pt demo fair understanding of the education provided. Brigid demonstrated fair return demonstration of activities.      Education provided re: POC, HEP     Pt has no cultural, educational or language barriers to learning provided.     Assessment   Brigid tolerated treatment well and did not have any problems. Pt was able to increase amount of sit to stand trials from gold chair, but cont to have difficulty with standing from low surface.  " "No change of exercises noted.  Pt can benefit from continued skilled PT to address remaining impairments to improve independence with mobility, decrease fall risk, and maintain mobility.      Pt prognosis is Good. Pt will continue to benefit from skilled outpatient physical therapy to address the deficits listed in the problem list, provide pt/family education and to maximize pt's level of independence in the home and community environment.      Activity limitations/ Participation Restrictions:   Fall Risk - impaired balance   Weakness  Impaired muscle tonePoor head control  Decreased ROM  Gait deviations   Decreased ambulation   Decreased activity tolerance   Decreased independence with daily activities   Requires skilled supervision to complete and progress HEP   Requires skilled PT for DME/ orthotic recommendations     Pt's spiritual, cultural and educational needs considered and pt agreeable to plan of care and GOALS as stated below:   Short term goals: 6 weeks, pt agrees to goals set. (As of 11/2/17)  1. Pt will perform chin tucks in supine with supervision to improve independent with HEP. Met 7/5/17  2. Assist pt with obtaining appropriate cervical brace to improve head control. Ongoing- pt more appropriate for [facility], pt to be measured next week  3. Pt will demonstrate improved knee ext strength on left to 4/5 to improve safety with ambulation and sit<>stand. Met 9/28/17 L=4/5  4. Pt will demonstrate improved cervical rotation PROM to 20 degrees to improve functional head control. Met 7/5/17  5. Pt will perform sit<>stand consistently from 22" height surface with supervision to improve independence. Met 7/5/17                Revised 8/31/17:  Pt will perform sit<>stand from 19" height chair to improve independence with sitting in various chairs around her home and decreased need for "pop up seat". improved- pt can stand from 19" height with BUE and >10 attempts without A, 20" with CGA and 2-3 " attempts     Long term goals: 12 weeks, pt agrees to goals set  6. Pt will perform basic HEP for gross strengthening with supervision to deter worsening of functional limitations. Met 8/31/17- pt's sister reports poor current compliance  7. Pt will demonstrate improved bilateral hip strength to 3/5 to improve balance and independence with mobility. Met 7/5/17  8. New 7/5/17: Pt will demonstrate improved bilateral hip strength to 4/5 to improve balance and independence with mobility. Same/slight decline- hip flex R=3+/5, L=4-/5, hip ext R= 3+/5, L=3+/5, hip abd B=4/5  9.       Pt will demonstrate improved cervical extension strength to 3+/5 to improve head control. Met 7/31/17  10. Pt will demonstrate improved cervical deep flexor strength to 3+/5 to improve head control. Met 7/31/17   11. Pt will demonstrate improve balance and gait by scoring 14/28 on Tinetti. Met 8/31/17- 17/28                Revised 8/31/17: pt will demonstrate improved balance/ gait and decreased fall risk to moderate by scoring 19/28 on Tinetti Assessment. Improved- 18/28          12. Pt will demonstrate improved cervical PROM for rotation to 40 degrees to improve functional head control. Improved- 45 degrees (L) ; 13 degrees (R).    Plan   Continue PT 2x weekly under established Plan of Care, with treatment to include: pt education, HEP, therapeutic exercises, neuromuscular re-education/balance exercises, therapeutic activities, joint mobilizations, manual therapy, gait training PRN, and modalities PRN, to work towards established goals. Pt may be seen by PTA to carry out plan of care.     Mae Caicedo, PTA

## 2017-11-27 ENCOUNTER — CLINICAL SUPPORT (OUTPATIENT)
Dept: REHABILITATION | Facility: HOSPITAL | Age: 52
End: 2017-11-27
Payer: COMMERCIAL

## 2017-11-27 DIAGNOSIS — Z74.09 IMPAIRED FUNCTIONAL MOBILITY, BALANCE, GAIT, AND ENDURANCE: ICD-10-CM

## 2017-11-27 DIAGNOSIS — R53.1 DECREASED STRENGTH: ICD-10-CM

## 2017-11-27 DIAGNOSIS — R29.898 DECREASED ROM OF NECK: ICD-10-CM

## 2017-11-27 PROCEDURE — 97530 THERAPEUTIC ACTIVITIES: CPT | Mod: PO

## 2017-11-27 PROCEDURE — 97110 THERAPEUTIC EXERCISES: CPT | Mod: PO

## 2017-11-27 NOTE — PROGRESS NOTES
"                                                    Physical Therapy Progress Note      Name: Brigid Jernigan  Clinic Number: [id number]  Diagnosis: G71.11 (ICD-10-CM) - Myotonic dystrophy  Physician: Luis Felipe Vo MD  Treatment Orders: PT Eval and Treat       Past Medical History:   Diagnosis Date    Abnormal EKG      Myotonic dystrophy      Pacemaker      PAF (paroxysmal atrial fibrillation)      Sleep apnea           Precautions: standard  Visit #: 40  Date of Eval: 5/9/2017  Extended POC: 1/25/18        Subjective   Pt reports: " I'm doing pretty good."   Sister reported: "Did she get approved for another appointment yet?"   Pain Scale: denies     Objective       Patient received individual therapy to increase strength, endurance, ROM, flexibility, posture, core stabilization and head control with activities as follows:      Pt participated in therapeutic exercises x 20 minutes to address ROM and cervical alignment:   X 10 min on Sci Fit recumbent stepper.  Level 9               B LE SLR 4# 2 x 10 reps   Bridges with 3 sec hold with B LE on green physioball   LTR with green physioball x 15 reps         Therapeutic activities to increase functional mobility x 23 min including:   Sit to stand from low mat with Mod A to elevate B hips  Sit to stand x 5 trials from low lying black mat ~20 inches with CGA  Sit to stand from low stepper seat with Min A to elevate B hips  Static standing without UE support 30 sec x 5 trials   30 sec, 41 sec, 35 sec, 61 sec  Sitting EOM x 5 mins while using B uE to hit a balloon back and forth with SBA  Standing balance with RW and CGA while hitting a balloon back and forth x 2 mins total.        Written Home Exercises: assisted quad/ hip flexor stretch in side lying  eval- chin tucks, rows with OTB, hip flexor and knee extensor stretch in sidelying  Pt demo fair understanding of the education provided. Brigid demonstrated fair return demonstration of activities. "      Education provided re: POC, HEP     Pt has no cultural, educational or language barriers to learning provided.     Assessment   Brigid tolerated treatment well and did not have any complaints.  Pt was initially anxious about standing balance with balloon tap, but was able to finally perform activity with some encouragement and assistance from therapist.  Pt was able to increase amount of time in standing, and had increased reps for supine therex.  Pt can benefit from continued skilled PT to address remaining impairments to improve independence with mobility, decrease fall risk, and maintain mobility.      Pt prognosis is Good. Pt will continue to benefit from skilled outpatient physical therapy to address the deficits listed in the problem list, provide pt/family education and to maximize pt's level of independence in the home and community environment.      Activity limitations/ Participation Restrictions:   Fall Risk - impaired balance   Weakness  Impaired muscle tonePoor head control  Decreased ROM  Gait deviations   Decreased ambulation   Decreased activity tolerance   Decreased independence with daily activities   Requires skilled supervision to complete and progress HEP   Requires skilled PT for DME/ orthotic recommendations     Pt's spiritual, cultural and educational needs considered and pt agreeable to plan of care and GOALS as stated below:   Short term goals: 6 weeks, pt agrees to goals set. (As of 11/2/17)  1. Pt will perform chin tucks in supine with supervision to improve independent with HEP. Met 7/5/17  2. Assist pt with obtaining appropriate cervical brace to improve head control. Ongoing- pt more appropriate for [facility], pt to be measured next week  3. Pt will demonstrate improved knee ext strength on left to 4/5 to improve safety with ambulation and sit<>stand. Met 9/28/17 L=4/5  4. Pt will demonstrate improved cervical rotation PROM to 20 degrees to improve functional head control. Met  "7/5/17  5. Pt will perform sit<>stand consistently from 22" height surface with supervision to improve independence. Met 7/5/17                Revised 8/31/17:  Pt will perform sit<>stand from 19" height chair to improve independence with sitting in various chairs around her home and decreased need for "pop up seat". improved- pt can stand from 19" height with BUE and >10 attempts without A, 20" with CGA and 2-3 attempts     Long term goals: 12 weeks, pt agrees to goals set  6. Pt will perform basic HEP for gross strengthening with supervision to deter worsening of functional limitations. Met 8/31/17- pt's sister reports poor current compliance  7. Pt will demonstrate improved bilateral hip strength to 3/5 to improve balance and independence with mobility. Met 7/5/17  8. New 7/5/17: Pt will demonstrate improved bilateral hip strength to 4/5 to improve balance and independence with mobility. Same/slight decline- hip flex R=3+/5, L=4-/5, hip ext R= 3+/5, L=3+/5, hip abd B=4/5  9.       Pt will demonstrate improved cervical extension strength to 3+/5 to improve head control. Met 7/31/17  10. Pt will demonstrate improved cervical deep flexor strength to 3+/5 to improve head control. Met 7/31/17   11. Pt will demonstrate improve balance and gait by scoring 14/28 on Tinetti. Met 8/31/17- 17/28                Revised 8/31/17: pt will demonstrate improved balance/ gait and decreased fall risk to moderate by scoring 19/28 on Tinetti Assessment. Improved- 18/28          12. Pt will demonstrate improved cervical PROM for rotation to 40 degrees to improve functional head control. Improved- 45 degrees (L) ; 13 degrees (R).    Plan   Continue PT 2x weekly under established Plan of Care, with treatment to include: pt education, HEP, therapeutic exercises, neuromuscular re-education/balance exercises, therapeutic activities, joint mobilizations, manual therapy, gait training PRN, and modalities PRN, to work towards established " goals. Pt may be seen by PTA to carry out plan of care.     Mae Caicedo, PTA

## 2017-12-04 ENCOUNTER — CLINICAL SUPPORT (OUTPATIENT)
Dept: REHABILITATION | Facility: HOSPITAL | Age: 52
End: 2017-12-04
Attending: STUDENT IN AN ORGANIZED HEALTH CARE EDUCATION/TRAINING PROGRAM
Payer: COMMERCIAL

## 2017-12-04 DIAGNOSIS — Z74.09 IMPAIRED FUNCTIONAL MOBILITY, BALANCE, GAIT, AND ENDURANCE: ICD-10-CM

## 2017-12-04 DIAGNOSIS — R53.1 DECREASED STRENGTH: ICD-10-CM

## 2017-12-04 DIAGNOSIS — R29.898 DECREASED ROM OF NECK: ICD-10-CM

## 2017-12-04 PROCEDURE — 97530 THERAPEUTIC ACTIVITIES: CPT | Mod: PO | Performed by: PHYSICAL THERAPIST

## 2017-12-04 PROCEDURE — 97110 THERAPEUTIC EXERCISES: CPT | Mod: PO | Performed by: PHYSICAL THERAPIST

## 2017-12-06 NOTE — PLAN OF CARE
"                                                    Physical Therapy Progress Note      Name: Brigid Jernigan  Clinic Number: [id number]  Diagnosis: G71.11 (ICD-10-CM) - Myotonic dystrophy  Physician: Luis Felipe Vo MD  Treatment Orders: PT Eval and Treat       Past Medical History:   Diagnosis Date    Abnormal EKG      Myotonic dystrophy      Pacemaker      PAF (paroxysmal atrial fibrillation)      Sleep apnea           Precautions: standard  Visit #: 41  Date of Eval: 5/9/2017  Extended POC: 1/25/18        Subjective   Pt reports: no new complaints  Sister reported: reports pt has not been performing HEP  Pain Scale: denies     Objective       Patient received individual therapy to increase strength, endurance, ROM, flexibility, posture, core stabilization and head control with activities as follows:      Pt participated in therapeutic exercises x 27 minutes to address ROM and cervical alignment:      RLE  Hip ABD 4/5  Hip ADD 4-/5  Hip flex 4-/5  Hip ext: 4-/5     LLE:  Hip ABD 4/5  Hip ADD 3+/5  Hip flex: 4/5  Hip Ext: 4-/5     Cervical Rotation:  R Rot: 5 degrees> 11 deg  L Rot: 52 degrees    B LE SLR 4# 2 x 10 reps  Clamshells BTB 2 x 10      NP today:   X 10 min on Sci Fit recumbent stepper.  Level 9            Bridges with 3 sec hold with B LE on green physioball   LTR with green physioball x 15 reps       Therapeutic activities to increase functional mobility x 15 min including:   Sit to stand x 3 trials from low lying black mat ~20 inches with close supervision  Sit>stand from 19" height to RW with multiple trials, min A    Static standing without UE support 2'34", fair balance    Tinetti Balance Assessment  Balance:  1. Sitting Balance 1   Leans/ slides in chair= 0   Steady= 1  2. Rises from chair 1   Unable without help= 0   Able, uses arms= 1   Able without use of arms= 2  3. Attempts to rise 1   Unable without help= 0   Able, >1 attmept required-= 1   Able, 1 attempt= 2  4. Immediate standing " balance (1st 5 seconds) 1   Unsteady (swaggers, moves feet, trunk sway)= 0   Steady but uses walker or other support= 1   Narrow base of support without walker or support= 2  5. Nudged 1   Begins to fall= 0   Staggers, grabs, catches self= 1   Steady= 2  6. Standing balance 1   Unsteady= 0   Steady but wide LEYDI or uses AD=1   Narrow LEYDI without AD=2  7. Eyes closed 1   Unsteady= 0   Steady= 1  8. Turning 360 degrees 0   Discontinuous steps= 0   Continuous steps= 1   Unsteady= 0   Steady= 1  9. Sitting down 1   Unsafe= 0   Uses arms or not in a smooth motion= 1   Safe, smooth motion= 2  Balance score= 8/16  Gait:  1. Initiation 1   hesitates or multiple attempts to start= 0   No hesitancy= 1  2. Step length 2   Step to= 0   One foot passes= 1   reciprocal pattern= 2  3. Step height 2    Neither foot clears floor= 0   One foot clears floor= 1   Both feet clear floor= 2  4. Step symmetry 1   Not symmetrical= 0   Appears symmetrical= 1  5. Step continuity 1   Not continuous= 0   Appears continuous= 1  6. Path 1   Marked deviation= 0   Mild/moderate deviation or uses A.D.= 1   Straight without A.D.= 2  7. Trunk 0   Marked sway or uses A.D.= 0   No sway, but flexes knees or back, spread arms out while walking= 1   No sway, no flexion, no use of UE, no use of A.D.= 2  8. Walking stance 0   Heels apart= 0   Heels almost touching while walking= 1  Gait score= 8/12  Total score= 16/28 , high fall risk         Written Home Exercises: 12/4/17- AROM cervical SB and rotation  assisted quad/ hip flexor stretch in side lying  eval- chin tucks, rows with OTB, hip flexor and knee extensor stretch in sidelying  Pt demo fair understanding of the education provided. Brigid demonstrated fair return demonstration of activities.      Education provided re: POC, HEP     Pt has no cultural, educational or language barriers to learning provided.     Assessment   Assessment period: 11/6/17 to 12/4/17. Brigid tolerates treatments well. PT  "continues to address pt's cervical ROM and ability to keep head upright. PT is also addressing pt's LE and core strength to improve balance and safety during standing/ ambulation. Pt is restricted to sitting on surfaces >20" tall due to inability to independently rise. Pt requires at least minimal assistance for sit>stand from heights <20". Improved static standing balance without UE support is noted. Pt continues to demonstrate increased tone on muscles of left side of neck, limiting ROM. Pt tends to hold head slightly to the left, but can correct with verbal cueing. Pt encouraged to improve participation in cervical AROM at home to prevent further ROM limitations. Pt can benefit from continued skilled PT to address remaining impairments to improve independence with mobility, decrease fall risk, and maintain mobility.      Pt prognosis is Good. Pt will continue to benefit from skilled outpatient physical therapy to address the deficits listed in the problem list, provide pt/family education and to maximize pt's level of independence in the home and community environment.      Activity limitations/ Participation Restrictions:   Fall Risk - impaired balance   Weakness  Impaired muscle tonePoor head control  Decreased ROM  Gait deviations   Decreased ambulation   Decreased activity tolerance   Decreased independence with daily activities   Requires skilled supervision to complete and progress HEP   Requires skilled PT for DME/ orthotic recommendations     Pt's spiritual, cultural and educational needs considered and pt agreeable to plan of care and GOALS as stated below:   Short term goals: 6 weeks, pt agrees to goals set. (As of 12/4/17)  1. Pt will perform chin tucks in supine with supervision to improve independent with HEP. Met 7/5/17  2. Assist pt with obtaining appropriate cervical brace to improve head control. Ongoing- pt measured, awaiting device  3. Pt will demonstrate improved knee ext strength on left to " "4/5 to improve safety with ambulation and sit<>stand. Met 9/28/17 L=4/5  4. Pt will demonstrate improved cervical rotation PROM to 20 degrees to improve functional head control. Met 7/5/17  5. Pt will perform sit<>stand consistently from 22" height surface with supervision to improve independence. Met 7/5/17                Revised 8/31/17:  Pt will perform sit<>stand from 19" height chair to improve independence with sitting in various chairs around her home and decreased need for "pop up seat". improved- pt can stand from 19" height with BUE and >10 attempts with min A, 20" with close superivsion and 2-3 attempts     Long term goals: 12 weeks, pt agrees to goals set  6. Pt will perform basic HEP for gross strengthening with supervision to deter worsening of functional limitations. Met 8/31/17- pt's sister reports poor current compliance  7. Pt will demonstrate improved bilateral hip strength to 3/5 to improve balance and independence with mobility. Met 7/5/17  8. New 7/5/17: Pt will demonstrate improved bilateral hip strength to 4/5 to improve balance and independence with mobility. Improved- hip flex R=4-/5, L=4/5, hip ext B= 4-/5, hip abd B=4/5  9.       Pt will demonstrate improved cervical extension strength to 3+/5 to improve head control. Met 7/31/17  10. Pt will demonstrate improved cervical deep flexor strength to 3+/5 to improve head control. Met 7/31/17   11. Pt will demonstrate improve balance and gait by scoring 14/28 on Tinetti. Met 8/31/17- 17/28                Revised 8/31/17: pt will demonstrate improved balance/ gait and decreased fall risk to moderate by scoring 19/28 on Tinetti Assessment. Same/ varies- 16/28 (-2 pts)          12. Pt will demonstrate improved cervical PROM for rotation to 40 degrees to improve functional head control. Improved/ partially met- 52 degrees (L) ; 11 degrees (R)- after stretching.      Plan   Continue PT 2x weekly under established Plan of Care, with treatment to " include: pt education, HEP, therapeutic exercises, neuromuscular re-education/balance exercises, therapeutic activities, joint mobilizations, manual therapy, gait training PRN, and modalities PRN, to work towards established goals. Pt may be seen by PTA to carry out plan of care.     Yadi Mercado, PT  12/4/2017    I certify the need for these services furnished under this plan of treatment and while under my care.  ____________________________________ Physician/Referring Practitioner   Date of Signature

## 2017-12-11 ENCOUNTER — CLINICAL SUPPORT (OUTPATIENT)
Dept: REHABILITATION | Facility: HOSPITAL | Age: 52
End: 2017-12-11
Attending: STUDENT IN AN ORGANIZED HEALTH CARE EDUCATION/TRAINING PROGRAM
Payer: COMMERCIAL

## 2017-12-11 DIAGNOSIS — R29.898 DECREASED ROM OF NECK: ICD-10-CM

## 2017-12-11 DIAGNOSIS — Z74.09 IMPAIRED FUNCTIONAL MOBILITY, BALANCE, GAIT, AND ENDURANCE: ICD-10-CM

## 2017-12-11 DIAGNOSIS — R53.1 DECREASED STRENGTH: ICD-10-CM

## 2017-12-11 PROCEDURE — 97110 THERAPEUTIC EXERCISES: CPT | Mod: PO | Performed by: PHYSICAL THERAPIST

## 2017-12-11 NOTE — PROGRESS NOTES
"    Physical Therapy Progress Note      Name: Brigid Jernigan  Clinic Number: [id number]  Diagnosis: G71.11 (ICD-10-CM) - Myotonic dystrophy  Physician: Luis Felipe Vo MD  Treatment Orders: PT Eval and Treat          Past Medical History:   Diagnosis Date    Abnormal EKG      Myotonic dystrophy      Pacemaker      PAF (paroxysmal atrial fibrillation)      Sleep apnea           Precautions: standard  Visit #: 42  Date of Eval: 5/9/2017  Extended POC: 1/25/18        Subjective   Pt reports: no new complaints. Pt attended session with new custom AFOs on  Sister reported: reports pt has received cervical collar and custom AFOs  Pain Scale: denies     Objective       Patient received individual therapy to increase strength, endurance, ROM, flexibility, posture, core stabilization and head control with activities as follows:      Pt participated in therapeutic exercises x 38 minutes to address ROM and cervical alignment:       B LE SLR 4# 2 x 10 reps  Clamshells BTB 2 x 10  Bridges with 3 sec hold with B LE on green [employer] ball  Leg press 100# at lower position (~90 deg hip/knee flex) 10x    Cervical PROM: upper trap PROM   Rotation PROM     NP today:   X 10 min on Sci Fit recumbent stepper.  Level 9            LTR with green physioball x 15 reps                 Therapeutic activities to increase functional mobility x 0 min including:   NP today:  Sit to stand x 3 trials from low lying black mat ~20 inches with close supervision  Sit>stand from 19" height to RW with multiple trials, min A  Static standing without UE support 2'34", fair balance        Written Home Exercises: 12/4/17- AROM cervical SB and rotation  assisted quad/ hip flexor stretch in side lying  eval- chin tucks, rows with OTB, hip flexor and knee extensor stretch in sidelying  Pt demo fair understanding of the education provided. Brigid demonstrated fair return demonstration of activities.      Education provided re: POC, HEP     Pt has no " cultural, educational or language barriers to learning provided.     Assessment   Brigid tolerated treatment well. Pt demonstrated an improved upright standing posture today (poor head control- full cervical flexion). Pt's sister reports pt's compliance with custom AFOs, but has not used cervical collar as instructed. Pt was noted to ambulate with decreased base of support and decreased bilateral knee hyperextension with use of new AFOs.  Decreased ankle supination/ inversion with sit<>stand was also noted as a result of pt wearing braces. PT is addressing pt's ability to rise from lower surfaces via leg press use at a lower interval. Pt can benefit from continued skilled PT to address remaining impairments to improve independence with mobility, decrease fall risk, and maintain mobility.      Pt prognosis is Good. Pt will continue to benefit from skilled outpatient physical therapy to address the deficits listed in the problem list, provide pt/family education and to maximize pt's level of independence in the home and community environment.      Activity limitations/ Participation Restrictions:   Fall Risk - impaired balance   Weakness  Impaired muscle tonePoor head control  Decreased ROM  Gait deviations   Decreased ambulation   Decreased activity tolerance   Decreased independence with daily activities   Requires skilled supervision to complete and progress HEP   Requires skilled PT for DME/ orthotic recommendations     Pt's spiritual, cultural and educational needs considered and pt agreeable to plan of care and GOALS as stated below:   Short term goals: 6 weeks, pt agrees to goals set. (As of 12/4/17)  1. Pt will perform chin tucks in supine with supervision to improve independent with HEP. Met 7/5/17  2. Assist pt with obtaining appropriate cervical brace to improve head control. Ongoing- pt measured, awaiting device  3. Pt will demonstrate improved knee ext strength on left to 4/5 to improve safety with  "ambulation and sit<>stand. Met 9/28/17 L=4/5  4. Pt will demonstrate improved cervical rotation PROM to 20 degrees to improve functional head control. Met 7/5/17  5. Pt will perform sit<>stand consistently from 22" height surface with supervision to improve independence. Met 7/5/17                Revised 8/31/17:  Pt will perform sit<>stand from 19" height chair to improve independence with sitting in various chairs around her home and decreased need for "pop up seat". improved- pt can stand from 19" height with BUE and >10 attempts with min A, 20" with close superivsion and 2-3 attempts     Long term goals: 12 weeks, pt agrees to goals set  6. Pt will perform basic HEP for gross strengthening with supervision to deter worsening of functional limitations. Met 8/31/17- pt's sister reports poor current compliance  7. Pt will demonstrate improved bilateral hip strength to 3/5 to improve balance and independence with mobility. Met 7/5/17  8. New 7/5/17: Pt will demonstrate improved bilateral hip strength to 4/5 to improve balance and independence with mobility. Improved- hip flex R=4-/5, L=4/5, hip ext B= 4-/5, hip abd B=4/5  9.       Pt will demonstrate improved cervical extension strength to 3+/5 to improve head control. Met 7/31/17  10. Pt will demonstrate improved cervical deep flexor strength to 3+/5 to improve head control. Met 7/31/17   11. Pt will demonstrate improve balance and gait by scoring 14/28 on Tinetti. Met 8/31/17- 17/28                Revised 8/31/17: pt will demonstrate improved balance/ gait and decreased fall risk to moderate by scoring 19/28 on Tinetti Assessment. Same/ varies- 16/28 (-2 pts)          12. Pt will demonstrate improved cervical PROM for rotation to 40 degrees to improve functional head control. Improved/ partially met- 52 degrees (L) ; 11 degrees (R)- after stretching.       Plan   Continue PT 2x weekly under established Plan of Care, with treatment to include: pt education, HEP, " therapeutic exercises, neuromuscular re-education/balance exercises, therapeutic activities, joint mobilizations, manual therapy, gait training PRN, and modalities PRN, to work towards established goals. Pt may be seen by PTA to carry out plan of care.      Yadi Mercado, PT  12/11/2017

## 2017-12-14 ENCOUNTER — CLINICAL SUPPORT (OUTPATIENT)
Dept: REHABILITATION | Facility: HOSPITAL | Age: 52
End: 2017-12-14
Attending: STUDENT IN AN ORGANIZED HEALTH CARE EDUCATION/TRAINING PROGRAM
Payer: COMMERCIAL

## 2017-12-14 DIAGNOSIS — Z74.09 IMPAIRED FUNCTIONAL MOBILITY, BALANCE, GAIT, AND ENDURANCE: ICD-10-CM

## 2017-12-14 DIAGNOSIS — R29.898 DECREASED ROM OF NECK: ICD-10-CM

## 2017-12-14 DIAGNOSIS — R53.1 DECREASED STRENGTH: ICD-10-CM

## 2017-12-14 PROCEDURE — 97530 THERAPEUTIC ACTIVITIES: CPT | Mod: PO | Performed by: PHYSICAL THERAPIST

## 2017-12-14 PROCEDURE — 97110 THERAPEUTIC EXERCISES: CPT | Mod: PO | Performed by: PHYSICAL THERAPIST

## 2017-12-14 NOTE — PROGRESS NOTES
"    Physical Therapy Progress Note      Name: Brigid Jernigan  Clinic Number: [id number]  Diagnosis: G71.11 (ICD-10-CM) - Myotonic dystrophy  Physician: Luis Felipe Vo MD  Treatment Orders: PT Eval and Treat          Past Medical History:   Diagnosis Date    Abnormal EKG      Myotonic dystrophy      Pacemaker      PAF (paroxysmal atrial fibrillation)      Sleep apnea           Precautions: standard  Visit #: 43  Date of Eval: 5/9/2017  Extended POC: 1/25/18        Subjective   Pt reports: no new complaints. Pt attended session with new custom AFOs on  Sister reported: reports pt has received cervical collar and custom AFOs  Pain Scale: denies     Objective       Patient received individual therapy to increase strength, endurance, ROM, flexibility, posture, core stabilization and head control with activities as follows:      Pt participated in therapeutic exercises x 29 minutes to address ROM and cervical alignment:   X 10 min on Sci Fit recumbent stepper.  Level 9  Supine: LTR with green physioball x 15 reps   Bridges with 3 sec hold with B LE on green physio ball 2 x 10   upper trap PROM   Rotation PROM   Inhibitive distraction   PROM cervical flex/ ext (poor available range)    Leg press 100# at lower position (~90 deg hip/knee flex) 15x    NP today:   B LE SLR 4# 2 x 10 reps  Clamshells BTB 2 x 10      Therapeutic activities to increase functional mobility x 13 min including:   Balloon volley with 1 UE support- 2 min + 1 min with fair balance  Sit to stand x 2 trials from ~20 inch height with close supervision- ~2 attempts for success  Sit>stand from 19" height to RW with min A to maintain forward weight shift (trunk)    NP today:      Static standing without UE support 2'34", fair balance        Written Home Exercises: 12/4/17- AROM cervical SB and rotation  assisted quad/ hip flexor stretch in side lying  eval- chin tucks, rows with OTB, hip flexor and knee extensor stretch in sidelying  Pt demo fair " "understanding of the education provided. Brigid demonstrated fair return demonstration of activities.      Education provided re: POC, HEP     Pt has no cultural, educational or language barriers to learning provided.     Assessment   Brigid tolerated treatment well.  Pt was able to tolerate 1-2 min of dynamic standing activities with 1 UE support. Pt demonstrated fair balance and had difficulty switching UE for support. Forward head posture makes reaching upward difficult. Pt tolerated increased reps for leg press and demonstrated improved strength of BLE with sit>stand from 19" height surface. PT requested pt wear cervical collar to next session for improved participation in standing activities.  Pt can benefit from continued skilled PT to address remaining impairments to improve independence with mobility, decrease fall risk, and maintain mobility.      Pt prognosis is Good. Pt will continue to benefit from skilled outpatient physical therapy to address the deficits listed in the problem list, provide pt/family education and to maximize pt's level of independence in the home and community environment.      Activity limitations/ Participation Restrictions:   Fall Risk - impaired balance   Weakness  Impaired muscle tonePoor head control  Decreased ROM  Gait deviations   Decreased ambulation   Decreased activity tolerance   Decreased independence with daily activities   Requires skilled supervision to complete and progress HEP   Requires skilled PT for DME/ orthotic recommendations     Pt's spiritual, cultural and educational needs considered and pt agreeable to plan of care and GOALS as stated below:   Short term goals: 6 weeks, pt agrees to goals set. (As of 12/4/17)  1. Pt will perform chin tucks in supine with supervision to improve independent with HEP. Met 7/5/17  2. Assist pt with obtaining appropriate cervical brace to improve head control. Ongoing- pt measured, awaiting device  3. Pt will demonstrate " "improved knee ext strength on left to 4/5 to improve safety with ambulation and sit<>stand. Met 9/28/17 L=4/5  4. Pt will demonstrate improved cervical rotation PROM to 20 degrees to improve functional head control. Met 7/5/17  5. Pt will perform sit<>stand consistently from 22" height surface with supervision to improve independence. Met 7/5/17                Revised 8/31/17:  Pt will perform sit<>stand from 19" height chair to improve independence with sitting in various chairs around her home and decreased need for "pop up seat". improved- pt can stand from 19" height with BUE and >10 attempts with min A, 20" with close superivsion and 2-3 attempts     Long term goals: 12 weeks, pt agrees to goals set  6. Pt will perform basic HEP for gross strengthening with supervision to deter worsening of functional limitations. Met 8/31/17- pt's sister reports poor current compliance  7. Pt will demonstrate improved bilateral hip strength to 3/5 to improve balance and independence with mobility. Met 7/5/17  8. New 7/5/17: Pt will demonstrate improved bilateral hip strength to 4/5 to improve balance and independence with mobility. Improved- hip flex R=4-/5, L=4/5, hip ext B= 4-/5, hip abd B=4/5  9.       Pt will demonstrate improved cervical extension strength to 3+/5 to improve head control. Met 7/31/17  10. Pt will demonstrate improved cervical deep flexor strength to 3+/5 to improve head control. Met 7/31/17   11. Pt will demonstrate improve balance and gait by scoring 14/28 on Tinetti. Met 8/31/17- 17/28                Revised 8/31/17: pt will demonstrate improved balance/ gait and decreased fall risk to moderate by scoring 19/28 on Tinetti Assessment. Same/ varies- 16/28 (-2 pts)          12. Pt will demonstrate improved cervical PROM for rotation to 40 degrees to improve functional head control. Improved/ partially met- 52 degrees (L) ; 11 degrees (R)- after stretching.       Plan   Continue PT 2x weekly under " established Plan of Care, with treatment to include: pt education, HEP, therapeutic exercises, neuromuscular re-education/balance exercises, therapeutic activities, joint mobilizations, manual therapy, gait training PRN, and modalities PRN, to work towards established goals. Pt may be seen by PTA to carry out plan of care.      Yadi Mercado, PT  12/14/2017

## 2017-12-18 ENCOUNTER — CLINICAL SUPPORT (OUTPATIENT)
Dept: REHABILITATION | Facility: HOSPITAL | Age: 52
End: 2017-12-18
Attending: STUDENT IN AN ORGANIZED HEALTH CARE EDUCATION/TRAINING PROGRAM
Payer: COMMERCIAL

## 2017-12-18 DIAGNOSIS — R53.1 DECREASED STRENGTH: ICD-10-CM

## 2017-12-18 DIAGNOSIS — Z74.09 IMPAIRED FUNCTIONAL MOBILITY, BALANCE, GAIT, AND ENDURANCE: ICD-10-CM

## 2017-12-18 DIAGNOSIS — R29.898 DECREASED ROM OF NECK: ICD-10-CM

## 2017-12-18 PROCEDURE — 97110 THERAPEUTIC EXERCISES: CPT | Mod: PO | Performed by: PHYSICAL THERAPIST

## 2017-12-18 PROCEDURE — 97530 THERAPEUTIC ACTIVITIES: CPT | Mod: PO | Performed by: PHYSICAL THERAPIST

## 2017-12-18 NOTE — PROGRESS NOTES
"    Physical Therapy Progress Note      Name: Brigid Jernigan  Clinic Number: [id number]  Diagnosis: G71.11 (ICD-10-CM) - Myotonic dystrophy  Physician: Luis Felipe Vo MD  Treatment Orders: PT Eval and Treat          Past Medical History:   Diagnosis Date    Abnormal EKG      Myotonic dystrophy      Pacemaker      PAF (paroxysmal atrial fibrillation)      Sleep apnea           Precautions: standard  Visit #: 42  Date of Eval: 5/9/2017  Extended POC: 1/25/18        Subjective   Pt reports: no new complaints. Pt brought cervical collar in  Sister reported: pt brought in cervical collar for assessment.  Pain Scale: denies     Objective       Patient received individual therapy to increase strength, endurance, ROM, flexibility, posture, core stabilization and head control with activities as follows:      Pt participated in therapeutic exercises x 28 minutes to address ROM and cervical alignment:   X 13 min on Sci Fit recumbent stepper.  Level 9- supervised use      B LE SLR 4# 2 x 15 reps  Clamshells BTB 2 x 10     NP today:   LTR with green physioball x 15 reps  Bridges with 3 sec hold with B LE on green physio ball  Leg press 100# at lower position (~90 deg hip/knee flex) 10x     Cervical PROM: upper trap PROM   Rotation PROM              Therapeutic activities to increase functional mobility x 15 min including:   Static standing without UE support 2'39", fair balance  Sit to stand from low mat + airfoam ~20 inches with min- mod A and multiple attempts     Reviewed application of cervical collar with return demo from pt's sister      Written Home Exercises: 12/4/17- AROM cervical SB and rotation  assisted quad/ hip flexor stretch in side lying  eval- chin tucks, rows with OTB, hip flexor and knee extensor stretch in sidelying  Pt demo fair understanding of the education provided. Brigid demonstrated fair return demonstration of activities.      Education provided re: 12/18/17- cervical collar " "application  POC, HEP     Pt has no cultural, educational or language barriers to learning provided.     Assessment   Brigid tolerated treatment well. Pt brought in cervical collar and wore >1/2 of the session. Pt was able to tolerate increased reps for SLR. Pt continues to have difficulty with sit<>stand from 20" height or less. Pt needs maximal verbal cues to lean head forward and down to unweight hips for sit>stand. Pt is successful with task if provided with min-mod assistance to maintain an anterior weight shift. Pt can benefit from continued skilled PT to address remaining impairments to improve independence with mobility, decrease fall risk, and maintain mobility.      Pt prognosis is Good. Pt will continue to benefit from skilled outpatient physical therapy to address the deficits listed in the problem list, provide pt/family education and to maximize pt's level of independence in the home and community environment.      Activity limitations/ Participation Restrictions:   Fall Risk - impaired balance   Weakness  Impaired muscle tonePoor head control  Decreased ROM  Gait deviations   Decreased ambulation   Decreased activity tolerance   Decreased independence with daily activities   Requires skilled supervision to complete and progress HEP   Requires skilled PT for DME/ orthotic recommendations     Pt's spiritual, cultural and educational needs considered and pt agreeable to plan of care and GOALS as stated below:   Short term goals: 6 weeks, pt agrees to goals set. (As of 12/4/17)  1. Pt will perform chin tucks in supine with supervision to improve independent with HEP. Met 7/5/17  2. Assist pt with obtaining appropriate cervical brace to improve head control. Ongoing- pt measured, awaiting device  3. Pt will demonstrate improved knee ext strength on left to 4/5 to improve safety with ambulation and sit<>stand. Met 9/28/17 L=4/5  4. Pt will demonstrate improved cervical rotation PROM to 20 degrees to " "improve functional head control. Met 7/5/17  5. Pt will perform sit<>stand consistently from 22" height surface with supervision to improve independence. Met 7/5/17                Revised 8/31/17:  Pt will perform sit<>stand from 19" height chair to improve independence with sitting in various chairs around her home and decreased need for "pop up seat". improved- pt can stand from 19" height with BUE and >10 attempts with min A, 20" with close superivsion and 2-3 attempts     Long term goals: 12 weeks, pt agrees to goals set  6. Pt will perform basic HEP for gross strengthening with supervision to deter worsening of functional limitations. Met 8/31/17- pt's sister reports poor current compliance  7. Pt will demonstrate improved bilateral hip strength to 3/5 to improve balance and independence with mobility. Met 7/5/17  8. New 7/5/17: Pt will demonstrate improved bilateral hip strength to 4/5 to improve balance and independence with mobility. Improved- hip flex R=4-/5, L=4/5, hip ext B= 4-/5, hip abd B=4/5  9.       Pt will demonstrate improved cervical extension strength to 3+/5 to improve head control. Met 7/31/17  10. Pt will demonstrate improved cervical deep flexor strength to 3+/5 to improve head control. Met 7/31/17   11. Pt will demonstrate improve balance and gait by scoring 14/28 on Tinetti. Met 8/31/17- 17/28                Revised 8/31/17: pt will demonstrate improved balance/ gait and decreased fall risk to moderate by scoring 19/28 on Tinetti Assessment. Same/ varies- 16/28 (-2 pts)          12. Pt will demonstrate improved cervical PROM for rotation to 40 degrees to improve functional head control. Improved/ partially met- 52 degrees (L) ; 11 degrees (R)- after stretching.       Plan   Continue PT 2x weekly under established Plan of Care, with treatment to include: pt education, HEP, therapeutic exercises, neuromuscular re-education/balance exercises, therapeutic activities, joint mobilizations, manual " therapy, gait training PRN, and modalities PRN, to work towards established goals. Pt may be seen by PTA to carry out plan of care.      Yadi Mercado, PT  12/18/2017

## 2017-12-20 PROBLEM — R09.89 CHEST CONGESTION: Status: ACTIVE | Noted: 2017-12-20

## 2017-12-21 ENCOUNTER — CLINICAL SUPPORT (OUTPATIENT)
Dept: REHABILITATION | Facility: HOSPITAL | Age: 52
End: 2017-12-21
Attending: STUDENT IN AN ORGANIZED HEALTH CARE EDUCATION/TRAINING PROGRAM
Payer: COMMERCIAL

## 2017-12-21 DIAGNOSIS — R29.898 DECREASED ROM OF NECK: ICD-10-CM

## 2017-12-21 DIAGNOSIS — Z74.09 IMPAIRED FUNCTIONAL MOBILITY, BALANCE, GAIT, AND ENDURANCE: ICD-10-CM

## 2017-12-21 DIAGNOSIS — R53.1 DECREASED STRENGTH: ICD-10-CM

## 2017-12-21 PROCEDURE — 97110 THERAPEUTIC EXERCISES: CPT | Mod: PO | Performed by: PHYSICAL THERAPIST

## 2017-12-21 NOTE — PROGRESS NOTES
"    Physical Therapy Progress Note      Name: Brigid Jernigan  Clinic Number: [id number]  Diagnosis: G71.11 (ICD-10-CM) - Myotonic dystrophy  Physician: Luis Felipe Vo MD  Treatment Orders: PT Eval and Treat          Past Medical History:   Diagnosis Date    Abnormal EKG      Myotonic dystrophy      Pacemaker      PAF (paroxysmal atrial fibrillation)      Sleep apnea           Precautions: standard  Visit #: 43  Date of Eval: 5/9/2017  Extended POC: 1/25/18        Subjective   Pt reports: no new complaints. Pt did not wear cervical collar.  Sister reported: same  Pain Scale: denies     Objective       Patient received individual therapy to increase strength, endurance, ROM, flexibility, posture, core stabilization and head control with activities as follows:      Pt participated in therapeutic exercises x 42 minutes to address ROM and cervical alignment:   X 10 min on Sci Fit recumbent stepper.  Level 9.5 BUE/LE  Supine: Bridges with 3 sec hold with B LE on green physio ball 2 x 10     LTR with green physioball x 15 reps   B LE SLR 4# 2 x 15 reps   Cervical PROM: upper trap PROM    Rotation PROM   Inhibitive distraction    Leg press 100# at lower position (~90 deg hip/knee flex) 15x       [facility] today:         [facility]       Therapeutic activities to increase functional mobility x 0 min including:   NP today:   Static standing without UE support 2'39", fair balance  Sit to stand from low mat + airfoam ~20 inches with min- mod A and multiple attempts     Reviewed application of cervical collar with return demo from pt's sister      Written Home Exercises: 12/4/17- AROM cervical SB and rotation  assisted quad/ hip flexor stretch in side lying  eval- chin tucks, rows with OTB, hip flexor and knee extensor stretch in sidelying  Pt demo fair understanding of the education provided. Brigid demonstrated fair return demonstration of activities.      Education provided re: 12/18/17- cervical collar " "application  POC, HEP     Pt has no cultural, educational or language barriers to learning provided.     Assessment   Brigid tolerated treatment well. Pt was able to tolerate increased resistance on recumbent stepper and increased reps on leg press. Pt continues to have difficulty rising from lower surfaces. Pt can consistently rise from a 21" height, but may require >1 attempt. Pt can benefit from continued skilled PT to address remaining impairments to improve independence with mobility, decrease fall risk, and maintain mobility.      Pt prognosis is Good. Pt will continue to benefit from skilled outpatient physical therapy to address the deficits listed in the problem list, provide pt/family education and to maximize pt's level of independence in the home and community environment.      Activity limitations/ Participation Restrictions:   Fall Risk - impaired balance   Weakness  Impaired muscle tonePoor head control  Decreased ROM  Gait deviations   Decreased ambulation   Decreased activity tolerance   Decreased independence with daily activities   Requires skilled supervision to complete and progress HEP   Requires skilled PT for DME/ orthotic recommendations     Pt's spiritual, cultural and educational needs considered and pt agreeable to plan of care and GOALS as stated below:   Short term goals: 6 weeks, pt agrees to goals set. (As of 12/4/17)  1. Pt will perform chin tucks in supine with supervision to improve independent with HEP. Met 7/5/17  2. Assist pt with obtaining appropriate cervical brace to improve head control. Ongoing- pt measured, awaiting device  3. Pt will demonstrate improved knee ext strength on left to 4/5 to improve safety with ambulation and sit<>stand. Met 9/28/17 L=4/5  4. Pt will demonstrate improved cervical rotation PROM to 20 degrees to improve functional head control. Met 7/5/17  5. Pt will perform sit<>stand consistently from 22" height surface with supervision to improve " "independence. Met 7/5/17                Revised 8/31/17:  Pt will perform sit<>stand from 19" height chair to improve independence with sitting in various chairs around her home and decreased need for "pop up seat". improved- pt can stand from 19" height with BUE and >10 attempts with min A, 20" with close superivsion and 2-3 attempts     Long term goals: 12 weeks, pt agrees to goals set  6. Pt will perform basic HEP for gross strengthening with supervision to deter worsening of functional limitations. Met 8/31/17- pt's sister reports poor current compliance  7. Pt will demonstrate improved bilateral hip strength to 3/5 to improve balance and independence with mobility. Met 7/5/17  8. New 7/5/17: Pt will demonstrate improved bilateral hip strength to 4/5 to improve balance and independence with mobility. Improved- hip flex R=4-/5, L=4/5, hip ext B= 4-/5, hip abd B=4/5  9.       Pt will demonstrate improved cervical extension strength to 3+/5 to improve head control. Met 7/31/17  10. Pt will demonstrate improved cervical deep flexor strength to 3+/5 to improve head control. Met 7/31/17   11. Pt will demonstrate improve balance and gait by scoring 14/28 on Tinetti. Met 8/31/17- 17/28                Revised 8/31/17: pt will demonstrate improved balance/ gait and decreased fall risk to moderate by scoring 19/28 on Tinetti Assessment. Same/ varies- 16/28 (-2 pts)          12. Pt will demonstrate improved cervical PROM for rotation to 40 degrees to improve functional head control. Improved/ partially met- 52 degrees (L) ; 11 degrees (R)- after stretching.       Plan   Continue PT 2x weekly under established Plan of Care, with treatment to include: pt education, HEP, therapeutic exercises, neuromuscular re-education/balance exercises, therapeutic activities, joint mobilizations, manual therapy, gait training PRN, and modalities PRN, to work towards established goals. Pt may be seen by PTA to carry out plan of care. "      Yadi Mercado, PT  12/21/2017

## 2017-12-26 ENCOUNTER — DOCUMENTATION ONLY (OUTPATIENT)
Dept: REHABILITATION | Facility: HOSPITAL | Age: 52
End: 2017-12-26

## 2017-12-26 NOTE — PROGRESS NOTES
I, JORDY HensonT, met with Mae Caicedo PTA to discuss this pt's POC. Continue to address LE strength. Uses leg press for 90/90 squats. Address standing balance/ tolerance as able. Continue to address cervical SB and rotation ROM.     aYdi Mercado DPT  12/26/2017    Face to face consultation with supervision PT held on 12/26/2017    Mae Caicedo PTA

## 2017-12-29 ENCOUNTER — CLINICAL SUPPORT (OUTPATIENT)
Dept: REHABILITATION | Facility: HOSPITAL | Age: 52
End: 2017-12-29
Attending: STUDENT IN AN ORGANIZED HEALTH CARE EDUCATION/TRAINING PROGRAM
Payer: COMMERCIAL

## 2017-12-29 DIAGNOSIS — R29.898 DECREASED ROM OF NECK: ICD-10-CM

## 2017-12-29 DIAGNOSIS — Z74.09 IMPAIRED FUNCTIONAL MOBILITY, BALANCE, GAIT, AND ENDURANCE: ICD-10-CM

## 2017-12-29 DIAGNOSIS — R53.1 DECREASED STRENGTH: ICD-10-CM

## 2017-12-29 PROCEDURE — 97530 THERAPEUTIC ACTIVITIES: CPT | Mod: PO | Performed by: PHYSICAL THERAPIST

## 2017-12-29 PROCEDURE — 97110 THERAPEUTIC EXERCISES: CPT | Mod: PO | Performed by: PHYSICAL THERAPIST

## 2017-12-29 NOTE — PROGRESS NOTES
"    Physical Therapy Progress Note      Name: Brigid Jernigan  Clinic Number: [id number]  Diagnosis: G71.11 (ICD-10-CM) - Myotonic dystrophy  Physician: Luis Felipe Vo MD  Treatment Orders: PT Eval and Treat          Past Medical History:   Diagnosis Date    Abnormal EKG      Myotonic dystrophy      Pacemaker      PAF (paroxysmal atrial fibrillation)      Sleep apnea           Precautions: standard  Visit #: 44  Date of Eval: 5/9/2017  Extended POC: 1/25/18        Subjective   Pt reports: no new complaints. Pt did not wear cervical collar.  Sister reported: same  Pain Scale: denies     Objective       Patient received individual therapy to increase strength, endurance, ROM, flexibility, posture, core stabilization and head control with activities as follows:      Pt participated in therapeutic exercises x 36 minutes to address ROM and cervical alignment:   X 10 min on Sci Fit recumbent stepper.  Level 9.5 BUE/LE  Supine: B LE SLR 5# 2 x 10 reps   Cervical PROM: upper trap PROM    Rotation PROM   pec PROM   Inhibitive distraction  Side lying: ClamshelAndel BTB 2 x 10    Leg press 100# at lower position (~90 deg hip/knee flex) 15x       NP today:        Bridges with 3 sec hold with B LE on green physio ball 2 x 10     LTR with green physioball x 15 reps  X 10 min on Sci Fit recumbent stepper.  Level 9.5 BUE/LE      Therapeutic activities to increase functional mobility x 10 min including:   Static standing without UE support 3'25"+ 3'30", fair balance  Sit to stand from ~20 inches with supervision and multiple attempts       Written Home Exercises: 12/4/17- AROM cervical SB and rotation  assisted quad/ hip flexor stretch in side lying  eval- chin tucks, rows with OTB, hip flexor and knee extensor stretch in sidelying  Pt demo fair understanding of the education provided. Brigid demonstrated fair return demonstration of activities.      Education provided re: 12/18/17- cervical collar application  POC, HEP     Pt " "has no cultural, educational or language barriers to learning provided.     Assessment   Brigid tolerated treatment well. Pt was able to tolerate increased resistance for SLR with decreased reps. Pt also demonstrated improved consistency for performing sit>stand from 20" height surface. Pt demonstrated improved static standing balance duration without UE support, demonstrating increased core and LE strength. Pt can benefit from continued skilled PT to address remaining impairments to improve independence with mobility, decrease fall risk, and maintain mobility.      Pt prognosis is Good. Pt will continue to benefit from skilled outpatient physical therapy to address the deficits listed in the problem list, provide pt/family education and to maximize pt's level of independence in the home and community environment.      Activity limitations/ Participation Restrictions:   Fall Risk - impaired balance   Weakness  Impaired muscle tonePoor head control  Decreased ROM  Gait deviations   Decreased ambulation   Decreased activity tolerance   Decreased independence with daily activities   Requires skilled supervision to complete and progress HEP   Requires skilled PT for DME/ orthotic recommendations     Pt's spiritual, cultural and educational needs considered and pt agreeable to plan of care and GOALS as stated below:   Short term goals: 6 weeks, pt agrees to goals set. (As of 12/4/17)  1. Pt will perform chin tucks in supine with supervision to improve independent with HEP. Met 7/5/17  2. Assist pt with obtaining appropriate cervical brace to improve head control. Ongoing- pt measured, awaiting device  3. Pt will demonstrate improved knee ext strength on left to 4/5 to improve safety with ambulation and sit<>stand. Met 9/28/17 L=4/5  4. Pt will demonstrate improved cervical rotation PROM to 20 degrees to improve functional head control. Met 7/5/17  5. Pt will perform sit<>stand consistently from 22" height surface with " "supervision to improve independence. Met 7/5/17                Revised 8/31/17:  Pt will perform sit<>stand from 19" height chair to improve independence with sitting in various chairs around her home and decreased need for "pop up seat". improved- pt can stand from 19" height with BUE and >10 attempts with min A, 20" with close superivsion and 2-3 attempts     Long term goals: 12 weeks, pt agrees to goals set  6. Pt will perform basic HEP for gross strengthening with supervision to deter worsening of functional limitations. Met 8/31/17- pt's sister reports poor current compliance  7. Pt will demonstrate improved bilateral hip strength to 3/5 to improve balance and independence with mobility. Met 7/5/17  8. New 7/5/17: Pt will demonstrate improved bilateral hip strength to 4/5 to improve balance and independence with mobility. Improved- hip flex R=4-/5, L=4/5, hip ext B= 4-/5, hip abd B=4/5  9.       Pt will demonstrate improved cervical extension strength to 3+/5 to improve head control. Met 7/31/17  10. Pt will demonstrate improved cervical deep flexor strength to 3+/5 to improve head control. Met 7/31/17   11. Pt will demonstrate improve balance and gait by scoring 14/28 on Tinetti. Met 8/31/17- 17/28                Revised 8/31/17: pt will demonstrate improved balance/ gait and decreased fall risk to moderate by scoring 19/28 on Tinetti Assessment. Same/ varies- 16/28 (-2 pts)          12. Pt will demonstrate improved cervical PROM for rotation to 40 degrees to improve functional head control. Improved/ partially met- 52 degrees (L) ; 11 degrees (R)- after stretching.       Plan   Continue PT 2x weekly under established Plan of Care, with treatment to include: pt education, HEP, therapeutic exercises, neuromuscular re-education/balance exercises, therapeutic activities, joint mobilizations, manual therapy, gait training PRN, and modalities PRN, to work towards established goals. Pt may be seen by PTA to carry " out plan of care.      Yadi Mercado, PT  12/29/2017

## 2018-01-08 ENCOUNTER — CLINICAL SUPPORT (OUTPATIENT)
Dept: REHABILITATION | Facility: HOSPITAL | Age: 53
End: 2018-01-08
Payer: COMMERCIAL

## 2018-01-08 DIAGNOSIS — Z74.09 IMPAIRED FUNCTIONAL MOBILITY, BALANCE, GAIT, AND ENDURANCE: ICD-10-CM

## 2018-01-08 DIAGNOSIS — R53.1 DECREASED STRENGTH: ICD-10-CM

## 2018-01-08 DIAGNOSIS — R29.898 DECREASED ROM OF NECK: ICD-10-CM

## 2018-01-08 PROCEDURE — 97530 THERAPEUTIC ACTIVITIES: CPT | Mod: PO | Performed by: PHYSICAL THERAPIST

## 2018-01-08 PROCEDURE — 97110 THERAPEUTIC EXERCISES: CPT | Mod: PO | Performed by: PHYSICAL THERAPIST

## 2018-01-09 NOTE — PLAN OF CARE
"    Physical Therapy Progress Note      Name: Brigid Jernigan  Clinic Number: [id number]  Diagnosis: G71.11 (ICD-10-CM) - Myotonic dystrophy  Physician: Luis Felipe Vo MD  Treatment Orders: PT Eval and Treat          Past Medical History:   Diagnosis Date    Abnormal EKG      Myotonic dystrophy      Pacemaker      PAF (paroxysmal atrial fibrillation)      Sleep apnea           Precautions: standard  Visit #: 1/12  (Visit total 2017= 44)  Date of Eval: 5/9/2017  Extended POC: 3/22/2018        Subjective   Pt reports: no new complaints. Pt did not wear cervical collar.  Sister reported: same  Pain Scale: denies     Objective       Patient received individual therapy to increase strength, endurance, ROM, flexibility, posture, core stabilization and head control with activities as follows:      Pt participated in therapeutic exercises x 34 minutes to address ROM and cervical alignment:   Strength:   hip flex R= 4/5, L= 4/5  Hip abd: R= 4-/5, L=4-/5,   Hip ext: R= 3+/5, L= 4-/5    Cervical rotation AROM: R= 15 deg, L= 54 deg    Supine:Bridges with 3 sec hold with B LE on green physio ball 2 x 10    LTR with green physioball x 15 reps   Cervical PROM: upper trap PROM    Rotation PROM   pec PROM   Inhibitive distraction  Side lying: Clamshells PTB 2 x 10    Leg press 100# at lower position (~90 deg hip/knee flex) 15x       NP today:           X 10 min on Sci Fit recumbent stepper.  Level 9.5 BUE/LE      Therapeutic activities to increase functional mobility x 13 min including:   Static standing without UE support 5", fair balance  Sit to stand from ~20 inches with CGA and multiple attempts     Tinetti Balance Assessment  Balance:  1. Sitting Balance 1   Leans/ slides in chair= 0   Steady= 1  2. Rises from chair 1   Unable without help= 0   Able, uses arms= 1   Able without use of arms= 2  3. Attempts to rise 1   Unable without help= 0   Able, >1 attmept required-= 1   Able, 1 attempt= 2  4. Immediate standing " balance (1st 5 seconds) 1   Unsteady (swaggers, moves feet, trunk sway)= 0   Steady but uses walker or other support= 1   Narrow base of support without walker or support= 2  5. Nudged 1   Begins to fall= 0   Staggers, grabs, catches self= 1   Steady= 2  6. Standing balance 1   Unsteady= 0   Steady but wide LEYDI or uses AD=1   Narrow LEYDI without AD=2  7. Eyes closed 1   Unsteady= 0   Steady= 1  8. Turning 360 degrees 0   Discontinuous steps= 0   Continuous steps= 1   Unsteady= 0   Steady= 1  9. Sitting down 1   Unsafe= 0   Uses arms or not in a smooth motion= 1   Safe, smooth motion= 2  Balance score= 8/16  Gait:  1. Initiation 1   hesitates or multiple attempts to start= 0   No hesitancy= 1  2. Step length 2   Step to= 0   One foot passes= 1   reciprocal pattern= 2  3. Step height 2    Neither foot clears floor= 0   One foot clears floor= 1   Both feet clear floor= 2  4. Step symmetry 1   Not symmetrical= 0   Appears symmetrical= 1  5. Step continuity 1   Not continuous= 0   Appears continuous= 1  6. Path 1   Marked deviation= 0   Mild/moderate deviation or uses A.D.= 1   Straight without A.D.= 2  7. Trunk 0   Marked sway or uses A.D.= 0   No sway, but flexes knees or back, spread arms out while walking= 1   No sway, no flexion, no use of UE, no use of A.D.= 2  8. Walking stance 0   Heels apart= 0   Heels almost touching while walking= 1  Gait score= 8/12  Total score= 16/28 , high fall risk         Written Home Exercises: 12/4/17- AROM cervical SB and rotation  assisted quad/ hip flexor stretch in side lying  eval- chin tucks, rows with OTB, hip flexor and knee extensor stretch in sidelying  Pt demo fair understanding of the education provided. Brigid demonstrated fair return demonstration of activities.      Education provided re: 12/18/17- cervical collar application  POC, HEP     Pt has no cultural, educational or language barriers to learning provided.     Assessment   Assessment period: 12/11/17 to 1/8/18.  "Brigid tolerates treatments well. Pt grossly denies compliance with HEP and use of cervical collar. Pt is consistently wearing new custom AFOs to therapy. Pt is demonstrating a good improvement in static standing balance without UE support. Pt is able to stand 5 min (statically) without UE support unassisted. Pt stands with a wide base of support; able to decrease with UE support. Pt's LE strength is ~the same and resistance for strengthening exercises was increased to address. Pt demonstrated improved static standing balance duration without UE support, demonstrating increased core and LE strength. Pt continues to demonstrate difficulty rising from surfaces 20" high or lower. Consistent ability to stand from 20" surface height has improved. Pt requires multiple attempts to perform independently. Right cervical AROM for rotation continues to gradually improve. Pt can benefit from continued skilled PT to address remaining impairments to improve independence with mobility, decrease fall risk, and maintain mobility. POC extended x 8 weeks to allow for continued functional progress towards goals.     Pt prognosis is Good. Pt will continue to benefit from skilled outpatient physical therapy to address the deficits listed in the problem list, provide pt/family education and to maximize pt's level of independence in the home and community environment.      Activity limitations/ Participation Restrictions:   Fall Risk - impaired balance   Weakness  Impaired muscle tonePoor head control  Decreased ROM  Gait deviations   Decreased ambulation   Decreased activity tolerance   Decreased independence with daily activities   Requires skilled supervision to complete and progress HEP   Requires skilled PT for DME/ orthotic recommendations     Pt's spiritual, cultural and educational needs considered and pt agreeable to plan of care and GOALS as stated below:   Short term goals: 6 weeks, pt agrees to goals set. (As of 1/9/18)  1. Pt " "will perform chin tucks in supine with supervision to improve independent with HEP. Met 7/5/17  2. Assist pt with obtaining appropriate cervical brace to improve head control. Met 1/9/18- pt has but is not consistently wearing  3. Pt will demonstrate improved knee ext strength on left to 4/5 to improve safety with ambulation and sit<>stand. Met 9/28/17 L=4/5  4. Pt will demonstrate improved cervical rotation PROM to 20 degrees to improve functional head control. Met 7/5/17  5. Pt will perform sit<>stand consistently from 22" height surface with supervision to improve independence. Met 7/5/17                Revised 8/31/17:  Pt will perform sit<>stand from 19" height chair to improve independence with sitting in various chairs around her home and decreased need for "pop up seat". improved- pt can consistently stand from 20" height with BUE and several attempts, supervision      Long term goals: 12 weeks, pt agrees to goals set  6. Pt will perform basic HEP for gross strengthening with supervision to deter worsening of functional limitations. Met 8/31/17- pt's sister reports poor current compliance  7. Pt will demonstrate improved bilateral hip strength to 3/5 to improve balance and independence with mobility. Met 7/5/17  8. New 7/5/17: Pt will demonstrate improved bilateral hip strength to 4/5 to improve balance and independence with mobility. ~Same/ varies- hip flex WALESKA= 4/5, hip ext R= 3+/5, L= 4-/5, hip abd B=4-/5  9.       Pt will demonstrate improved cervical extension strength to 3+/5 to improve head control. Met 7/31/17  10. Pt will demonstrate improved cervical deep flexor strength to 3+/5 to improve head control. Met 7/31/17   11. Pt will demonstrate improve balance and gait by scoring 14/28 on Tinetti. Met 8/31/17- 17/28                  Revised 8/31/17: pt will demonstrate improved balance/ gait and decreased fall risk to moderate by scoring 19/28 on Tinetti     Assessment. Same- 16/28                      "   12. Pt will demonstrate improved cervical PROM for rotation to 40 degrees to improve functional head control. Improved/ partially met-     54 degrees (L) ; 15 degrees (R)     Plan   Continue PT 2x weekly under established Plan of Care, with treatment to include: pt education, HEP, therapeutic exercises, neuromuscular re-education/balance exercises, therapeutic activities, joint mobilizations, manual therapy, gait training PRN, and modalities PRN, to work towards established goals. Pt may be seen by PTA to carry out plan of care.      Yadi Mercado, PT  1/8/2018    I certify the need for these services furnished under this plan of treatment and while under my care.  ____________________________________ Physician/Referring Practitioner   Date of Signature

## 2018-01-11 ENCOUNTER — CLINICAL SUPPORT (OUTPATIENT)
Dept: REHABILITATION | Facility: HOSPITAL | Age: 53
End: 2018-01-11
Payer: COMMERCIAL

## 2018-01-11 DIAGNOSIS — R29.898 DECREASED ROM OF NECK: ICD-10-CM

## 2018-01-11 DIAGNOSIS — Z74.09 IMPAIRED FUNCTIONAL MOBILITY, BALANCE, GAIT, AND ENDURANCE: ICD-10-CM

## 2018-01-11 DIAGNOSIS — R53.1 DECREASED STRENGTH: ICD-10-CM

## 2018-01-11 PROCEDURE — 97110 THERAPEUTIC EXERCISES: CPT | Mod: PO | Performed by: PHYSICAL THERAPIST

## 2018-01-11 NOTE — PROGRESS NOTES
"  Physical Therapy Progress Note      Name: Brigid Jernigan  Clinic Number: [id number]  Diagnosis: G71.11 (ICD-10-CM) - Myotonic dystrophy  Physician: Luis Felipe Vo MD  Treatment Orders: PT Eval and Treat          Past Medical History:   Diagnosis Date    Abnormal EKG      Myotonic dystrophy      Pacemaker      PAF (paroxysmal atrial fibrillation)      Sleep apnea           Precautions: standard  Visit #: 2/12  (Visit total 2017= 44)  Date of Eval: 5/9/2017  Extended POC: 3/22/2018        Subjective   Pt reports: no new complaints. Pt did not wear cervical collar.  Sister reported: same  Pain Scale: denies     Objective       Patient received individual therapy to increase strength, endurance, ROM, flexibility, posture, core stabilization and head control with activities as follows:      Pt participated in therapeutic exercises x 47 minutes to address ROM and cervical alignment:   X 8 min on Sci Fit recumbent stepper.  Level 11 BUE/LE     Supine: SLR 5# 2 x10   Bridges with 3 sec hold with B LE on green physio ball 2 x 10            LTR with green physioball x 20 reps            Cervical PROM: upper trap PROM                          Rotation PROM              pec PROM              Inhibitive distraction   R cervical AROM rotation= 9 deg     Leg press 105# at lower position (~90 deg hip/knee flex) 15x      Sit>stand from 21" height x1 attempt    NP today:   Side lying: Clamshells PTB 2 x 10    Therapeutic activities to increase functional mobility x 0 min including:   NP today:   Static standing without UE support 5", fair balance  Sit to stand from ~20 inches with CGA and multiple attempts     Written Home Exercises: 12/4/17- AROM cervical SB and rotation  assisted quad/ hip flexor stretch in side lying  eval- chin tucks, rows with OTB, hip flexor and knee extensor stretch in sidelying  Pt demo fair understanding of the education provided. Brigid demonstrated fair return demonstration of activities. " "     Education provided re: 12/18/17- cervical collar application  POC, HEP     Pt has no cultural, educational or language barriers to learning provided.     Assessment   Brigid tolerated treatment well. Pt reports compliance with resisted hip ABD at home. Pt was able to tolerate increased reps for trunk rotation and increased resistance for leg press. Pt was able to stand with UE support rather effortlessly from a 21" height with only 1 attempt.  Pt can benefit from continued skilled PT to address remaining impairments to improve independence with mobility, decrease fall risk, and maintain mobility.      Pt prognosis is Good. Pt will continue to benefit from skilled outpatient physical therapy to address the deficits listed in the problem list, provide pt/family education and to maximize pt's level of independence in the home and community environment.      Activity limitations/ Participation Restrictions:   Fall Risk - impaired balance   Weakness  Impaired muscle tonePoor head control  Decreased ROM  Gait deviations   Decreased ambulation   Decreased activity tolerance   Decreased independence with daily activities   Requires skilled supervision to complete and progress HEP   Requires skilled PT for DME/ orthotic recommendations     Pt's spiritual, cultural and educational needs considered and pt agreeable to plan of care and GOALS as stated below:   Short term goals: 6 weeks, pt agrees to goals set. (As of 1/9/18)  1. Pt will perform chin tucks in supine with supervision to improve independent with HEP. Met 7/5/17  2. Assist pt with obtaining appropriate cervical brace to improve head control. Met 1/9/18- pt has but is not consistently wearing  3. Pt will demonstrate improved knee ext strength on left to 4/5 to improve safety with ambulation and sit<>stand. Met 9/28/17 L=4/5  4. Pt will demonstrate improved cervical rotation PROM to 20 degrees to improve functional head control. Met 7/5/17  5. Pt will perform " "sit<>stand consistently from 22" height surface with supervision to improve independence. Met 7/5/17                Revised 8/31/17:  Pt will perform sit<>stand from 19" height chair to improve independence with sitting in various chairs around her home and decreased need for "pop up seat". improved- pt can consistently stand from 20" height with BUE and several attempts, supervision      Long term goals: 12 weeks, pt agrees to goals set  6. Pt will perform basic HEP for gross strengthening with supervision to deter worsening of functional limitations. Met 8/31/17- pt's sister reports poor current compliance  7. Pt will demonstrate improved bilateral hip strength to 3/5 to improve balance and independence with mobility. Met 7/5/17  8. New 7/5/17: Pt will demonstrate improved bilateral hip strength to 4/5 to improve balance and independence with mobility. ~Same/ varies- hip flex WALESKA= 4/5, hip ext R= 3+/5, L= 4-/5, hip abd B=4-/5  9.       Pt will demonstrate improved cervical extension strength to 3+/5 to improve head control. Met 7/31/17  10. Pt will demonstrate improved cervical deep flexor strength to 3+/5 to improve head control. Met 7/31/17   11. Pt will demonstrate improve balance and gait by scoring 14/28 on Tinetti. Met 8/31/17- 17/28                                      Revised 8/31/17: pt will demonstrate improved balance/ gait and decreased fall risk to moderate by scoring 19/28 on Tinetti                                            Assessment. Same- 16/28                        12. Pt will demonstrate improved cervical PROM for rotation to 40 degrees to improve functional head control. Improved/ partially met-                                                54 degrees (L) ; 15 degrees (R)     Plan   Continue PT 2x weekly under established Plan of Care, with treatment to include: pt education, HEP, therapeutic exercises, neuromuscular re-education/balance exercises, therapeutic activities, joint " mobilizations, manual therapy, gait training PRN, and modalities PRN, to work towards established goals. Pt may be seen by PTA to carry out plan of care.      Yadi Mercado, PT   1/11/2018

## 2018-01-15 ENCOUNTER — CLINICAL SUPPORT (OUTPATIENT)
Dept: REHABILITATION | Facility: HOSPITAL | Age: 53
End: 2018-01-15
Payer: COMMERCIAL

## 2018-01-15 DIAGNOSIS — R29.898 DECREASED ROM OF NECK: ICD-10-CM

## 2018-01-15 DIAGNOSIS — Z74.09 IMPAIRED FUNCTIONAL MOBILITY, BALANCE, GAIT, AND ENDURANCE: ICD-10-CM

## 2018-01-15 DIAGNOSIS — R53.1 DECREASED STRENGTH: ICD-10-CM

## 2018-01-15 PROCEDURE — 97110 THERAPEUTIC EXERCISES: CPT | Mod: PO | Performed by: PHYSICAL THERAPIST

## 2018-01-15 NOTE — PROGRESS NOTES
"  Physical Therapy Progress Note      Name: Brigid Jernigan  Clinic Number: [id number]  Diagnosis: G71.11 (ICD-10-CM) - Myotonic dystrophy  Physician: Luis Felipe Vo MD  Treatment Orders: PT Eval and Treat          Past Medical History:   Diagnosis Date    Abnormal EKG      Myotonic dystrophy      Pacemaker      PAF (paroxysmal atrial fibrillation)      Sleep apnea           Precautions: standard  Visit #: 3/12  (Visit total 2017= 44)  Date of Eval: 5/9/2017  Extended POC: 3/22/2018        Subjective   Pt reports: no new complaints. Pt did not wear cervical collar.  Sister reported: same  Pain Scale: denies     Objective       Patient received individual therapy to increase strength, endurance, ROM, flexibility, posture, core stabilization and head control with activities as follows:      Pt participated in therapeutic exercises x 38 minutes to address ROM and cervical alignment:   X 8 min on Sci Fit recumbent stepper.  Level 11 BUE/LE     Supine: SLR 5# 2 x10            Cervical PROM: upper trap PROM                          Rotation PROM              pec PROM              Inhibitive distraction   R cervical AROM rotation= 7 deg  Side lying: Clouli PTB 2 x 10     Leg press 105# at lower position (~90 deg hip/knee flex) 15x      Sit>stand from 20" height, min A, mod VC for technique    Mini squats with RW and min A 10x    NP today:   Bridges with 3 sec hold with B LE on green physio ball 2 x 10            LTR with green physioball x 20 reps  Static standing without UE support 5", fair balance    Therapeutic activities to increase functional mobility x 0 min including:          Written Home Exercises: 12/4/17- AROM cervical SB and rotation  assisted quad/ hip flexor stretch in side lying  eval- chin tucks, rows with OTB, hip flexor and knee extensor stretch in sidelying  Pt demo fair understanding of the education provided. Brigid demonstrated fair return demonstration of activities.      Education " "provided re: 12/18/17- cervical collar application  POC, HEP     Pt has no cultural, educational or language barriers to learning provided.     Assessment   Brigid tolerated treatment well. Pt was able to unweight hips from a 20" height surface, minimal assistance was provided to rise. Pt continues to require verbal cueing for correct technique for sit>stand. Cervical right rotation is ~same. PT will continue to address LE strength to improve standing ability from various surfaces/ heights.  Pt can benefit from continued skilled PT to address remaining impairments to improve independence with mobility, decrease fall risk, and maintain mobility.      Pt prognosis is Good. Pt will continue to benefit from skilled outpatient physical therapy to address the deficits listed in the problem list, provide pt/family education and to maximize pt's level of independence in the home and community environment.      Activity limitations/ Participation Restrictions:   Fall Risk - impaired balance   Weakness  Impaired muscle tonePoor head control  Decreased ROM  Gait deviations   Decreased ambulation   Decreased activity tolerance   Decreased independence with daily activities   Requires skilled supervision to complete and progress HEP   Requires skilled PT for DME/ orthotic recommendations     Pt's spiritual, cultural and educational needs considered and pt agreeable to plan of care and GOALS as stated below:   Short term goals: 6 weeks, pt agrees to goals set. (As of 1/9/18)  1. Pt will perform chin tucks in supine with supervision to improve independent with HEP. Met 7/5/17  2. Assist pt with obtaining appropriate cervical brace to improve head control. Met 1/9/18- pt has but is not consistently wearing  3. Pt will demonstrate improved knee ext strength on left to 4/5 to improve safety with ambulation and sit<>stand. Met 9/28/17 L=4/5  4. Pt will demonstrate improved cervical rotation PROM to 20 degrees to improve functional " "head control. Met 7/5/17  5. Pt will perform sit<>stand consistently from 22" height surface with supervision to improve independence. Met 7/5/17                Revised 8/31/17:  Pt will perform sit<>stand from 19" height chair to improve independence with sitting in various chairs around her home and decreased need for "pop up seat". improved- pt can consistently stand from 20" height with BUE and several attempts, supervision      Long term goals: 12 weeks, pt agrees to goals set  6. Pt will perform basic HEP for gross strengthening with supervision to deter worsening of functional limitations. Met 8/31/17- pt's sister reports poor current compliance  7. Pt will demonstrate improved bilateral hip strength to 3/5 to improve balance and independence with mobility. Met 7/5/17  8. New 7/5/17: Pt will demonstrate improved bilateral hip strength to 4/5 to improve balance and independence with mobility. ~Same/ varies- hip flex WALESKA= 4/5, hip ext R= 3+/5, L= 4-/5, hip abd B=4-/5  9.       Pt will demonstrate improved cervical extension strength to 3+/5 to improve head control. Met 7/31/17  10. Pt will demonstrate improved cervical deep flexor strength to 3+/5 to improve head control. Met 7/31/17   11. Pt will demonstrate improve balance and gait by scoring 14/28 on Tinetti. Met 8/31/17- 17/28                                      Revised 8/31/17: pt will demonstrate improved balance/ gait and decreased fall risk to moderate by scoring 19/28 on Tinetti                                            Assessment. Same- 16/28                        12. Pt will demonstrate improved cervical PROM for rotation to 40 degrees to improve functional head control. Improved/ partially met-                                                54 degrees (L) ; 15 degrees (R)     Plan   Continue PT 2x weekly under established Plan of Care, with treatment to include: pt education, HEP, therapeutic exercises, neuromuscular re-education/balance " exercises, therapeutic activities, joint mobilizations, manual therapy, gait training PRN, and modalities PRN, to work towards established goals. Pt may be seen by PTA to carry out plan of care.      Yadi Mercado, PT   1/15/2018

## 2018-01-22 ENCOUNTER — DOCUMENTATION ONLY (OUTPATIENT)
Dept: REHABILITATION | Facility: HOSPITAL | Age: 53
End: 2018-01-22

## 2018-01-22 ENCOUNTER — CLINICAL SUPPORT (OUTPATIENT)
Dept: REHABILITATION | Facility: HOSPITAL | Age: 53
End: 2018-01-22
Payer: COMMERCIAL

## 2018-01-22 DIAGNOSIS — R29.898 DECREASED ROM OF NECK: ICD-10-CM

## 2018-01-22 DIAGNOSIS — Z74.09 IMPAIRED FUNCTIONAL MOBILITY, BALANCE, GAIT, AND ENDURANCE: ICD-10-CM

## 2018-01-22 DIAGNOSIS — R53.1 DECREASED STRENGTH: ICD-10-CM

## 2018-01-22 PROCEDURE — 97110 THERAPEUTIC EXERCISES: CPT | Mod: PO | Performed by: PHYSICAL THERAPIST

## 2018-01-22 NOTE — PROGRESS NOTES
"  Physical Therapy Progress Note      Name: Brigid Jernigan  Clinic Number: [id number]  Diagnosis: G71.11 (ICD-10-CM) - Myotonic dystrophy  Physician: Luis Felipe Vo MD  Treatment Orders: PT Eval and Treat          Past Medical History:   Diagnosis Date    Abnormal EKG      Myotonic dystrophy      Pacemaker      PAF (paroxysmal atrial fibrillation)      Sleep apnea           Precautions: standard  Visit #: 4/12  (Visit total 2017= 44)  Date of Eval: 5/9/2017  Extended POC: 3/22/2018        Subjective   Pt reports: no new complaints. Pt did not wear cervical collar.  Sister reported: same  Pain Scale: denies     Objective       Patient received individual therapy to increase strength, endurance, ROM, flexibility, posture, core stabilization and head control with activities as follows:      Pt participated in therapeutic exercises x 54 minutes to address ROM and cervical alignment:   X 10 min on Sci Fit recumbent stepper.  Level 11 BUE/LE     Supine: SLR 5# 2 x10            Cervical PROM: upper trap PROM                          Rotation PROM              pec PROM              Inhibitive distraction   Bridges with 3 sec hold with B LE on green physio ball 2 x 10            LTR with green physioball x 20 reps    Leg press 105# at lower position (~90 deg hip/knee flex) 10x      NP today:   Sit>stand from 20" height, min A, mod VC for technique  Mini squats with RW and min A 10x  Bridges with 3 sec hold with B LE on green physio ball 2 x 10            LTR with green physioball x 20 reps  Static standing without UE support 5", fair balance  Side lying: Clamshells PTB 2 x 10   Therapeutic activities to increase functional mobility x 0 min including:          Written Home Exercises: 12/4/17- AROM cervical SB and rotation  assisted quad/ hip flexor stretch in side lying  eval- chin tucks, rows with OTB, hip flexor and knee extensor stretch in sidelying  Pt demo fair understanding of the education provided. Brigid " demonstrated fair return demonstration of activities.      Education provided re: 12/18/17- cervical collar application  POC, HEP     Pt has no cultural, educational or language barriers to learning provided.     Assessment   Brigid tolerated treatment well. Pt demonstrated good tolerance to increased duration on stepper (with increased resistance). PT focused on cervical ROM today. Pt continues to be very tight in upper traps and cervical AROM for rotation and side bending are poor.  PT will continue to address LE strength to improve standing ability from various surfaces/ heights.  Pt can benefit from continued skilled PT to address remaining impairments to improve independence with mobility, decrease fall risk, and maintain mobility.      Pt prognosis is Good. Pt will continue to benefit from skilled outpatient physical therapy to address the deficits listed in the problem list, provide pt/family education and to maximize pt's level of independence in the home and community environment.      Activity limitations/ Participation Restrictions:   Fall Risk - impaired balance   Weakness  Impaired muscle tone  Poor head control  Decreased ROM  Gait deviations   Decreased ambulation   Decreased activity tolerance   Decreased independence with daily activities   Requires skilled supervision to complete and progress HEP   Requires skilled PT for DME/ orthotic recommendations     Pt's spiritual, cultural and educational needs considered and pt agreeable to plan of care and GOALS as stated below:   Short term goals: 6 weeks, pt agrees to goals set. (As of 1/9/18)  1. Pt will perform chin tucks in supine with supervision to improve independent with HEP. Met 7/5/17  2. Assist pt with obtaining appropriate cervical brace to improve head control. Met 1/9/18- pt has but is not consistently wearing  3. Pt will demonstrate improved knee ext strength on left to 4/5 to improve safety with ambulation and sit<>stand. Met  "9/28/17 L=4/5  4. Pt will demonstrate improved cervical rotation PROM to 20 degrees to improve functional head control. Met 7/5/17  5. Pt will perform sit<>stand consistently from 22" height surface with supervision to improve independence. Met 7/5/17                Revised 8/31/17:  Pt will perform sit<>stand from 19" height chair to improve independence with sitting in various chairs around her home and decreased need for "pop up seat". improved- pt can consistently stand from 20" height with BUE and several attempts, supervision      Long term goals: 12 weeks, pt agrees to goals set  6. Pt will perform basic HEP for gross strengthening with supervision to deter worsening of functional limitations. Met 8/31/17- pt's sister reports poor current compliance  7. Pt will demonstrate improved bilateral hip strength to 3/5 to improve balance and independence with mobility. Met 7/5/17  8. New 7/5/17: Pt will demonstrate improved bilateral hip strength to 4/5 to improve balance and independence with mobility. ~Same/ varies- hip flex WALESKA= 4/5, hip ext R= 3+/5, L= 4-/5, hip abd B=4-/5  9.       Pt will demonstrate improved cervical extension strength to 3+/5 to improve head control. Met 7/31/17  10. Pt will demonstrate improved cervical deep flexor strength to 3+/5 to improve head control. Met 7/31/17   11. Pt will demonstrate improve balance and gait by scoring 14/28 on Tinetti. Met 8/31/17- 17/28                                      Revised 8/31/17: pt will demonstrate improved balance/ gait and decreased fall risk to moderate by scoring 19/28 on Tinetti                                            Assessment. Same- 16/28                        12. Pt will demonstrate improved cervical PROM for rotation to 40 degrees to improve functional head control. Improved/ partially met-                                                54 degrees (L) ; 15 degrees (R)     Plan   Continue PT 2x weekly under established Plan of Care, with " treatment to include: pt education, HEP, therapeutic exercises, neuromuscular re-education/balance exercises, therapeutic activities, joint mobilizations, manual therapy, gait training PRN, and modalities PRN, to work towards established goals. Pt may be seen by PTA to carry out plan of care.      Yadi Mercado, PT   1/22/2018

## 2018-01-22 NOTE — PROGRESS NOTES
I, Yadi Mercado DPT, met with Mae Caicedo PTA to discuss this pt's POC. Continue with cervical PROM, emphasis on improving right rotation after neutral cervical position is achieved. Continue with LE strengthening to address gait. Pt is able to tolerate leg press with increased resistance and reps. Put leg press with hips flexed at least 90 deg to improve sit>stand from lower surfaces. Practice standing balance with decreased UE support as able.       Yadi Mercado DPT  1/22/2018      Face to face consultation with supervision PT held on 01/23/2018    Mae Caicedo PTA

## 2018-01-24 ENCOUNTER — CLINICAL SUPPORT (OUTPATIENT)
Dept: REHABILITATION | Facility: HOSPITAL | Age: 53
End: 2018-01-24
Payer: COMMERCIAL

## 2018-01-24 DIAGNOSIS — R53.1 DECREASED STRENGTH: ICD-10-CM

## 2018-01-24 DIAGNOSIS — Z74.09 IMPAIRED FUNCTIONAL MOBILITY, BALANCE, GAIT, AND ENDURANCE: ICD-10-CM

## 2018-01-24 DIAGNOSIS — R29.898 DECREASED ROM OF NECK: ICD-10-CM

## 2018-01-24 PROCEDURE — 97110 THERAPEUTIC EXERCISES: CPT | Mod: PO

## 2018-01-24 NOTE — PROGRESS NOTES
"  Physical Therapy Progress Note      Name: Brigid Jernigan  Clinic Number: [id number]  Diagnosis: G71.11 (ICD-10-CM) - Myotonic dystrophy  Physician: Luis Felipe Vo MD  Treatment Orders: PT Eval and Treat          Past Medical History:   Diagnosis Date    Abnormal EKG      Myotonic dystrophy      Pacemaker      PAF (paroxysmal atrial fibrillation)      Sleep apnea           Precautions: standard  Visit #: 5/12  (Visit total 2017= 44)  Date of Eval: 5/9/2017  Extended POC: 3/22/2018        Subjective   Pt reports: " I'm doing pretty good"   Sister reported: same  Pain Scale: denies     Objective       Patient received individual therapy to increase strength, endurance, ROM, flexibility, posture, core stabilization and head control with activities as follows:      Pt participated in therapeutic exercises x 53 minutes to address ROM and cervical alignment:   X 10 min on Sci Fit recumbent stepper.  Level 11 BUE/LE     Supine:             Cervical PROM: upper trap PROM            Rotation PROM   Cervical distraction 4 x 30 sec             pec PROM 2 x 30 sec      SLR 5# 2 x10   Bridges with 3 sec hold with B LE on green physio ball 2 x 10            LTR with green physioball x 20 reps  Hooklying:    Clamshells PTB 2 x 10     Leg press 105# at lower position (~90 deg hip/knee flex) 15x      Therapeutic activities to increase functional mobility x 0 min including:          Written Home Exercises: 12/4/17- AROM cervical SB and rotation  assisted quad/ hip flexor stretch in side lying  eval- chin tucks, rows with OTB, hip flexor and knee extensor stretch in sidelying  Pt demo fair understanding of the education provided. Brigid demonstrated fair return demonstration of activities.      Education provided re: 12/18/17- cervical collar application  POC, HEP     Pt has no cultural, educational or language barriers to learning provided.     Assessment   Brigid tolerated treatment well with no complaints.  Pt was able " "to increase reps on supine therex and reports some relief with stretching.  No other changes noted.  Cont with POC.    Pt prognosis is Good. Pt will continue to benefit from skilled outpatient physical therapy to address the deficits listed in the problem list, provide pt/family education and to maximize pt's level of independence in the home and community environment.      Activity limitations/ Participation Restrictions:   Fall Risk - impaired balance   Weakness  Impaired muscle tone  Poor head control  Decreased ROM  Gait deviations   Decreased ambulation   Decreased activity tolerance   Decreased independence with daily activities   Requires skilled supervision to complete and progress HEP   Requires skilled PT for DME/ orthotic recommendations     Pt's spiritual, cultural and educational needs considered and pt agreeable to plan of care and GOALS as stated below:   Short term goals: 6 weeks, pt agrees to goals set. (As of 1/9/18)  1. Pt will perform chin tucks in supine with supervision to improve independent with HEP. Met 7/5/17  2. Assist pt with obtaining appropriate cervical brace to improve head control. Met 1/9/18- pt has but is not consistently wearing  3. Pt will demonstrate improved knee ext strength on left to 4/5 to improve safety with ambulation and sit<>stand. Met 9/28/17 L=4/5  4. Pt will demonstrate improved cervical rotation PROM to 20 degrees to improve functional head control. Met 7/5/17  5. Pt will perform sit<>stand consistently from 22" height surface with supervision to improve independence. Met 7/5/17                Revised 8/31/17:  Pt will perform sit<>stand from 19" height chair to improve independence with sitting in various chairs around her home and decreased need for "pop up seat". improved- pt can consistently stand from 20" height with BUE and several attempts, supervision      Long term goals: 12 weeks, pt agrees to goals set  6. Pt will perform basic HEP for gross strengthening " with supervision to deter worsening of functional limitations. Met 8/31/17- pt's sister reports poor current compliance  7. Pt will demonstrate improved bilateral hip strength to 3/5 to improve balance and independence with mobility. Met 7/5/17  8. New 7/5/17: Pt will demonstrate improved bilateral hip strength to 4/5 to improve balance and independence with mobility. ~Same/ varies- hip flex WALESKA= 4/5, hip ext R= 3+/5, L= 4-/5, hip abd B=4-/5  9.       Pt will demonstrate improved cervical extension strength to 3+/5 to improve head control. Met 7/31/17  10. Pt will demonstrate improved cervical deep flexor strength to 3+/5 to improve head control. Met 7/31/17   11. Pt will demonstrate improve balance and gait by scoring 14/28 on Tinetti. Met 8/31/17- 17/28                                      Revised 8/31/17: pt will demonstrate improved balance/ gait and decreased fall risk to moderate by scoring 19/28 on Tinetti                                            Assessment. Same- 16/28                        12. Pt will demonstrate improved cervical PROM for rotation to 40 degrees to improve functional head control. Improved/ partially met-                                                54 degrees (L) ; 15 degrees (R)     Plan   Continue PT 2x weekly under established Plan of Care, with treatment to include: pt education, HEP, therapeutic exercises, neuromuscular re-education/balance exercises, therapeutic activities, joint mobilizations, manual therapy, gait training PRN, and modalities PRN, to work towards established goals. Pt may be seen by PTA to carry out plan of care.   Mae Caicedo, PTA     1/24/2018

## 2018-01-29 ENCOUNTER — CLINICAL SUPPORT (OUTPATIENT)
Dept: REHABILITATION | Facility: HOSPITAL | Age: 53
End: 2018-01-29
Payer: COMMERCIAL

## 2018-01-29 DIAGNOSIS — Z74.09 IMPAIRED FUNCTIONAL MOBILITY, BALANCE, GAIT, AND ENDURANCE: ICD-10-CM

## 2018-01-29 DIAGNOSIS — R29.898 DECREASED ROM OF NECK: ICD-10-CM

## 2018-01-29 DIAGNOSIS — R53.1 DECREASED STRENGTH: ICD-10-CM

## 2018-01-29 PROCEDURE — 97530 THERAPEUTIC ACTIVITIES: CPT | Mod: PO | Performed by: PHYSICAL THERAPIST

## 2018-01-29 PROCEDURE — 97110 THERAPEUTIC EXERCISES: CPT | Mod: PO | Performed by: PHYSICAL THERAPIST

## 2018-01-29 NOTE — PROGRESS NOTES
"  Physical Therapy Progress Note      Name: Brigid Jernigan  Clinic Number: [id number]  Diagnosis: G71.11 (ICD-10-CM) - Myotonic dystrophy  Physician: Luis Felipe Vo MD  Treatment Orders: PT Eval and Treat          Past Medical History:   Diagnosis Date    Abnormal EKG      Myotonic dystrophy      Pacemaker      PAF (paroxysmal atrial fibrillation)      Sleep apnea           Precautions: standard  Visit #: 6/12    (Visit total 2017= 44)  Date of Eval: 5/9/2017  Extended POC: 3/22/2018        Subjective   Pt reports: " I'm doing pretty good"   Sister reported: reports pt is attempting ambulation without RW inside of home, but is dragging feet  Pain Scale: denies     Objective       Patient received individual therapy to increase strength, endurance, ROM, flexibility, posture, core stabilization and head control with activities as follows:      Pt participated in therapeutic exercises x 34 minutes to address ROM and cervical alignment:   X 11 min on Sci Fit recumbent stepper.  Level 11 BUE/LE     Supine:            Cervical PROM: upper trap PROM, Rotation PROM   Cervical distraction              SLR 5# 2 x10   Bridges with alternate LE kick (max VC) 10x            LTR with green physioball x 20 reps  Cervical R rotation (AROM supine)= 11 deg    NP today:   Hooklying:    Clamshells PTB 2 x 10   Leg press 105# at lower position (~90 deg hip/knee flex) 15x      Therapeutic activities to increase functional mobility x 10 min including:   Side stepping at ballet bar with 1-2 UE support, mod VC to maintain hips facing forward.   Forward gait with 1-2 UE support and min A       Written Home Exercises: 1/29/18- side stepping at counter with 1-2 UE support  12/4/17- AROM cervical SB and rotation  assisted quad/ hip flexor stretch in side lying  eval- chin tucks, rows with OTB, hip flexor and knee extensor stretch in sidelying  Pt demo fair understanding of the education provided. Brigid demonstrated fair return " demonstration of activities.      Education provided re: 12/18/17- cervical collar application  POC, HEP     Pt has no cultural, educational or language barriers to learning provided.     Assessment   Brigid tolerated treatment well with no complaints.  Pt's sister reports that she has been attempting to ambulate in the home without AD but is noted to drag both feet. PT addressed today at ballet bar. When attempting to take a right sided step, pt demonstrates significant trunk flexion and lean to left. PT instructed pt to perform side stepping at home with UE support to address stability in standing without UE support. Cervical rotation PROM is not significantly changed, but pt demonstrates improved ease of AROM for rotation. Pt can benefit from continued skilled PT to address impairments to improve safety, independence, and quality of life.    Pt prognosis is Good. Pt will continue to benefit from skilled outpatient physical therapy to address the deficits listed in the problem list, provide pt/family education and to maximize pt's level of independence in the home and community environment.      Activity limitations/ Participation Restrictions:   Fall Risk - impaired balance   Weakness  Impaired muscle tone  Poor head control  Decreased ROM  Gait deviations   Decreased ambulation   Decreased activity tolerance   Decreased independence with daily activities   Requires skilled supervision to complete and progress HEP   Requires skilled PT for DME/ orthotic recommendations     Pt's spiritual, cultural and educational needs considered and pt agreeable to plan of care and GOALS as stated below:   Short term goals: 6 weeks, pt agrees to goals set. (As of 1/9/18)  1. Pt will perform chin tucks in supine with supervision to improve independent with HEP. Met 7/5/17  2. Assist pt with obtaining appropriate cervical brace to improve head control. Met 1/9/18- pt has but is not consistently wearing  3. Pt will demonstrate  "improved knee ext strength on left to 4/5 to improve safety with ambulation and sit<>stand. Met 9/28/17 L=4/5  4. Pt will demonstrate improved cervical rotation PROM to 20 degrees to improve functional head control. Met 7/5/17  5. Pt will perform sit<>stand consistently from 22" height surface with supervision to improve independence. Met 7/5/17                Revised 8/31/17:  Pt will perform sit<>stand from 19" height chair to improve independence with sitting in various chairs around her home and decreased need for "pop up seat". improved- pt can consistently stand from 20" height with BUE and several attempts, supervision      Long term goals: 12 weeks, pt agrees to goals set  6. Pt will perform basic HEP for gross strengthening with supervision to deter worsening of functional limitations. Met 8/31/17- pt's sister reports poor current compliance  7. Pt will demonstrate improved bilateral hip strength to 3/5 to improve balance and independence with mobility. Met 7/5/17  8. New 7/5/17: Pt will demonstrate improved bilateral hip strength to 4/5 to improve balance and independence with mobility. ~Same/ varies- hip flex WALESKA= 4/5, hip ext R= 3+/5, L= 4-/5, hip abd B=4-/5  9.       Pt will demonstrate improved cervical extension strength to 3+/5 to improve head control. Met 7/31/17  10. Pt will demonstrate improved cervical deep flexor strength to 3+/5 to improve head control. Met 7/31/17   11. Pt will demonstrate improve balance and gait by scoring 14/28 on Tinetti. Met 8/31/17- 17/28                                      Revised 8/31/17: pt will demonstrate improved balance/ gait and decreased fall risk to moderate by scoring 19/28 on Tinetti                                            Assessment. Same- 16/28                        12. Pt will demonstrate improved cervical PROM for rotation to 40 degrees to improve functional head control. Improved/ partially met-                                                " 54 degrees (L) ; 15 degrees (R)     Plan   Continue PT 2x weekly under established Plan of Care, with treatment to include: pt education, HEP, therapeutic exercises, neuromuscular re-education/balance exercises, therapeutic activities, joint mobilizations, manual therapy, gait training PRN, and modalities PRN, to work towards established goals. Pt may be seen by PTA to carry out plan of care.   Yadi Mercado, PT     1/29/2018

## 2018-02-01 ENCOUNTER — CLINICAL SUPPORT (OUTPATIENT)
Dept: REHABILITATION | Facility: HOSPITAL | Age: 53
End: 2018-02-01
Attending: STUDENT IN AN ORGANIZED HEALTH CARE EDUCATION/TRAINING PROGRAM
Payer: COMMERCIAL

## 2018-02-01 DIAGNOSIS — Z74.09 IMPAIRED FUNCTIONAL MOBILITY, BALANCE, GAIT, AND ENDURANCE: ICD-10-CM

## 2018-02-01 DIAGNOSIS — R53.1 DECREASED STRENGTH: ICD-10-CM

## 2018-02-01 DIAGNOSIS — R29.898 DECREASED ROM OF NECK: ICD-10-CM

## 2018-02-01 PROCEDURE — 97110 THERAPEUTIC EXERCISES: CPT | Mod: PO | Performed by: PHYSICAL THERAPIST

## 2018-02-01 NOTE — PROGRESS NOTES
Physical Therapy Progress Note      Name: Brigid Jernigan  Clinic Number: [id number]  Diagnosis: G71.11 (ICD-10-CM) - Myotonic dystrophy  Physician: Luis Felipe Vo MD  Treatment Orders: PT Eval and Treat          Past Medical History:   Diagnosis Date    Abnormal EKG      Myotonic dystrophy      Pacemaker      PAF (paroxysmal atrial fibrillation)      Sleep apnea           Precautions: standard  Visit #: 7/12    (Visit total 2017= 44)  Date of Eval: 5/9/2017  Extended POC: 3/22/2018        Subjective   Pt reports: no new complaints  Sister reported: reports pt is attempting ambulation without RW inside of home, but is dragging feet  Pain Scale: denies     Objective       Patient received individual therapy to increase strength, endurance, ROM, flexibility, posture, core stabilization and head control with activities as follows:      Pt participated in therapeutic exercises x 55 minutes to address ROM and cervical alignment:   X 10 min on Sci Fit recumbent stepper.  Level 11 BUE/LE     Supine:    Bridging with hip abd PTB 10x   SLR 5# 2 x10   Double leg lift with ball between knees 10x           Cervical PROM: upper trap PROM, Rotation PROM, pec PROM   AAROM cervical flex 5x   AAROM cervical R rotation 10x   Cervical distraction                       LTR with green physioball x 20 reps  Cervical R rotation (AROM supine)= 10 deg  Leg press 105# at lower position (~90 deg hip/knee flex) 15x        NP today:   Hooklying:    Clamshells PTB 2 x 10     Bridges with alternate LE kick (max VC) 10x    Therapeutic activities to increase functional mobility x 0 min including:   Side stepping at ballet bar with 1-2 UE support, mod VC to maintain hips facing forward.   Forward gait with 1-2 UE support and min A       Written Home Exercises: 1/29/18- side stepping at counter with 1-2 UE support  12/4/17- AROM cervical SB and rotation  assisted quad/ hip flexor stretch in side lying  eval- chin tucks, rows with OTB, hip  flexor and knee extensor stretch in sidelying  Pt demo fair understanding of the education provided. Brigid demonstrated fair return demonstration of activities.      Education provided re: 12/18/17- cervical collar application  POC, HEP     Pt has no cultural, educational or language barriers to learning provided.     Assessment   Brigid tolerated treatment well with no complaints.  PT added AAROM for cervical flexion and rotation. PT also advanced core strengthening as pt tolerated. Significant cervical ROM limitations continue due to poor posture and muscle tightness. Pt can benefit from continued skilled PT to address impairments to improve safety, independence, and quality of life.    Pt prognosis is Good. Pt will continue to benefit from skilled outpatient physical therapy to address the deficits listed in the problem list, provide pt/family education and to maximize pt's level of independence in the home and community environment.      Activity limitations/ Participation Restrictions:   Fall Risk - impaired balance   Weakness  Impaired muscle tone  Poor head control  Decreased ROM  Gait deviations   Decreased ambulation   Decreased activity tolerance   Decreased independence with daily activities   Requires skilled supervision to complete and progress HEP   Requires skilled PT for DME/ orthotic recommendations     Pt's spiritual, cultural and educational needs considered and pt agreeable to plan of care and GOALS as stated below:   Short term goals: 6 weeks, pt agrees to goals set. (As of 1/9/18)  1. Pt will perform chin tucks in supine with supervision to improve independent with HEP. Met 7/5/17  2. Assist pt with obtaining appropriate cervical brace to improve head control. Met 1/9/18- pt has but is not consistently wearing  3. Pt will demonstrate improved knee ext strength on left to 4/5 to improve safety with ambulation and sit<>stand. Met 9/28/17 L=4/5  4. Pt will demonstrate improved cervical rotation  "PROM to 20 degrees to improve functional head control. Met 7/5/17  5. Pt will perform sit<>stand consistently from 22" height surface with supervision to improve independence. Met 7/5/17                Revised 8/31/17:  Pt will perform sit<>stand from 19" height chair to improve independence with sitting in various chairs around her home and decreased need for "pop up seat". improved- pt can consistently stand from 20" height with BUE and several attempts, supervision      Long term goals: 12 weeks, pt agrees to goals set  6. Pt will perform basic HEP for gross strengthening with supervision to deter worsening of functional limitations. Met 8/31/17- pt's sister reports poor current compliance  7. Pt will demonstrate improved bilateral hip strength to 3/5 to improve balance and independence with mobility. Met 7/5/17  8. New 7/5/17: Pt will demonstrate improved bilateral hip strength to 4/5 to improve balance and independence with mobility. ~Same/ varies- hip flex WALESKA= 4/5, hip ext R= 3+/5, L= 4-/5, hip abd B=4-/5  9.       Pt will demonstrate improved cervical extension strength to 3+/5 to improve head control. Met 7/31/17  10. Pt will demonstrate improved cervical deep flexor strength to 3+/5 to improve head control. Met 7/31/17   11. Pt will demonstrate improve balance and gait by scoring 14/28 on Tinetti. Met 8/31/17- 17/28                                      Revised 8/31/17: pt will demonstrate improved balance/ gait and decreased fall risk to moderate by scoring 19/28 on Tinetti                                            Assessment. Same- 16/28                        12. Pt will demonstrate improved cervical PROM for rotation to 40 degrees to improve functional head control. Improved/ partially met-                                                54 degrees (L) ; 15 degrees (R)     Plan   Continue PT 2x weekly under established Plan of Care, with treatment to include: pt education, HEP, therapeutic exercises, " neuromuscular re-education/balance exercises, therapeutic activities, joint mobilizations, manual therapy, gait training PRN, and modalities PRN, to work towards established goals. Pt may be seen by PTA to carry out plan of care.   Yadi Mercado, PT     2/1/2018

## 2018-02-05 ENCOUNTER — CLINICAL SUPPORT (OUTPATIENT)
Dept: REHABILITATION | Facility: HOSPITAL | Age: 53
End: 2018-02-05
Attending: STUDENT IN AN ORGANIZED HEALTH CARE EDUCATION/TRAINING PROGRAM
Payer: COMMERCIAL

## 2018-02-05 DIAGNOSIS — R29.898 DECREASED ROM OF NECK: ICD-10-CM

## 2018-02-05 DIAGNOSIS — R53.1 DECREASED STRENGTH: ICD-10-CM

## 2018-02-05 DIAGNOSIS — Z74.09 IMPAIRED FUNCTIONAL MOBILITY, BALANCE, GAIT, AND ENDURANCE: ICD-10-CM

## 2018-02-05 PROCEDURE — 97110 THERAPEUTIC EXERCISES: CPT | Mod: PO | Performed by: PHYSICAL THERAPIST

## 2018-02-05 PROCEDURE — 97530 THERAPEUTIC ACTIVITIES: CPT | Mod: PO | Performed by: PHYSICAL THERAPIST

## 2018-02-05 NOTE — PROGRESS NOTES
"  Physical Therapy Progress Note      Name: Brigid Jernigan  Clinic Number: [id number]  Diagnosis: G71.11 (ICD-10-CM) - Myotonic dystrophy  Physician: Luis Felipe Vo MD  Treatment Orders: PT Eval and Treat          Past Medical History:   Diagnosis Date    Abnormal EKG      Myotonic dystrophy      Pacemaker      PAF (paroxysmal atrial fibrillation)      Sleep apnea           Precautions: standard  Visit #: 8/12    (Visit total 2017= 44)  Date of Eval: 5/9/2017  Extended POC: 3/22/2018        Subjective   Pt reports: pt reports practicing side stepping at home   Sister reported: no new complaints  Pain Scale: denies     Objective       Patient received individual therapy to increase strength, endurance, ROM, flexibility, posture, core stabilization and head control with activities as follows:      Pt participated in therapeutic exercises x 34 minutes to address ROM and cervical alignment:   X 10 min on Sci Fit recumbent stepper.  Level 11 BUE/LE     Supine:    Bridging with hip abd PTB 10x   Double leg lift with ball between knees 10x           Cervical PROM: upper trap PROM, Rotation PROM, pec PROM   AAROM cervical flex 10x   AAROM cervical rotation 10x   Cervical distraction                      Cervical R rotation (AROM supine)= 10 deg  Leg press 105# at lower position (~90 deg hip/knee flex) 15x        NP today:   Clamshells PTB 2 x 10   Bridges with alternate LE kick (max VC) 10x  SLR 5# 2 x10  LTR with green physioball x 20 reps    Therapeutic activities to increase functional mobility x 11 min including:   Side stepping at ballet bar with 1 UE support, min VC to maintain hips facing forward- 2 laps  Forward gait with 1 UE support and min A- 1 lap    Sit>stand from 20" height mat with multiple trials and close supervision       Written Home Exercises: 1/29/18- side stepping at counter with 1-2 UE support  12/4/17- AROM cervical SB and rotation  assisted quad/ hip flexor stretch in side lying  eval- chin " tucks, rows with OTB, hip flexor and knee extensor stretch in sidelying  Pt demo fair understanding of the education provided. Brigid demonstrated fair return demonstration of activities.      Education provided re: 12/18/17- cervical collar application  POC, HEP     Pt has no cultural, educational or language barriers to learning provided.     Assessment   Brigid tolerated treatment well.  Pt reported fatigue with increased reps of cervical AROM. Pt demonstrated improved balance with gait with 1 UE support. Pt demonstrates anterior lean to compensate for poor left hip extension strength while in left stance. This is more evident with decreased UE support. PT will address posture in gait as able.  Pt can benefit from continued skilled PT to address impairments to improve safety, independence, and quality of life.    Pt prognosis is Good. Pt will continue to benefit from skilled outpatient physical therapy to address the deficits listed in the problem list, provide pt/family education and to maximize pt's level of independence in the home and community environment.      Activity limitations/ Participation Restrictions:   Fall Risk - impaired balance   Weakness  Impaired muscle tone  Poor head control  Decreased ROM  Gait deviations   Decreased ambulation   Decreased activity tolerance   Decreased independence with daily activities   Requires skilled supervision to complete and progress HEP   Requires skilled PT for DME/ orthotic recommendations     Pt's spiritual, cultural and educational needs considered and pt agreeable to plan of care and GOALS as stated below:   Short term goals: 6 weeks, pt agrees to goals set. (As of 1/9/18)  1. Pt will perform chin tucks in supine with supervision to improve independent with HEP. Met 7/5/17  2. Assist pt with obtaining appropriate cervical brace to improve head control. Met 1/9/18- pt has but is not consistently wearing  3. Pt will demonstrate improved knee ext strength on  "left to 4/5 to improve safety with ambulation and sit<>stand. Met 9/28/17 L=4/5  4. Pt will demonstrate improved cervical rotation PROM to 20 degrees to improve functional head control. Met 7/5/17  5. Pt will perform sit<>stand consistently from 22" height surface with supervision to improve independence. Met 7/5/17                Revised 8/31/17:  Pt will perform sit<>stand from 19" height chair to improve independence with sitting in various chairs around her home and decreased need for "pop up seat". improved- pt can consistently stand from 20" height with BUE and several attempts, supervision      Long term goals: 12 weeks, pt agrees to goals set  6. Pt will perform basic HEP for gross strengthening with supervision to deter worsening of functional limitations. Met 8/31/17- pt's sister reports poor current compliance  7. Pt will demonstrate improved bilateral hip strength to 3/5 to improve balance and independence with mobility. Met 7/5/17  8. New 7/5/17: Pt will demonstrate improved bilateral hip strength to 4/5 to improve balance and independence with mobility. ~Same/ varies- hip flex WALESKA= 4/5, hip ext R= 3+/5, L= 4-/5, hip abd B=4-/5  9.       Pt will demonstrate improved cervical extension strength to 3+/5 to improve head control. Met 7/31/17  10. Pt will demonstrate improved cervical deep flexor strength to 3+/5 to improve head control. Met 7/31/17   11. Pt will demonstrate improve balance and gait by scoring 14/28 on Tinetti. Met 8/31/17- 17/28                                      Revised 8/31/17: pt will demonstrate improved balance/ gait and decreased fall risk to moderate by scoring 19/28 on Tinetti                                            Assessment. Same- 16/28                        12. Pt will demonstrate improved cervical PROM for rotation to 40 degrees to improve functional head control. Improved/ partially met-                                                54 degrees (L) ; 15 degrees " (R)     Plan   Continue PT 2x weekly under established Plan of Care, with treatment to include: pt education, HEP, therapeutic exercises, neuromuscular re-education/balance exercises, therapeutic activities, joint mobilizations, manual therapy, gait training PRN, and modalities PRN, to work towards established goals. Pt may be seen by PTA to carry out plan of care.   Yadi Mercado, PT     2/5/2018

## 2018-02-08 ENCOUNTER — CLINICAL SUPPORT (OUTPATIENT)
Dept: REHABILITATION | Facility: HOSPITAL | Age: 53
End: 2018-02-08
Attending: STUDENT IN AN ORGANIZED HEALTH CARE EDUCATION/TRAINING PROGRAM
Payer: COMMERCIAL

## 2018-02-08 DIAGNOSIS — R53.1 DECREASED STRENGTH: ICD-10-CM

## 2018-02-08 DIAGNOSIS — Z74.09 IMPAIRED FUNCTIONAL MOBILITY, BALANCE, GAIT, AND ENDURANCE: ICD-10-CM

## 2018-02-08 DIAGNOSIS — R29.898 DECREASED ROM OF NECK: ICD-10-CM

## 2018-02-08 PROCEDURE — 97112 NEUROMUSCULAR REEDUCATION: CPT | Mod: PO | Performed by: PHYSICAL THERAPIST

## 2018-02-08 PROCEDURE — 97110 THERAPEUTIC EXERCISES: CPT | Mod: PO | Performed by: PHYSICAL THERAPIST

## 2018-02-09 NOTE — PLAN OF CARE
"  Physical Therapy Progress Note      Name: Brigid Jernigan  Clinic Number: [id number]  Diagnosis: G71.11 (ICD-10-CM) - Myotonic dystrophy  Physician: Luis Felipe Vo MD  Treatment Orders: PT Eval and Treat          Past Medical History:   Diagnosis Date    Abnormal EKG      Myotonic dystrophy      Pacemaker      PAF (paroxysmal atrial fibrillation)      Sleep apnea           Precautions: standard  Visit #: 8/12    (Visit total 2017= 44)  Date of Eval: 5/9/2017  Extended POC: 3/22/2018        Subjective   Pt reports: pt reports practicing side stepping at home   Sister reported: reports pt can intermittently rise from dining chair at home without pop-up seat assist  Pain Scale: denies     Objective       Patient received individual therapy to increase strength, endurance, ROM, flexibility, posture, core stabilization and head control with activities as follows:      Pt participated in therapeutic exercises x 29 minutes to address ROM and cervical alignment:   X 10 min on Sci Fit recumbent stepper.  Level 11 BUE/LE     Cervical rotation: R= 13 deg, L= 44 degrees prior to stretching  ~same after PROM     hip flex WALESKA= 4+/5, hip ext R= 4-/5, L= 3+/5, hip abd R=4/5, L=4+/5    Supine:            Cervical PROM: upper trap PROM, Rotation PROM, pec PROM   AAROM cervical flex 10x   AAROM cervical rotation 10x   Cervical distraction                      Cervical R rotation (AROM supine)  Leg press 110# at lower position (~90 deg hip/knee flex) 15x        NP today:   Clamshells PTB 2 x 10   Bridges with alternate LE kick (max VC) 10x  SLR 5# 2 x10  LTR with green physioball x 20 reps  Bridging with hip abd PTB 10x  Double leg lift with ball between knees 10x    Therapeutic activities to increase functional mobility x 20 min including:   Forward gait with 1 UE support and  CGA-  3 laps, VC to keep trunk upright    Sit>stand from 20" height mat with 2 attempts prior to success  Sit>stand from 19" height- unable without " assistance    .Tinetti Balance Assessment  Balance:  1. Sitting Balance 1   Leans/ slides in chair= 0   Steady= 1  2. Rises from chair 1   Unable without help= 0   Able, uses arms= 1   Able without use of arms= 2  3. Attempts to rise 1   Unable without help= 0   Able, >1 attmept required-= 1   Able, 1 attempt= 2  4. Immediate standing balance (1st 5 seconds) 1   Unsteady (swaggers, moves feet, trunk sway)= 0   Steady but uses walker or other support= 1   Narrow base of support without walker or support= 2  5. Nudged 1   Begins to fall= 0   Staggers, grabs, catches self= 1   Steady= 2  6. Standing balance 1   Unsteady= 0   Steady but wide LEYDI or uses AD=1   Narrow LEYDI without AD=2  7. Eyes closed 0   Unsteady= 0   Steady= 1  8. Turning 360 degrees 0   Discontinuous steps= 0   Continuous steps= 1   Unsteady= 0   Steady= 1  9. Sitting down 1   Unsafe= 0   Uses arms or not in a smooth motion= 1   Safe, smooth motion= 2  Balance score= 7/16  Gait:  1. Initiation 1   hesitates or multiple attempts to start= 0   No hesitancy= 1  2. Step length 2   Step to= 0   One foot passes= 1   reciprocal pattern= 2  3. Step height 2    Neither foot clears floor= 0   One foot clears floor= 1   Both feet clear floor= 2  4. Step symmetry 1   Not symmetrical= 0   Appears symmetrical= 1  5. Step continuity 1   Not continuous= 0   Appears continuous= 1  6. Path 1   Marked deviation= 0   Mild/moderate deviation or uses A.D.= 1   Straight without A.D.= 2  7. Trunk 0   Marked sway or uses A.D.= 0   No sway, but flexes knees or back, spread arms out while walking= 1   No sway, no flexion, no use of UE, no use of A.D.= 2  8. Walking stance 0   Heels apart= 0   Heels almost touching while walking= 1  Gait score= 8/12  Total score= 15/28 , high fall risk    NP today:    Side stepping at ballet bar with 1 UE support, min VC to maintain hips facing forward- 2 laps     Written Home Exercises: 1/29/18- side stepping at counter with 1-2 UE  "support  12/4/17- AROM cervical SB and rotation  assisted quad/ hip flexor stretch in side lying  eval- chin tucks, rows with OTB, hip flexor and knee extensor stretch in sidelying  Pt demo fair understanding of the education provided. Brigid demonstrated fair return demonstration of activities.      Education provided re: 12/18/17- cervical collar application  POC, HEP     Pt has no cultural, educational or language barriers to learning provided.     Assessment   Assessment period: 1/11/2018 to 2/8/2018. Brigid tolerates treatments well and continues to demonstrate progress towards goals.  Pt reports only intermittent compliance with HEP.Pt's sister reports that pt can occasionally rise from her dining chair without use of pop-up seat. Pt's sister also noted that pt is attempting "some" ambulation in the home without AD. Pt is not safe at this time to ambulate without an AD due to poor trunk stability and ambulatory balance with 1 UE support. PT is addressing ambulation with 1 UE support to improve pt's balance and strength for ambulation without AD.  Pt's LE continues to improve, allowing pt to rise from 20" height surface with only 2-3 attempts. Pt is unable to rise from a 19" high surface without assistance. PT is addressing sit<>stand with use of leg press at a lower setting to encourage increased ROM and recumbent stepper. Pt continues to demonstrate increased stance time on RLE and anterior/lateral trunk lean during left stance, poor hip extension bilaterally for terminal stance. Pt instructed to increase bridging at home to improve hip extension strength form improved balance and mobility.  Pt can benefit from continued skilled PT to address impairments to improve safety, independence, and quality of life.    Pt prognosis is Good. Pt will continue to benefit from skilled outpatient physical therapy to address the deficits listed in the problem list, provide pt/family education and to maximize pt's level of " "independence in the home and community environment.      Activity limitations/ Participation Restrictions:   Fall Risk - impaired balance   Weakness  Impaired muscle tone  Poor head control  Decreased ROM  Gait deviations   Decreased ambulation   Decreased activity tolerance   Decreased independence with daily activities   Requires skilled supervision to complete and progress HEP   Requires skilled PT for DME/ orthotic recommendations     Pt's spiritual, cultural and educational needs considered and pt agreeable to plan of care and GOALS as stated below:   Short term goals: 6 weeks, pt agrees to goals set. (As of 2/8/18)  1. Pt will perform chin tucks in supine with supervision to improve independent with HEP. Met 7/5/17  2. Assist pt with obtaining appropriate cervical brace to improve head control. Met 1/9/18- pt has but is not consistently wearing  3. Pt will demonstrate improved knee ext strength on left to 4/5 to improve safety with ambulation and sit<>stand. Met 9/28/17   4. Pt will demonstrate improved cervical rotation PROM to 20 degrees to improve functional head control. Met 7/5/17  5. Pt will perform sit<>stand consistently from 22" height surface with supervision to improve independence. Met 7/5/17                Revised 8/31/17:  Pt will perform sit<>stand from 19" height chair to improve independence with sitting in various chairs around her home and decreased need for "pop up seat". improved- pt can consistently stand from 20" height with BUE and 2-3 attempts, supervision without verbal cues     Long term goals: 12 weeks, pt agrees to goals set  6. Pt will perform basic HEP for gross strengthening with supervision to deter worsening of functional limitations. Met 8/31/17- pt's sister reports poor current compliance  7. Pt will demonstrate improved bilateral hip strength to 3/5 to improve balance and independence with mobility. Met 7/5/17  8. New 7/5/17: Pt will demonstrate improved bilateral hip " strength to 4/5 to improve balance and independence with mobility. improved-  WALESKA= 4+/5, hip ext R= 4-/5, L= 3+/5, hip abd R=4/5, L=4+/5  9. Pt will demonstrate improved cervical extension strength to 3+/5 to improve head control. Met 7/31/17  10. Pt will demonstrate improved cervical deep flexor strength to 3+/5 to improve head control. Met 7/31/17   11. Pt will demonstrate improve balance and gait by scoring 14/28 on Tinetti. Met 8/31/17- 17/28                                      Revised 8/31/17: pt will demonstrate improved balance/ gait and decreased fall risk to moderate by scoring 19/28 on Tinetti                                            Assessment. ~Same- 15/28                        12. Pt will demonstrate improved cervical PROM for rotation to 40 degrees to improve functional head control. ~same as last assessment                                                44 degrees (L) ; 13-15 degrees (R)     Plan   Continue PT 2x weekly under established Plan of Care, with treatment to include: pt education, HEP, therapeutic exercises, neuromuscular re-education/balance exercises, therapeutic activities, joint mobilizations, manual therapy, gait training PRN, and modalities PRN, to work towards established goals. Pt may be seen by PTA to carry out plan of care.     Yadi Mercado, PT     2/8/2018    I certify the need for these services furnished under this plan of treatment and while under my care.  ____________________________________ Physician/Referring Practitioner   Date of Signature

## 2018-02-12 PROBLEM — J31.0 RHINITIS: Status: ACTIVE | Noted: 2018-02-12

## 2018-02-15 ENCOUNTER — CLINICAL SUPPORT (OUTPATIENT)
Dept: REHABILITATION | Facility: HOSPITAL | Age: 53
End: 2018-02-15
Attending: STUDENT IN AN ORGANIZED HEALTH CARE EDUCATION/TRAINING PROGRAM
Payer: COMMERCIAL

## 2018-02-15 DIAGNOSIS — R53.1 DECREASED STRENGTH: ICD-10-CM

## 2018-02-15 DIAGNOSIS — Z74.09 IMPAIRED FUNCTIONAL MOBILITY, BALANCE, GAIT, AND ENDURANCE: ICD-10-CM

## 2018-02-15 DIAGNOSIS — R29.898 DECREASED ROM OF NECK: ICD-10-CM

## 2018-02-15 PROCEDURE — 97110 THERAPEUTIC EXERCISES: CPT | Mod: PO | Performed by: PHYSICAL THERAPIST

## 2018-02-15 NOTE — PROGRESS NOTES
"Physical Therapy Progress Note      Name: Brigid Jernigan  Clinic Number: [id number]  Diagnosis: G71.11 (ICD-10-CM) - Myotonic dystrophy  Physician: Luis Felipe Vo MD  Treatment Orders: PT Eval and Treat          Past Medical History:   Diagnosis Date    Abnormal EKG      Myotonic dystrophy      Pacemaker      PAF (paroxysmal atrial fibrillation)      Sleep apnea           Precautions: standard  Visit #: 9/12     (Visit total 2017= 44)  Date of Eval: 5/9/2017  Extended POC: 3/22/2018        Subjective   Pt reports: pt reports practicing side stepping at home   Sister reported: no new complaints. Brought in cervical collar today  Pain Scale: denies     Objective       Patient received individual therapy to increase strength, endurance, ROM, flexibility, posture, core stabilization and head control with activities as follows:      Pt participated in therapeutic exercises x 47 minutes to address ROM and cervical alignment:   X 10 min on Sci Fit recumbent stepper.  Level 11 BUE/LE     Cervical AROM rotation (supine): R= 15 deg    Supine:            Cervical PROM: upper trap PROM, Rotation PROM             AROM deep cervical flex 10x            AAROM cervical rotation 5x            Cervical distraction                      SLR R 5#, L 4# 2 x10   LTR with green physioball x 20 reps   Bridging with hip abd PTB 10x   Double leg lift with ball between knees 10x    Leg press 110# at lower position (~90 deg hip/knee flex) 15x        Sit>stand from 20" height mat with 1 attempt for success    NP today:   Clamshells PTB 2 x 10   Bridges with alternate LE kick (max VC) 10x     Therapeutic activities to increase functional mobility x 0 min including:   NP today:   Forward gait with 1 UE support and  CGA-  3 laps, VC to keep trunk upright  Sit>stand from 19" height- unable without assistance    Side stepping at ballet bar with 1 UE support, min VC to maintain hips facing forward- 2 laps      Written Home Exercises: 1/29/18- " "side stepping at counter with 1-2 UE support  12/4/17- AROM cervical SB and rotation  assisted quad/ hip flexor stretch in side lying  eval- chin tucks, rows with OTB, hip flexor and knee extensor stretch in sidelying  Pt demo fair understanding of the education provided. Brigid demonstrated fair return demonstration of activities.      Education provided re: 12/18/17- cervical collar application  POC, HEP     Pt has no cultural, educational or language barriers to learning provided.     Assessment   Brigid tolerated treatment well. Pt demonstrated a slight improvement in right cervical AROM for rotation. Pt continues to demonstrate significant ROM limitations in cervical spine. Pt was able to demonstrate improved strength of deep cervical flexors with good repetition of chin tucks off of bed. PT had to decrease resistance for L SLR due to increase compensation, pt was able to perform with 4#. Pt demonstrated ability to rise from a 20" height surface with UE use with 1 attempt. Pt wore cervical collar for stepper only.  Pt can benefit from continued skilled PT to address impairments to improve safety, independence, and quality of life.     Pt prognosis is Good. Pt will continue to benefit from skilled outpatient physical therapy to address the deficits listed in the problem list, provide pt/family education and to maximize pt's level of independence in the home and community environment.      Activity limitations/ Participation Restrictions:   Fall Risk - impaired balance   Weakness  Impaired muscle tone  Poor head control  Decreased ROM  Gait deviations   Decreased ambulation   Decreased activity tolerance   Decreased independence with daily activities   Requires skilled supervision to complete and progress HEP   Requires skilled PT for DME/ orthotic recommendations     Pt's spiritual, cultural and educational needs considered and pt agreeable to plan of care and GOALS as stated below:   Short term goals: 6 " "weeks, pt agrees to goals set. (As of 2/8/18)  1. Pt will perform chin tucks in supine with supervision to improve independent with HEP. Met 7/5/17  2. Assist pt with obtaining appropriate cervical brace to improve head control. Met 1/9/18- pt has but is not consistently wearing  3. Pt will demonstrate improved knee ext strength on left to 4/5 to improve safety with ambulation and sit<>stand. Met 9/28/17   4. Pt will demonstrate improved cervical rotation PROM to 20 degrees to improve functional head control. Met 7/5/17  5. Pt will perform sit<>stand consistently from 22" height surface with supervision to improve independence. Met 7/5/17                Revised 8/31/17:  Pt will perform sit<>stand from 19" height chair to improve independence with sitting in various chairs around her home and decreased need for "pop up seat". improved- pt can consistently stand from 20" height with BUE and 2-3 attempts, supervision without verbal cues     Long term goals: 12 weeks, pt agrees to goals set  6. Pt will perform basic HEP for gross strengthening with supervision to deter worsening of functional limitations. Met 8/31/17- pt's sister reports poor current compliance  7. Pt will demonstrate improved bilateral hip strength to 3/5 to improve balance and independence with mobility. Met 7/5/17  8. New 7/5/17: Pt will demonstrate improved bilateral hip strength to 4/5 to improve balance and independence with mobility. improved-  WALESKA= 4+/5, hip ext R= 4-/5, L= 3+/5, hip abd R=4/5, L=4+/5  9. Pt will demonstrate improved cervical extension strength to 3+/5 to improve head control. Met 7/31/17  10. Pt will demonstrate improved cervical deep flexor strength to 3+/5 to improve head control. Met 7/31/17   11. Pt will demonstrate improve balance and gait by scoring 14/28 on Tinetti. Met 8/31/17- 17/28                                      Revised 8/31/17: pt will demonstrate improved balance/ gait and decreased fall risk to moderate by " scoring 19/28 on Tinetti                                            Assessment. ~Same- 15/28                        12. Pt will demonstrate improved cervical PROM for rotation to 40 degrees to improve functional head control. ~same as last assessment                                                44 degrees (L) ; 13-15 degrees (R)     Plan   Continue PT 2x weekly under established Plan of Care, with treatment to include: pt education, HEP, therapeutic exercises, neuromuscular re-education/balance exercises, therapeutic activities, joint mobilizations, manual therapy, gait training PRN, and modalities PRN, to work towards established goals. Pt may be seen by PTA to carry out plan of care.      Yadi Mercado, PT   2/15/2018

## 2018-02-19 ENCOUNTER — CLINICAL SUPPORT (OUTPATIENT)
Dept: REHABILITATION | Facility: HOSPITAL | Age: 53
End: 2018-02-19
Attending: STUDENT IN AN ORGANIZED HEALTH CARE EDUCATION/TRAINING PROGRAM
Payer: COMMERCIAL

## 2018-02-19 DIAGNOSIS — R53.1 DECREASED STRENGTH: ICD-10-CM

## 2018-02-19 DIAGNOSIS — R29.898 DECREASED ROM OF NECK: ICD-10-CM

## 2018-02-19 DIAGNOSIS — Z74.09 IMPAIRED FUNCTIONAL MOBILITY, BALANCE, GAIT, AND ENDURANCE: ICD-10-CM

## 2018-02-19 PROCEDURE — 97110 THERAPEUTIC EXERCISES: CPT | Mod: PO

## 2018-02-19 NOTE — PROGRESS NOTES
"Physical Therapy Progress Note      Name: Brigid Jernigan  Clinic Number: [id number]  Diagnosis: G71.11 (ICD-10-CM) - Myotonic dystrophy  Physician: Luis Felipe Vo MD  Treatment Orders: PT Eval and Treat          Past Medical History:   Diagnosis Date    Abnormal EKG      Myotonic dystrophy      Pacemaker      PAF (paroxysmal atrial fibrillation)      Sleep apnea           Precautions: standard  Visit #: 10/12     (Visit total 2017= 44)  Date of Eval: 5/9/2017  Extended POC: 3/22/2018        Subjective   Pt reports: " I'm doing pretty good."   Sister reported: " Can you put her brace on her?"   Pain Scale: denies     Objective    Sister arrived with cervical brace in hand.    Therapist donned on cervical brace for stepper   Patient received individual therapy to increase strength, endurance, ROM, flexibility, posture, core stabilization and head control with activities as follows:      Pt participated in therapeutic exercises x 45 minutes to address ROM and cervical alignment:   X 10 min on Nu Step recumbent stepper.  Level 19 BUE/LE       Supine:            Cervical PROM: upper trap PROM, Rotation PROM             AROM deep cervical flex 10x            AAROM cervical rotation 5x            Cervical distraction                      SLR R 5#, L 4# 2 x10   LTR with green physioball x 20 reps   Bridging with hip abd PTB 10x   Double leg lift with ball between knees 10x         Sit>stand from 20" height mat with 1 attempt for success       Therapeutic activities to increase functional mobility x 0 min including:        Written Home Exercises: 1/29/18- side stepping at counter with 1-2 UE support  12/4/17- AROM cervical SB and rotation  assisted quad/ hip flexor stretch in side lying  eval- chin tucks, rows with OTB, hip flexor and knee extensor stretch in sidelying  Pt demo fair understanding of the education provided. Brigid demonstrated fair return demonstration of activities.      Education provided re: " "12/18/17- cervical collar application  POC, HEP     Pt has no cultural, educational or language barriers to learning provided.     Assessment   Brigid tolerated treatment well and did not have any problems.  Pt cont with B LE hip and core strengthening.  No significant changes noted.   Pt can benefit from continued skilled PT to address impairments to improve safety, independence, and quality of life.     Pt prognosis is Good. Pt will continue to benefit from skilled outpatient physical therapy to address the deficits listed in the problem list, provide pt/family education and to maximize pt's level of independence in the home and community environment.      Activity limitations/ Participation Restrictions:   Fall Risk - impaired balance   Weakness  Impaired muscle tone  Poor head control  Decreased ROM  Gait deviations   Decreased ambulation   Decreased activity tolerance   Decreased independence with daily activities   Requires skilled supervision to complete and progress HEP   Requires skilled PT for DME/ orthotic recommendations     Pt's spiritual, cultural and educational needs considered and pt agreeable to plan of care and GOALS as stated below:   Short term goals: 6 weeks, pt agrees to goals set. (As of 2/8/18)  1. Pt will perform chin tucks in supine with supervision to improve independent with HEP. Met 7/5/17  2. Assist pt with obtaining appropriate cervical brace to improve head control. Met 1/9/18- pt has but is not consistently wearing  3. Pt will demonstrate improved knee ext strength on left to 4/5 to improve safety with ambulation and sit<>stand. Met 9/28/17   4. Pt will demonstrate improved cervical rotation PROM to 20 degrees to improve functional head control. Met 7/5/17  5. Pt will perform sit<>stand consistently from 22" height surface with supervision to improve independence. Met 7/5/17                Revised 8/31/17:  Pt will perform sit<>stand from 19" height chair to improve independence " "with sitting in various chairs around her home and decreased need for "pop up seat". improved- pt can consistently stand from 20" height with BUE and 2-3 attempts, supervision without verbal cues     Long term goals: 12 weeks, pt agrees to goals set  6. Pt will perform basic HEP for gross strengthening with supervision to deter worsening of functional limitations. Met 8/31/17- pt's sister reports poor current compliance  7. Pt will demonstrate improved bilateral hip strength to 3/5 to improve balance and independence with mobility. Met 7/5/17  8. New 7/5/17: Pt will demonstrate improved bilateral hip strength to 4/5 to improve balance and independence with mobility. improved-  WALESKA= 4+/5, hip ext R= 4-/5, L= 3+/5, hip abd R=4/5, L=4+/5  9. Pt will demonstrate improved cervical extension strength to 3+/5 to improve head control. Met 7/31/17  10. Pt will demonstrate improved cervical deep flexor strength to 3+/5 to improve head control. Met 7/31/17   11. Pt will demonstrate improve balance and gait by scoring 14/28 on Tinetti. Met 8/31/17- 17/28                                      Revised 8/31/17: pt will demonstrate improved balance/ gait and decreased fall risk to moderate by scoring 19/28 on Tinetti                                            Assessment. ~Same- 15/28                        12. Pt will demonstrate improved cervical PROM for rotation to 40 degrees to improve functional head control. ~same as last assessment                                                44 degrees (L) ; 13-15 degrees (R)     Plan   Continue PT 2x weekly under established Plan of Care, with treatment to include: pt education, HEP, therapeutic exercises, neuromuscular re-education/balance exercises, therapeutic activities, joint mobilizations, manual therapy, gait training PRN, and modalities PRN, to work towards established goals. Pt may be seen by PTA to carry out plan of care.      Mae Caicedo, PTA     2/19/2018         "

## 2018-02-23 ENCOUNTER — DOCUMENTATION ONLY (OUTPATIENT)
Dept: REHABILITATION | Facility: HOSPITAL | Age: 53
End: 2018-02-23

## 2018-02-23 NOTE — PROGRESS NOTES
"I, JORDY HensonT, met with Mae Caicedo PTA to discuss this pt's POC. Address cervical ROM- albert. R rotation. Continue with LE and core strengthening, gait with decreased UE support, sit<>stand from 19-20" height surface.    Yadi Mercado DPT  2/23/2018    Face to face consultation with supervision PT held on 02/23/2018    Mae Caicedo PTA        "

## 2018-02-26 ENCOUNTER — CLINICAL SUPPORT (OUTPATIENT)
Dept: REHABILITATION | Facility: HOSPITAL | Age: 53
End: 2018-02-26
Attending: STUDENT IN AN ORGANIZED HEALTH CARE EDUCATION/TRAINING PROGRAM
Payer: COMMERCIAL

## 2018-02-26 DIAGNOSIS — R53.1 DECREASED STRENGTH: ICD-10-CM

## 2018-02-26 DIAGNOSIS — Z74.09 IMPAIRED FUNCTIONAL MOBILITY, BALANCE, GAIT, AND ENDURANCE: ICD-10-CM

## 2018-02-26 DIAGNOSIS — R29.898 DECREASED ROM OF NECK: ICD-10-CM

## 2018-02-26 PROCEDURE — 97140 MANUAL THERAPY 1/> REGIONS: CPT | Mod: PO

## 2018-02-26 PROCEDURE — 97110 THERAPEUTIC EXERCISES: CPT | Mod: PO

## 2018-02-26 NOTE — PROGRESS NOTES
"Physical Therapy Progress Note      Name: Brigid Jernigan  Clinic Number: [id number]  Diagnosis: G71.11 (ICD-10-CM) - Myotonic dystrophy  Physician: Luis Felipe Vo MD  Treatment Orders: PT Eval and Treat          Past Medical History:   Diagnosis Date    Abnormal EKG      Myotonic dystrophy      Pacemaker      PAF (paroxysmal atrial fibrillation)      Sleep apnea           Precautions: standard  Visit #: 11/12     (Visit total 2017= 44)  Date of Eval: 5/9/2017  Extended POC: 3/22/2018        Subjective   Pt reports: "I'm good."   Sister reported: " We left her brace at home.  She won't wear it unless I bring it here."   Pain Scale: denies     Objective        Patient received individual therapy to increase strength, endurance, ROM, flexibility, posture, core stabilization and head control with activities as follows:      Pt participated in therapeutic exercises x 53 minutes to address ROM and cervical alignment:   Sci Fit recumbent stepper x 10 min.  Level 11 BUE/LE       Supine:            Cervical PROM: upper trap PROM, Rotation PROM             AROM deep cervical flex 10x            AAROM cervical rotation 5x            Cervical distraction                      SLR R 5#, L 4# 2 x10   Double leg lift with ball between knees 10x   SKTC x 15 reps with pink cook band  :     Sit>stand x 2 trials from 20" height mat with 1 attempt for success  2 x 10 reps of squats on leg press with #115 lbs applied.      Therapeutic activities to increase functional mobility x 0 min including:        Written Home Exercises: 1/29/18- side stepping at counter with 1-2 UE support  12/4/17- AROM cervical SB and rotation  assisted quad/ hip flexor stretch in side lying  eval- chin tucks, rows with OTB, hip flexor and knee extensor stretch in sidelying  Pt demo fair understanding of the education provided. Brigid demonstrated fair return demonstration of activities.      Education provided re: 12/18/17- cervical collar " "application  POC, HEP     Pt has no cultural, educational or language barriers to learning provided.     Assessment   Brigid tolerated treatment well and did not have any problems.  Pt with increased weight on leg press and began single knee to chest with pink cook band to increase leg and core strength.  Pt can benefit from continued skilled PT to address impairments to improve safety, independence, and quality of life.     Pt prognosis is Good. Pt will continue to benefit from skilled outpatient physical therapy to address the deficits listed in the problem list, provide pt/family education and to maximize pt's level of independence in the home and community environment.      Activity limitations/ Participation Restrictions:   Fall Risk - impaired balance   Weakness  Impaired muscle tone  Poor head control  Decreased ROM  Gait deviations   Decreased ambulation   Decreased activity tolerance   Decreased independence with daily activities   Requires skilled supervision to complete and progress HEP   Requires skilled PT for DME/ orthotic recommendations     Pt's spiritual, cultural and educational needs considered and pt agreeable to plan of care and GOALS as stated below:   Short term goals: 6 weeks, pt agrees to goals set. (As of 2/8/18)  1. Pt will perform chin tucks in supine with supervision to improve independent with HEP. Met 7/5/17  2. Assist pt with obtaining appropriate cervical brace to improve head control. Met 1/9/18- pt has but is not consistently wearing  3. Pt will demonstrate improved knee ext strength on left to 4/5 to improve safety with ambulation and sit<>stand. Met 9/28/17   4. Pt will demonstrate improved cervical rotation PROM to 20 degrees to improve functional head control. Met 7/5/17  5. Pt will perform sit<>stand consistently from 22" height surface with supervision to improve independence. Met 7/5/17                Revised 8/31/17:  Pt will perform sit<>stand from 19" height chair to " "improve independence with sitting in various chairs around her home and decreased need for "pop up seat". improved- pt can consistently stand from 20" height with BUE and 2-3 attempts, supervision without verbal cues     Long term goals: 12 weeks, pt agrees to goals set  6. Pt will perform basic HEP for gross strengthening with supervision to deter worsening of functional limitations. Met 8/31/17- pt's sister reports poor current compliance  7. Pt will demonstrate improved bilateral hip strength to 3/5 to improve balance and independence with mobility. Met 7/5/17  8. New 7/5/17: Pt will demonstrate improved bilateral hip strength to 4/5 to improve balance and independence with mobility. improved-  WALESKA= 4+/5, hip ext R= 4-/5, L= 3+/5, hip abd R=4/5, L=4+/5  9. Pt will demonstrate improved cervical extension strength to 3+/5 to improve head control. Met 7/31/17  10. Pt will demonstrate improved cervical deep flexor strength to 3+/5 to improve head control. Met 7/31/17   11. Pt will demonstrate improve balance and gait by scoring 14/28 on Tinetti. Met 8/31/17- 17/28                                      Revised 8/31/17: pt will demonstrate improved balance/ gait and decreased fall risk to moderate by scoring 19/28 on Tinetti                                            Assessment. ~Same- 15/28                        12. Pt will demonstrate improved cervical PROM for rotation to 40 degrees to improve functional head control. ~same as last assessment                                                44 degrees (L) ; 13-15 degrees (R)     Plan   Continue PT 2x weekly under established Plan of Care, with treatment to include: pt education, HEP, therapeutic exercises, neuromuscular re-education/balance exercises, therapeutic activities, joint mobilizations, manual therapy, gait training PRN, and modalities PRN, to work towards established goals. Pt may be seen by PTA to carry out plan of care.      Mae Caicedo, " PTA     2/26/2018

## 2018-03-01 ENCOUNTER — CLINICAL SUPPORT (OUTPATIENT)
Dept: REHABILITATION | Facility: HOSPITAL | Age: 53
End: 2018-03-01
Attending: STUDENT IN AN ORGANIZED HEALTH CARE EDUCATION/TRAINING PROGRAM
Payer: COMMERCIAL

## 2018-03-01 DIAGNOSIS — R53.1 DECREASED STRENGTH: ICD-10-CM

## 2018-03-01 DIAGNOSIS — R29.898 DECREASED ROM OF NECK: ICD-10-CM

## 2018-03-01 DIAGNOSIS — Z74.09 IMPAIRED FUNCTIONAL MOBILITY, BALANCE, GAIT, AND ENDURANCE: ICD-10-CM

## 2018-03-01 PROCEDURE — 97530 THERAPEUTIC ACTIVITIES: CPT | Mod: PO | Performed by: PHYSICAL THERAPIST

## 2018-03-01 PROCEDURE — 97110 THERAPEUTIC EXERCISES: CPT | Mod: PO | Performed by: PHYSICAL THERAPIST

## 2018-03-01 NOTE — PROGRESS NOTES
"Physical Therapy Progress Note      Name: Brigid Jernigan  Clinic Number: [id number]  Diagnosis: G71.11 (ICD-10-CM) - Myotonic dystrophy  Physician: Luis Felipe Vo MD  Treatment Orders: PT Eval and Treat          Past Medical History:   Diagnosis Date    Abnormal EKG      Myotonic dystrophy      Pacemaker      PAF (paroxysmal atrial fibrillation)      Sleep apnea           Precautions: standard  Visit #: 11/12     (Visit total 2017= 44)  Date of Eval: 5/9/2017  Extended POC: 3/22/2018        Subjective   Pt reports: no new complaints  Sister reported: brought her neck brace in  Pain Scale: denies     Objective        Patient received individual therapy to increase strength, endurance, ROM, flexibility, posture, core stabilization and head control with activities as follows:      Pt participated in therapeutic exercises x 33 minutes to address ROM and cervical alignment:   Sci Fit recumbent stepper x 10 min.  Level 11 BUE/LE    Supine: bridging with feet on ball 15x   LTR with feet on ball 15x   SKTC x 15 reps with pink cook band  2 x 10 reps of squats on leg press with #115 lbs applied.     NP today:    Sit>stand x 2 trials from 20" height mat with 1 attempt for success  Cervical PROM: upper trap PROM, Rotation PROM             AROM deep cervical flex 10x            AAROM cervical rotation 5x            Cervical distraction                      SLR R 5#, L 4# 2 x10   Double leg lift with ball between knees 10x     Therapeutic activities to increase functional mobility x 12 min including:   ballet bar: gait with 1 UE support- 6 x 15ft with mod VC to improve L pelvic advancement   Side stepping with BUE support 4 x 15ft      Written Home Exercises: 1/29/18- side stepping at counter with 1-2 UE support  12/4/17- AROM cervical SB and rotation  assisted quad/ hip flexor stretch in side lying  eval- chin tucks, rows with OTB, hip flexor and knee extensor stretch in sidelying  Pt demo fair understanding of the " education provided. Brigid demonstrated fair return demonstration of activities.      Education provided re: 12/18/17- cervical collar application  POC, HEP     Pt has no cultural, educational or language barriers to learning provided.     Assessment   Brigid tolerated treatment well. Pt demonstrates decreased ability to support body on LLE. Pt does not adequately advance left hip for initial contact- terminal stance. Pt also demonstrates significant flexion at hips with weight bearing on LLE. Pt is demonstrating improved upright trunk posture with decreased thoracic kyphosis in sitting and while ambulating with RW.  Posture worsens with fatigue. Pt can benefit from continued skilled PT to address impairments to improve safety, independence, and quality of life.     Pt prognosis is Good. Pt will continue to benefit from skilled outpatient physical therapy to address the deficits listed in the problem list, provide pt/family education and to maximize pt's level of independence in the home and community environment.      Activity limitations/ Participation Restrictions:   Fall Risk - impaired balance   Weakness  Impaired muscle tone  Poor head control  Decreased ROM  Gait deviations   Decreased ambulation   Decreased activity tolerance   Decreased independence with daily activities   Requires skilled supervision to complete and progress HEP   Requires skilled PT for DME/ orthotic recommendations     Pt's spiritual, cultural and educational needs considered and pt agreeable to plan of care and GOALS as stated below:   Short term goals: 6 weeks, pt agrees to goals set. (As of 2/8/18)  1. Pt will perform chin tucks in supine with supervision to improve independent with HEP. Met 7/5/17  2. Assist pt with obtaining appropriate cervical brace to improve head control. Met 1/9/18- pt has but is not consistently wearing  3. Pt will demonstrate improved knee ext strength on left to 4/5 to improve safety with ambulation and  "sit<>stand. Met 9/28/17   4. Pt will demonstrate improved cervical rotation PROM to 20 degrees to improve functional head control. Met 7/5/17  5. Pt will perform sit<>stand consistently from 22" height surface with supervision to improve independence. Met 7/5/17                Revised 8/31/17:  Pt will perform sit<>stand from 19" height chair to improve independence with sitting in various chairs around her home and decreased need for "pop up seat". improved- pt can consistently stand from 20" height with BUE and 2-3 attempts, supervision without verbal cues     Long term goals: 12 weeks, pt agrees to goals set  6. Pt will perform basic HEP for gross strengthening with supervision to deter worsening of functional limitations. Met 8/31/17- pt's sister reports poor current compliance  7. Pt will demonstrate improved bilateral hip strength to 3/5 to improve balance and independence with mobility. Met 7/5/17  8. New 7/5/17: Pt will demonstrate improved bilateral hip strength to 4/5 to improve balance and independence with mobility. improved-  WALESKA= 4+/5, hip ext R= 4-/5, L= 3+/5, hip abd R=4/5, L=4+/5  9. Pt will demonstrate improved cervical extension strength to 3+/5 to improve head control. Met 7/31/17  10. Pt will demonstrate improved cervical deep flexor strength to 3+/5 to improve head control. Met 7/31/17   11. Pt will demonstrate improve balance and gait by scoring 14/28 on Tinetti. Met 8/31/17- 17/28                                      Revised 8/31/17: pt will demonstrate improved balance/ gait and decreased fall risk to moderate by scoring 19/28 on Tinetti                                            Assessment. ~Same- 15/28                        12. Pt will demonstrate improved cervical PROM for rotation to 40 degrees to improve functional head control. ~same as last assessment                                                44 degrees (L) ; 13-15 degrees (R)     Plan   Continue PT 2x weekly under established " Plan of Care, with treatment to include: pt education, HEP, therapeutic exercises, neuromuscular re-education/balance exercises, therapeutic activities, joint mobilizations, manual therapy, gait training PRN, and modalities PRN, to work towards established goals. Pt may be seen by PTA to carry out plan of care.      Yadi Mercado, PT     3/1/2018

## 2018-03-05 ENCOUNTER — CLINICAL SUPPORT (OUTPATIENT)
Dept: REHABILITATION | Facility: HOSPITAL | Age: 53
End: 2018-03-05
Attending: STUDENT IN AN ORGANIZED HEALTH CARE EDUCATION/TRAINING PROGRAM
Payer: COMMERCIAL

## 2018-03-05 DIAGNOSIS — Z74.09 IMPAIRED FUNCTIONAL MOBILITY, BALANCE, GAIT, AND ENDURANCE: ICD-10-CM

## 2018-03-05 DIAGNOSIS — R53.1 DECREASED STRENGTH: ICD-10-CM

## 2018-03-05 DIAGNOSIS — R29.898 DECREASED ROM OF NECK: ICD-10-CM

## 2018-03-05 PROCEDURE — 97110 THERAPEUTIC EXERCISES: CPT | Mod: PO

## 2018-03-05 NOTE — PROGRESS NOTES
"Physical Therapy Progress Note      Name: Brigid Jernigan  Clinic Number: [id number]  Diagnosis: G71.11 (ICD-10-CM) - Myotonic dystrophy  Physician: Luis Felipe Vo MD  Treatment Orders: PT Eval and Treat          Past Medical History:   Diagnosis Date    Abnormal EKG      Myotonic dystrophy      Pacemaker      PAF (paroxysmal atrial fibrillation)      Sleep apnea           Precautions: standard  Visit #: 12/12     (Visit total 2017= 44)  Date of Eval: 5/9/2017  Extended POC: 3/22/2018        Subjective   Pt reports: " I'm feeling good."   Sister reported: " We are late."  Pain Scale: denies     Objective     Pt 18 min late to tx session.  Able to accommodate.     Patient received individual therapy to increase strength, endurance, ROM, flexibility, posture, core stabilization and head control with activities as follows:      Pt participated in therapeutic exercises x 45 minutes to address ROM and cervical alignment:   Sci Fit recumbent stepper x 10 min.  Level 11 BUE/LE    Supine:    Cervical PROM: upper trap PROM, Rotation PROM           AROM deep cervical flex 10x          AAROM cervical rotation 5x   SLR R 5#, L 4# 2 x10   Double leg lift with ball between knees 10x   bridging with feet on ball 15x   LTR with feet on ball 15x   SKTC x 15 reps with pink Mira Dx band    Machine weights:    1 x 10 reps of squats on leg press with #120 lbs applied.    X 5 reps of squats on leg press with #115lbs applied.         Written Home Exercises: 1/29/18- side stepping at counter with 1-2 UE support  12/4/17- AROM cervical SB and rotation  assisted quad/ hip flexor stretch in side lying  eval- chin tucks, rows with OTB, hip flexor and knee extensor stretch in sidelying  Pt demo fair understanding of the education provided. Brigid demonstrated fair return demonstration of activities.      Education provided re: 12/18/17- cervical collar application  POC, HEP     Pt has no cultural, educational or language barriers to " "learning provided.     Assessment   Brigid tolerated treatment well and did not have any complaints.  Pt was able to progress to 120lbs on the leg press, but decreased reps noted.  Cont with B LE and core strengthening exercises.   Pt can benefit from continued skilled PT to address impairments to improve safety, independence, and quality of life.     Pt prognosis is Good. Pt will continue to benefit from skilled outpatient physical therapy to address the deficits listed in the problem list, provide pt/family education and to maximize pt's level of independence in the home and community environment.      Activity limitations/ Participation Restrictions:   Fall Risk - impaired balance   Weakness  Impaired muscle tone  Poor head control  Decreased ROM  Gait deviations   Decreased ambulation   Decreased activity tolerance   Decreased independence with daily activities   Requires skilled supervision to complete and progress HEP   Requires skilled PT for DME/ orthotic recommendations     Pt's spiritual, cultural and educational needs considered and pt agreeable to plan of care and GOALS as stated below:   Short term goals: 6 weeks, pt agrees to goals set. (As of 2/8/18)  1. Pt will perform chin tucks in supine with supervision to improve independent with HEP. Met 7/5/17  2. Assist pt with obtaining appropriate cervical brace to improve head control. Met 1/9/18- pt has but is not consistently wearing  3. Pt will demonstrate improved knee ext strength on left to 4/5 to improve safety with ambulation and sit<>stand. Met 9/28/17   4. Pt will demonstrate improved cervical rotation PROM to 20 degrees to improve functional head control. Met 7/5/17  5. Pt will perform sit<>stand consistently from 22" height surface with supervision to improve independence. Met 7/5/17                Revised 8/31/17:  Pt will perform sit<>stand from 19" height chair to improve independence with sitting in various chairs around her home and " "decreased need for "pop up seat". improved- pt can consistently stand from 20" height with BUE and 2-3 attempts, supervision without verbal cues     Long term goals: 12 weeks, pt agrees to goals set  6. Pt will perform basic HEP for gross strengthening with supervision to deter worsening of functional limitations. Met 8/31/17- pt's sister reports poor current compliance  7. Pt will demonstrate improved bilateral hip strength to 3/5 to improve balance and independence with mobility. Met 7/5/17  8. New 7/5/17: Pt will demonstrate improved bilateral hip strength to 4/5 to improve balance and independence with mobility. improved-  WALESKA= 4+/5, hip ext R= 4-/5, L= 3+/5, hip abd R=4/5, L=4+/5  9. Pt will demonstrate improved cervical extension strength to 3+/5 to improve head control. Met 7/31/17  10. Pt will demonstrate improved cervical deep flexor strength to 3+/5 to improve head control. Met 7/31/17   11. Pt will demonstrate improve balance and gait by scoring 14/28 on Tinetti. Met 8/31/17- 17/28                                      Revised 8/31/17: pt will demonstrate improved balance/ gait and decreased fall risk to moderate by scoring 19/28 on Tinetti                                            Assessment. ~Same- 15/28                        12. Pt will demonstrate improved cervical PROM for rotation to 40 degrees to improve functional head control. ~same as last assessment                                                44 degrees (L) ; 13-15 degrees (R)     Plan   Continue PT 2x weekly under established Plan of Care, with treatment to include: pt education, HEP, therapeutic exercises, neuromuscular re-education/balance exercises, therapeutic activities, joint mobilizations, manual therapy, gait training PRN, and modalities PRN, to work towards established goals. Pt may be seen by PTA to carry out plan of care.      Mae Caicedo, PTA     3/5/2018         "

## 2018-03-15 ENCOUNTER — CLINICAL SUPPORT (OUTPATIENT)
Dept: REHABILITATION | Facility: HOSPITAL | Age: 53
End: 2018-03-15
Attending: STUDENT IN AN ORGANIZED HEALTH CARE EDUCATION/TRAINING PROGRAM
Payer: COMMERCIAL

## 2018-03-15 DIAGNOSIS — R29.898 DECREASED ROM OF NECK: ICD-10-CM

## 2018-03-15 DIAGNOSIS — R53.1 DECREASED STRENGTH: ICD-10-CM

## 2018-03-15 DIAGNOSIS — Z74.09 IMPAIRED FUNCTIONAL MOBILITY, BALANCE, GAIT, AND ENDURANCE: ICD-10-CM

## 2018-03-15 PROCEDURE — 97110 THERAPEUTIC EXERCISES: CPT | Mod: PO | Performed by: PHYSICAL THERAPIST

## 2018-03-15 NOTE — PLAN OF CARE
Physical Therapy Progress Note      Name: Brigid Jernigan  Clinic Number: [id number]  Diagnosis: G71.11 (ICD-10-CM) - Myotonic dystrophy  Physician: Luis Felipe Vo MD  Treatment Orders: PT Eval and Treat          Past Medical History:   Diagnosis Date    Abnormal EKG      Myotonic dystrophy      Pacemaker      PAF (paroxysmal atrial fibrillation)      Sleep apnea           Precautions: standard  Visit #: 3/8    (previous visits 2018= 12)   (Visit total 2017= 44)  Date of Eval: 5/9/2017  Extended POC: 6/14/2018        Subjective   Pt reports: no new complaints  Pain Scale: denies     Objective        Patient received individual therapy to increase strength, endurance, ROM, flexibility, posture, core stabilization and head control with activities as follows:      Pt participated in therapeutic exercises x 45 minutes to address ROM and cervical alignment:     Sci Fit recumbent stepper x 10 min.  Level 11 BUE/LE         2 x 10 reps of squats on leg press with #120 lbs applied.           Ballet bar: side stepping with BUE support    March with emphasis on stance phase with 1 UE support     Supine: LTR with feet on ball 20x             KTC with physioball 20x      Cervical PROM: L rotation 40 degrees, R rotation 18 degrees    Tinetti Balance Assessment  Balance:  1. Sitting Balance 1   Leans/ slides in chair= 0   Steady= 1  2. Rises from chair 1   Unable without help= 0   Able, uses arms= 1   Able without use of arms= 2  3. Attempts to rise 1   Unable without help= 0   Able, >1 attmept required-= 1   Able, 1 attempt= 2  4. Immediate standing balance (1st 5 seconds) 1   Unsteady (swaggers, moves feet, trunk sway)= 0   Steady but uses walker or other support= 1   Narrow base of support without walker or support= 2  5. Nudged 2   Begins to fall= 0   Staggers, grabs, catches self= 1   Steady= 2  6. Standing balance 1   Unsteady= 0   Steady but wide LEYDI or uses AD=1   Narrow LEYDI without AD=2  7. Eyes closed  "1   Unsteady= 0   Steady= 1  8. Turning 360 degrees 1   Discontinuous steps= 0   Continuous steps= 1   Unsteady= 0   Steady= 1  9. Sitting down 1   Unsafe= 0   Uses arms or not in a smooth motion= 1   Safe, smooth motion= 2  Balance score= 10  Gait:  1. Initiation 1   hesitates or multiple attempts to start= 0   No hesitancy= 1  2. Step length 2, R foot passes L minimally   Step to= 0   One foot passes= 1   reciprocal pattern= 2  3. Step height 2    Neither foot clears floor= 0   One foot clears floor= 1   Both feet clear floor= 2  4. Step symmetry 0   Not symmetrical= 0   Appears symmetrical= 1  5. Step continuity 1   Not continuous= 0   Appears continuous= 1  6. Path 1   Marked deviation= 0   Mild/moderate deviation or uses A.D.= 1   Straight without A.D.= 2  7. Trunk 1   Marked sway or uses A.D.= 0   No sway, but flexes knees or back, spread arms out while walking= 1   No sway, no flexion, no use of UE, no use of A.D.= 2  8. Walking stance 0   Heels apart= 0   Heels almost touching while walking= 1  Gait score= 8  Total score= 18 , high fall risk         NP today:    Sit>stand x 2 trials from 20" height mat with 1 attempt for success  Cervical PROM: upper trap PROM, Rotation PROM             AROM deep cervical flex 10x            AAROM cervical rotation 5x            Cervical distraction                      SLR R 5#, L 4# 2 x10            Double leg lift with ball between knees 10x       Written Home Exercises: 1/29/18- side stepping at counter with 1-2 UE support  12/4/17- AROM cervical SB and rotation  assisted quad/ hip flexor stretch in side lying  eval- chin tucks, rows with OTB, hip flexor and knee extensor stretch in sidelying  Pt demo fair understanding of the education provided. Brigid demonstrated fair return demonstration of activities.      Education provided re: 12/18/17- cervical collar application  POC, HEP     Pt has no cultural, educational or language barriers to learning " "provided.     Assessment   Brigid tolerated treatment well, pt is highly motivated and demonstrates improvement in STS height to 20.5 in with Supervision, improvement in Tinetti balance assessment, and an increase in R cervical ROM. Pt's posture worsens with fatigue and her ambulating balance is unpredictable, close SBA at all times. Pt can benefit from continued skilled PT to address impairments such as cervical ROM, LLE strength, balance and functional ambulation in order to improve safety, independence, and quality of life.     Pt prognosis is Good. Pt will continue to benefit from skilled outpatient physical therapy to address the deficits listed in the problem list, provide pt/family education and to maximize pt's level of independence in the home and community environment.      Activity limitations/ Participation Restrictions:   Fall Risk - impaired balance   Weakness  Impaired muscle tone  Poor head control  Decreased ROM  Gait deviations   Decreased ambulation   Decreased activity tolerance   Decreased independence with daily activities   Requires skilled supervision to complete and progress HEP   Requires skilled PT for DME/ orthotic recommendations     Pt's spiritual, cultural and educational needs considered and pt agreeable to plan of care and GOALS as stated below:   Short term goals: 6 weeks, pt agrees to goals set. (As of 3/15/18)  1. Pt will perform chin tucks in supine with supervision to improve independent with HEP. Met 7/5/17  2. Assist pt with obtaining appropriate cervical brace to improve head control. Met 1/9/18- pt has but is not consistently wearing  3. Pt will demonstrate improved knee ext strength on left to 4/5 to improve safety with ambulation and sit<>stand. Met 9/28/17   4. Pt will demonstrate improved cervical rotation PROM to 20 degrees to improve functional head control. Met 7/5/17  5. Pt will perform sit<>stand consistently from 22" height surface with supervision to improve " "independence. Met 7/5/17                Revised 8/31/17:  Pt will perform sit<>stand from 19" height chair to improve independence with sitting in various chairs around her home and decreased need for "pop up seat". improved as of 3/15/18- pt stood from 20.5" height with BUE and 2-3 attempts, supervision without verbal cues      Long term goals: 12 weeks, pt agrees to goals set  6. Pt will perform basic HEP for gross strengthening with supervision to deter worsening of functional limitations. Met 8/31/17- pt's sister reports poor current compliance  7. Pt will demonstrate improved bilateral hip strength to 3/5 to improve balance and independence with mobility. Met 7/5/17  8. New 7/5/17: Pt will demonstrate improved bilateral hip strength to 4/5 to improve balance and independence with mobility. improved-  WALESKA= 4+/5, hip ext R= 4-/5, L= 3+/5, hip abd R=4/5, L=4+/5  9. Pt will demonstrate improved cervical extension strength to 3+/5 to improve head control. Met 7/31/17  10. Pt will demonstrate improved cervical deep flexor strength to 3+/5 to improve head control. Met 7/31/17   11. Pt will demonstrate improve balance and gait by scoring 14/28 on Tinetti. Met 8/31/17- 17/28                                      Revised 8/31/17: pt will demonstrate improved balance/ gait and decreased fall risk to moderate by scoring 19/28 on Tinetti                                            Assessment. Improved 3/15/18- 18/28                        12. Pt will demonstrate improved cervical PROM for rotation to 40 degrees to improve functional head control. ~same as last assessment                                                40 degrees (L) ; 18 degrees (R)     Plan   POC extended x 12 weeks to allow for continnued functional gains. Continue PT 2x weekly under established Plan of Care, with treatment to include: pt education, HEP, therapeutic exercises, neuromuscular re-education/balance exercises, therapeutic activities, joint " mobilizations, manual therapy, gait training PRN, and modalities PRN, to work towards established goals. Pt may be seen by PTA to carry out plan of care.     Gerardo Khan, SPT    I, Yadi Mercado, JORDYT, certify that I was directly present in the room directing the student in service delivery and guiding them using my skilled judgment. As the co-signing therapist I have reviewed the students documentation and am responsible for the treatment, assessment, and plan.     Yadi Mercado DPT  3/15/2018

## 2018-03-19 ENCOUNTER — CLINICAL SUPPORT (OUTPATIENT)
Dept: REHABILITATION | Facility: HOSPITAL | Age: 53
End: 2018-03-19
Attending: STUDENT IN AN ORGANIZED HEALTH CARE EDUCATION/TRAINING PROGRAM
Payer: COMMERCIAL

## 2018-03-19 DIAGNOSIS — R53.1 DECREASED STRENGTH: ICD-10-CM

## 2018-03-19 DIAGNOSIS — R29.898 DECREASED ROM OF NECK: ICD-10-CM

## 2018-03-19 DIAGNOSIS — Z74.09 IMPAIRED FUNCTIONAL MOBILITY, BALANCE, GAIT, AND ENDURANCE: ICD-10-CM

## 2018-03-19 PROCEDURE — 97110 THERAPEUTIC EXERCISES: CPT | Mod: PO

## 2018-03-19 NOTE — PROGRESS NOTES
"Physical Therapy Progress Note      Name: Brigid Jernigan  Clinic Number: [id number]  Diagnosis: G71.11 (ICD-10-CM) - Myotonic dystrophy  Physician: Luis Felipe Vo MD  Treatment Orders: PT Eval and Treat          Past Medical History:   Diagnosis Date    Abnormal EKG      Myotonic dystrophy      Pacemaker      PAF (paroxysmal atrial fibrillation)      Sleep apnea           Precautions: standard  Visit #: 3/8     (previous visits 2018= 12)   (Visit total 2017= 44)  Date of Eval: 5/9/2017  Extended POC: 6/14/2018        Subjective   Pt reports: " I'm doing good."   Sister reported:   Pain Scale: denies     Objective      Patient received individual therapy to increase strength, endurance, ROM, flexibility, posture, core stabilization and head control with activities as follows:      Pt participated in therapeutic exercises x 53 minutes to address ROM and cervical alignment:   Sci Fit recumbent stepper x 10 min.  Level 11 BUE/LE    Supine:    Cervical PROM: upper trap PROM, Rotation PROM           AROM deep cervical flex 10x          AAROM cervical rotation 5x   SLR R 5#, L 4# 2 x10   Double leg lift with ball between knees 10x   bridging with feet on ball 15x   LTR with feet on ball 15x      Machine weights:    1 x 15 reps of squats on leg press with #120 lbs applied.            Written Home Exercises: 1/29/18- side stepping at counter with 1-2 UE support  12/4/17- AROM cervical SB and rotation  assisted quad/ hip flexor stretch in side lying  eval- chin tucks, rows with OTB, hip flexor and knee extensor stretch in sidelying  Pt demo fair understanding of the education provided. Brigid demonstrated fair return demonstration of activities.      Education provided re: 12/18/17- cervical collar application  POC, HEP     Pt has no cultural, educational or language barriers to learning provided.     Assessment   Brigid tolerated treatment well and did not have any complaints. Pt with increased reps on leg press " "and no other changes noted.  Pt requested to only perform supine therex 2* fatigue.     Pt can benefit from continued skilled PT to address impairments to improve safety, independence, and quality of life.     Pt prognosis is Good. Pt will continue to benefit from skilled outpatient physical therapy to address the deficits listed in the problem list, provide pt/family education and to maximize pt's level of independence in the home and community environment.      Activity limitations/ Participation Restrictions:   Fall Risk - impaired balance   Weakness  Impaired muscle tone  Poor head control  Decreased ROM  Gait deviations   Decreased ambulation   Decreased activity tolerance   Decreased independence with daily activities   Requires skilled supervision to complete and progress HEP   Requires skilled PT for DME/ orthotic recommendations     Pt's spiritual, cultural and educational needs considered and pt agreeable to plan of care and GOALS as stated below:   Short term goals: 6 weeks, pt agrees to goals set. (As of 2/8/18)  1. Pt will perform chin tucks in supine with supervision to improve independent with HEP. Met 7/5/17  2. Assist pt with obtaining appropriate cervical brace to improve head control. Met 1/9/18- pt has but is not consistently wearing  3. Pt will demonstrate improved knee ext strength on left to 4/5 to improve safety with ambulation and sit<>stand. Met 9/28/17   4. Pt will demonstrate improved cervical rotation PROM to 20 degrees to improve functional head control. Met 7/5/17  5. Pt will perform sit<>stand consistently from 22" height surface with supervision to improve independence. Met 7/5/17                Revised 8/31/17:  Pt will perform sit<>stand from 19" height chair to improve independence with sitting in various chairs around her home and decreased need for "pop up seat". improved- pt can consistently stand from 20" height with BUE and 2-3 attempts, supervision without verbal " cues     Long term goals: 12 weeks, pt agrees to goals set  6. Pt will perform basic HEP for gross strengthening with supervision to deter worsening of functional limitations. Met 8/31/17- pt's sister reports poor current compliance  7. Pt will demonstrate improved bilateral hip strength to 3/5 to improve balance and independence with mobility. Met 7/5/17  8. New 7/5/17: Pt will demonstrate improved bilateral hip strength to 4/5 to improve balance and independence with mobility. improved-  WALESKA= 4+/5, hip ext R= 4-/5, L= 3+/5, hip abd R=4/5, L=4+/5  9. Pt will demonstrate improved cervical extension strength to 3+/5 to improve head control. Met 7/31/17  10. Pt will demonstrate improved cervical deep flexor strength to 3+/5 to improve head control. Met 7/31/17   11. Pt will demonstrate improve balance and gait by scoring 14/28 on Tinetti. Met 8/31/17- 17/28                                      Revised 8/31/17: pt will demonstrate improved balance/ gait and decreased fall risk to moderate by scoring 19/28 on Tinetti                                            Assessment. ~Same- 15/28                        12. Pt will demonstrate improved cervical PROM for rotation to 40 degrees to improve functional head control. ~same as last assessment                                                44 degrees (L) ; 13-15 degrees (R)     Plan   Continue PT 2x weekly under established Plan of Care, with treatment to include: pt education, HEP, therapeutic exercises, neuromuscular re-education/balance exercises, therapeutic activities, joint mobilizations, manual therapy, gait training PRN, and modalities PRN, to work towards established goals. Pt may be seen by PTA to carry out plan of care.      Mae Caicedo, PTA     3/19/2018

## 2018-03-22 ENCOUNTER — CLINICAL SUPPORT (OUTPATIENT)
Dept: REHABILITATION | Facility: HOSPITAL | Age: 53
End: 2018-03-22
Attending: STUDENT IN AN ORGANIZED HEALTH CARE EDUCATION/TRAINING PROGRAM
Payer: COMMERCIAL

## 2018-03-22 DIAGNOSIS — R53.1 DECREASED STRENGTH: ICD-10-CM

## 2018-03-22 DIAGNOSIS — Z74.09 IMPAIRED FUNCTIONAL MOBILITY, BALANCE, GAIT, AND ENDURANCE: ICD-10-CM

## 2018-03-22 DIAGNOSIS — R29.898 DECREASED ROM OF NECK: ICD-10-CM

## 2018-03-22 PROCEDURE — 97110 THERAPEUTIC EXERCISES: CPT | Mod: PO | Performed by: PHYSICAL THERAPIST

## 2018-03-22 NOTE — PROGRESS NOTES
Physical Therapy Progress Note      Name: Brigid Jernigan  Clinic Number: [id number]  Diagnosis: G71.11 (ICD-10-CM) - Myotonic dystrophy  Physician: Luis Felipe Vo MD  Treatment Orders: PT Eval and Treat          Past Medical History:   Diagnosis Date    Abnormal EKG      Myotonic dystrophy      Pacemaker      PAF (paroxysmal atrial fibrillation)      Sleep apnea           Precautions: standard  Visit #: 4/8     (previous visits 2018= 12)   (Visit total 2017= 44)  Date of Eval: 5/9/2017  Extended POC: 6/14/2018        Subjective   Pt reports: Pt reports she is doing good, no new complaints  Sister reported: Brought the cervical collar for Brigid to wear during therapy  Pain Scale: denies     Objective   Pt arrived 15 mins late to tx, able to accommodate.     Patient received individual therapy to increase strength, endurance, ROM, flexibility, posture, core stabilization and head control with activities as follows:      Pt participated in therapeutic exercises x 42 minutes to address ROM and cervical alignment:     Sci Fit recumbent stepper x 10 min.  Level 11 BUE/LE     Supine:          Cervical PROM: Rotation PROM           AROM deep cervical flex 10x   AAROM cervical extension x10          AAROM cervical rotation 10x            SLR R 5#, L 4# 2 x10         Double leg lift with ball between knees 15x         bridging with feet on ball 20x         LTR with feet on ball 20x     Machine weights:             1 x 10 reps of squats on leg press with #120 lbs applied, x5 reps with #125 lbs    Manual therapy x8 mins of the following:  B Upper Trap stretch 2 x 30 sec  Manual cervical distraction x 3 mins        Written Home Exercises: 1/29/18- side stepping at counter with 1-2 UE support  12/4/17- AROM cervical SB and rotation  assisted quad/ hip flexor stretch in side lying  eval- chin tucks, rows with OTB, hip flexor and knee extensor stretch in sidelying  Pt demo fair understanding of the education provided.  Brigid demonstrated fair return demonstration of activities.      Education provided re: 12/18/17- cervical collar application  POC, HEP     Pt has no cultural, educational or language barriers to learning provided.     Assessment   Brigid tolerated treatment well, sister brought the C-collar and pt tolerated wearing it during SciFit recumbent stepper but not for ther ex. Pt required [place] for last 10 reps of SLR on the LLE in order to prevent the hip from flexing. Pt completes all ther ex with minimal VC and no assist, added repetitions today and pt tolerated with no additional fatigue. Pt can benefit from continued skilled PT to address impairments to improve safety, independence, and quality of life.     Pt prognosis is Good. Pt will continue to benefit from skilled outpatient physical therapy to address the deficits listed in the problem list, provide pt/family education and to maximize pt's level of independence in the home and community environment.      Activity limitations/ Participation Restrictions:   Fall Risk - impaired balance   Weakness  Impaired muscle tone  Poor head control  Decreased ROM  Gait deviations   Decreased ambulation   Decreased activity tolerance   Decreased independence with daily activities   Requires skilled supervision to complete and progress HEP   Requires skilled PT for DME/ orthotic recommendations     Pt's spiritual, cultural and educational needs considered and pt agreeable to plan of care and GOALS as stated below:    Short term goals: 6 weeks, pt agrees to goals set. (As of 3/15/18)  1. Pt will perform chin tucks in supine with supervision to improve independent with HEP. Met 7/5/17  2. Assist pt with obtaining appropriate cervical brace to improve head control. Met 1/9/18- pt has but is not consistently wearing  3. Pt will demonstrate improved knee ext strength on left to 4/5 to improve safety with ambulation and sit<>stand. Met 9/28/17   4. Pt will demonstrate improved  "cervical rotation PROM to 20 degrees to improve functional head control. Met 7/5/17  5. Pt will perform sit<>stand consistently from 22" height surface with supervision to improve independence. Met 7/5/17                Revised 8/31/17:  Pt will perform sit<>stand from 19" height chair to improve independence with sitting in various chairs around her home and decreased need for "pop up seat". improved as of 3/15/18- pt stood from 20.5" height with BUE and 2-3 attempts, supervision without verbal cues      Long term goals: 12 weeks, pt agrees to goals set  6. Pt will perform basic HEP for gross strengthening with supervision to deter worsening of functional limitations. Met 8/31/17- pt's sister reports poor current compliance  7. Pt will demonstrate improved bilateral hip strength to 3/5 to improve balance and independence with mobility. Met 7/5/17  8. New 7/5/17: Pt will demonstrate improved bilateral hip strength to 4/5 to improve balance and independence with mobility. improved-  WALESKA= 4+/5, hip ext R= 4-/5, L= 3+/5, hip abd R=4/5, L=4+/5  9. Pt will demonstrate improved cervical extension strength to 3+/5 to improve head control. Met 7/31/17  10. Pt will demonstrate improved cervical deep flexor strength to 3+/5 to improve head control. Met 7/31/17   11. Pt will demonstrate improve balance and gait by scoring 14/28 on Tinetti. Met 8/31/17- 17/28                                      Revised 8/31/17: pt will demonstrate improved balance/ gait and decreased fall risk to moderate by scoring 19/28 on Tinetti                                            Assessment. Improved 3/15/18- 18/28                        12. Pt will demonstrate improved cervical PROM for rotation to 40 degrees to improve functional head control. ~same as last assessment                                                40 degrees (L) ; 18 degrees (R)  Plan   Continue PT 2x weekly under established Plan of Care, with treatment to include: pt education, " HEP, therapeutic exercises, neuromuscular re-education/balance exercises, therapeutic activities, joint mobilizations, manual therapy, gait training PRN, and modalities PRN, to work towards established goals. Pt may be seen by PTA to carry out plan of care.      Gerardo Khan, SPT    I, Yadi Mercado, DPT, certify that I was present in the room directing the student in service delivery and guiding them using my skilled judgment. As the co-signing therapist I have reviewed the students documentation and am responsible for the treatment, assessment, and plan.     Yadi Mercado DPT  3/22/2018

## 2018-03-27 ENCOUNTER — DOCUMENTATION ONLY (OUTPATIENT)
Dept: REHABILITATION | Facility: HOSPITAL | Age: 53
End: 2018-03-27

## 2018-03-27 NOTE — PROGRESS NOTES
I, JORDY HensonT, met with Mae Caicedo PTA to discuss this pt's POC. Continue with LE and core strengthening to improve sit<>stand ability and standing balance. continue to address cervical rotation ROM especially to right. Practice gait with 1 UE support at ballet bar with gait belt on. Encourage upright posture when stepping.    Yadi Mercado DPT  3/27/2018    Face to face consultation with supervision PT held on 03/27/2018    Mae Caicedo PTA

## 2018-03-28 ENCOUNTER — CLINICAL SUPPORT (OUTPATIENT)
Dept: REHABILITATION | Facility: HOSPITAL | Age: 53
End: 2018-03-28
Attending: STUDENT IN AN ORGANIZED HEALTH CARE EDUCATION/TRAINING PROGRAM
Payer: COMMERCIAL

## 2018-03-28 DIAGNOSIS — Z74.09 IMPAIRED FUNCTIONAL MOBILITY, BALANCE, GAIT, AND ENDURANCE: ICD-10-CM

## 2018-03-28 DIAGNOSIS — R53.1 DECREASED STRENGTH: ICD-10-CM

## 2018-03-28 DIAGNOSIS — R29.898 DECREASED ROM OF NECK: ICD-10-CM

## 2018-03-28 PROCEDURE — 97140 MANUAL THERAPY 1/> REGIONS: CPT | Mod: PO

## 2018-03-28 PROCEDURE — 97110 THERAPEUTIC EXERCISES: CPT | Mod: PO

## 2018-03-28 NOTE — PROGRESS NOTES
"Physical Therapy Progress Note      Name: Brigid Jernigan  Clinic Number: [id number]  Diagnosis: G71.11 (ICD-10-CM) - Myotonic dystrophy  Physician: Luis Felipe Vo MD  Treatment Orders: PT Eval and Treat          Past Medical History:   Diagnosis Date    Abnormal EKG      Myotonic dystrophy      Pacemaker      PAF (paroxysmal atrial fibrillation)      Sleep apnea           Precautions: standard  Visit #: 5/8     (previous visits 2018= 12)   (Visit total 2017= 44)  Date of Eval: 5/9/2017  Extended POC: 6/14/2018        Subjective   Pt reports: " Im good."   Sister reported: " I forgot to bring her collar and she won't wear it if I don't bring it."   Pain Scale: denies     Objective   Pt arrived 5 mins late to tx    Patient received individual therapy to increase strength, endurance, ROM, flexibility, posture, core stabilization and head control with activities as follows:      Pt participated in therapeutic exercises x 42 minutes to address ROM and cervical alignment:     Sci Fit recumbent stepper x 10 min.  Level 11 BUE/LE     Supine:          Cervical PROM: Rotation PROM           AROM deep cervical flex 10x   AAROM cervical extension x10          AAROM cervical rotation 10x            SLR R 5#, L 4# 2 x10         Double leg lift with ball between knees 15x         bridging with feet on ball 20x         LTR with feet on ball 20x     Machine weights:             1 x 10 reps of squats on leg press with #120 lbs applied, x5 reps with #125 lbs    Manual therapy x8 mins of the following:  B Upper Trap stretch 2 x 30 sec  Manual cervical distraction x 3 mins        Written Home Exercises: 1/29/18- side stepping at counter with 1-2 UE support  12/4/17- AROM cervical SB and rotation  assisted quad/ hip flexor stretch in side lying  eval- chin tucks, rows with OTB, hip flexor and knee extensor stretch in sidelying  Pt demo fair understanding of the education provided. Brigid demonstrated fair return demonstration " "of activities.      Education provided re: 12/18/17- cervical collar application  POC, HEP     Pt has no cultural, educational or language barriers to learning provided.     Assessment   Brigid tolerated treatment well and did not have any problems.  Pt required VC's for posture during gait 2* not having her C-collar donned on.  No changes noted to tx session.   Pt can benefit from continued skilled PT to address impairments to improve safety, independence, and quality of life.     Pt prognosis is Good. Pt will continue to benefit from skilled outpatient physical therapy to address the deficits listed in the problem list, provide pt/family education and to maximize pt's level of independence in the home and community environment.      Activity limitations/ Participation Restrictions:   Fall Risk - impaired balance   Weakness  Impaired muscle tone  Poor head control  Decreased ROM  Gait deviations   Decreased ambulation   Decreased activity tolerance   Decreased independence with daily activities   Requires skilled supervision to complete and progress HEP   Requires skilled PT for DME/ orthotic recommendations     Pt's spiritual, cultural and educational needs considered and pt agreeable to plan of care and GOALS as stated below:   Short term goals: 6 weeks, pt agrees to goals set. (As of 2/8/18)  1. Pt will perform chin tucks in supine with supervision to improve independent with HEP. Met 7/5/17  2. Assist pt with obtaining appropriate cervical brace to improve head control. Met 1/9/18- pt has but is not consistently wearing  3. Pt will demonstrate improved knee ext strength on left to 4/5 to improve safety with ambulation and sit<>stand. Met 9/28/17   4. Pt will demonstrate improved cervical rotation PROM to 20 degrees to improve functional head control. Met 7/5/17  5. Pt will perform sit<>stand consistently from 22" height surface with supervision to improve independence. Met 7/5/17                Revised " "8/31/17:  Pt will perform sit<>stand from 19" height chair to improve independence with sitting in various chairs around her home and decreased need for "pop up seat". improved- pt can consistently stand from 20" height with BUE and 2-3 attempts, supervision without verbal cues     Long term goals: 12 weeks, pt agrees to goals set  6. Pt will perform basic HEP for gross strengthening with supervision to deter worsening of functional limitations. Met 8/31/17- pt's sister reports poor current compliance  7. Pt will demonstrate improved bilateral hip strength to 3/5 to improve balance and independence with mobility. Met 7/5/17  8. New 7/5/17: Pt will demonstrate improved bilateral hip strength to 4/5 to improve balance and independence with mobility. improved-  WALESKA= 4+/5, hip ext R= 4-/5, L= 3+/5, hip abd R=4/5, L=4+/5  9. Pt will demonstrate improved cervical extension strength to 3+/5 to improve head control. Met 7/31/17  10. Pt will demonstrate improved cervical deep flexor strength to 3+/5 to improve head control. Met 7/31/17   11. Pt will demonstrate improve balance and gait by scoring 14/28 on Tinetti. Met 8/31/17- 17/28                                      Revised 8/31/17: pt will demonstrate improved balance/ gait and decreased fall risk to moderate by scoring 19/28 on Tinetti                                            Assessment. ~Same- 15/28                        12. Pt will demonstrate improved cervical PROM for rotation to 40 degrees to improve functional head control. ~same as last assessment                                                44 degrees (L) ; 13-15 degrees (R)     Plan   Continue PT 2x weekly under established Plan of Care, with treatment to include: pt education, HEP, therapeutic exercises, neuromuscular re-education/balance exercises, therapeutic activities, joint mobilizations, manual therapy, gait training PRN, and modalities PRN, to work towards established goals. Pt may be seen by PTA " to carry out plan of care.      Mae Caicedo, PTA  03/28/2018

## 2018-03-29 PROBLEM — N60.81: Status: ACTIVE | Noted: 2018-03-29

## 2018-04-02 ENCOUNTER — CLINICAL SUPPORT (OUTPATIENT)
Dept: REHABILITATION | Facility: HOSPITAL | Age: 53
End: 2018-04-02
Attending: STUDENT IN AN ORGANIZED HEALTH CARE EDUCATION/TRAINING PROGRAM
Payer: COMMERCIAL

## 2018-04-02 DIAGNOSIS — R53.1 DECREASED STRENGTH: ICD-10-CM

## 2018-04-02 DIAGNOSIS — R29.898 DECREASED ROM OF NECK: ICD-10-CM

## 2018-04-02 DIAGNOSIS — Z74.09 IMPAIRED FUNCTIONAL MOBILITY, BALANCE, GAIT, AND ENDURANCE: ICD-10-CM

## 2018-04-02 PROCEDURE — 97110 THERAPEUTIC EXERCISES: CPT | Mod: PO

## 2018-04-02 NOTE — PROGRESS NOTES
"Physical Therapy Progress Note      Name: Brigid Jernigan  Clinic Number: [id number]  Diagnosis: G71.11 (ICD-10-CM) - Myotonic dystrophy  Physician: Luis Felipe Vo MD  Treatment Orders: PT Eval and Treat          Past Medical History:   Diagnosis Date    Abnormal EKG      Myotonic dystrophy      Pacemaker      PAF (paroxysmal atrial fibrillation)      Sleep apnea           Precautions: standard  Visit #: 6/8     (previous visits 2018= 12)   (Visit total 2017= 44)  Date of Eval: 5/9/2017  Extended POC: 6/14/2018        Subjective   Pt reports: "I brought my collar my I'm not going to put it on."   Sister reported:    Pain Scale: denies     Objective   Pt arrived 5 mins late to tx    Patient received individual therapy to increase strength, endurance, ROM, flexibility, posture, core stabilization and head control with activities as follows:      Pt participated in therapeutic exercises x 42 minutes to address ROM and cervical alignment:     Sci Fit recumbent stepper x 10 min.  Level 11 BUE/LE     Supine:          Cervical PROM: Rotation PROM           AROM deep cervical flex 10x   AAROM cervical extension x10          AAROM cervical rotation 10x            SLR R 5#, L 4# 2 x10         Double leg lift with ball between knees 15x         bridging with feet on ball 20x         LTR with feet on ball 20x     Machine weights:             1 x 10 reps of squats on leg press with #120 lbs applied, x5 reps with #125 lbs    Manual therapy x8 mins of the following:  B Upper Trap stretch 2 x 30 sec  Manual cervical distraction x 3 mins        Written Home Exercises: 1/29/18- side stepping at counter with 1-2 UE support  12/4/17- AROM cervical SB and rotation  assisted quad/ hip flexor stretch in side lying  eval- chin tucks, rows with OTB, hip flexor and knee extensor stretch in sidelying  Pt demo fair understanding of the education provided. Brigid demonstrated fair return demonstration of activities.      Education " "provided re: 12/18/17- cervical collar application  POC, HEP     Pt has no cultural, educational or language barriers to learning provided.     Assessment   Brigid tolerated treatment well and did not have any problems.  Pt refused to wear C-Collar even though she had it with her.  Requested supine exercises but wants to do // bar exercises next tx session.    Remains motivated.  Pt can benefit from continued skilled PT to address impairments to improve safety, independence, and quality of life.     Pt prognosis is Good. Pt will continue to benefit from skilled outpatient physical therapy to address the deficits listed in the problem list, provide pt/family education and to maximize pt's level of independence in the home and community environment.      Activity limitations/ Participation Restrictions:   Fall Risk - impaired balance   Weakness  Impaired muscle tone  Poor head control  Decreased ROM  Gait deviations   Decreased ambulation   Decreased activity tolerance   Decreased independence with daily activities   Requires skilled supervision to complete and progress HEP   Requires skilled PT for DME/ orthotic recommendations     Pt's spiritual, cultural and educational needs considered and pt agreeable to plan of care and GOALS as stated below:   Short term goals: 6 weeks, pt agrees to goals set. (As of 2/8/18)  1. Pt will perform chin tucks in supine with supervision to improve independent with HEP. Met 7/5/17  2. Assist pt with obtaining appropriate cervical brace to improve head control. Met 1/9/18- pt has but is not consistently wearing  3. Pt will demonstrate improved knee ext strength on left to 4/5 to improve safety with ambulation and sit<>stand. Met 9/28/17   4. Pt will demonstrate improved cervical rotation PROM to 20 degrees to improve functional head control. Met 7/5/17  5. Pt will perform sit<>stand consistently from 22" height surface with supervision to improve independence. Met " "7/5/17                Revised 8/31/17:  Pt will perform sit<>stand from 19" height chair to improve independence with sitting in various chairs around her home and decreased need for "pop up seat". improved- pt can consistently stand from 20" height with BUE and 2-3 attempts, supervision without verbal cues     Long term goals: 12 weeks, pt agrees to goals set  6. Pt will perform basic HEP for gross strengthening with supervision to deter worsening of functional limitations. Met 8/31/17- pt's sister reports poor current compliance  7. Pt will demonstrate improved bilateral hip strength to 3/5 to improve balance and independence with mobility. Met 7/5/17  8. New 7/5/17: Pt will demonstrate improved bilateral hip strength to 4/5 to improve balance and independence with mobility. improved-  WALESKA= 4+/5, hip ext R= 4-/5, L= 3+/5, hip abd R=4/5, L=4+/5  9. Pt will demonstrate improved cervical extension strength to 3+/5 to improve head control. Met 7/31/17  10. Pt will demonstrate improved cervical deep flexor strength to 3+/5 to improve head control. Met 7/31/17   11. Pt will demonstrate improve balance and gait by scoring 14/28 on Tinetti. Met 8/31/17- 17/28                                      Revised 8/31/17: pt will demonstrate improved balance/ gait and decreased fall risk to moderate by scoring 19/28 on Tinetti                                            Assessment. ~Same- 15/28                        12. Pt will demonstrate improved cervical PROM for rotation to 40 degrees to improve functional head control. ~same as last assessment                                                44 degrees (L) ; 13-15 degrees (R)     Plan   Continue PT 2x weekly under established Plan of Care, with treatment to include: pt education, HEP, therapeutic exercises, neuromuscular re-education/balance exercises, therapeutic activities, joint mobilizations, manual therapy, gait training PRN, and modalities PRN, to work towards established " goals. Pt may be seen by PTA to carry out plan of care.      Mae Caicedo, PTA  04/02/2018

## 2018-04-04 NOTE — PLAN OF CARE
Physical Therapy Progress Note      Name: Brigid Jernigan  Clinic Number: [id number]  Diagnosis: G71.11 (ICD-10-CM) - Myotonic dystrophy  Physician: Luis Felipe Vo MD  Treatment Orders: PT Eval and Treat          Past Medical History:   Diagnosis Date    Abnormal EKG      Myotonic dystrophy      Pacemaker      PAF (paroxysmal atrial fibrillation)      Sleep apnea           Precautions: standard  Visit #: 4/8     (previous visits 2018= 12)   (Visit total 2017= 44)  Date of Eval: 5/9/2017  Extended POC: 6/14/2018        Subjective   Pt reports: Pt reports she is doing good, no new complaints  Sister reported: Brought the cervical collar for Brigid to wear during therapy  Pain Scale: denies     Objective   Pt arrived 15 mins late to tx, able to accommodate.     Patient received individual therapy to increase strength, endurance, ROM, flexibility, posture, core stabilization and head control with activities as follows:      Pt participated in therapeutic exercises x 42 minutes to address ROM and cervical alignment:     Sci Fit recumbent stepper x 10 min.  Level 11 BUE/LE     Supine:          Cervical PROM: Rotation PROM           AROM deep cervical flex 10x   AAROM cervical extension x10          AAROM cervical rotation 10x            SLR R 5#, L 4# 2 x10         Double leg lift with ball between knees 15x         bridging with feet on ball 20x         LTR with feet on ball 20x     Machine weights:             1 x 10 reps of squats on leg press with #120 lbs applied, x5 reps with #125 lbs    Manual therapy x8 mins of the following:  B Upper Trap stretch 2 x 30 sec  Manual cervical distraction x 3 mins        Written Home Exercises: 1/29/18- side stepping at counter with 1-2 UE support  12/4/17- AROM cervical SB and rotation  assisted quad/ hip flexor stretch in side lying  eval- chin tucks, rows with OTB, hip flexor and knee extensor stretch in sidelying  Pt demo fair understanding of the education provided.  Brigid demonstrated fair return demonstration of activities.      Education provided re: 12/18/17- cervical collar application  POC, HEP     Pt has no cultural, educational or language barriers to learning provided.     Assessment   Brigid tolerated treatment well, sister brought the C-collar and pt tolerated wearing it during SciFit recumbent stepper but not for ther ex. Pt required [place] for last 10 reps of SLR on the LLE in order to prevent the hip from flexing. Pt completes all ther ex with minimal VC and no assist, added repetitions today and pt tolerated with no additional fatigue. Pt can benefit from continued skilled PT to address impairments to improve safety, independence, and quality of life.     Pt prognosis is Good. Pt will continue to benefit from skilled outpatient physical therapy to address the deficits listed in the problem list, provide pt/family education and to maximize pt's level of independence in the home and community environment.      Activity limitations/ Participation Restrictions:   Fall Risk - impaired balance   Weakness  Impaired muscle tone  Poor head control  Decreased ROM  Gait deviations   Decreased ambulation   Decreased activity tolerance   Decreased independence with daily activities   Requires skilled supervision to complete and progress HEP   Requires skilled PT for DME/ orthotic recommendations     Pt's spiritual, cultural and educational needs considered and pt agreeable to plan of care and GOALS as stated below:    Short term goals: 6 weeks, pt agrees to goals set. (As of 3/15/18)  1. Pt will perform chin tucks in supine with supervision to improve independent with HEP. Met 7/5/17  2. Assist pt with obtaining appropriate cervical brace to improve head control. Met 1/9/18- pt has but is not consistently wearing  3. Pt will demonstrate improved knee ext strength on left to 4/5 to improve safety with ambulation and sit<>stand. Met 9/28/17   4. Pt will demonstrate improved  "cervical rotation PROM to 20 degrees to improve functional head control. Met 7/5/17  5. Pt will perform sit<>stand consistently from 22" height surface with supervision to improve independence. Met 7/5/17                Revised 8/31/17:  Pt will perform sit<>stand from 19" height chair to improve independence with sitting in various chairs around her home and decreased need for "pop up seat". improved as of 3/15/18- pt stood from 20.5" height with BUE and 2-3 attempts, supervision without verbal cues      Long term goals: 12 weeks, pt agrees to goals set  6. Pt will perform basic HEP for gross strengthening with supervision to deter worsening of functional limitations. Met 8/31/17- pt's sister reports poor current compliance  7. Pt will demonstrate improved bilateral hip strength to 3/5 to improve balance and independence with mobility. Met 7/5/17  8. New 7/5/17: Pt will demonstrate improved bilateral hip strength to 4/5 to improve balance and independence with mobility. improved-  WALESKA= 4+/5, hip ext R= 4-/5, L= 3+/5, hip abd R=4/5, L=4+/5  9. Pt will demonstrate improved cervical extension strength to 3+/5 to improve head control. Met 7/31/17  10. Pt will demonstrate improved cervical deep flexor strength to 3+/5 to improve head control. Met 7/31/17   11. Pt will demonstrate improve balance and gait by scoring 14/28 on Tinetti. Met 8/31/17- 17/28                                      Revised 8/31/17: pt will demonstrate improved balance/ gait and decreased fall risk to moderate by scoring 19/28 on Tinetti                                            Assessment. Improved 3/15/18- 18/28                        12. Pt will demonstrate improved cervical PROM for rotation to 40 degrees to improve functional head control. ~same as last assessment                                                40 degrees (L) ; 18 degrees (R)  Plan   Continue PT 2x weekly under established Plan of Care, with treatment to include: pt education, " HEP, therapeutic exercises, neuromuscular re-education/balance exercises, therapeutic activities, joint mobilizations, manual therapy, gait training PRN, and modalities PRN, to work towards established goals. Pt may be seen by PTA to carry out plan of care.      Gerardo Khan, SPT    I, Yadi Mercado, DPT, certify that I was present in the room directing the student in service delivery and guiding them using my skilled judgment. As the co-signing therapist I have reviewed the students documentation and am responsible for the treatment, assessment, and plan.     Yadi Mercado DPT  3/22/2018        patricia

## 2018-04-05 ENCOUNTER — CLINICAL SUPPORT (OUTPATIENT)
Dept: REHABILITATION | Facility: HOSPITAL | Age: 53
End: 2018-04-05
Attending: STUDENT IN AN ORGANIZED HEALTH CARE EDUCATION/TRAINING PROGRAM
Payer: COMMERCIAL

## 2018-04-05 DIAGNOSIS — R53.1 DECREASED STRENGTH: ICD-10-CM

## 2018-04-05 DIAGNOSIS — R29.898 DECREASED ROM OF NECK: ICD-10-CM

## 2018-04-05 DIAGNOSIS — Z74.09 IMPAIRED FUNCTIONAL MOBILITY, BALANCE, GAIT, AND ENDURANCE: ICD-10-CM

## 2018-04-05 PROCEDURE — 97140 MANUAL THERAPY 1/> REGIONS: CPT | Mod: PO | Performed by: PHYSICAL THERAPIST

## 2018-04-05 PROCEDURE — 97110 THERAPEUTIC EXERCISES: CPT | Mod: PO | Performed by: PHYSICAL THERAPIST

## 2018-04-05 NOTE — PROGRESS NOTES
"Physical Therapy Progress Note      Name: Brigid Jernigan  Clinic Number: [id number]  Diagnosis: G71.11 (ICD-10-CM) - Myotonic dystrophy  Physician: Luis Felipe Vo MD  Treatment Orders: PT Eval and Treat          Past Medical History:   Diagnosis Date    Abnormal EKG      Myotonic dystrophy      Pacemaker      PAF (paroxysmal atrial fibrillation)      Sleep apnea           Precautions: standard  Visit #: 3/12 (exp. 5/9/18)     (previous visits 2018= 16)   (Visit total 2017= 44)  Date of Eval: 5/9/2017  Extended POC: 6/14/2018        Subjective   Pt reports: "no brace today, I'm not wearing it"  Sister reported:  "I don't even bother bringing the brace anymore."  Pain Scale: denies     Objective   Pt arrived 15 mins late to tx.     Patient received individual therapy to increase strength, endurance, ROM, flexibility, posture, core stabilization and head control with activities as follows:      Pt participated in therapeutic exercises x 32 minutes to address ROM and cervical alignment:      Sci Fit recumbent stepper x 10 min.  Level 11 BUE/LE     Supine:          Cervical PROM: Rotation PROM          AROM deep cervical flex 10x         AAROM cervical extension x10         AAROM cervical rotation 10x         Double leg lift with ball between knees 15x         bridging with feet on ball 20x         LTR with feet on ball 20x   Double KTC with legs on physioball 10x     Machine weights:             1 x 10 reps of squats on leg press with #120 lbs applied, 1 x 10 reps with #125 lbs     Manual therapy x 8 mins of the following:  B Upper Trap stretch 2 x 30 sec  Manual cervical distraction x 3 mins        Written Home Exercises: 1/29/18- side stepping at counter with 1-2 UE support  12/4/17- AROM cervical SB and rotation  assisted quad/ hip flexor stretch in side lying  eval- chin tucks, rows with OTB, hip flexor and knee extensor stretch in sidelying  Pt demo fair understanding of the education provided. Brigid " "demonstrated fair return demonstration of activities.      Education provided re: 12/18/17- cervical collar application  POC, HEP     Pt has no cultural, educational or language barriers to learning provided.     Assessment   Brigid tolerated treatment well, pt continues to refuse C-Collar. Tolerated an increase of reps in supine ther ex well, has requested to post-pone // bar exercise until next session. Remains motivated. Pt can benefit from continued skilled PT to address impairments to improve safety, independence, and quality of life.     Pt prognosis is Good. Pt will continue to benefit from skilled outpatient physical therapy to address the deficits listed in the problem list, provide pt/family education and to maximize pt's level of independence in the home and community environment.      Activity limitations/ Participation Restrictions:   Fall Risk - impaired balance   Weakness  Impaired muscle tone  Poor head control  Decreased ROM  Gait deviations   Decreased ambulation   Decreased activity tolerance   Decreased independence with daily activities   Requires skilled supervision to complete and progress HEP   Requires skilled PT for DME/ orthotic recommendations     Pt's spiritual, cultural and educational needs considered and pt agreeable to plan of care and GOALS as stated below:   Short term goals: 6 weeks, pt agrees to goals set. (As of 3/15/18)  1. Pt will perform chin tucks in supine with supervision to improve independent with HEP. Met 7/5/17  2. Assist pt with obtaining appropriate cervical brace to improve head control. Met 1/9/18- pt has but is not consistently wearing  3. Pt will demonstrate improved knee ext strength on left to 4/5 to improve safety with ambulation and sit<>stand. Met 9/28/17   4. Pt will demonstrate improved cervical rotation PROM to 20 degrees to improve functional head control. Met 7/5/17  5. Pt will perform sit<>stand consistently from 22" height surface with supervision to " "improve independence. Met 7/5/17                Revised 8/31/17:  Pt will perform sit<>stand from 19" height chair to improve independence with sitting in various chairs around her home and decreased need for "pop up seat". improved as of 3/15/18- pt stood from 20.5" height with BUE and 2-3 attempts, supervision without verbal cues      Long term goals: 12 weeks, pt agrees to goals set  6. Pt will perform basic HEP for gross strengthening with supervision to deter worsening of functional limitations. Met 8/31/17- pt's sister reports poor current compliance  7. Pt will demonstrate improved bilateral hip strength to 3/5 to improve balance and independence with mobility. Met 7/5/17  8. New 7/5/17: Pt will demonstrate improved bilateral hip strength to 4/5 to improve balance and independence with mobility. improved-  WALESKA= 4+/5, hip ext R= 4-/5, L= 3+/5, hip abd R=4/5, L=4+/5  9. Pt will demonstrate improved cervical extension strength to 3+/5 to improve head control. Met 7/31/17  10. Pt will demonstrate improved cervical deep flexor strength to 3+/5 to improve head control. Met 7/31/17   11. Pt will demonstrate improve balance and gait by scoring 14/28 on Tinetti. Met 8/31/17- 17/28                                      Revised 8/31/17: pt will demonstrate improved balance/ gait and decreased fall risk to moderate by scoring 19/28 on Tinetti                                            Assessment. Improved 3/15/18- 18/28                        12. Pt will demonstrate improved cervical PROM for rotation to 40 degrees to improve functional head control. ~same as last assessment                                                40 degrees (L) ; 18 degrees (R)        Plan   Continue PT 2x weekly under established Plan of Care, with treatment to include: pt education, HEP, therapeutic exercises, neuromuscular re-education/balance exercises, therapeutic activities, joint mobilizations, manual therapy, gait training PRN, and " modalities PRN, to work towards established goals. Pt may be seen by PTA to carry out plan of care.     Gerardo Khan, SPT    I, Yadi Mercado, DPT, certify that I was present in the room directing the student in service delivery and guiding them using my skilled judgment. As the co-signing therapist I have reviewed the students documentation and am responsible for the treatment, assessment, and plan.     Yadi Mercado, JORDYT  4/5/2018

## 2018-04-09 ENCOUNTER — CLINICAL SUPPORT (OUTPATIENT)
Dept: REHABILITATION | Facility: HOSPITAL | Age: 53
End: 2018-04-09
Attending: STUDENT IN AN ORGANIZED HEALTH CARE EDUCATION/TRAINING PROGRAM
Payer: COMMERCIAL

## 2018-04-09 DIAGNOSIS — R53.1 DECREASED STRENGTH: ICD-10-CM

## 2018-04-09 DIAGNOSIS — R29.898 DECREASED ROM OF NECK: ICD-10-CM

## 2018-04-09 DIAGNOSIS — Z74.09 IMPAIRED FUNCTIONAL MOBILITY, BALANCE, GAIT, AND ENDURANCE: ICD-10-CM

## 2018-04-09 PROCEDURE — 97110 THERAPEUTIC EXERCISES: CPT | Mod: PO

## 2018-04-09 PROCEDURE — 97140 MANUAL THERAPY 1/> REGIONS: CPT | Mod: PO

## 2018-04-09 NOTE — PROGRESS NOTES
"Physical Therapy Progress Note      Name: Brigid Jernigan  Clinic Number: [id number]  Diagnosis: G71.11 (ICD-10-CM) - Myotonic dystrophy  Physician: Luis Felipe Vo MD  Treatment Orders: PT Eval and Treat          Past Medical History:   Diagnosis Date    Abnormal EKG      Myotonic dystrophy      Pacemaker      PAF (paroxysmal atrial fibrillation)      Sleep apnea           Precautions: standard  Visit #: 4/12 (exp. 5/9/18)     (previous visits 2018= 16)   (Visit total 2017= 44)  Date of Eval: 5/9/2017  Extended POC: 6/14/2018        Subjective   Pt reports: "no brace today, I'm not wearing it"  Sister reported:  "I don't even bother bringing the brace anymore."  Pain Scale: denies     Objective   Pt arrived 20 mins late to tx. Unable to accommodate.       Patient received individual therapy to increase strength, endurance, ROM, flexibility, posture, core stabilization and head control with activities as follows:      Pt participated in therapeutic exercises x 22 minutes to address ROM and cervical alignment:      Sci Fit recumbent stepper x 10 min.  Level 11 BUE/LE     Supine:          Cervical PROM: Rotation PROM          AROM deep cervical flex 10x         AAROM cervical extension x10         AAROM cervical rotation 10x         Double leg lift with ball between knees 15x         bridging with feet on ball 20x         LTR with feet on ball 20x   Double KTC with legs on physioball 10x     Machine weights:             1 x 10 reps of squats on leg press with #120 lbs applied, 1 x 10 reps with #125 lbs     Manual therapy x 8 mins of the following:  B Upper Trap stretch 2 x 30 sec  Manual cervical distraction x 3 mins        Written Home Exercises: 1/29/18- side stepping at counter with 1-2 UE support  12/4/17- AROM cervical SB and rotation  assisted quad/ hip flexor stretch in side lying  eval- chin tucks, rows with OTB, hip flexor and knee extensor stretch in sidelying  Pt demo fair understanding of the " "education provided. Brigid demonstrated fair return demonstration of activities.      Education provided re: 12/18/17- cervical collar application  POC, HEP     Pt has no cultural, educational or language barriers to learning provided.     Assessment   Brigid tolerated treatment well, pt continues to refuse C-Collar. Pt limited in progression 2* time constraint.  Pt requested to post-pone balet bar exercise again until next session. Remains motivated. Pt can benefit from continued skilled PT to address impairments to improve safety, independence, and quality of life.     Pt prognosis is Good. Pt will continue to benefit from skilled outpatient physical therapy to address the deficits listed in the problem list, provide pt/family education and to maximize pt's level of independence in the home and community environment.      Activity limitations/ Participation Restrictions:   Fall Risk - impaired balance   Weakness  Impaired muscle tone  Poor head control  Decreased ROM  Gait deviations   Decreased ambulation   Decreased activity tolerance   Decreased independence with daily activities   Requires skilled supervision to complete and progress HEP   Requires skilled PT for DME/ orthotic recommendations     Pt's spiritual, cultural and educational needs considered and pt agreeable to plan of care and GOALS as stated below:   Short term goals: 6 weeks, pt agrees to goals set. (As of 3/15/18)  1. Pt will perform chin tucks in supine with supervision to improve independent with HEP. Met 7/5/17  2. Assist pt with obtaining appropriate cervical brace to improve head control. Met 1/9/18- pt has but is not consistently wearing  3. Pt will demonstrate improved knee ext strength on left to 4/5 to improve safety with ambulation and sit<>stand. Met 9/28/17   4. Pt will demonstrate improved cervical rotation PROM to 20 degrees to improve functional head control. Met 7/5/17  5. Pt will perform sit<>stand consistently from 22" " "height surface with supervision to improve independence. Met 7/5/17                Revised 8/31/17:  Pt will perform sit<>stand from 19" height chair to improve independence with sitting in various chairs around her home and decreased need for "pop up seat". improved as of 3/15/18- pt stood from 20.5" height with BUE and 2-3 attempts, supervision without verbal cues      Long term goals: 12 weeks, pt agrees to goals set  6. Pt will perform basic HEP for gross strengthening with supervision to deter worsening of functional limitations. Met 8/31/17- pt's sister reports poor current compliance  7. Pt will demonstrate improved bilateral hip strength to 3/5 to improve balance and independence with mobility. Met 7/5/17  8. New 7/5/17: Pt will demonstrate improved bilateral hip strength to 4/5 to improve balance and independence with mobility. improved-  WALESKA= 4+/5, hip ext R= 4-/5, L= 3+/5, hip abd R=4/5, L=4+/5  9. Pt will demonstrate improved cervical extension strength to 3+/5 to improve head control. Met 7/31/17  10. Pt will demonstrate improved cervical deep flexor strength to 3+/5 to improve head control. Met 7/31/17   11. Pt will demonstrate improve balance and gait by scoring 14/28 on Tinetti. Met 8/31/17- 17/28                                      Revised 8/31/17: pt will demonstrate improved balance/ gait and decreased fall risk to moderate by scoring 19/28 on Tinetti                                            Assessment. Improved 3/15/18- 18/28                        12. Pt will demonstrate improved cervical PROM for rotation to 40 degrees to improve functional head control. ~same as last assessment                                                40 degrees (L) ; 18 degrees (R)        Plan   Continue PT 2x weekly under established Plan of Care, with treatment to include: pt education, HEP, therapeutic exercises, neuromuscular re-education/balance exercises, therapeutic activities, joint mobilizations, manual " therapy, gait training PRN, and modalities PRN, to work towards established goals. Pt may be seen by PTA to carry out plan of care.     Mae Caicedo, PTA  04/09/2018

## 2018-04-11 ENCOUNTER — CLINICAL SUPPORT (OUTPATIENT)
Dept: REHABILITATION | Facility: HOSPITAL | Age: 53
End: 2018-04-11
Attending: STUDENT IN AN ORGANIZED HEALTH CARE EDUCATION/TRAINING PROGRAM
Payer: COMMERCIAL

## 2018-04-11 DIAGNOSIS — R29.898 DECREASED ROM OF NECK: ICD-10-CM

## 2018-04-11 DIAGNOSIS — R53.1 DECREASED STRENGTH: ICD-10-CM

## 2018-04-11 DIAGNOSIS — Z74.09 IMPAIRED FUNCTIONAL MOBILITY, BALANCE, GAIT, AND ENDURANCE: ICD-10-CM

## 2018-04-11 PROCEDURE — 97110 THERAPEUTIC EXERCISES: CPT | Mod: PO

## 2018-04-11 NOTE — PROGRESS NOTES
"Physical Therapy Progress Note      Name: Brigid Jernigan  Clinic Number: [id number]  Diagnosis: G71.11 (ICD-10-CM) - Myotonic dystrophy  Physician: Luis Felipe Vo MD  Treatment Orders: PT Eval and Treat          Past Medical History:   Diagnosis Date    Abnormal EKG      Myotonic dystrophy      Pacemaker      PAF (paroxysmal atrial fibrillation)      Sleep apnea           Precautions: standard  Visit #: 5/12 (exp. 5/9/18)     (previous visits 2018= 16)   (Visit total 2017= 44)  Date of Eval: 5/9/2017  Extended POC: 6/14/2018        Subjective   Pt reports: "no brace today, I'm not wearing it"  Sister reported:  "I don't even bother bringing the brace anymore."  Pain Scale: denies     Objective   Pt arrived 15 mins late to tx.      Patient received individual therapy to increase strength, endurance, ROM, flexibility, posture, core stabilization and head control with activities as follows:      Pt participated in therapeutic exercises x 45 minutes to address ROM and cervical alignment:      Sci Fit recumbent stepper x 10 min.  Level 12 BUE/LE     Supine:          Cervical PROM: Rotation PROM          AROM deep cervical flex 10x         AAROM cervical extension x10         AAROM cervical rotation 10x           Standing:   X 8 laps of forward ambulation with 1 UE support and SBA          Machine weights:             2 x 10 reps of squats with #125 lbs     Manual therapy x 8 mins of the following:  B Upper Trap stretch 2 x 30 sec  Manual cervical distraction x 3 mins        Written Home Exercises: 1/29/18- side stepping at counter with 1-2 UE support  12/4/17- AROM cervical SB and rotation  assisted quad/ hip flexor stretch in side lying  eval- chin tucks, rows with OTB, hip flexor and knee extensor stretch in sidelying  Pt demo fair understanding of the education provided. Brigid demonstrated fair return demonstration of activities.      Education provided re: 12/18/17- cervical collar application  POC, " "HEP     Pt has no cultural, educational or language barriers to learning provided.     Assessment   Brigid tolerated treatment well and agreeable to wearing the C collar while on the stepper and while on the ambulating by the ballet bar for gait with 1 UE support.  Pt was able to increase resistance on the stepper and performed all squats on the leg press with #125lb applied.   Pt can benefit from continued skilled PT to address impairments to improve safety, independence, and quality of life.     Pt prognosis is Good. Pt will continue to benefit from skilled outpatient physical therapy to address the deficits listed in the problem list, provide pt/family education and to maximize pt's level of independence in the home and community environment.      Activity limitations/ Participation Restrictions:   Fall Risk - impaired balance   Weakness  Impaired muscle tone  Poor head control  Decreased ROM  Gait deviations   Decreased ambulation   Decreased activity tolerance   Decreased independence with daily activities   Requires skilled supervision to complete and progress HEP   Requires skilled PT for DME/ orthotic recommendations     Pt's spiritual, cultural and educational needs considered and pt agreeable to plan of care and GOALS as stated below:   Short term goals: 6 weeks, pt agrees to goals set. (As of 3/15/18)  1. Pt will perform chin tucks in supine with supervision to improve independent with HEP. Met 7/5/17  2. Assist pt with obtaining appropriate cervical brace to improve head control. Met 1/9/18- pt has but is not consistently wearing  3. Pt will demonstrate improved knee ext strength on left to 4/5 to improve safety with ambulation and sit<>stand. Met 9/28/17   4. Pt will demonstrate improved cervical rotation PROM to 20 degrees to improve functional head control. Met 7/5/17  5. Pt will perform sit<>stand consistently from 22" height surface with supervision to improve independence. Met " "7/5/17                Revised 8/31/17:  Pt will perform sit<>stand from 19" height chair to improve independence with sitting in various chairs around her home and decreased need for "pop up seat". improved as of 3/15/18- pt stood from 20.5" height with BUE and 2-3 attempts, supervision without verbal cues      Long term goals: 12 weeks, pt agrees to goals set  6. Pt will perform basic HEP for gross strengthening with supervision to deter worsening of functional limitations. Met 8/31/17- pt's sister reports poor current compliance  7. Pt will demonstrate improved bilateral hip strength to 3/5 to improve balance and independence with mobility. Met 7/5/17  8. New 7/5/17: Pt will demonstrate improved bilateral hip strength to 4/5 to improve balance and independence with mobility. improved-  WALESKA= 4+/5, hip ext R= 4-/5, L= 3+/5, hip abd R=4/5, L=4+/5  9. Pt will demonstrate improved cervical extension strength to 3+/5 to improve head control. Met 7/31/17  10. Pt will demonstrate improved cervical deep flexor strength to 3+/5 to improve head control. Met 7/31/17   11. Pt will demonstrate improve balance and gait by scoring 14/28 on Tinetti. Met 8/31/17- 17/28                                      Revised 8/31/17: pt will demonstrate improved balance/ gait and decreased fall risk to moderate by scoring 19/28 on Tinetti                                            Assessment. Improved 3/15/18- 18/28                        12. Pt will demonstrate improved cervical PROM for rotation to 40 degrees to improve functional head control. ~same as last assessment                                                40 degrees (L) ; 18 degrees (R)        Plan   Continue PT 2x weekly under established Plan of Care, with treatment to include: pt education, HEP, therapeutic exercises, neuromuscular re-education/balance exercises, therapeutic activities, joint mobilizations, manual therapy, gait training PRN, and modalities PRN, to work towards " established goals. Pt may be seen by PTA to carry out plan of care.     Mae Caicedo, PTA  04/11/2018

## 2018-04-16 ENCOUNTER — CLINICAL SUPPORT (OUTPATIENT)
Dept: REHABILITATION | Facility: HOSPITAL | Age: 53
End: 2018-04-16
Attending: STUDENT IN AN ORGANIZED HEALTH CARE EDUCATION/TRAINING PROGRAM
Payer: COMMERCIAL

## 2018-04-16 DIAGNOSIS — Z74.09 IMPAIRED FUNCTIONAL MOBILITY, BALANCE, GAIT, AND ENDURANCE: ICD-10-CM

## 2018-04-16 DIAGNOSIS — R29.898 DECREASED ROM OF NECK: ICD-10-CM

## 2018-04-16 DIAGNOSIS — R53.1 DECREASED STRENGTH: ICD-10-CM

## 2018-04-16 PROCEDURE — 97110 THERAPEUTIC EXERCISES: CPT | Mod: PO

## 2018-04-16 NOTE — PROGRESS NOTES
"Physical Therapy Progress Note      Name: Brigid Jernigan  Clinic Number: [id number]  Diagnosis: G71.11 (ICD-10-CM) - Myotonic dystrophy  Physician: Luis Felipe Vo MD  Treatment Orders: PT Eval and Treat          Past Medical History:   Diagnosis Date    Abnormal EKG      Myotonic dystrophy      Pacemaker      PAF (paroxysmal atrial fibrillation)      Sleep apnea           Precautions: standard  Visit #: 6/12 (exp. 5/9/18)     (previous visits 2018= 16)   (Visit total 2017= 44)  Date of Eval: 5/9/2017  Extended POC: 6/14/2018        Subjective   Pt reports: "I'm doing ok."   Sister reported:  "I don't even bother bringing the brace anymore."  Pain Scale: denies     Objective   Pt arrived 15 mins late to tx.      Patient received individual therapy to increase strength, endurance, ROM, flexibility, posture, core stabilization and head control with activities as follows:      Pt participated in therapeutic exercises x 45 minutes to address ROM and cervical alignment:      Sci Fit recumbent stepper x 10 min.  Level 12 BUE/LE     Supine:          Cervical PROM: Rotation PROM          AROM deep cervical flex 10x         AAROM cervical extension x10         AAROM cervical rotation 10x         Double leg lift with ball between knees 15x         bridging with feet on ball 20x         LTR with feet on ball 20x         Double KTC with legs on physioball 10x                   Machine weights:             2 x 10 reps of squats with #125 lbs     Manual therapy x 8 mins of the following:  B Upper Trap stretch 2 x 30 sec  Manual cervical distraction x 3 mins        Written Home Exercises: 1/29/18- side stepping at counter with 1-2 UE support  12/4/17- AROM cervical SB and rotation  assisted quad/ hip flexor stretch in side lying  eval- chin tucks, rows with OTB, hip flexor and knee extensor stretch in sidelying  Pt demo fair understanding of the education provided. Brigid demonstrated fair return demonstration of " "activities.      Education provided re: 12/18/17- cervical collar application  POC, HEP     Pt has no cultural, educational or language barriers to learning provided.     Assessment   Brigid tolerated treatment well and agreeable to supine therex and manual therapy.  Will perform exercises at ballet bar next tx session.  No other changes to report.  Pt can benefit from continued skilled PT to address impairments to improve safety, independence, and quality of life.     Pt prognosis is Good. Pt will continue to benefit from skilled outpatient physical therapy to address the deficits listed in the problem list, provide pt/family education and to maximize pt's level of independence in the home and community environment.      Activity limitations/ Participation Restrictions:   Fall Risk - impaired balance   Weakness  Impaired muscle tone  Poor head control  Decreased ROM  Gait deviations   Decreased ambulation   Decreased activity tolerance   Decreased independence with daily activities   Requires skilled supervision to complete and progress HEP   Requires skilled PT for DME/ orthotic recommendations     Pt's spiritual, cultural and educational needs considered and pt agreeable to plan of care and GOALS as stated below:   Short term goals: 6 weeks, pt agrees to goals set. (As of 3/15/18)  1. Pt will perform chin tucks in supine with supervision to improve independent with HEP. Met 7/5/17  2. Assist pt with obtaining appropriate cervical brace to improve head control. Met 1/9/18- pt has but is not consistently wearing  3. Pt will demonstrate improved knee ext strength on left to 4/5 to improve safety with ambulation and sit<>stand. Met 9/28/17   4. Pt will demonstrate improved cervical rotation PROM to 20 degrees to improve functional head control. Met 7/5/17  5. Pt will perform sit<>stand consistently from 22" height surface with supervision to improve independence. Met 7/5/17                Revised 8/31/17:  Pt will " "perform sit<>stand from 19" height chair to improve independence with sitting in various chairs around her home and decreased need for "pop up seat". improved as of 3/15/18- pt stood from 20.5" height with BUE and 2-3 attempts, supervision without verbal cues      Long term goals: 12 weeks, pt agrees to goals set  6. Pt will perform basic HEP for gross strengthening with supervision to deter worsening of functional limitations. Met 8/31/17- pt's sister reports poor current compliance  7. Pt will demonstrate improved bilateral hip strength to 3/5 to improve balance and independence with mobility. Met 7/5/17  8. New 7/5/17: Pt will demonstrate improved bilateral hip strength to 4/5 to improve balance and independence with mobility. improved-  WALESKA= 4+/5, hip ext R= 4-/5, L= 3+/5, hip abd R=4/5, L=4+/5  9. Pt will demonstrate improved cervical extension strength to 3+/5 to improve head control. Met 7/31/17  10. Pt will demonstrate improved cervical deep flexor strength to 3+/5 to improve head control. Met 7/31/17   11. Pt will demonstrate improve balance and gait by scoring 14/28 on Tinetti. Met 8/31/17- 17/28                                      Revised 8/31/17: pt will demonstrate improved balance/ gait and decreased fall risk to moderate by scoring 19/28 on Tinetti                                            Assessment. Improved 3/15/18- 18/28                        12. Pt will demonstrate improved cervical PROM for rotation to 40 degrees to improve functional head control. ~same as last assessment                                                40 degrees (L) ; 18 degrees (R)        Plan   Continue PT 2x weekly under established Plan of Care, with treatment to include: pt education, HEP, therapeutic exercises, neuromuscular re-education/balance exercises, therapeutic activities, joint mobilizations, manual therapy, gait training PRN, and modalities PRN, to work towards established goals. Pt may be seen by PTA to carry " out plan of care.     Mae Caicedo, PTA  04/16/2018

## 2018-04-23 ENCOUNTER — CLINICAL SUPPORT (OUTPATIENT)
Dept: REHABILITATION | Facility: HOSPITAL | Age: 53
End: 2018-04-23
Attending: STUDENT IN AN ORGANIZED HEALTH CARE EDUCATION/TRAINING PROGRAM
Payer: COMMERCIAL

## 2018-04-23 DIAGNOSIS — R29.898 DECREASED ROM OF NECK: ICD-10-CM

## 2018-04-23 DIAGNOSIS — Z74.09 IMPAIRED FUNCTIONAL MOBILITY, BALANCE, GAIT, AND ENDURANCE: ICD-10-CM

## 2018-04-23 DIAGNOSIS — R53.1 DECREASED STRENGTH: ICD-10-CM

## 2018-04-23 PROCEDURE — 97110 THERAPEUTIC EXERCISES: CPT | Mod: PO

## 2018-04-23 PROCEDURE — 97140 MANUAL THERAPY 1/> REGIONS: CPT | Mod: PO

## 2018-04-23 PROCEDURE — 97116 GAIT TRAINING THERAPY: CPT | Mod: PO

## 2018-04-23 PROCEDURE — 97530 THERAPEUTIC ACTIVITIES: CPT | Mod: PO

## 2018-04-23 NOTE — PROGRESS NOTES
"Physical Therapy Progress Note      Name: Brigid Jernigan  Clinic Number: [id number]  Diagnosis: G71.11 (ICD-10-CM) - Myotonic dystrophy  Physician: Luis Felipe Vo MD  Treatment Orders: PT Eval and Treat          Past Medical History:   Diagnosis Date    Abnormal EKG      Myotonic dystrophy      Pacemaker      PAF (paroxysmal atrial fibrillation)      Sleep apnea           Precautions: standard  Visit #: 7/12 (exp. 5/9/18)     (previous visits 2018= 16)   (Visit total 2017= 44)  Date of Eval: 5/9/2017  Extended POC: 6/14/2018        Subjective   Pt reports: "I'm doing ok."   Sister reported:  " Work her hard."   Pain Scale: denies     Objective   Pt arrived 8 mins late to tx.      Patient received individual therapy to increase strength, endurance, ROM, flexibility, posture, core stabilization and head control with activities as follows:      Pt participated in therapeutic exercises x 45 minutes to address ROM and cervical alignment:      Nu Step recumbent stepper x 10 min.  Level 12 BUE/LE     Sitting EOM  Hitting a ball back and forth working on core strength and upright sitting x 5 mins    Standing at Ballet bar:  X 6 laps of forward ambulation with 1 UE support and close SBA.  VC's to increase hip flexion and upright posture    Standing by mat:  Static standing w/o UE support 2 x 30 sec  X 5 reps of single arm  R UE chest press and L UE chest press with CGA.  CGA for safety  3 x 5 reps of B UE chest press while in standing with CGA.  1 UE support required between each set    Supine:   Manual therapy x 8 mins of the following:  B Upper Trap stretch 2 x 30 sec  Manual cervical distraction x 3 mins    Cervical PROM: Rotation PROM   AROM deep cervical flex 10x  AAROM cervical extension x10  AAROM cervical rotation 10x                    Machine weights:             x 5 reps of squats with #125 lbs    X 10 reps of B squats with #130lb applied .            Written Home Exercises: 1/29/18- side stepping at " counter with 1-2 UE support  12/4/17- AROM cervical SB and rotation  assisted quad/ hip flexor stretch in side lying  eval- chin tucks, rows with OTB, hip flexor and knee extensor stretch in sidelying  Pt demo fair understanding of the education provided. Brigid demonstrated fair return demonstration of activities.      Education provided re: 12/18/17- cervical collar application  POC, HEP     Pt has no cultural, educational or language barriers to learning provided.     Assessment   Brigid tolerated treatment well and agreeable to supine therex and manual therapy.  Pt required increased VC's for gait at the // bars for increased hip flexion and upright posture but was able to tolerate the cervical brace for 20 min today during tx session.  Pt agreeable to standing balance activities today and was able to balance with out UE support for only about 15-30 sec max at a time.  Pt with increased weights on leg press noted.    Pt can benefit from continued skilled PT to address impairments to improve safety, independence, and quality of life.     Pt prognosis is Good. Pt will continue to benefit from skilled outpatient physical therapy to address the deficits listed in the problem list, provide pt/family education and to maximize pt's level of independence in the home and community environment.      Activity limitations/ Participation Restrictions:   Fall Risk - impaired balance   Weakness  Impaired muscle tone  Poor head control  Decreased ROM  Gait deviations   Decreased ambulation   Decreased activity tolerance   Decreased independence with daily activities   Requires skilled supervision to complete and progress HEP   Requires skilled PT for DME/ orthotic recommendations     Pt's spiritual, cultural and educational needs considered and pt agreeable to plan of care and GOALS as stated below:   Short term goals: 6 weeks, pt agrees to goals set. (As of 3/15/18)  1. Pt will perform chin tucks in supine with supervision  "to improve independent with HEP. Met 7/5/17  2. Assist pt with obtaining appropriate cervical brace to improve head control. Met 1/9/18- pt has but is not consistently wearing  3. Pt will demonstrate improved knee ext strength on left to 4/5 to improve safety with ambulation and sit<>stand. Met 9/28/17   4. Pt will demonstrate improved cervical rotation PROM to 20 degrees to improve functional head control. Met 7/5/17  5. Pt will perform sit<>stand consistently from 22" height surface with supervision to improve independence. Met 7/5/17                Revised 8/31/17:  Pt will perform sit<>stand from 19" height chair to improve independence with sitting in various chairs around her home and decreased need for "pop up seat". improved as of 3/15/18- pt stood from 20.5" height with BUE and 2-3 attempts, supervision without verbal cues      Long term goals: 12 weeks, pt agrees to goals set  6. Pt will perform basic HEP for gross strengthening with supervision to deter worsening of functional limitations. Met 8/31/17- pt's sister reports poor current compliance  7. Pt will demonstrate improved bilateral hip strength to 3/5 to improve balance and independence with mobility. Met 7/5/17  8. New 7/5/17: Pt will demonstrate improved bilateral hip strength to 4/5 to improve balance and independence with mobility. improved-  WALESKA= 4+/5, hip ext R= 4-/5, L= 3+/5, hip abd R=4/5, L=4+/5  9. Pt will demonstrate improved cervical extension strength to 3+/5 to improve head control. Met 7/31/17  10. Pt will demonstrate improved cervical deep flexor strength to 3+/5 to improve head control. Met 7/31/17   11. Pt will demonstrate improve balance and gait by scoring 14/28 on Tinetti. Met 8/31/17- 17/28                                      Revised 8/31/17: pt will demonstrate improved balance/ gait and decreased fall risk to moderate by scoring 19/28 on Tinetti                                            Assessment. Improved 3/15/18- 18/28 "                        12. Pt will demonstrate improved cervical PROM for rotation to 40 degrees to improve functional head control. ~same as last assessment                                                40 degrees (L) ; 18 degrees (R)        Plan   Continue PT 2x weekly under established Plan of Care, with treatment to include: pt education, HEP, therapeutic exercises, neuromuscular re-education/balance exercises, therapeutic activities, joint mobilizations, manual therapy, gait training PRN, and modalities PRN, to work towards established goals. Pt may be seen by PTA to carry out plan of care.     Mae Caicedo, PTA  04/23/2018

## 2018-04-26 ENCOUNTER — CLINICAL SUPPORT (OUTPATIENT)
Dept: REHABILITATION | Facility: HOSPITAL | Age: 53
End: 2018-04-26
Attending: STUDENT IN AN ORGANIZED HEALTH CARE EDUCATION/TRAINING PROGRAM
Payer: COMMERCIAL

## 2018-04-26 DIAGNOSIS — Z74.09 IMPAIRED FUNCTIONAL MOBILITY, BALANCE, GAIT, AND ENDURANCE: ICD-10-CM

## 2018-04-26 DIAGNOSIS — R53.1 DECREASED STRENGTH: ICD-10-CM

## 2018-04-26 DIAGNOSIS — R29.898 DECREASED ROM OF NECK: ICD-10-CM

## 2018-04-26 PROCEDURE — 97110 THERAPEUTIC EXERCISES: CPT | Mod: PO | Performed by: PHYSICAL THERAPIST

## 2018-04-26 PROCEDURE — 97140 MANUAL THERAPY 1/> REGIONS: CPT | Mod: PO | Performed by: PHYSICAL THERAPIST

## 2018-04-26 NOTE — PROGRESS NOTES
"Physical Therapy Progress Note      Name: Brigid Jernigan  Clinic Number: [id number]  Diagnosis: G71.11 (ICD-10-CM) - Myotonic dystrophy  Physician: Luis Felipe Vo MD  Treatment Orders: PT Eval and Treat          Past Medical History:   Diagnosis Date    Abnormal EKG      Myotonic dystrophy      Pacemaker      PAF (paroxysmal atrial fibrillation)      Sleep apnea           Precautions: standard  Visit #: 8/12 (exp. 5/9/18)     (previous visits 2018= 16)   (Visit total 2017= 44)  Date of Eval: 5/9/2017  Extended POC: 6/14/2018     Subjective   Pt reports: "I'm doing ok."   Sister reported:  "Here's her brace"   Pain Scale: denies     Objective   Pt arrived 15 mins late to tx.      Patient received individual therapy to increase strength, endurance, ROM, flexibility, posture, core stabilization and head control with activities as follows:      Pt participated in therapeutic exercises x 37 minutes to address ROM and cervical alignment:      Nu Step recumbent stepper x 10 min-Level 12 BUE/LE- cervical brace donned     Sitting EOM:  Hitting a ball back and forth working on core strength and upright sitting x 3 mins  Modified Sit ups with wedge x 10, x5 with 4# ball     Standing by mat:  3 x 5 reps of B UE chest press while in standing with CGA.  1 UE support required between each set  2 x 30 sec static stance, intermittent 1UE support          Machine weights:     X 12 reps of B squats with #130lb applied    Supine:  Cervical PROM: Rotation PROM   AROM deep cervical flex 10x  AAROM cervical extension x10  AAROM cervical rotation 10x  LTR with green physioball x 20    Manual therapy x 8 mins of the following:  B Upper Trap stretch 2 x 30 sec  Manual cervical distraction x 3 mins  Cervical PROM       Written Home Exercises: 1/29/18- side stepping at counter with 1-2 UE support  12/4/17- AROM cervical SB and rotation  assisted quad/ hip flexor stretch in side lying  eval- chin tucks, rows with OTB, hip flexor and " knee extensor stretch in sidelying  Pt demo fair understanding of the education provided. Brigid demonstrated fair return demonstration of activities.      Education provided re: 12/18/17- cervical collar application  POC, HEP     Pt has no cultural, educational or language barriers to learning provided.     Assessment   Brigid tolerated treatment well and demonstrated good involvement in additional core strengthening exercises. Pt was able to perform modified sit ups with a 4# weight and demonstrated good-fair sitting balance with edge of mat exercises. In standing balance exercises such as BUE chest press, pt can perform 1-2 reps before requiring UE support. Pt would benefit from continued balance exercises to emphasize decreased UE use. Pt can benefit from continued skilled PT to address impairments to improve safety, independence, and quality of life.     Pt prognosis is Good. Pt will continue to benefit from skilled outpatient physical therapy to address the deficits listed in the problem list, provide pt/family education and to maximize pt's level of independence in the home and community environment.      Activity limitations/ Participation Restrictions:   Fall Risk - impaired balance   Weakness  Impaired muscle tone  Poor head control  Decreased ROM  Gait deviations   Decreased ambulation   Decreased activity tolerance   Decreased independence with daily activities   Requires skilled supervision to complete and progress HEP   Requires skilled PT for DME/ orthotic recommendations     Pt's spiritual, cultural and educational needs considered and pt agreeable to plan of care and GOALS as stated below:   Short term goals: 6 weeks, pt agrees to goals set. (As of 4/5/18)  1. Pt will perform chin tucks in supine with supervision to improve independent with HEP. Met 7/5/17  2. Assist pt with obtaining appropriate cervical brace to improve head control. Met 1/9/18- pt has but is not consistently wearing  3. Pt will  "demonstrate improved knee ext strength on left to 4/5 to improve safety with ambulation and sit<>stand. Met 9/28/17   4. Pt will demonstrate improved cervical rotation PROM to 20 degrees to improve functional head control. Met 7/5/17  5. Pt will perform sit<>stand consistently from 22" height surface with supervision to improve independence. Met 7/5/17                Revised 8/31/17:  Pt will perform sit<>stand from 19" height chair to improve independence with sitting in various chairs around her home and decreased need for "pop up seat". improved as of 3/15/18- pt stood from 20.5" height with BUE and 2-3 attempts, supervision without verbal cues      Long term goals: 12 weeks, pt agrees to goals set  6. Pt will perform basic HEP for gross strengthening with supervision to deter worsening of functional limitations. Met 8/31/17- pt's sister reports poor current compliance  7. Pt will demonstrate improved bilateral hip strength to 3/5 to improve balance and independence with mobility. Met 7/5/17  8. New 7/5/17: Pt will demonstrate improved bilateral hip strength to 4/5 to improve balance and independence with mobility. improved-  WALESKA= 4+/5, hip ext R= 4-/5, L= 3+/5, hip abd R=4/5, L=4+/5  9. Pt will demonstrate improved cervical extension strength to 3+/5 to improve head control. Met 7/31/17  10. Pt will demonstrate improved cervical deep flexor strength to 3+/5 to improve head control. Met 7/31/17   11. Pt will demonstrate improve balance and gait by scoring 14/28 on Tinetti. Met 8/31/17- 17/28                                      Revised 8/31/17: pt will demonstrate improved balance/ gait and decreased fall risk to moderate by scoring 19/28 on Tinetti                                            Assessment. Improved 3/15/18- 18/28                        12. Pt will demonstrate improved cervical PROM for rotation to 40 degrees to improve functional head control. ~same as last assessment "                                                40 degrees (L) ; 18 degrees (R)        Plan   Continue PT 2x weekly under established Plan of Care, with treatment to include: pt education, HEP, therapeutic exercises, neuromuscular re-education/balance exercises, therapeutic activities, joint mobilizations, manual therapy, gait training PRN, and modalities PRN, to work towards established goals. Pt may be seen by PTA to carry out plan of care.     Gerardo Khan, SPT      I, JORDY HensonT, certify that I was present in the room directing the student in service delivery and guiding them using my skilled judgment. As the co-signing therapist I have reviewed the students documentation and am responsible for the treatment, assessment, and plan.     Yadi Mercado DPT  4/26/2018

## 2018-04-30 ENCOUNTER — CLINICAL SUPPORT (OUTPATIENT)
Dept: REHABILITATION | Facility: HOSPITAL | Age: 53
End: 2018-04-30
Attending: STUDENT IN AN ORGANIZED HEALTH CARE EDUCATION/TRAINING PROGRAM
Payer: COMMERCIAL

## 2018-04-30 DIAGNOSIS — R29.898 DECREASED ROM OF NECK: ICD-10-CM

## 2018-04-30 DIAGNOSIS — R53.1 DECREASED STRENGTH: ICD-10-CM

## 2018-04-30 DIAGNOSIS — Z74.09 IMPAIRED FUNCTIONAL MOBILITY, BALANCE, GAIT, AND ENDURANCE: ICD-10-CM

## 2018-04-30 PROCEDURE — 97110 THERAPEUTIC EXERCISES: CPT | Mod: PO | Performed by: PHYSICAL THERAPIST

## 2018-04-30 NOTE — PROGRESS NOTES
"Physical Therapy Progress Note      Name: Brigid Jernigan  Clinic Number: [id number]  Diagnosis: G71.11 (ICD-10-CM) - Myotonic dystrophy  Physician: Lius Felipe Vo MD  Treatment Orders: PT Eval and Treat          Past Medical History:   Diagnosis Date    Abnormal EKG      Myotonic dystrophy      Pacemaker      PAF (paroxysmal atrial fibrillation)      Sleep apnea           Precautions: standard  Visit #: 9/12 (exp. 5/9/18)     (previous visits 2018= 16)   (Visit total 2017= 44)  Date of Eval: 5/9/2017  Extended POC: 6/14/2018     Subjective   Pt reports: no new complaints  Sister reported:  no new complaints  Pain Scale: denies     Objective   Pt arrived 15 mins late to tx, able to accomodate       Patient received individual therapy to increase strength, endurance, ROM, flexibility, posture, core stabilization and head control with activities as follows:      Pt participated in therapeutic exercises x 40 minutes to address ROM and cervical alignment:      Nu Step recumbent stepper x 10 min-Level 12 BUE/LE- cervical brace donned- supervised treatment     Sitting EOM:   Modified Sit ups with wedge x 10, with 4# ball       Single UE flexion to ~shoulder height with focus on good trunk posture       Supine:  SLR 5# 2 x 10  bridging with ball 2 x 10  Cervical PROM: Rotation PROM   AROM deep cervical flex 10x  AAROM cervical extension x10  AAROM cervical rotation 10x  B Upper Trap stretch 3 x 30 sec    Machine weights:     X 15 reps of B squats with #130lb applied    Sit<>stand from ~20" height with multiple attempts for success, but with Mod I     NP today:   Standing by mat:  3 x 5 reps of B UE chest press while in standing with CGA.  1 UE support required between each set  2 x 30 sec static stance, intermittent 1UE support  LTR with green physioball x 20  Hitting a ball back and forth working on core strength and upright sitting x 3 mins     Written Home Exercises: 1/29/18- side stepping at counter with 1-2 UE " "support  12/4/17- AROM cervical SB and rotation  assisted quad/ hip flexor stretch in side lying  eval- chin tucks, rows with OTB, hip flexor and knee extensor stretch in sidelying  Pt demo fair understanding of the education provided. Brigid demonstrated fair return demonstration of activities.      Education provided re: 12/18/17- cervical collar application  POC, HEP     Pt has no cultural, educational or language barriers to learning provided.     Assessment   Brigid tolerated treatment well. PT continues to address core strength to improve standing balance and safety with mobility. Increase LE strength and endurance noted with increased reps with leg press. pt contiues to require multiple attempts for sit<>stand from a height of ~20" with BUE use.  Pt can benefit from continued skilled PT to address impairments to improve safety, independence, and quality of life.     Pt prognosis is Good. Pt will continue to benefit from skilled outpatient physical therapy to address the deficits listed in the problem list, provide pt/family education and to maximize pt's level of independence in the home and community environment.      Activity limitations/ Participation Restrictions:   Fall Risk - impaired balance   Weakness  Impaired muscle tone  Poor head control  Decreased ROM  Gait deviations   Decreased ambulation   Decreased activity tolerance   Decreased independence with daily activities   Requires skilled supervision to complete and progress HEP   Requires skilled PT for DME/ orthotic recommendations     Pt's spiritual, cultural and educational needs considered and pt agreeable to plan of care and GOALS as stated below:   Short term goals: 6 weeks, pt agrees to goals set. (As of 4/5/18)  1. Pt will perform chin tucks in supine with supervision to improve independent with HEP. Met 7/5/17  2. Assist pt with obtaining appropriate cervical brace to improve head control. Met 1/9/18- pt has but is not consistently " "wearing  3. Pt will demonstrate improved knee ext strength on left to 4/5 to improve safety with ambulation and sit<>stand. Met 9/28/17   4. Pt will demonstrate improved cervical rotation PROM to 20 degrees to improve functional head control. Met 7/5/17  5. Pt will perform sit<>stand consistently from 22" height surface with supervision to improve independence. Met 7/5/17                Revised 8/31/17:  Pt will perform sit<>stand from 19" height chair to improve independence with sitting in various chairs around her home and decreased need for "pop up seat". improved as of 3/15/18- pt stood from 20.5" height with BUE and 2-3 attempts, supervision without verbal cues      Long term goals: 12 weeks, pt agrees to goals set  6. Pt will perform basic HEP for gross strengthening with supervision to deter worsening of functional limitations. Met 8/31/17- pt's sister reports poor current compliance  7. Pt will demonstrate improved bilateral hip strength to 3/5 to improve balance and independence with mobility. Met 7/5/17  8. New 7/5/17: Pt will demonstrate improved bilateral hip strength to 4/5 to improve balance and independence with mobility. improved-  WALESKA= 4+/5, hip ext R= 4-/5, L= 3+/5, hip abd R=4/5, L=4+/5  9. Pt will demonstrate improved cervical extension strength to 3+/5 to improve head control. Met 7/31/17  10. Pt will demonstrate improved cervical deep flexor strength to 3+/5 to improve head control. Met 7/31/17   11. Pt will demonstrate improve balance and gait by scoring 14/28 on Tinetti. Met 8/31/17- 17/28                                      Revised 8/31/17: pt will demonstrate improved balance/ gait and decreased fall risk to moderate by scoring 19/28 on Tinetti                                            Assessment. Improved 3/15/18- 18/28                        12. Pt will demonstrate improved cervical PROM for rotation to 40 degrees to improve functional head control. ~same as last assessment "                                                40 degrees (L) ; 18 degrees (R)        Plan   Continue PT 2x weekly under established Plan of Care, with treatment to include: pt education, HEP, therapeutic exercises, neuromuscular re-education/balance exercises, therapeutic activities, joint mobilizations, manual therapy, gait training PRN, and modalities PRN, to work towards established goals. Pt may be seen by PTA to carry out plan of care.       Yadi Mercado DPT  4/30/2018

## 2018-05-03 ENCOUNTER — CLINICAL SUPPORT (OUTPATIENT)
Dept: REHABILITATION | Facility: HOSPITAL | Age: 53
End: 2018-05-03
Attending: STUDENT IN AN ORGANIZED HEALTH CARE EDUCATION/TRAINING PROGRAM
Payer: COMMERCIAL

## 2018-05-03 DIAGNOSIS — R53.1 DECREASED STRENGTH: ICD-10-CM

## 2018-05-03 DIAGNOSIS — Z74.09 IMPAIRED FUNCTIONAL MOBILITY, BALANCE, GAIT, AND ENDURANCE: ICD-10-CM

## 2018-05-03 DIAGNOSIS — R29.898 DECREASED ROM OF NECK: ICD-10-CM

## 2018-05-03 PROCEDURE — 97110 THERAPEUTIC EXERCISES: CPT | Mod: PO | Performed by: PHYSICAL THERAPIST

## 2018-05-03 NOTE — PLAN OF CARE
Physical Therapy Progress Note      Name: Brigid Jernigan  Clinic Number: [id number]  Diagnosis: G71.11 (ICD-10-CM) - Myotonic dystrophy  Physician: Luis Felipe Vo MD  Treatment Orders: PT Eval and Treat          Past Medical History:   Diagnosis Date    Abnormal EKG      Myotonic dystrophy      Pacemaker      PAF (paroxysmal atrial fibrillation)      Sleep apnea           Precautions: standard  Visit #: 10/12 (exp. 5/9/18)     (previous visits 2018= 16)   (Visit total 2017= 44)  Date of Eval: 5/9/2017  Extended POC: 6/14/2018     Subjective   Pt reports: no new complaints  Sister reported:  no new complaints  Pain Scale: denies     Objective   Pt arrived 15 mins late to tx, able to accomodate       Patient received individual therapy to increase strength, endurance, ROM, flexibility, posture, core stabilization and head control with activities as follows:      Pt participated in therapeutic exercises x 42 minutes to address ROM and cervical alignment:      recumbent stepper x 8 min-Level 12 BUE/LE     Tinetti Balance Assessment  Balance:  1. Sitting Balance 1   Leans/ slides in chair= 0   Steady= 1  2. Rises from chair 1   Unable without help= 0   Able, uses arms= 1   Able without use of arms= 2  3. Attempts to rise 2   Unable without help= 0   Able, >1 attmept required-= 1   Able, 1 attempt= 2  4. Immediate standing balance (1st 5 seconds) 1   Unsteady (swaggers, moves feet, trunk sway)= 0   Steady but uses walker or other support= 1   Narrow base of support without walker or support= 2  5. Nudged 1   Begins to fall= 0   Staggers, grabs, catches self= 1   Steady= 2  6. Standing balance 1   Unsteady= 0   Steady but wide LEYDI or uses AD=1   Narrow LEYDI without AD=2  7. Eyes closed 1   Unsteady= 0   Steady= 1  8. Turning 360 degrees 0   Discontinuous steps= 0   Continuous steps= 1   Unsteady= 0   Steady= 1  9. Sitting down 1   Unsafe= 0   Uses arms or not in a smooth motion= 1   Safe, smooth motion= 2  Balance  "score= 9/16  Gait:  1. Initiation 1   hesitates or multiple attempts to start= 0   No hesitancy= 1  2. Step length 2   Step to= 0   One foot passes= 1   reciprocal pattern= 2  3. Step height 2    Neither foot clears floor= 0   One foot clears floor= 1   Both feet clear floor= 2  4. Step symmetry 1   Not symmetrical= 0   Appears symmetrical= 1  5. Step continuity 1   Not continuous= 0   Appears continuous= 1  6. Path 1   Marked deviation= 0   Mild/moderate deviation or uses A.D.= 1   Straight without A.D.= 2  7. Trunk 0   Marked sway or uses A.D.= 0   No sway, but flexes knees or back, spread arms out while walking= 1   No sway, no flexion, no use of UE, no use of A.D.= 2  8. Walking stance 0   Heels apart= 0   Heels almost touching while walking= 1  Gait score= 8/12  Total score= 17/28 , high fall risk    Strength: hip flex WALESKA= 5/5    hip abd R= 5/5, L= 4/5     hip add R= 4-/5, L= 3+/5    hip extension R= 3+/5, L=3/5    Cervical R rotation AAROM in supine= 8 deg  Cervical PROM: Rotation PROM   Inhibitive distraction    Machine weights:     X 15 reps of B squats with #130lb applied    Sitting EOM:   Modified Sit ups with wedge x 10, with 4.4# ball   Russian twists 4.4# ball- 10x    Sit<>stand from 20"- 19.5" height with multiple attempts for success, but with Mod I    Standing with RW:  Hip abd OTB 10x   Hip ext OTB 10x      NP today:   Sitting:  Single UE flexion to ~shoulder height with focus on good trunk posture  Supine:  SLR 5# 2 x 10  bridging with ball 2 x 10  AROM deep cervical flex 10x  AAROM cervical extension x10  AAROM cervical rotation 10x  B Upper Trap stretch 3 x 30 sec  Standing by mat:  3 x 5 reps of B UE chest press while in standing with CGA.  1 UE support required between each set  2 x 30 sec static stance, intermittent 1UE support  LTR with green physioball x 20  Hitting a ball back and forth working on core strength and upright sitting x 3 mins     Written Home Exercises: 1/29/18- side stepping " "at counter with 1-2 UE support  12/4/17- AROM cervical SB and rotation  assisted quad/ hip flexor stretch in side lying  eval- chin tucks, rows with OTB, hip flexor and knee extensor stretch in sidelying  Pt demo fair understanding of the education provided. Brigid demonstrated fair return demonstration of activities.      Education provided re: 12/18/17- cervical collar application  POC, HEP     Pt has no cultural, educational or language barriers to learning provided.     Assessment   Assessment period: 4/9/18 to 5/3/18. Brigid tolerates treatments well. PT continues to address core and LE strength to improve standing balance and safety with mobility. Pt demonstrates good tolerance to increased challenge and resistance for strengthening exercises. Pt was able to demonstrate ability to rise from 19.5" surface after multiple attempts with UE use. Pt has not been able to interdependently stand from this height before. PT initiated resisted standing hip strengthening to address remaining hip weakness noted. Pt continues to demonstrate poor R cervical rotation ROM. Increased tone impairs ROM.  Pt can benefit from continued skilled PT to address impairments to improve safety, independence, and quality of life.     Pt prognosis is Good. Pt will continue to benefit from skilled outpatient physical therapy to address the deficits listed in the problem list, provide pt/family education and to maximize pt's level of independence in the home and community environment.      Activity limitations/ Participation Restrictions:   Fall Risk - impaired balance   Weakness  Impaired muscle tone  Poor head control  Decreased ROM  Gait deviations   Decreased ambulation   Decreased activity tolerance   Decreased independence with daily activities   Requires skilled supervision to complete and progress HEP   Requires skilled PT for DME/ orthotic recommendations     Pt's spiritual, cultural and educational needs considered and pt " "agreeable to plan of care and GOALS as stated below:   Short term goals: 6 weeks, pt agrees to goals set. (As of 4/5/18)  1. Pt will perform chin tucks in supine with supervision to improve independent with HEP. Met 7/5/17  2. Assist pt with obtaining appropriate cervical brace to improve head control. Met 1/9/18- pt has but is not consistently wearing  3. Pt will demonstrate improved knee ext strength on left to 4/5 to improve safety with ambulation and sit<>stand. Met 9/28/17   4. Pt will demonstrate improved cervical rotation PROM to 20 degrees to improve functional head control. Met 7/5/17  5. Pt will perform sit<>stand consistently from 22" height surface with supervision to improve independence. Met 7/5/17                Revised 8/31/17:  Pt will perform sit<>stand from 19" height chair to improve independence with sitting in various chairs around her home and decreased need for "pop up seat". improved as of 3/15/18- pt stood from 20.5" height with BUE and 2-3 attempts, supervision without verbal cues      Long term goals: 12 weeks, pt agrees to goals set  6. Pt will perform basic HEP for gross strengthening with supervision to deter worsening of functional limitations. Met 8/31/17- pt's sister reports poor current compliance  7. Pt will demonstrate improved bilateral hip strength to 3/5 to improve balance and independence with mobility. Met 7/5/17  8. New 7/5/17: Pt will demonstrate improved bilateral hip strength to 4/5 to improve balance and independence with mobility. Nearly met-  hip flex WALESKA= 5/5, hip ext R= 3+/5, L= 3/5, hip abd R=5/5, L=4/5, hip add R= 4-/5, L= 3+/5  9. Pt will demonstrate improved cervical extension strength to 3+/5 to improve head control. Met 7/31/17  10. Pt will demonstrate improved cervical deep flexor strength to 3+/5 to improve head control. Met 7/31/17   11. Pt will demonstrate improve balance and gait by scoring 14/28 on Tinetti. Met 8/31/17- " 17/28                                      Revised 8/31/17: pt will demonstrate improved balance/ gait and decreased fall risk to moderate by scoring 19/28 on Tinetti                                            Assessment. ~same- 17/28                        12. Pt will demonstrate improved cervical PROM for rotation to 40 degrees to improve functional head control. Declined for R-  9 deg AAROM R rotation      Plan   Continue PT 2x weekly under established Plan of Care, with treatment to include: pt education, HEP, therapeutic exercises, neuromuscular re-education/balance exercises, therapeutic activities, joint mobilizations, manual therapy, gait training PRN, and modalities PRN, to work towards established goals. Pt may be seen by PTA to carry out plan of care.       Yadi Mercado, DPT  5/3/2018        I certify the need for these services furnished under this plan of treatment and while under my care.  ____________________________________ Physician/Referring Practitioner   Date of Signature

## 2018-05-03 NOTE — PROGRESS NOTES
Physical Therapy Progress Note      Name: Brigid Jernigan  Clinic Number: [id number]  Diagnosis: G71.11 (ICD-10-CM) - Myotonic dystrophy  Physician: Luis Felipe Vo MD  Treatment Orders: PT Eval and Treat          Past Medical History:   Diagnosis Date    Abnormal EKG      Myotonic dystrophy      Pacemaker      PAF (paroxysmal atrial fibrillation)      Sleep apnea           Precautions: standard  Visit #: 10/12 (exp. 5/9/18)     (previous visits 2018= 16)   (Visit total 2017= 44)  Date of Eval: 5/9/2017  Extended POC: 6/14/2018     Subjective   Pt reports: no new complaints  Sister reported:  no new complaints  Pain Scale: denies     Objective   Pt arrived 15 mins late to tx, able to accomodate       Patient received individual therapy to increase strength, endurance, ROM, flexibility, posture, core stabilization and head control with activities as follows:      Pt participated in therapeutic exercises x 42 minutes to address ROM and cervical alignment:      recumbent stepper x 8 min-Level 12 BUE/LE     Tinetti Balance Assessment  Balance:  1. Sitting Balance 1   Leans/ slides in chair= 0   Steady= 1  2. Rises from chair 1   Unable without help= 0   Able, uses arms= 1   Able without use of arms= 2  3. Attempts to rise 2   Unable without help= 0   Able, >1 attmept required-= 1   Able, 1 attempt= 2  4. Immediate standing balance (1st 5 seconds) 1   Unsteady (swaggers, moves feet, trunk sway)= 0   Steady but uses walker or other support= 1   Narrow base of support without walker or support= 2  5. Nudged 1   Begins to fall= 0   Staggers, grabs, catches self= 1   Steady= 2  6. Standing balance 1   Unsteady= 0   Steady but wide LEYDI or uses AD=1   Narrow LEYDI without AD=2  7. Eyes closed 1   Unsteady= 0   Steady= 1  8. Turning 360 degrees 0   Discontinuous steps= 0   Continuous steps= 1   Unsteady= 0   Steady= 1  9. Sitting down 1   Unsafe= 0   Uses arms or not in a smooth motion= 1   Safe, smooth motion= 2  Balance  "score= 9/16  Gait:  1. Initiation 1   hesitates or multiple attempts to start= 0   No hesitancy= 1  2. Step length 2   Step to= 0   One foot passes= 1   reciprocal pattern= 2  3. Step height 2    Neither foot clears floor= 0   One foot clears floor= 1   Both feet clear floor= 2  4. Step symmetry 1   Not symmetrical= 0   Appears symmetrical= 1  5. Step continuity 1   Not continuous= 0   Appears continuous= 1  6. Path 1   Marked deviation= 0   Mild/moderate deviation or uses A.D.= 1   Straight without A.D.= 2  7. Trunk 0   Marked sway or uses A.D.= 0   No sway, but flexes knees or back, spread arms out while walking= 1   No sway, no flexion, no use of UE, no use of A.D.= 2  8. Walking stance 0   Heels apart= 0   Heels almost touching while walking= 1  Gait score= 8/12  Total score= 17/28 , high fall risk    Strength: hip flex WALESKA= 5/5    hip abd R= 5/5, L= 4/5     hip add R= 4-/5, L= 3+/5    hip extension R= 3+/5, L=3/5    Cervical R rotation AAROM in supine= 8 deg  Cervical PROM: Rotation PROM   Inhibitive distraction    Machine weights:     X 15 reps of B squats with #130lb applied    Sitting EOM:   Modified Sit ups with wedge x 10, with 4.4# ball   Russian twists 4.4# ball- 10x    Sit<>stand from 20"- 19.5" height with multiple attempts for success, but with Mod I    Standing with RW:  Hip abd OTB 10x   Hip ext OTB 10x      NP today:   Sitting:  Single UE flexion to ~shoulder height with focus on good trunk posture  Supine:  SLR 5# 2 x 10  bridging with ball 2 x 10  AROM deep cervical flex 10x  AAROM cervical extension x10  AAROM cervical rotation 10x  B Upper Trap stretch 3 x 30 sec  Standing by mat:  3 x 5 reps of B UE chest press while in standing with CGA.  1 UE support required between each set  2 x 30 sec static stance, intermittent 1UE support  LTR with green physioball x 20  Hitting a ball back and forth working on core strength and upright sitting x 3 mins     Written Home Exercises: 1/29/18- side stepping " "at counter with 1-2 UE support  12/4/17- AROM cervical SB and rotation  assisted quad/ hip flexor stretch in side lying  eval- chin tucks, rows with OTB, hip flexor and knee extensor stretch in sidelying  Pt demo fair understanding of the education provided. Brigid demonstrated fair return demonstration of activities.      Education provided re: 12/18/17- cervical collar application  POC, HEP     Pt has no cultural, educational or language barriers to learning provided.     Assessment   Assessment period: 4/9/18 to 5/3/18. Brigid tolerates treatments well. PT continues to address core and LE strength to improve standing balance and safety with mobility. Pt demonstrates good tolerance to increased challenge and resistance for strengthening exercises. Pt was able to demonstrate ability to rise from 19.5" surface after multiple attempts with UE use. Pt has not been able to interdependently stand from this height before. PT initiated resisted standing hip strengthening to address remaining hip weakness noted. Pt continues to demonstrate poor R cervical rotation ROM. Increased tone impairs ROM.  Pt can benefit from continued skilled PT to address impairments to improve safety, independence, and quality of life.     Pt prognosis is Good. Pt will continue to benefit from skilled outpatient physical therapy to address the deficits listed in the problem list, provide pt/family education and to maximize pt's level of independence in the home and community environment.      Activity limitations/ Participation Restrictions:   Fall Risk - impaired balance   Weakness  Impaired muscle tone  Poor head control  Decreased ROM  Gait deviations   Decreased ambulation   Decreased activity tolerance   Decreased independence with daily activities   Requires skilled supervision to complete and progress HEP   Requires skilled PT for DME/ orthotic recommendations     Pt's spiritual, cultural and educational needs considered and pt " "agreeable to plan of care and GOALS as stated below:   Short term goals: 6 weeks, pt agrees to goals set. (As of 4/5/18)  1. Pt will perform chin tucks in supine with supervision to improve independent with HEP. Met 7/5/17  2. Assist pt with obtaining appropriate cervical brace to improve head control. Met 1/9/18- pt has but is not consistently wearing  3. Pt will demonstrate improved knee ext strength on left to 4/5 to improve safety with ambulation and sit<>stand. Met 9/28/17   4. Pt will demonstrate improved cervical rotation PROM to 20 degrees to improve functional head control. Met 7/5/17  5. Pt will perform sit<>stand consistently from 22" height surface with supervision to improve independence. Met 7/5/17                Revised 8/31/17:  Pt will perform sit<>stand from 19" height chair to improve independence with sitting in various chairs around her home and decreased need for "pop up seat". improved as of 3/15/18- pt stood from 20.5" height with BUE and 2-3 attempts, supervision without verbal cues      Long term goals: 12 weeks, pt agrees to goals set  6. Pt will perform basic HEP for gross strengthening with supervision to deter worsening of functional limitations. Met 8/31/17- pt's sister reports poor current compliance  7. Pt will demonstrate improved bilateral hip strength to 3/5 to improve balance and independence with mobility. Met 7/5/17  8. New 7/5/17: Pt will demonstrate improved bilateral hip strength to 4/5 to improve balance and independence with mobility. Nearly met-  hip flex WALESKA= 5/5, hip ext R= 3+/5, L= 3/5, hip abd R=5/5, L=4/5, hip add R= 4-/5, L= 3+/5  9. Pt will demonstrate improved cervical extension strength to 3+/5 to improve head control. Met 7/31/17  10. Pt will demonstrate improved cervical deep flexor strength to 3+/5 to improve head control. Met 7/31/17   11. Pt will demonstrate improve balance and gait by scoring 14/28 on Tinetti. Met 8/31/17- " 17/28                                      Revised 8/31/17: pt will demonstrate improved balance/ gait and decreased fall risk to moderate by scoring 19/28 on Tinetti                                            Assessment. ~same- 17/28                        12. Pt will demonstrate improved cervical PROM for rotation to 40 degrees to improve functional head control. Declined for R-  9 deg AAROM R rotation      Plan   Continue PT 2x weekly under established Plan of Care, with treatment to include: pt education, HEP, therapeutic exercises, neuromuscular re-education/balance exercises, therapeutic activities, joint mobilizations, manual therapy, gait training PRN, and modalities PRN, to work towards established goals. Pt may be seen by PTA to carry out plan of care.       Yadi Mercado DPT  5/3/2018

## 2018-05-07 ENCOUNTER — CLINICAL SUPPORT (OUTPATIENT)
Dept: REHABILITATION | Facility: HOSPITAL | Age: 53
End: 2018-05-07
Attending: STUDENT IN AN ORGANIZED HEALTH CARE EDUCATION/TRAINING PROGRAM
Payer: COMMERCIAL

## 2018-05-07 DIAGNOSIS — R53.1 DECREASED STRENGTH: ICD-10-CM

## 2018-05-07 DIAGNOSIS — Z74.09 IMPAIRED FUNCTIONAL MOBILITY, BALANCE, GAIT, AND ENDURANCE: ICD-10-CM

## 2018-05-07 DIAGNOSIS — R29.898 DECREASED ROM OF NECK: ICD-10-CM

## 2018-05-07 PROCEDURE — 97110 THERAPEUTIC EXERCISES: CPT | Mod: PO | Performed by: PHYSICAL THERAPIST

## 2018-05-07 NOTE — PROGRESS NOTES
"Physical Therapy Progress Note      Name: Brigid Jernigan  Clinic Number: [id number]  Diagnosis: G71.11 (ICD-10-CM) - Myotonic dystrophy  Physician: Luis Felipe Vo MD  Treatment Orders: PT Eval and Treat          Past Medical History:   Diagnosis Date    Abnormal EKG      Myotonic dystrophy      Pacemaker      PAF (paroxysmal atrial fibrillation)      Sleep apnea           Precautions: standard  Visit #: 10/12 (exp. 5/9/18)     (previous visits 2018= 16)   (Visit total 2017= 44)  Date of Eval: 5/9/2017  Extended POC: 6/14/2018     Subjective   Pt reports: no new complaints  Sister reported:  no new complaints  Pain Scale: denies     Objective   Pt arrived ~10 mins late to tx, able to accomodate       Patient received individual therapy to increase strength, endurance, ROM, flexibility, posture, core stabilization and head control with activities as follows:      Pt participated in therapeutic exercises x 40 minutes to address ROM and cervical alignment:      recumbent stepper x 8 min-Level 12.5 BUE/LE    Supine: SLR 5# 20x   Cervical PROM: Rotation PROM, SB PROM    Inhibitive distraction    Side lying: clam shells PTB 20x    Standing with RW:  Hip abd OTB 10x   Hip ext OTB 10x      NP today::   Machine weights:     X 15 reps of B squats with #130lb applied  Sitting EOM:   Modified Sit ups with wedge x 10, with 4.4# ball   Russian twists 4.4# ball- 10x  Sit<>stand from 20"- 19.5" height with multiple attempts for success, but with Mod I  Sitting:  Single UE flexion to ~shoulder height with focus on good trunk posture  Supine:  bridging with ball 2 x 10  AROM deep cervical flex 10x  Standing by mat:  3 x 5 reps of B UE chest press while in standing with CGA.  1 UE support required between each set  2 x 30 sec static stance, intermittent 1UE support  LTR with green Fastlyoball x 20  Hitting a ball back and forth working on core strength and upright sitting x 3 mins     Written Home Exercises: 1/29/18- side " stepping at counter with 1-2 UE support  12/4/17- AROM cervical SB and rotation  assisted quad/ hip flexor stretch in side lying  eval- chin tucks, rows with OTB, hip flexor and knee extensor stretch in sidelying  Pt demo fair understanding of the education provided. Brigid demonstrated fair return demonstration of activities.      Education provided re: 12/18/17- cervical collar application  POC, HEP     Pt has no cultural, educational or language barriers to learning provided.     Assessment   Brigid tolerated treatment well. Pt demonstrated good tolerance for performing resisted hip strengthening and tolerated increased resistance on stepper. Cervical musculature is still very tight and restricts A/PROM.  PT will address more core stability and gait with decreased UE support next visit. Pt can benefit from continued skilled PT to address impairments to improve safety, independence, and quality of life.     Pt prognosis is Good. Pt will continue to benefit from skilled outpatient physical therapy to address the deficits listed in the problem list, provide pt/family education and to maximize pt's level of independence in the home and community environment.      Activity limitations/ Participation Restrictions:   Fall Risk - impaired balance   Weakness  Impaired muscle tone  Poor head control  Decreased ROM  Gait deviations   Decreased ambulation   Decreased activity tolerance   Decreased independence with daily activities   Requires skilled supervision to complete and progress HEP   Requires skilled PT for DME/ orthotic recommendations     Pt's spiritual, cultural and educational needs considered and pt agreeable to plan of care and GOALS as stated below:   Short term goals: 6 weeks, pt agrees to goals set. (As of 5/3/18)  1. Pt will perform chin tucks in supine with supervision to improve independent with HEP. Met 7/5/17  2. Assist pt with obtaining appropriate cervical brace to improve head control. Met 1/9/18-  "pt has but is not consistently wearing  3. Pt will demonstrate improved knee ext strength on left to 4/5 to improve safety with ambulation and sit<>stand. Met 9/28/17   4. Pt will demonstrate improved cervical rotation PROM to 20 degrees to improve functional head control. Met 7/5/17  5. Pt will perform sit<>stand consistently from 22" height surface with supervision to improve independence. Met 7/5/17                Revised 8/31/17:  Pt will perform sit<>stand from 19" height chair to improve independence with sitting in various chairs around her home and decreased need for "pop up seat". improved as of 3/15/18- pt stood from 20.5" height with BUE and 2-3 attempts, supervision without verbal cues      Long term goals: 12 weeks, pt agrees to goals set  6. Pt will perform basic HEP for gross strengthening with supervision to deter worsening of functional limitations. Met 8/31/17- pt's sister reports poor current compliance  7. Pt will demonstrate improved bilateral hip strength to 3/5 to improve balance and independence with mobility. Met 7/5/17  8. New 7/5/17: Pt will demonstrate improved bilateral hip strength to 4/5 to improve balance and independence with mobility. Nearly met-  hip flex WALESKA= 5/5, hip ext R= 3+/5, L= 3/5, hip abd R=5/5, L=4/5, hip add R= 4-/5, L= 3+/5  9. Pt will demonstrate improved cervical extension strength to 3+/5 to improve head control. Met 7/31/17  10. Pt will demonstrate improved cervical deep flexor strength to 3+/5 to improve head control. Met 7/31/17   11. Pt will demonstrate improve balance and gait by scoring 14/28 on Tinetti. Met 8/31/17- 17/28                                      Revised 8/31/17: pt will demonstrate improved balance/ gait and decreased fall risk to moderate by scoring 19/28 on Tinetti                                            Assessment. ~same- 17/28                        12. Pt will demonstrate improved cervical PROM for rotation to 40 degrees to improve " functional head control. Declined for R-  9 deg AAROM R rotation      Plan   Continue PT 2x weekly under established Plan of Care, with treatment to include: pt education, HEP, therapeutic exercises, neuromuscular re-education/balance exercises, therapeutic activities, joint mobilizations, manual therapy, gait training PRN, and modalities PRN, to work towards established goals. Pt may be seen by PTA to carry out plan of care.       Yadi Mercado, LUL  5/7/2018

## 2018-05-14 ENCOUNTER — CLINICAL SUPPORT (OUTPATIENT)
Dept: REHABILITATION | Facility: HOSPITAL | Age: 53
End: 2018-05-14
Attending: STUDENT IN AN ORGANIZED HEALTH CARE EDUCATION/TRAINING PROGRAM
Payer: COMMERCIAL

## 2018-05-14 DIAGNOSIS — R53.1 DECREASED STRENGTH: ICD-10-CM

## 2018-05-14 DIAGNOSIS — R29.898 DECREASED ROM OF NECK: ICD-10-CM

## 2018-05-14 DIAGNOSIS — Z74.09 IMPAIRED FUNCTIONAL MOBILITY, BALANCE, GAIT, AND ENDURANCE: ICD-10-CM

## 2018-05-14 PROCEDURE — 97110 THERAPEUTIC EXERCISES: CPT | Mod: PO | Performed by: PHYSICAL THERAPIST

## 2018-05-14 NOTE — PROGRESS NOTES
"Physical Therapy Progress Note      Name: Brigid Jernigan  Clinic Number: [id number]  Diagnosis: G71.11 (ICD-10-CM) - Myotonic dystrophy  Physician: Luis Felipe Vo MD  Treatment Orders: PT Eval and Treat          Past Medical History:   Diagnosis Date    Abnormal EKG      Myotonic dystrophy      Pacemaker      PAF (paroxysmal atrial fibrillation)      Sleep apnea           Precautions: standard  Visit #: 11/12 (exp. 5/9/18)     (previous visits 2018= 16)   (Visit total 2017= 44)  Date of Eval: 5/9/2017  Extended POC: 6/14/2018     Subjective   Pt reports: no new complaints  Sister reported:  no new complaints  Pain Scale: denies     Objective   Pt arrived ~10 mins late to tx, able to accomodate       Patient received individual therapy to increase strength, endurance, ROM, flexibility, posture, core stabilization and head control with activities as follows:      Pt participated in therapeutic exercises x 45 minutes to address ROM and cervical alignment:      Supine: SLR 5# 20x   Cervical PROM: Rotation PROM, SB PROM    pec PROM    Inhibitive distraction    Side lying: clam shells PTB 20x    Machine weights:     X 15 reps of B squats with #130lb applied    Ballet bar: ambulation training with cervical collar and 1 UE support- 5 laps,CGA    Backward gait- 1 lap min A    NP today:   recumbent stepper x 8 min-Level 12.5 BUE/LE  Standing with RW:  Hip abd OTB 10x   Hip ext OTB 10x  Sitting EOM:   Modified Sit ups with wedge x 10, with 4.4# ball   Russian twists 4.4# ball- 10x  Sit<>stand from 20"- 19.5" height with multiple attempts for success, but with Mod I  Sitting:  Single UE flexion to ~shoulder height with focus on good trunk posture  Supine:  bridging with ball 2 x 10  AROM deep cervical flex 10x  Standing by mat:  3 x 5 reps of B UE chest press while in standing with CGA.  1 UE support required between each set  2 x 30 sec static stance, intermittent 1UE support  LTR with green physioball x 20  Hitting a " ball back and forth working on core strength and upright sitting x 3 mins     Written Home Exercises: 1/29/18- side stepping at counter with 1-2 UE support  12/4/17- AROM cervical SB and rotation  assisted quad/ hip flexor stretch in side lying  eval- chin tucks, rows with OTB, hip flexor and knee extensor stretch in sidelying  Pt demo fair understanding of the education provided. Brigid demonstrated fair return demonstration of activities.      Education provided re: 12/18/17- cervical collar application  POC, HEP     Pt has no cultural, educational or language barriers to learning provided.     Assessment   Brigid tolerated treatment well. Pt demonstrated improved upright posture while ambulating with 1 UE support. Pt continues to demonstrate significant hip flexion when in single leg stance phase of walking, but this has significantly improved since eval. Pt continue to require increased surface heights for independence with sit<>stand. PT is continuing to address core and LE strength to improve balance, sit<>stand ability, and decrease fall risk. Pt can benefit from continued skilled PT to address impairments to improve safety, independence, and quality of life.     Pt prognosis is Good. Pt will continue to benefit from skilled outpatient physical therapy to address the deficits listed in the problem list, provide pt/family education and to maximize pt's level of independence in the home and community environment.      Activity limitations/ Participation Restrictions:   Fall Risk - impaired balance   Weakness  Impaired muscle tone  Poor head control  Decreased ROM  Gait deviations   Decreased ambulation   Decreased activity tolerance   Decreased independence with daily activities   Requires skilled supervision to complete and progress HEP   Requires skilled PT for DME/ orthotic recommendations     Pt's spiritual, cultural and educational needs considered and pt agreeable to plan of care and GOALS as stated  "below:   Short term goals: 6 weeks, pt agrees to goals set. (As of 5/3/18)  1. Pt will perform chin tucks in supine with supervision to improve independent with HEP. Met 7/5/17  2. Assist pt with obtaining appropriate cervical brace to improve head control. Met 1/9/18- pt has but is not consistently wearing  3. Pt will demonstrate improved knee ext strength on left to 4/5 to improve safety with ambulation and sit<>stand. Met 9/28/17   4. Pt will demonstrate improved cervical rotation PROM to 20 degrees to improve functional head control. Met 7/5/17  5. Pt will perform sit<>stand consistently from 22" height surface with supervision to improve independence. Met 7/5/17                Revised 8/31/17:  Pt will perform sit<>stand from 19" height chair to improve independence with sitting in various chairs around her home and decreased need for "pop up seat". improved as of 3/15/18- pt stood from 20.5" height with BUE and 2-3 attempts, supervision without verbal cues      Long term goals: 12 weeks, pt agrees to goals set  6. Pt will perform basic HEP for gross strengthening with supervision to deter worsening of functional limitations. Met 8/31/17- pt's sister reports poor current compliance  7. Pt will demonstrate improved bilateral hip strength to 3/5 to improve balance and independence with mobility. Met 7/5/17  8. New 7/5/17: Pt will demonstrate improved bilateral hip strength to 4/5 to improve balance and independence with mobility. Nearly met-  hip flex WALESKA= 5/5, hip ext R= 3+/5, L= 3/5, hip abd R=5/5, L=4/5, hip add R= 4-/5, L= 3+/5  9. Pt will demonstrate improved cervical extension strength to 3+/5 to improve head control. Met 7/31/17  10. Pt will demonstrate improved cervical deep flexor strength to 3+/5 to improve head control. Met 7/31/17   11. Pt will demonstrate improve balance and gait by scoring 14/28 on Tinetti. Met 8/31/17- 17/28                                      Revised 8/31/17: pt will demonstrate " improved balance/ gait and decreased fall risk to moderate by scoring 19/28 on Tinetti                                            Assessment. ~same- 17/28                        12. Pt will demonstrate improved cervical PROM for rotation to 40 degrees to improve functional head control. Declined for R-  9 deg AAROM R rotation      Plan   Continue PT 2x weekly under established Plan of Care, with treatment to include: pt education, HEP, therapeutic exercises, neuromuscular re-education/balance exercises, therapeutic activities, joint mobilizations, manual therapy, gait training PRN, and modalities PRN, to work towards established goals. Pt may be seen by PTA to carry out plan of care.       Yadi Mercado DPT  5/14/2018

## 2018-05-17 ENCOUNTER — CLINICAL SUPPORT (OUTPATIENT)
Dept: REHABILITATION | Facility: HOSPITAL | Age: 53
End: 2018-05-17
Attending: STUDENT IN AN ORGANIZED HEALTH CARE EDUCATION/TRAINING PROGRAM
Payer: COMMERCIAL

## 2018-05-17 DIAGNOSIS — R53.1 DECREASED STRENGTH: ICD-10-CM

## 2018-05-17 DIAGNOSIS — Z74.09 IMPAIRED FUNCTIONAL MOBILITY, BALANCE, GAIT, AND ENDURANCE: ICD-10-CM

## 2018-05-17 DIAGNOSIS — R29.898 DECREASED ROM OF NECK: ICD-10-CM

## 2018-05-17 PROCEDURE — 97110 THERAPEUTIC EXERCISES: CPT | Mod: PO | Performed by: PHYSICAL THERAPIST

## 2018-05-17 NOTE — PROGRESS NOTES
"Physical Therapy Progress Note      Name: Brigid Jernigan  Clinic Number: [id number]  Diagnosis: G71.11 (ICD-10-CM) - Myotonic dystrophy  Physician: Luis Felipe Vo MD  Treatment Orders: PT Eval and Treat          Past Medical History:   Diagnosis Date    Abnormal EKG      Myotonic dystrophy      Pacemaker      PAF (paroxysmal atrial fibrillation)      Sleep apnea           Precautions: standard  Visit #: 12/12 (exp. 5/9/18)     (previous visits 2018= 16)   (Visit total 2017= 44)  Date of Eval: 5/9/2017  Extended POC: 6/14/2018     Subjective   Pt reports: no new complaints  Sister reported:  no new complaints  Pain Scale: denies     Objective   Pt arrived ~15 mins late to tx, able to accomodate       Patient received individual therapy to increase strength, endurance, ROM, flexibility, posture, core stabilization and head control with activities as follows:      Pt participated in therapeutic exercises x 43 minutes to address ROM and cervical alignment:     recumbent stepper x 10 min-Level 12.5 BUE/LE     Supine: LTR with green physioball x 30   Double knee to chest with ball 30x    Standing with RW:  Hip abd OTB 15x   Hip ext OTB 15x    Sitting EOM:   Modified Sit ups with wedge x 10, with 4.4# ball   Russian twists 4.4# ball- 10x    NP today:   Side lying: clam shells PTB 20x  Machine weights:     X 15 reps of B squats with #130lb applied  Ballet bar: ambulation training with cervical collar and 1 UE support- 5 laps,CGA    Backward gait- 1 lap min A  Sit<>stand from 20"- 19.5" height with multiple attempts for success, but with Mod I  Sitting:  Single UE flexion to ~shoulder height with focus on good trunk posture  Supine: SLR 5# 20x   Cervical PROM: Rotation PROM, SB PROM    pec PROM    Inhibitive distraction  bridging with ball 2 x 10  AROM deep cervical flex 10x  Standing by mat:  3 x 5 reps of B UE chest press while in standing with CGA.  1 UE support required between each set  2 x 30 sec static " stance, intermittent 1UE support  Hitting a ball back and forth working on core strength and upright sitting x 3 mins     Written Home Exercises: 1/29/18- side stepping at counter with 1-2 UE support  12/4/17- AROM cervical SB and rotation  assisted quad/ hip flexor stretch in side lying  eval- chin tucks, rows with OTB, hip flexor and knee extensor stretch in sidelying  Pt demo fair understanding of the education provided. Brigid demonstrated fair return demonstration of activities.      Education provided re: 12/18/17- cervical collar application  POC, HEP     Pt has no cultural, educational or language barriers to learning provided.     Assessment   Brigid tolerated treatment well. Pt demonstrated a good tolerance to increased duration on stepper and increased reps for supine and standing exercises. Pt demonstrated improved core stability when performing ab routine. Pt can benefit from continued skilled PT to address impairments to improve safety, independence, and quality of life.     Pt prognosis is Good. Pt will continue to benefit from skilled outpatient physical therapy to address the deficits listed in the problem list, provide pt/family education and to maximize pt's level of independence in the home and community environment.      Activity limitations/ Participation Restrictions:   Fall Risk - impaired balance   Weakness  Impaired muscle tone  Poor head control  Decreased ROM  Gait deviations   Decreased ambulation   Decreased activity tolerance   Decreased independence with daily activities   Requires skilled supervision to complete and progress HEP   Requires skilled PT for DME/ orthotic recommendations     Pt's spiritual, cultural and educational needs considered and pt agreeable to plan of care and GOALS as stated below:   Short term goals: 6 weeks, pt agrees to goals set. (As of 5/3/18)  1. Pt will perform chin tucks in supine with supervision to improve independent with HEP. Met 7/5/17  2. Assist  "pt with obtaining appropriate cervical brace to improve head control. Met 1/9/18- pt has but is not consistently wearing  3. Pt will demonstrate improved knee ext strength on left to 4/5 to improve safety with ambulation and sit<>stand. Met 9/28/17   4. Pt will demonstrate improved cervical rotation PROM to 20 degrees to improve functional head control. Met 7/5/17  5. Pt will perform sit<>stand consistently from 22" height surface with supervision to improve independence. Met 7/5/17                Revised 8/31/17:  Pt will perform sit<>stand from 19" height chair to improve independence with sitting in various chairs around her home and decreased need for "pop up seat". improved as of 3/15/18- pt stood from 20.5" height with BUE and 2-3 attempts, supervision without verbal cues      Long term goals: 12 weeks, pt agrees to goals set  6. Pt will perform basic HEP for gross strengthening with supervision to deter worsening of functional limitations. Met 8/31/17- pt's sister reports poor current compliance  7. Pt will demonstrate improved bilateral hip strength to 3/5 to improve balance and independence with mobility. Met 7/5/17  8. New 7/5/17: Pt will demonstrate improved bilateral hip strength to 4/5 to improve balance and independence with mobility. Nearly met-  hip flex WALESKA= 5/5, hip ext R= 3+/5, L= 3/5, hip abd R=5/5, L=4/5, hip add R= 4-/5, L= 3+/5  9. Pt will demonstrate improved cervical extension strength to 3+/5 to improve head control. Met 7/31/17  10. Pt will demonstrate improved cervical deep flexor strength to 3+/5 to improve head control. Met 7/31/17   11. Pt will demonstrate improve balance and gait by scoring 14/28 on Tinetti. Met 8/31/17- 17/28                                      Revised 8/31/17: pt will demonstrate improved balance/ gait and decreased fall risk to moderate by scoring 19/28 on Tinetti                                            Assessment. ~same- 17/28                        12. Pt " will demonstrate improved cervical PROM for rotation to 40 degrees to improve functional head control. Declined for R-  9 deg AAROM R rotation      Plan   Continue PT 2x weekly under established Plan of Care, with treatment to include: pt education, HEP, therapeutic exercises, neuromuscular re-education/balance exercises, therapeutic activities, joint mobilizations, manual therapy, gait training PRN, and modalities PRN, to work towards established goals. Pt may be seen by PTA to carry out plan of care.       Yadi Mercado DPT  5/17/2018

## 2018-05-21 ENCOUNTER — CLINICAL SUPPORT (OUTPATIENT)
Dept: REHABILITATION | Facility: HOSPITAL | Age: 53
End: 2018-05-21
Attending: STUDENT IN AN ORGANIZED HEALTH CARE EDUCATION/TRAINING PROGRAM
Payer: COMMERCIAL

## 2018-05-21 DIAGNOSIS — R53.1 DECREASED STRENGTH: ICD-10-CM

## 2018-05-21 DIAGNOSIS — Z74.09 IMPAIRED FUNCTIONAL MOBILITY, BALANCE, GAIT, AND ENDURANCE: ICD-10-CM

## 2018-05-21 DIAGNOSIS — R29.898 DECREASED ROM OF NECK: ICD-10-CM

## 2018-05-21 DIAGNOSIS — G71.11 MYOTONIC DYSTROPHY: Primary | ICD-10-CM

## 2018-05-21 PROCEDURE — 97110 THERAPEUTIC EXERCISES: CPT | Mod: PO | Performed by: PHYSICAL THERAPIST

## 2018-05-21 NOTE — PROGRESS NOTES
"Physical Therapy Progress Note      Name: Brigid Jernigan  Clinic Number: [id number]  Diagnosis: G71.11 (ICD-10-CM) - Myotonic dystrophy  Physician: Luis Felipe Vo MD  Treatment Orders: PT Eval and Treat          Past Medical History:   Diagnosis Date    Abnormal EKG      Myotonic dystrophy      Pacemaker      PAF (paroxysmal atrial fibrillation)      Sleep apnea           Precautions: standard  Visit #: 1     (previous visits 2018= 28)   (Visit total 2017= 44)  Date of Eval: 5/9/2017  Extended POC: 6/14/2018     Subjective   Pt reports: no new complaints  Sister reported:  no new complaints  Pain Scale: denies     Objective   Pt arrived ~10 mins late to tx, able to accomodate       Patient received individual therapy to increase strength, endurance, ROM, flexibility, posture, core stabilization and head control with activities as follows:      Pt participated in therapeutic exercises x 55 minutes to address ROM and cervical alignment:     recumbent stepper x 12 min-Level 10 BUE/LE     Supine: SLR 5# 20x   Cervical PROM: Rotation PROM   pec PROM    Inhibitive distraction      bridging with ball 2 x 10    Side lying: clam shells PTB 20x    Sit<>stand from ~19.5" height with success on 1st attempt, then multiple attempts for success, but with Mod I     X 15 reps of B squats with #130lb applied      NP today:   Supine: LTR with green physioball x 30   Double knee to chest with ball 30x  Standing with RW:  Hip abd OTB 15x   Hip ext OTB 15x  Sitting EOM:   Modified Sit ups with wedge x 10, with 4.4# ball   Russian twists 4.4# ball- 10x  Ballet bar: ambulation training with cervical collar and 1 UE support- 5 laps,CGA    Backward gait- 1 lap min A  Sitting:  Single UE flexion to ~shoulder height with focus on good trunk posture  Standing by mat:  3 x 5 reps of B UE chest press while in standing with CGA.  1 UE support required between each set  2 x 30 sec static stance, intermittent 1UE support  Hitting a ball " back and forth working on core strength and upright sitting x 3 mins     Written Home Exercises: 1/29/18- side stepping at counter with 1-2 UE support  12/4/17- AROM cervical SB and rotation  assisted quad/ hip flexor stretch in side lying  eval- chin tucks, rows with OTB, hip flexor and knee extensor stretch in sidelying  Pt demo fair understanding of the education provided. Brigid demonstrated fair return demonstration of activities.      Education provided re: 12/18/17- cervical collar application  POC, HEP     Pt has no cultural, educational or language barriers to learning provided.     Assessment   Brigid tolerated treatment well. Pt demonstrates improved ability to stand from lower surfaces. Pt was able to demonstrate upon first attempt, but then required multiple repeated attempts for success. Pt instructed to change seating when watching TV to encourage R cervical rotation vs. L. Pt can benefit from continued skilled PT to address impairments to improve safety, independence, and quality of life.     Pt prognosis is Good. Pt will continue to benefit from skilled outpatient physical therapy to address the deficits listed in the problem list, provide pt/family education and to maximize pt's level of independence in the home and community environment.      Activity limitations/ Participation Restrictions:   Fall Risk - impaired balance   Weakness  Impaired muscle tone  Poor head control  Decreased ROM  Gait deviations   Decreased ambulation   Decreased activity tolerance   Decreased independence with daily activities   Requires skilled supervision to complete and progress HEP   Requires skilled PT for DME/ orthotic recommendations     Pt's spiritual, cultural and educational needs considered and pt agreeable to plan of care and GOALS as stated below:   Short term goals: 6 weeks, pt agrees to goals set. (As of 5/3/18)  1. Pt will perform chin tucks in supine with supervision to improve independent with HEP.  "Met 7/5/17  2. Assist pt with obtaining appropriate cervical brace to improve head control. Met 1/9/18- pt has but is not consistently wearing  3. Pt will demonstrate improved knee ext strength on left to 4/5 to improve safety with ambulation and sit<>stand. Met 9/28/17   4. Pt will demonstrate improved cervical rotation PROM to 20 degrees to improve functional head control. Met 7/5/17  5. Pt will perform sit<>stand consistently from 22" height surface with supervision to improve independence. Met 7/5/17                Revised 8/31/17:  Pt will perform sit<>stand from 19" height chair to improve independence with sitting in various chairs around her home and decreased need for "pop up seat". improved as of 3/15/18- pt stood from 20.5" height with BUE and 2-3 attempts, supervision without verbal cues      Long term goals: 12 weeks, pt agrees to goals set  6. Pt will perform basic HEP for gross strengthening with supervision to deter worsening of functional limitations. Met 8/31/17- pt's sister reports poor current compliance  7. Pt will demonstrate improved bilateral hip strength to 3/5 to improve balance and independence with mobility. Met 7/5/17  8. New 7/5/17: Pt will demonstrate improved bilateral hip strength to 4/5 to improve balance and independence with mobility. Nearly met-  hip flex WALESKA= 5/5, hip ext R= 3+/5, L= 3/5, hip abd R=5/5, L=4/5, hip add R= 4-/5, L= 3+/5  9. Pt will demonstrate improved cervical extension strength to 3+/5 to improve head control. Met 7/31/17  10. Pt will demonstrate improved cervical deep flexor strength to 3+/5 to improve head control. Met 7/31/17   11. Pt will demonstrate improve balance and gait by scoring 14/28 on Tinetti. Met 8/31/17- 17/28                                      Revised 8/31/17: pt will demonstrate improved balance/ gait and decreased fall risk to moderate by scoring 19/28 on Tinetti                                            Assessment. ~same- 17/28 "                        12. Pt will demonstrate improved cervical PROM for rotation to 40 degrees to improve functional head control. Declined for R-  9 deg AAROM R rotation      Plan   Continue PT 2x weekly under established Plan of Care, with treatment to include: pt education, HEP, therapeutic exercises, neuromuscular re-education/balance exercises, therapeutic activities, joint mobilizations, manual therapy, gait training PRN, and modalities PRN, to work towards established goals. Pt may be seen by PTA to carry out plan of care.       Yadi Mercado DPT  5/21/2018

## 2018-05-24 ENCOUNTER — CLINICAL SUPPORT (OUTPATIENT)
Dept: REHABILITATION | Facility: HOSPITAL | Age: 53
End: 2018-05-24
Attending: STUDENT IN AN ORGANIZED HEALTH CARE EDUCATION/TRAINING PROGRAM
Payer: COMMERCIAL

## 2018-05-24 DIAGNOSIS — Z74.09 IMPAIRED FUNCTIONAL MOBILITY, BALANCE, GAIT, AND ENDURANCE: ICD-10-CM

## 2018-05-24 DIAGNOSIS — R53.1 DECREASED STRENGTH: ICD-10-CM

## 2018-05-24 DIAGNOSIS — R29.898 DECREASED ROM OF NECK: ICD-10-CM

## 2018-05-24 PROCEDURE — 97110 THERAPEUTIC EXERCISES: CPT | Mod: PO | Performed by: PHYSICAL THERAPIST

## 2018-05-24 NOTE — PROGRESS NOTES
"Physical Therapy Progress Note      Name: Brigid Jernigan  Clinic Number: [id number]  Diagnosis: G71.11 (ICD-10-CM) - Myotonic dystrophy  Physician: Luis Felipe Vo MD  Treatment Orders: PT Eval and Treat          Past Medical History:   Diagnosis Date    Abnormal EKG      Myotonic dystrophy      Pacemaker      PAF (paroxysmal atrial fibrillation)      Sleep apnea           Precautions: standard  Visit #: 2     (previous visits 2018= 28)   (Visit total 2017= 44)  Date of Eval: 5/9/2017  Extended POC: 6/14/2018     Subjective   Pt reports: no new complaints  Sister reported:  no new complaints  Pain Scale: denies     Objective   Pt arrived ~10 mins late to tx, able to accomodate       Patient received individual therapy to increase strength, endurance, ROM, flexibility, posture, core stabilization and head control with activities as follows:      Pt participated in therapeutic exercises x 54 minutes to address ROM and cervical alignment:     recumbent stepper x 12.5 min-Level 10 BUE/LE     Supine: SLR 5# 20x   Cervical PROM: Rotation PROM, upper trap stretch   pec PROM    Inhibitive distraction      LTR with ball 20x        Ballet bar: side stepping with BUE support- 3 laps, supervision- mod I   ambulation training with cervical collar and 1 UE support, close supervision- 5 laps,    Backward gait 1 UE support- 1 lap CGA    Standing with RW:  Hip abd OTB 10x   Hip ext OTB 10x    Sitting EOM:   Modified Sit ups with wedge x 10, with 4.4# ball throw   Russian twists 4.4# ball- 10x    NP today:   Side lying: clam shells PTB 20x  Sit<>stand from ~19.5" height with success on 1st attempt, then multiple attempts for success, but with Mod I   X 15 reps of B squats with #130lb applied  Supine:    Double knee to chest with ball 30x    bridging with ball 2 x 10  Standing by mat:  3 x 5 reps of B UE chest press while in standing with CGA.  1 UE support required between each set  2 x 30 sec static stance, intermittent 1UE " support  Hitting a ball back and forth working on core strength and upright sitting x 3 mins     Written Home Exercises: 1/29/18- side stepping at counter with 1-2 UE support  12/4/17- AROM cervical SB and rotation  assisted quad/ hip flexor stretch in side lying  eval- chin tucks, rows with OTB, hip flexor and knee extensor stretch in sidelying  Pt demo fair understanding of the education provided. Brigid demonstrated fair return demonstration of activities.      Education provided re: 12/18/17- cervical collar application  POC, HEP     Pt has no cultural, educational or language barriers to learning provided.     Assessment   Brigid tolerated treatment well. Pt reports intermittent compliance with side stepping at counter at home. Pt reports changing the direction that she sits to watch TV. PT was able to passively rotate head past midline towards the right. Pt demonstrated improved balance and strength while ambulating with 1 UE support at ballet bar.  Pt demonstrated decreased anterior trunk lean during stance phase on LLE. Pt also demonstrated ability to ambulate backwards with 1 UE support, but would not attempt >20 ft due to fear of falling. Pt can benefit from continued skilled PT to address impairments to improve safety, independence, and quality of life.     Pt prognosis is Good. Pt will continue to benefit from skilled outpatient physical therapy to address the deficits listed in the problem list, provide pt/family education and to maximize pt's level of independence in the home and community environment.      Activity limitations/ Participation Restrictions:   Fall Risk - impaired balance   Weakness  Impaired muscle tone  Poor head control  Decreased ROM  Gait deviations   Decreased ambulation   Decreased activity tolerance   Decreased independence with daily activities   Requires skilled supervision to complete and progress HEP   Requires skilled PT for DME/ orthotic recommendations     Pt's  "spiritual, cultural and educational needs considered and pt agreeable to plan of care and GOALS as stated below:   Short term goals: 6 weeks, pt agrees to goals set. (As of 5/3/18)  1. Pt will perform chin tucks in supine with supervision to improve independent with HEP. Met 7/5/17  2. Assist pt with obtaining appropriate cervical brace to improve head control. Met 1/9/18- pt has but is not consistently wearing  3. Pt will demonstrate improved knee ext strength on left to 4/5 to improve safety with ambulation and sit<>stand. Met 9/28/17   4. Pt will demonstrate improved cervical rotation PROM to 20 degrees to improve functional head control. Met 7/5/17  5. Pt will perform sit<>stand consistently from 22" height surface with supervision to improve independence. Met 7/5/17                Revised 8/31/17:  Pt will perform sit<>stand from 19" height chair to improve independence with sitting in various chairs around her home and decreased need for "pop up seat". improved as of 3/15/18- pt stood from 20.5" height with BUE and 2-3 attempts, supervision without verbal cues      Long term goals: 12 weeks, pt agrees to goals set  6. Pt will perform basic HEP for gross strengthening with supervision to deter worsening of functional limitations. Met 8/31/17- pt's sister reports poor current compliance  7. Pt will demonstrate improved bilateral hip strength to 3/5 to improve balance and independence with mobility. Met 7/5/17  8. New 7/5/17: Pt will demonstrate improved bilateral hip strength to 4/5 to improve balance and independence with mobility. Nearly met-  hip flex WALESKA= 5/5, hip ext R= 3+/5, L= 3/5, hip abd R=5/5, L=4/5, hip add R= 4-/5, L= 3+/5  9. Pt will demonstrate improved cervical extension strength to 3+/5 to improve head control. Met 7/31/17  10. Pt will demonstrate improved cervical deep flexor strength to 3+/5 to improve head control. Met 7/31/17   11. Pt will demonstrate improve balance and gait by scoring 14/28 " on Tinetti. Met 8/31/17- 17/28                                      Revised 8/31/17: pt will demonstrate improved balance/ gait and decreased fall risk to moderate by scoring 19/28 on Tinetti                                            Assessment. ~same- 17/28                        12. Pt will demonstrate improved cervical PROM for rotation to 40 degrees to improve functional head control. Declined for R-  9 deg AAROM R rotation      Plan   Continue PT 2x weekly under established Plan of Care, with treatment to include: pt education, HEP, therapeutic exercises, neuromuscular re-education/balance exercises, therapeutic activities, joint mobilizations, manual therapy, gait training PRN, and modalities PRN, to work towards established goals. Pt may be seen by PTA to carry out plan of care.       Yadi Mercado DPT  5/24/2018

## 2018-05-28 ENCOUNTER — DOCUMENTATION ONLY (OUTPATIENT)
Dept: REHABILITATION | Facility: HOSPITAL | Age: 53
End: 2018-05-28

## 2018-05-28 NOTE — PROGRESS NOTES
I, Yadi Mercado, JORDYT, met with Mae Caicedo PTA to discuss this pt's POC. Continue with core and LE strengthening to improve pt's mobility. May perform gait with 1 UE support at ballet bar. Stretch pecs, upper traps and cervical ROM.    Yadi Mercado DPT  5/28/2018    Face to face consultation with supervision PT held on 06/14/2018    Mae Caicedo PTA

## 2018-05-29 ENCOUNTER — CLINICAL SUPPORT (OUTPATIENT)
Dept: REHABILITATION | Facility: HOSPITAL | Age: 53
End: 2018-05-29
Attending: STUDENT IN AN ORGANIZED HEALTH CARE EDUCATION/TRAINING PROGRAM
Payer: COMMERCIAL

## 2018-05-29 DIAGNOSIS — Z74.09 IMPAIRED FUNCTIONAL MOBILITY, BALANCE, GAIT, AND ENDURANCE: ICD-10-CM

## 2018-05-29 DIAGNOSIS — R53.1 DECREASED STRENGTH: ICD-10-CM

## 2018-05-29 DIAGNOSIS — R29.898 DECREASED ROM OF NECK: ICD-10-CM

## 2018-05-29 PROCEDURE — 97110 THERAPEUTIC EXERCISES: CPT | Mod: PO

## 2018-05-29 NOTE — PROGRESS NOTES
Physical Therapy Progress Note      Name: Brigid Jernigan  Clinic Number: [id number]  Diagnosis: G71.11 (ICD-10-CM) - Myotonic dystrophy  Physician: Luis Felipe Vo MD  Treatment Orders: PT Eval and Treat          Past Medical History:   Diagnosis Date    Abnormal EKG      Myotonic dystrophy      Pacemaker      PAF (paroxysmal atrial fibrillation)      Sleep apnea           Precautions: standard  Visit #: 3     (previous visits 2018= 28)   (Visit total 2017= 44)  Date of Eval: 5/9/2017  Extended POC: 6/14/2018     Subjective   Pt reports:   Sister reported:    Pain Scale: denies     Objective     Patient received individual therapy to increase strength, endurance, ROM, flexibility, posture, core stabilization and head control with activities as follows:      Pt participated in therapeutic exercises x 45 minutes to address ROM and cervical alignment:      Nu Step recumbent stepper x 10 min-Level 10 BUE/LE     Supine: SLR 5# 20x   Cervical PROM: Rotation PROM, upper trap stretch   pec PROM      LTR with ball 20x       Double leg lift with ball between knees 15x         bridging with feet on ball 20x         LTR with feet on ball 20x        Double KTC with legs on physioball 10x    X 15 reps of B LE squats on leg press with #125lbs applied       Written Home Exercises: 1/29/18- side stepping at counter with 1-2 UE support  12/4/17- AROM cervical SB and rotation  assisted quad/ hip flexor stretch in side lying  eval- chin tucks, rows with OTB, hip flexor and knee extensor stretch in sidelying  Pt demo fair understanding of the education provided. Brigid demonstrated fair return demonstration of activities.      Education provided re: 12/18/17- cervical collar application  POC, HEP     Pt has no cultural, educational or language barriers to learning provided.     Assessment   Brigid tolerated treatment well and did not have any problems.  Pt agreeable to wearing neck brace with stepper, but not agreeable to gait  "at ballet bar.  PT cont with supine stretching and exercises.  No complaints noted.  Cont with POC.       Pt prognosis is Good. Pt will continue to benefit from skilled outpatient physical therapy to address the deficits listed in the problem list, provide pt/family education and to maximize pt's level of independence in the home and community environment.      Activity limitations/ Participation Restrictions:   Fall Risk - impaired balance   Weakness  Impaired muscle tone  Poor head control  Decreased ROM  Gait deviations   Decreased ambulation   Decreased activity tolerance   Decreased independence with daily activities   Requires skilled supervision to complete and progress HEP   Requires skilled PT for DME/ orthotic recommendations     Pt's spiritual, cultural and educational needs considered and pt agreeable to plan of care and GOALS as stated below:   Short term goals: 6 weeks, pt agrees to goals set. (As of 5/3/18)  1. Pt will perform chin tucks in supine with supervision to improve independent with HEP. Met 7/5/17  2. Assist pt with obtaining appropriate cervical brace to improve head control. Met 1/9/18- pt has but is not consistently wearing  3. Pt will demonstrate improved knee ext strength on left to 4/5 to improve safety with ambulation and sit<>stand. Met 9/28/17   4. Pt will demonstrate improved cervical rotation PROM to 20 degrees to improve functional head control. Met 7/5/17  5. Pt will perform sit<>stand consistently from 22" height surface with supervision to improve independence. Met 7/5/17                Revised 8/31/17:  Pt will perform sit<>stand from 19" height chair to improve independence with sitting in various chairs around her home and decreased need for "pop up seat". improved as of 3/15/18- pt stood from 20.5" height with BUE and 2-3 attempts, supervision without verbal cues      Long term goals: 12 weeks, pt agrees to goals set  6. Pt will perform basic HEP for gross strengthening " with supervision to deter worsening of functional limitations. Met 8/31/17- pt's sister reports poor current compliance  7. Pt will demonstrate improved bilateral hip strength to 3/5 to improve balance and independence with mobility. Met 7/5/17  8. New 7/5/17: Pt will demonstrate improved bilateral hip strength to 4/5 to improve balance and independence with mobility. Nearly met-  hip flex WALESKA= 5/5, hip ext R= 3+/5, L= 3/5, hip abd R=5/5, L=4/5, hip add R= 4-/5, L= 3+/5  9. Pt will demonstrate improved cervical extension strength to 3+/5 to improve head control. Met 7/31/17  10. Pt will demonstrate improved cervical deep flexor strength to 3+/5 to improve head control. Met 7/31/17   11. Pt will demonstrate improve balance and gait by scoring 14/28 on Tinetti. Met 8/31/17- 17/28                                      Revised 8/31/17: pt will demonstrate improved balance/ gait and decreased fall risk to moderate by scoring 19/28 on Tinetti                                            Assessment. ~same- 17/28                        12. Pt will demonstrate improved cervical PROM for rotation to 40 degrees to improve functional head control. Declined for R-  9 deg AAROM R rotation      Plan   Continue PT 2x weekly under established Plan of Care, with treatment to include: pt education, HEP, therapeutic exercises, neuromuscular re-education/balance exercises, therapeutic activities, joint mobilizations, manual therapy, gait training PRN, and modalities PRN, to work towards established goals. Pt may be seen by PTA to carry out plan of care.       Yadi Mercado DPT  5/29/2018

## 2018-06-05 ENCOUNTER — CLINICAL SUPPORT (OUTPATIENT)
Dept: REHABILITATION | Facility: HOSPITAL | Age: 53
End: 2018-06-05
Attending: STUDENT IN AN ORGANIZED HEALTH CARE EDUCATION/TRAINING PROGRAM
Payer: COMMERCIAL

## 2018-06-05 DIAGNOSIS — Z74.09 IMPAIRED FUNCTIONAL MOBILITY, BALANCE, GAIT, AND ENDURANCE: ICD-10-CM

## 2018-06-05 DIAGNOSIS — R53.1 DECREASED STRENGTH: ICD-10-CM

## 2018-06-05 DIAGNOSIS — R29.898 DECREASED ROM OF NECK: ICD-10-CM

## 2018-06-05 PROCEDURE — 97110 THERAPEUTIC EXERCISES: CPT | Mod: PO | Performed by: PHYSICAL THERAPIST

## 2018-06-05 NOTE — PLAN OF CARE
Physical Therapy Progress Note      Name: Brigid Jernigan  Clinic Number: [id number]  Diagnosis: G71.11 (ICD-10-CM) - Myotonic dystrophy  Physician: Luis Felipe Vo MD  Treatment Orders: PT Eval and Treat          Past Medical History:   Diagnosis Date    Abnormal EKG      Myotonic dystrophy      Pacemaker      PAF (paroxysmal atrial fibrillation)      Sleep apnea           Precautions: standard  Visit #: 5/8 (expires 6/14)     (previous visits 2018= 28)   (Visit total 2017= 44)  Date of Eval: 5/9/2017  Extended POC: 8/9/2018     Subjective   Pt reports: no new complaints  Sister reported:  I forgot the neck brace today  Pain Scale: denies     Objective     Patient received individual therapy to increase strength, endurance, ROM, flexibility, posture, core stabilization and head control with activities as follows:      Pt participated in therapeutic exercises x 38 minutes to address ROM and cervical alignment:    Supine: Cervical PROM: Rotation PROM, upper trap stretch    pec PROM    Cervical rotation AAROM: R= 6 deg, L= 20 deg   hip flex WALESKA= 5/5, hip ext R= 3+/5, L= 4-/5, hip abd R=4+/5, L=4+/5, hip add R= 4-/5, L= 3+/5      Nu Step recumbent stepper x 13 min-Level 10 BUE/LE     Clamshells PTB 30x   2 x 10 reps of B LE squats on leg press with #130lbs applied     Tinetti Balance Assessment  Balance:  1. Sitting Balance 1   Leans/ slides in chair= 0   Steady= 1  2. Rises from chair 1   Unable without help= 0   Able, uses arms= 1   Able without use of arms= 2  3. Attempts to rise 2   Unable without help= 0   Able, >1 attmept required-= 1   Able, 1 attempt= 2  4. Immediate standing balance (1st 5 seconds) 1   Unsteady (swaggers, moves feet, trunk sway)= 0   Steady but uses walker or other support= 1   Narrow base of support without walker or support= 2  5. Nudged 1   Begins to fall= 0   Staggers, grabs, catches self= 1   Steady= 2  6. Standing balance 1   Unsteady= 0   Steady but wide LEYDI or uses AD=1   Narrow  LEYDI without AD=2  7. Eyes closed 1   Unsteady= 0   Steady= 1  8. Turning 360 degrees 1, steady   Discontinuous steps= 0   Continuous steps= 1   Unsteady= 0   Steady= 1  9. Sitting down 1   Unsafe= 0   Uses arms or not in a smooth motion= 1   Safe, smooth motion= 2  Balance score= 10/16  Gait:  1. Initiation 1   hesitates or multiple attempts to start= 0   No hesitancy= 1  2. Step length 2   Step to= 0   One foot passes= 1   reciprocal pattern= 2  3. Step height 2    Neither foot clears floor= 0   One foot clears floor= 1   Both feet clear floor= 2  4. Step symmetry 1   Not symmetrical= 0   Appears symmetrical= 1  5. Step continuity 1   Not continuous= 0   Appears continuous= 1  6. Path 1   Marked deviation= 0   Mild/moderate deviation or uses A.D.= 1   Straight without A.D.= 2  7. Trunk 0   Marked sway or uses A.D.= 0   No sway, but flexes knees or back, spread arms out while walking= 1   No sway, no flexion, no use of UE, no use of A.D.= 2  8. Walking stance 1   Heels apart= 0   Heels almost touching while walking= 1  Gait score= 9/12  Total score= 19/28 , high fall risk    Ambulation training with LBQC (intermittent use of ballet bar) 10' x 4 with CGA, VC for sequencing    NP today:    Supine: SLR 5# 20x      LTR with ball 20x       Double leg lift with ball between knees 15x         bridging with feet on ball 20x         LTR with feet on ball 20x        Double KTC with legs on physioball 10x      Written Home Exercises: 1/29/18- side stepping at counter with 1-2 UE support  12/4/17- AROM cervical SB and rotation  assisted quad/ hip flexor stretch in side lying  eval- chin tucks, rows with OTB, hip flexor and knee extensor stretch in sidelying  Pt demo fair understanding of the education provided. Brigid demonstrated fair return demonstration of activities.      Education provided re: 12/18/17- cervical collar application  POC, HEP     Pt has no cultural, educational or language barriers to learning  provided.     Assessment   assessment period: 5/7/2018 to 6/4/2018. Brigid tolerates treatments well and demonstrated progress towards goals. Pt met goal for improving balance/ gait and decreasing fall risk to moderate. Prior to fx LLE, pt was not using an AD for ambulation. Pt attempted gait training with LBQC and intermittent use of bar. Pt demonstrated fair- balance, but decreased trunk control with use of LBQC. Pt can benefit from continued practice with quad cane to improve trunk and LE stability to improve pt's ability to ambulate without AD. New goal set to address. Pt's LE improving and pt reports being able to get up from her dining room chair unassisted and was giovani to rise from a lower mat today. Sit>stand required 1 attempt, pt pushes/ stabilizes LE on mat/ bed. Bilateral hip strength continues to improve. Pt is not demonstrating progress with cervical rotation ROM. This is partially due to significant forward head posture with possible orthopedic changes, shortened muscle length, and pt's participation in HEP. Pt can benefit from continued skilled PT to address impairments listed below to improve pt's safety, independence , and functional mobility.     Pt prognosis is Good. Pt will continue to benefit from skilled outpatient physical therapy to address the deficits listed in the problem list, provide pt/family education and to maximize pt's level of independence in the home and community environment.      Activity limitations/ Participation Restrictions:   Fall Risk - impaired balance   Weakness  Impaired muscle tone  Poor head control  Decreased ROM  Gait deviations   Decreased ambulation   Decreased activity tolerance   Decreased independence with daily activities   Requires skilled supervision to complete and progress HEP   Requires skilled PT for DME/ orthotic recommendations     Pt's spiritual, cultural and educational needs considered and pt agreeable to plan of care and GOALS as stated below:  "  Short term goals: 6 weeks, pt agrees to goals set. (As of 6/5/18)  1. Pt will perform chin tucks in supine with supervision to improve independent with HEP. Met 7/5/17  2. Assist pt with obtaining appropriate cervical brace to improve head control. Met 1/9/18- pt has but is not consistently wearing  3. Pt will demonstrate improved knee ext strength on left to 4/5 to improve safety with ambulation and sit<>stand. Met 9/28/17   4. Pt will demonstrate improved cervical rotation PROM to 20 degrees to improve functional head control. Met 7/5/17  5. Pt will perform sit<>stand consistently from 22" height surface with supervision to improve independence. Met 7/5/17                Revised 8/31/17:  Pt will perform sit<>stand from 19" height chair to improve independence with sitting in various chairs around her home and decreased need for "pop up seat". improved- pt stood from 19" height with BUE and 1 attempts, supervision without verbal cues      Long term goals: 12 weeks, pt agrees to goals set  6. Pt will perform basic HEP for gross strengthening with supervision to deter worsening of functional limitations. Met 8/31/17- pt's sister reports poor current compliance  7. Pt will demonstrate improved bilateral hip strength to 3/5 to improve balance and independence with mobility. Met 7/5/17  8. New 7/5/17: Pt will demonstrate improved bilateral hip strength to 4/5 to improve balance and independence with mobility. Improved/ Nearly met-  hip flex WALESKA= 5/5, hip ext R= 3+/5, L= 4-/5, hip abd R=4+/5, L=4+/5, hip add R= 4-/5, L= 3+/5  9. Pt will demonstrate improved cervical extension strength to 3+/5 to improve head control. Met 7/31/17  10. Pt will demonstrate improved cervical deep flexor strength to 3+/5 to improve head control. Met 7/31/17   11. Pt will demonstrate improve balance and gait by scoring 14/28 on Tinetti. Met 8/31/17- 17/28                                      Revised 8/31/17: pt will demonstrate improved " balance/ gait and decreased fall risk to moderate by scoring 19/28 on Tinetti                                            Assessment. met- 19/28                       12. Pt will demonstrate improved cervical PROM for rotation to 40 degrees to improve functional head control. Declined for R-  6 deg AAROM R rotation      Plan   POC extended x 8 weeks to allow for continued pt progress towards goals. Continue PT 2x weekly under established Plan of Care, with treatment to include: pt education, HEP, therapeutic exercises, neuromuscular re-education/balance exercises, therapeutic activities, joint mobilizations, manual therapy, gait training PRN, and modalities PRN, to work towards established goals. Pt may be seen by PTA to carry out plan of care.       Yadi Mercado DPT  6/5/2018

## 2018-06-07 ENCOUNTER — CLINICAL SUPPORT (OUTPATIENT)
Dept: REHABILITATION | Facility: HOSPITAL | Age: 53
End: 2018-06-07
Attending: STUDENT IN AN ORGANIZED HEALTH CARE EDUCATION/TRAINING PROGRAM
Payer: COMMERCIAL

## 2018-06-07 DIAGNOSIS — R29.898 DECREASED ROM OF NECK: ICD-10-CM

## 2018-06-07 DIAGNOSIS — R53.1 DECREASED STRENGTH: ICD-10-CM

## 2018-06-07 DIAGNOSIS — Z74.09 IMPAIRED FUNCTIONAL MOBILITY, BALANCE, GAIT, AND ENDURANCE: ICD-10-CM

## 2018-06-07 PROCEDURE — 97110 THERAPEUTIC EXERCISES: CPT | Mod: PO | Performed by: PHYSICAL THERAPIST

## 2018-06-07 NOTE — PROGRESS NOTES
Physical Therapy Progress Note      Name: Brigid Jernigan  Clinic Number: [id number]  Diagnosis: G71.11 (ICD-10-CM) - Myotonic dystrophy  Physician: Luis Felipe Vo MD  Treatment Orders: PT Eval and Treat          Past Medical History:   Diagnosis Date    Abnormal EKG      Myotonic dystrophy      Pacemaker      PAF (paroxysmal atrial fibrillation)      Sleep apnea           Precautions: standard  Visit #: 6/8 (expires 6/14)     (previous visits 2018= 28)   (Visit total 2017= 44)  Date of Eval: 5/9/2017  Extended POC: 8/9/2018     Subjective   Pt reports: no new complaints  Sister reported:  I forgot the neck brace today  Pain Scale: denies     Objective      Patient received individual therapy to increase strength, endurance, ROM, flexibility, posture, core stabilization and head control with activities as follows:      Pt participated in therapeutic exercises x 45 minutes to address ROM and cervical alignment:                    Nu Step recumbent stepper x 10 min-Level 12.5 BUE/LE- cervical collar in use     Supine: lying on towel roll   Cervical PROM: Rotation PROM, upper trap stretch                          pec PROM       Double leg lift with ball between knees 2 x 10         bridging with feet on ball 2 x 10   Sit ups from large blue wedge with 2000g ball toss 10x   [place] twists 2000g ball 10x                      10x reps of B LE squats on leg press with #130lbs applied       Ambulation training with LBQC (intermittent use of ballet bar) 10' x 4 with CGA, min VC for sequencing- cervical collar in use     NP today:          Supine: SLR 5# 20x      LTR with ball 20x        Double KTC with legs on physioball 10x    Clamshells PTB 30x     Written Home Exercises: 1/29/18- side stepping at counter with 1-2 UE support  12/4/17- AROM cervical SB and rotation  assisted quad/ hip flexor stretch in side lying  eval- chin tucks, rows with OTB, hip flexor and knee extensor stretch in sidelying  Pt demo fair  understanding of the education provided. Brigid demonstrated fair return demonstration of activities.      Education provided re: 12/18/17- cervical collar application  POC, HEP     Pt has no cultural, educational or language barriers to learning provided.     Assessment   Brigid tolerated treatment well. Pt demonstrated good carryover for ambulation training with LBQC and only required minimal verbal cues for sequencing. PT address core and hip strengthening to improve pt's ability to use LBQC for gait.  Pt can benefit from continued skilled PT to address impairments listed below to improve pt's safety, independence , and functional mobility.     Pt prognosis is Good. Pt will continue to benefit from skilled outpatient physical therapy to address the deficits listed in the problem list, provide pt/family education and to maximize pt's level of independence in the home and community environment.      Activity limitations/ Participation Restrictions:   Fall Risk - impaired balance   Weakness  Impaired muscle tone  Poor head control  Decreased ROM  Gait deviations   Decreased ambulation   Decreased activity tolerance   Decreased independence with daily activities   Requires skilled supervision to complete and progress HEP   Requires skilled PT for DME/ orthotic recommendations     Pt's spiritual, cultural and educational needs considered and pt agreeable to plan of care and GOALS as stated below:   Short term goals: 6 weeks, pt agrees to goals set. (As of 6/5/18)  1. Pt will perform chin tucks in supine with supervision to improve independent with HEP. Met 7/5/17  2. Assist pt with obtaining appropriate cervical brace to improve head control. Met 1/9/18- pt has but is not consistently wearing  3. Pt will demonstrate improved knee ext strength on left to 4/5 to improve safety with ambulation and sit<>stand. Met 9/28/17   4. Pt will demonstrate improved cervical rotation PROM to 20 degrees to improve functional head  "control. Met 7/5/17  5. Pt will perform sit<>stand consistently from 22" height surface with supervision to improve independence. Met 7/5/17                Revised 8/31/17:  Pt will perform sit<>stand from 19" height chair to improve independence with sitting in various chairs around her home and decreased need for "pop up seat". improved- pt stood from 19" height with BUE and 1 attempts, supervision without verbal cues      Long term goals: 12 weeks, pt agrees to goals set  6. Pt will perform basic HEP for gross strengthening with supervision to deter worsening of functional limitations. Met 8/31/17- pt's sister reports poor current compliance  7. Pt will demonstrate improved bilateral hip strength to 3/5 to improve balance and independence with mobility. Met 7/5/17  8. New 7/5/17: Pt will demonstrate improved bilateral hip strength to 4/5 to improve balance and independence with mobility. Improved/ Nearly met-  hip flex WALESKA= 5/5, hip ext R= 3+/5, L= 4-/5, hip abd R=4+/5, L=4+/5, hip add R= 4-/5, L= 3+/5  9. Pt will demonstrate improved cervical extension strength to 3+/5 to improve head control. Met 7/31/17  10. Pt will demonstrate improved cervical deep flexor strength to 3+/5 to improve head control. Met 7/31/17   11. Pt will demonstrate improve balance and gait by scoring 14/28 on Tinetti. Met 8/31/17- 17/28                                      Revised 8/31/17: pt will demonstrate improved balance/ gait and decreased fall risk to moderate by scoring 19/28 on Tinetti                                            Assessment. met- 19/28                       12. Pt will demonstrate improved cervical PROM for rotation to 40 degrees to improve functional head control. Declined for R-  6 deg AAROM R rotation      Plan   Continue PT 2x weekly under established Plan of Care, with treatment to include: pt education, HEP, therapeutic exercises, neuromuscular re-education/balance exercises, therapeutic activities, joint " mobilizations, manual therapy, gait training PRN, and modalities PRN, to work towards established goals. Pt may be seen by PTA to carry out plan of care.         Yadi Mercado DPT  6/7/2018

## 2018-06-14 ENCOUNTER — CLINICAL SUPPORT (OUTPATIENT)
Dept: REHABILITATION | Facility: HOSPITAL | Age: 53
End: 2018-06-14
Attending: STUDENT IN AN ORGANIZED HEALTH CARE EDUCATION/TRAINING PROGRAM
Payer: COMMERCIAL

## 2018-06-14 DIAGNOSIS — Z74.09 IMPAIRED FUNCTIONAL MOBILITY, BALANCE, GAIT, AND ENDURANCE: ICD-10-CM

## 2018-06-14 DIAGNOSIS — R53.1 DECREASED STRENGTH: ICD-10-CM

## 2018-06-14 DIAGNOSIS — R29.898 DECREASED ROM OF NECK: ICD-10-CM

## 2018-06-14 PROCEDURE — 97116 GAIT TRAINING THERAPY: CPT | Mod: PO | Performed by: PHYSICAL THERAPIST

## 2018-06-14 NOTE — PROGRESS NOTES
Physical Therapy Progress Note      Name: Brigid Jernigan  Clinic Number: [id number]  Diagnosis: G71.11 (ICD-10-CM) - Myotonic dystrophy  Physician: Luis Felipe Vo MD  Treatment Orders: PT Eval and Treat          Past Medical History:   Diagnosis Date    Abnormal EKG      Myotonic dystrophy      Pacemaker      PAF (paroxysmal atrial fibrillation)      Sleep apnea           Precautions: standard  Visit #: 7/8 (expires 6/14)     (previous visits 2018= 28)   (Visit total 2017= 44)  Date of Eval: 5/9/2017  Extended POC: 8/9/2018     Subjective   Pt reports: no new complaints  Sister reported:  her MD says that she is doing much better.   Pain Scale: denies     Objective      Patient received individual therapy to increase strength, endurance, ROM, flexibility, posture, core stabilization and head control with activities as follows:      Pt participated in therapeutic exercises x 33 minutes to address ROM and cervical alignment:- supervised with PT tech:   Nu Step recumbent stepper x 10 min-Level 12.5 BUE/LE- cervical collar in use     Supine:  Double leg lift with ball between knees 2 x 10         bridging with feet on ball 2 x 10   SLR 6# 10x   LTR with ball 20x      Pt participated in gait training x 12 minutes to address gait ability and strength:                 Ambulation training with LBQC (intermittent use of ballet bar) 10' x 6 with CGA, no VC for sequencing- cervical collar in use     NP today:          Supine:         Double KTC with legs on physioball 10x    Clamshells PTB 30x  Cervical PROM: Rotation PROM, upper trap stretch                          pec PROM   Sit ups from large blue wedge with 2000g ball toss 10x   [place] twists 2000g ball 10x                      10x reps of B LE squats on leg press with #130lbs applied     Written Home Exercises: 1/29/18- side stepping at counter with 1-2 UE support  12/4/17- AROM cervical SB and rotation  assisted quad/ hip flexor stretch in side  lying  eval- chin tucks, rows with OTB, hip flexor and knee extensor stretch in sidelying  Pt demo fair understanding of the education provided. Brigid demonstrated fair return demonstration of activities.      Education provided re: 12/18/17- cervical collar application  POC, HEP     Pt has no cultural, educational or language barriers to learning provided.     Assessment   Brigid tolerated treatment well. Pt was able to perform increased ambulation distance with LBQC. Pt intermittently uses bar/ wall to regain balance. Increased trunk flexion occurs with stance (L>R) when attempting ambulation with LBQC.   Pt can benefit from continued skilled PT to address impairments listed below to improve pt's safety, independence , and functional mobility.     Pt prognosis is Good. Pt will continue to benefit from skilled outpatient physical therapy to address the deficits listed in the problem list, provide pt/family education and to maximize pt's level of independence in the home and community environment.      Activity limitations/ Participation Restrictions:   Fall Risk - impaired balance   Weakness  Impaired muscle tone  Poor head control  Decreased ROM  Gait deviations   Decreased ambulation   Decreased activity tolerance   Decreased independence with daily activities   Requires skilled supervision to complete and progress HEP   Requires skilled PT for DME/ orthotic recommendations     Pt's spiritual, cultural and educational needs considered and pt agreeable to plan of care and GOALS as stated below:   Short term goals: 6 weeks, pt agrees to goals set. (As of 6/5/18)  1. Pt will perform chin tucks in supine with supervision to improve independent with HEP. Met 7/5/17  2. Assist pt with obtaining appropriate cervical brace to improve head control. Met 1/9/18- pt has but is not consistently wearing  3. Pt will demonstrate improved knee ext strength on left to 4/5 to improve safety with ambulation and sit<>stand. Met  "9/28/17   4. Pt will demonstrate improved cervical rotation PROM to 20 degrees to improve functional head control. Met 7/5/17  5. Pt will perform sit<>stand consistently from 22" height surface with supervision to improve independence. Met 7/5/17                Revised 8/31/17:  Pt will perform sit<>stand from 19" height chair to improve independence with sitting in various chairs around her home and decreased need for "pop up seat". improved- pt stood from 19" height with BUE and 1 attempts, supervision without verbal cues      Long term goals: 12 weeks, pt agrees to goals set  6. Pt will perform basic HEP for gross strengthening with supervision to deter worsening of functional limitations. Met 8/31/17- pt's sister reports poor current compliance  7. Pt will demonstrate improved bilateral hip strength to 3/5 to improve balance and independence with mobility. Met 7/5/17  8. New 7/5/17: Pt will demonstrate improved bilateral hip strength to 4/5 to improve balance and independence with mobility. Improved/ Nearly met-  hip flex WALESKA= 5/5, hip ext R= 3+/5, L= 4-/5, hip abd R=4+/5, L=4+/5, hip add R= 4-/5, L= 3+/5  9. Pt will demonstrate improved cervical extension strength to 3+/5 to improve head control. Met 7/31/17  10. Pt will demonstrate improved cervical deep flexor strength to 3+/5 to improve head control. Met 7/31/17   11. Pt will demonstrate improve balance and gait by scoring 14/28 on Tinetti. Met 8/31/17- 17/28                                      Revised 8/31/17: pt will demonstrate improved balance/ gait and decreased fall risk to moderate by scoring 19/28 on Tinetti                                            Assessment. met- 19/28                       12. Pt will demonstrate improved cervical PROM for rotation to 40 degrees to improve functional head control. Declined for R-  6 deg AAROM R rotation      Plan   Continue PT 2x weekly under established Plan of Care, with treatment to include: pt education, " HEP, therapeutic exercises, neuromuscular re-education/balance exercises, therapeutic activities, joint mobilizations, manual therapy, gait training PRN, and modalities PRN, to work towards established goals. Pt may be seen by PTA to carry out plan of care.         Yadi Mercado DPT  6/14/2018

## 2018-06-18 ENCOUNTER — CLINICAL SUPPORT (OUTPATIENT)
Dept: REHABILITATION | Facility: HOSPITAL | Age: 53
End: 2018-06-18
Attending: STUDENT IN AN ORGANIZED HEALTH CARE EDUCATION/TRAINING PROGRAM
Payer: COMMERCIAL

## 2018-06-18 DIAGNOSIS — R53.1 DECREASED STRENGTH: ICD-10-CM

## 2018-06-18 DIAGNOSIS — Z74.09 IMPAIRED FUNCTIONAL MOBILITY, BALANCE, GAIT, AND ENDURANCE: ICD-10-CM

## 2018-06-18 DIAGNOSIS — R29.898 DECREASED ROM OF NECK: ICD-10-CM

## 2018-06-18 PROCEDURE — 97110 THERAPEUTIC EXERCISES: CPT | Mod: PO

## 2018-06-18 PROCEDURE — 97140 MANUAL THERAPY 1/> REGIONS: CPT | Mod: PO

## 2018-06-18 NOTE — PROGRESS NOTES
"Physical Therapy Progress Note      Name: Brigid Jernigan  Clinic Number: [id number]  Diagnosis: G71.11 (ICD-10-CM) - Myotonic dystrophy  Physician: Luis Felipe Vo MD  Treatment Orders: PT Eval and Treat          Past Medical History:   Diagnosis Date    Abnormal EKG      Myotonic dystrophy      Pacemaker      PAF (paroxysmal atrial fibrillation)      Sleep apnea           Precautions: standard  Visit #: 8/8 (expires 6/14)     (previous visits 2018= 28)   (Visit total 2017= 44)  Date of Eval: 5/9/2017  Extended POC: 8/9/2018     Subjective   Pt reports: " I went to the regular doctor today."   Sister reported: " We are good."   Pain Scale: denies     Objective      Patient received individual therapy to increase strength, endurance, ROM, flexibility, posture, core stabilization and head control with activities as follows:      Pt participated in therapeutic exercises x 38 minutes to address ROM and cervical alignment:  Nu Step recumbent stepper x 10 min-Level 12.5 BUE/LE- cervical collar in use     Manual x 15 min including:   Cervical PROM: Rotation PROM, upper trap stretch              pec PROM    Supine therex including:     Double leg lift with ball between knees 2 x 10         bridging with feet on ball 2 x 10   SLR 6# 10x   LTR with ball 20x                                 10x reps of B LE squats on leg press with #130lbs applied     Written Home Exercises: 1/29/18- side stepping at counter with 1-2 UE support  12/4/17- AROM cervical SB and rotation  assisted quad/ hip flexor stretch in side lying  eval- chin tucks, rows with OTB, hip flexor and knee extensor stretch in sidelying  Pt demo fair understanding of the education provided. Brigid demonstrated fair return demonstration of activities.      Education provided re: 12/18/17- cervical collar application  POC, HEP     Pt has no cultural, educational or language barriers to learning provided.     Assessment   Brigid tolerated treatment well and " "did not have any problems.  Pt cont with level 12.5 on the stepper and #130lbs on the leg press.  No other changes noted.       Pt prognosis is Good. Pt will continue to benefit from skilled outpatient physical therapy to address the deficits listed in the problem list, provide pt/family education and to maximize pt's level of independence in the home and community environment.      Activity limitations/ Participation Restrictions:   Fall Risk - impaired balance   Weakness  Impaired muscle tone  Poor head control  Decreased ROM  Gait deviations   Decreased ambulation   Decreased activity tolerance   Decreased independence with daily activities   Requires skilled supervision to complete and progress HEP   Requires skilled PT for DME/ orthotic recommendations     Pt's spiritual, cultural and educational needs considered and pt agreeable to plan of care and GOALS as stated below:   Short term goals: 6 weeks, pt agrees to goals set. (As of 6/5/18)  1. Pt will perform chin tucks in supine with supervision to improve independent with HEP. Met 7/5/17  2. Assist pt with obtaining appropriate cervical brace to improve head control. Met 1/9/18- pt has but is not consistently wearing  3. Pt will demonstrate improved knee ext strength on left to 4/5 to improve safety with ambulation and sit<>stand. Met 9/28/17   4. Pt will demonstrate improved cervical rotation PROM to 20 degrees to improve functional head control. Met 7/5/17  5. Pt will perform sit<>stand consistently from 22" height surface with supervision to improve independence. Met 7/5/17                Revised 8/31/17:  Pt will perform sit<>stand from 19" height chair to improve independence with sitting in various chairs around her home and decreased need for "pop up seat". improved- pt stood from 19" height with BUE and 1 attempts, supervision without verbal cues      Long term goals: 12 weeks, pt agrees to goals set  6. Pt will perform basic HEP for gross " strengthening with supervision to deter worsening of functional limitations. Met 8/31/17- pt's sister reports poor current compliance  7. Pt will demonstrate improved bilateral hip strength to 3/5 to improve balance and independence with mobility. Met 7/5/17  8. New 7/5/17: Pt will demonstrate improved bilateral hip strength to 4/5 to improve balance and independence with mobility. Improved/ Nearly met-  hip flex WALESKA= 5/5, hip ext R= 3+/5, L= 4-/5, hip abd R=4+/5, L=4+/5, hip add R= 4-/5, L= 3+/5  9. Pt will demonstrate improved cervical extension strength to 3+/5 to improve head control. Met 7/31/17  10. Pt will demonstrate improved cervical deep flexor strength to 3+/5 to improve head control. Met 7/31/17   11. Pt will demonstrate improve balance and gait by scoring 14/28 on Tinetti. Met 8/31/17- 17/28                                      Revised 8/31/17: pt will demonstrate improved balance/ gait and decreased fall risk to moderate by scoring 19/28 on Tinetti                                            Assessment. met- 19/28                       12. Pt will demonstrate improved cervical PROM for rotation to 40 degrees to improve functional head control. Declined for R-  6 deg AAROM R rotation      Plan   Continue PT 2x weekly under established Plan of Care, with treatment to include: pt education, HEP, therapeutic exercises, neuromuscular re-education/balance exercises, therapeutic activities, joint mobilizations, manual therapy, gait training PRN, and modalities PRN, to work towards established goals. Pt may be seen by PTA to carry out plan of care.         Yadi Mercado DPT  6/18/2018

## 2018-06-28 ENCOUNTER — CLINICAL SUPPORT (OUTPATIENT)
Dept: REHABILITATION | Facility: HOSPITAL | Age: 53
End: 2018-06-28
Attending: STUDENT IN AN ORGANIZED HEALTH CARE EDUCATION/TRAINING PROGRAM
Payer: COMMERCIAL

## 2018-06-28 DIAGNOSIS — R29.898 DECREASED ROM OF NECK: ICD-10-CM

## 2018-06-28 DIAGNOSIS — Z74.09 IMPAIRED FUNCTIONAL MOBILITY, BALANCE, GAIT, AND ENDURANCE: ICD-10-CM

## 2018-06-28 DIAGNOSIS — R53.1 DECREASED STRENGTH: ICD-10-CM

## 2018-06-28 PROCEDURE — 97110 THERAPEUTIC EXERCISES: CPT | Mod: PO | Performed by: PHYSICAL THERAPIST

## 2018-06-28 NOTE — PROGRESS NOTES
"Physical Therapy Progress Note      Name: Brigid Jernigan  Clinic Number: [id number]  Diagnosis: G71.11 (ICD-10-CM) - Myotonic dystrophy  Physician: Luis Felipe Vo MD  Treatment Orders: PT Eval and Treat          Past Medical History:   Diagnosis Date    Abnormal EKG      Myotonic dystrophy      Pacemaker      PAF (paroxysmal atrial fibrillation)      Sleep apnea           Precautions: standard  Visit #:  1     (previous visits 2018= 36)   (Visit total 2017= 44)  Date of Eval: 5/9/2017  Extended POC: 8/9/2018     Subjective   Pt reports: no new complaints  Sister reported: no new complaints  Pain Scale: denies     Objective      Patient received individual therapy to increase strength, endurance, ROM, flexibility, posture, core stabilization and head control with activities as follows:      Pt participated in therapeutic exercises x 48 minutes to address ROM and cervical alignment:  Nu Step recumbent stepper x 10 min-Level 12.5 BUE/LE- cervical collar in use    Gait with LBQC near ballet bar- 3 laps with min- CGA, for balance, min VC for sequencing 3-point gait      15x reps of B LE squats on leg press with #130lbs applied       Supine: PROM of pecs while on towel roll    PROM of upper traps    Cervical PROM: Rotation     Inhibitive distraction     Sit ups from med maroon wedge with 2000g ball toss 10x   Thai twists 2000g ball 10x    reviewed modified scalene stretch    Sit>stand from 20" height mat with 2-3 attempts for success.        NP today:   Supine therex including:     Double leg lift with ball between knees 2 x 10         bridging with feet on ball 2 x 10   SLR 6# 10x   LTR with ball 20x                     Written Home Exercises: 6/28/18- modified scalene stretch, illustrated copy to be provided  1/29/18- side stepping at counter with 1-2 UE support  12/4/17- AROM cervical SB and rotation  assisted quad/ hip flexor stretch in side lying  eval- chin tucks, rows with OTB, hip flexor and knee " "extensor stretch in sidelying  Pt demo fair understanding of the education provided. Brigid demonstrated fair return demonstration of activities.      Education provided re: 12/18/17- cervical collar application  POC, HEP     Pt has no cultural, educational or language barriers to learning provided.     Assessment   Brigid tolerated treatment well. Pt demonstrated improved upright posture during gait training, but continues to demonstrate a forward trunk lean with L stance. Pt demonstrates improved consistency with sit<>stand from shorter surfaces (~20"). Pt was able to tolerate increased reps on leg press. Pt was instructed in self scalene stretch to improve cervical ROM as she reports compliance with current HEP of sitting on L side of TV to encourage R cervical rotation. Cervical rotation (albert R) is still severely limited. Pt can benefit from continued skilled PT to address impairments to improve consistency with mobility, prevent further functional decline, and maintain pt's independence for as long as possible.       Pt prognosis is Good. Pt will continue to benefit from skilled outpatient physical therapy to address the deficits listed in the problem list, provide pt/family education and to maximize pt's level of independence in the home and community environment.      Activity limitations/ Participation Restrictions:   Fall Risk - impaired balance   Weakness  Impaired muscle tone  Poor head control  Decreased ROM  Gait deviations   Decreased ambulation   Decreased activity tolerance   Decreased independence with daily activities   Requires skilled supervision to complete and progress HEP   Requires skilled PT for DME/ orthotic recommendations     Pt's spiritual, cultural and educational needs considered and pt agreeable to plan of care and GOALS as stated below:   Short term goals: 6 weeks, pt agrees to goals set. (As of 6/5/18)  1. Pt will perform chin tucks in supine with supervision to improve " "independent with HEP. Met 7/5/17  2. Assist pt with obtaining appropriate cervical brace to improve head control. Met 1/9/18- pt has but is not consistently wearing  3. Pt will demonstrate improved knee ext strength on left to 4/5 to improve safety with ambulation and sit<>stand. Met 9/28/17   4. Pt will demonstrate improved cervical rotation PROM to 20 degrees to improve functional head control. Met 7/5/17  5. Pt will perform sit<>stand consistently from 22" height surface with supervision to improve independence. Met 7/5/17                Revised 8/31/17:  Pt will perform sit<>stand from 19" height chair to improve independence with sitting in various chairs around her home and decreased need for "pop up seat". improved- pt stood from 19" height with BUE and 1 attempts, supervision without verbal cues      Long term goals: 12 weeks, pt agrees to goals set  6. Pt will perform basic HEP for gross strengthening with supervision to deter worsening of functional limitations. Met 8/31/17- pt's sister reports poor current compliance  7. Pt will demonstrate improved bilateral hip strength to 3/5 to improve balance and independence with mobility. Met 7/5/17  8. New 7/5/17: Pt will demonstrate improved bilateral hip strength to 4/5 to improve balance and independence with mobility. Improved/ Nearly met-  hip flex WALESKA= 5/5, hip ext R= 3+/5, L= 4-/5, hip abd R=4+/5, L=4+/5, hip add R= 4-/5, L= 3+/5  9. Pt will demonstrate improved cervical extension strength to 3+/5 to improve head control. Met 7/31/17  10. Pt will demonstrate improved cervical deep flexor strength to 3+/5 to improve head control. Met 7/31/17   11. Pt will demonstrate improve balance and gait by scoring 14/28 on Tinetti. Met 8/31/17- 17/28                                      Revised 8/31/17: pt will demonstrate improved balance/ gait and decreased fall risk to moderate by scoring 19/28 on Tinetti                                            Assessment. met- " 19/28                       12. Pt will demonstrate improved cervical PROM for rotation to 40 degrees to improve functional head control. Declined for R-  6 deg AAROM R rotation      Plan   Continue PT 2x weekly under established Plan of Care, with treatment to include: pt education, HEP, therapeutic exercises, neuromuscular re-education/balance exercises, therapeutic activities, joint mobilizations, manual therapy, gait training PRN, and modalities PRN, to work towards established goals. Pt may be seen by PTA to carry out plan of care.         Yadi Mercado DPT  6/28/2018

## 2018-07-03 ENCOUNTER — CLINICAL SUPPORT (OUTPATIENT)
Dept: REHABILITATION | Facility: HOSPITAL | Age: 53
End: 2018-07-03
Attending: STUDENT IN AN ORGANIZED HEALTH CARE EDUCATION/TRAINING PROGRAM
Payer: COMMERCIAL

## 2018-07-03 DIAGNOSIS — R29.898 DECREASED ROM OF NECK: ICD-10-CM

## 2018-07-03 DIAGNOSIS — Z74.09 IMPAIRED FUNCTIONAL MOBILITY, BALANCE, GAIT, AND ENDURANCE: ICD-10-CM

## 2018-07-03 DIAGNOSIS — R53.1 DECREASED STRENGTH: ICD-10-CM

## 2018-07-03 PROCEDURE — 97110 THERAPEUTIC EXERCISES: CPT | Mod: PO | Performed by: PHYSICAL THERAPIST

## 2018-07-03 NOTE — PROGRESS NOTES
"Physical Therapy Progress Note      Name: Brigid Jernigan  Clinic Number: [id number]  Diagnosis: G71.11 (ICD-10-CM) - Myotonic dystrophy  Physician: Luis Felipe Vo MD  Treatment Orders: PT Eval and Treat          Past Medical History:   Diagnosis Date    Abnormal EKG      Myotonic dystrophy      Pacemaker      PAF (paroxysmal atrial fibrillation)      Sleep apnea           Precautions: standard  Visit #:  2     (previous visits 2018= 36)   (Visit total 2017= 44)  Date of Eval: 5/9/2017  Extended POC: 8/9/2018     Subjective   Pt reports: no new complaints  Sister reported: no new complaints  Pain Scale: denies     Objective      Patient received individual therapy to increase strength, endurance, ROM, flexibility, posture, core stabilization and head control with activities as follows:      Pt participated in therapeutic exercises x 39 minutes to address ROM and cervical alignment:      10x reps of B LE squats on leg press with #135lbs applied       Supine: PROM of pecs while on towel roll    PROM of upper traps    Cervical PROM: Rotation     Inhibitive distraction    reviewed modified scalene stretch    Sit>stand from 20" height mat with 2-3 attempts for success.   Static standing without UE support- 45 sec    Static standing in stance without UE support ~10 sec, 3x ea tolerated     Supine therex including:    Double leg lift with ball between knees 2 x 10         bridging with feet on ball 2 x 10   SLR 5# 2 x 15   LTR with ball 20x             NP today:    Sit ups from med maroon wedge with 2000g ball toss 10x   Swiss twists 2000g ball 10x  Nu Step recumbent stepper x 10 min-Level 12.5 BUE/LE- cervical collar in use  Gait with LBQC near ballet bar- 3 laps with min- CGA, for balance, min VC for sequencing 3-point gait          Written Home Exercises: 6/28/18- modified scalene stretch, illustrated copy to be provided  1/29/18- side stepping at counter with 1-2 UE support  12/4/17- AROM cervical SB and " rotation  assisted quad/ hip flexor stretch in side lying  eval- chin tucks, rows with OTB, hip flexor and knee extensor stretch in sidelying  Pt demo fair understanding of the education provided. Brigid demonstrated fair return demonstration of activities.      Education provided re: 12/18/17- cervical collar application  POC, HEP     Pt has no cultural, educational or language barriers to learning provided.     Assessment   Brigid tolerated treatment well. Pt tolerated increased reps for SLR and increased resistance for leg press. Pt also demonstrated the ability to balance in stance for ~10 sec intervals unsupported. Pt requires UE support to attain stance. Verbal cues were provided to improve stance length. Cervical rotation (albert R) is still severely limited. Pt can benefit from continued skilled PT to address impairments to improve consistency with mobility, prevent further functional decline, and maintain pt's independence for as long as possible.       Pt prognosis is Good. Pt will continue to benefit from skilled outpatient physical therapy to address the deficits listed in the problem list, provide pt/family education and to maximize pt's level of independence in the home and community environment.      Activity limitations/ Participation Restrictions:   Fall Risk - impaired balance   Weakness  Impaired muscle tone  Poor head control  Decreased ROM  Gait deviations   Decreased ambulation   Decreased activity tolerance   Decreased independence with daily activities   Requires skilled supervision to complete and progress HEP   Requires skilled PT for DME/ orthotic recommendations     Pt's spiritual, cultural and educational needs considered and pt agreeable to plan of care and GOALS as stated below:   Short term goals: 6 weeks, pt agrees to goals set. (As of 6/5/18)  1. Pt will perform chin tucks in supine with supervision to improve independent with HEP. Met 7/5/17  2. Assist pt with obtaining appropriate  "cervical brace to improve head control. Met 1/9/18- pt has but is not consistently wearing  3. Pt will demonstrate improved knee ext strength on left to 4/5 to improve safety with ambulation and sit<>stand. Met 9/28/17   4. Pt will demonstrate improved cervical rotation PROM to 20 degrees to improve functional head control. Met 7/5/17  5. Pt will perform sit<>stand consistently from 22" height surface with supervision to improve independence. Met 7/5/17                Revised 8/31/17:  Pt will perform sit<>stand from 19" height chair to improve independence with sitting in various chairs around her home and decreased need for "pop up seat". improved- pt stood from 19" height with BUE and 1 attempts, supervision without verbal cues      Long term goals: 12 weeks, pt agrees to goals set  6. Pt will perform basic HEP for gross strengthening with supervision to deter worsening of functional limitations. Met 8/31/17- pt's sister reports poor current compliance  7. Pt will demonstrate improved bilateral hip strength to 3/5 to improve balance and independence with mobility. Met 7/5/17  8. New 7/5/17: Pt will demonstrate improved bilateral hip strength to 4/5 to improve balance and independence with mobility. Improved/ Nearly met-  hip flex WALESKA= 5/5, hip ext R= 3+/5, L= 4-/5, hip abd R=4+/5, L=4+/5, hip add R= 4-/5, L= 3+/5  9. Pt will demonstrate improved cervical extension strength to 3+/5 to improve head control. Met 7/31/17  10. Pt will demonstrate improved cervical deep flexor strength to 3+/5 to improve head control. Met 7/31/17   11. Pt will demonstrate improve balance and gait by scoring 14/28 on Tinetti. Met 8/31/17- 17/28                                      Revised 8/31/17: pt will demonstrate improved balance/ gait and decreased fall risk to moderate by scoring 19/28 on Tinetti                                            Assessment. met- 19/28                       12. Pt will demonstrate improved cervical PROM " for rotation to 40 degrees to improve functional head control. Declined for R-  6 deg AAROM R rotation      Plan   Continue PT 2x weekly under established Plan of Care, with treatment to include: pt education, HEP, therapeutic exercises, neuromuscular re-education/balance exercises, therapeutic activities, joint mobilizations, manual therapy, gait training PRN, and modalities PRN, to work towards established goals. Pt may be seen by PTA to carry out plan of care.         Yadi Mercado DPT  7/3/2018

## 2018-07-06 ENCOUNTER — DOCUMENTATION ONLY (OUTPATIENT)
Dept: REHABILITATION | Facility: HOSPITAL | Age: 53
End: 2018-07-06

## 2018-07-09 ENCOUNTER — DOCUMENTATION ONLY (OUTPATIENT)
Dept: REHABILITATION | Facility: HOSPITAL | Age: 53
End: 2018-07-09

## 2018-07-09 NOTE — PROGRESS NOTES
Pt called to cancel today's PT session due to a death in the family.      Yadi Mercado, LUL  7/9/2018

## 2018-07-09 NOTE — PROGRESS NOTES
I, JORDY HensonT, met with Mae Caicedo PTA to discuss this pt's POC. Continue to address cervical ROM, especially R rotation. Address LE and core strengthening to improve upright/ ambulatory balance with LBQC. Address static standing in stance to improve ambulatory balance.     Yadi Mercado DPT  7/9/2018    Face to face consultation with supervision PT held on 07/10/2018    Mae Caicedo PTA

## 2018-07-11 ENCOUNTER — CLINICAL SUPPORT (OUTPATIENT)
Dept: REHABILITATION | Facility: HOSPITAL | Age: 53
End: 2018-07-11
Attending: STUDENT IN AN ORGANIZED HEALTH CARE EDUCATION/TRAINING PROGRAM
Payer: COMMERCIAL

## 2018-07-11 DIAGNOSIS — Z74.09 IMPAIRED FUNCTIONAL MOBILITY, BALANCE, GAIT, AND ENDURANCE: ICD-10-CM

## 2018-07-11 DIAGNOSIS — R29.898 DECREASED ROM OF NECK: ICD-10-CM

## 2018-07-11 DIAGNOSIS — R53.1 DECREASED STRENGTH: ICD-10-CM

## 2018-07-11 PROCEDURE — 97110 THERAPEUTIC EXERCISES: CPT | Mod: PO

## 2018-07-11 NOTE — PROGRESS NOTES
"Physical Therapy Progress Note      Name: Brigid Jernigan  Clinic Number: [id number]  Diagnosis: G71.11 (ICD-10-CM) - Myotonic dystrophy  Physician: Luis Felipe Vo MD  Treatment Orders: PT Eval and Treat          Past Medical History:   Diagnosis Date    Abnormal EKG      Myotonic dystrophy      Pacemaker      PAF (paroxysmal atrial fibrillation)      Sleep apnea           Precautions: standard  Visit #:  3     (previous visits 2018= 36)   (Visit total 2017= 44)  Date of Eval: 5/9/2017  Extended POC: 8/9/2018     Subjective   Pt reports: " How you been?  I haven't seen you in awhile."   Sister reported: " Did we get approved for more appointments."  Pain Scale: denies     Objective      Patient received individual therapy to increase strength, endurance, ROM, flexibility, posture, core stabilization and head control with activities as follows:      Pt participated in therapeutic exercises x 55 minutes to address ROM and cervical alignment:   x 10 min on Sci Fit recumbent stepper.  Level 12.5, B UE/B LE        Supine: PROM of pecs while on towel roll    PROM of upper traps    Cervical PROM: Rotation     Inhibitive distraction       Supine therex including:          bridging with feet on ball 2 x 10   SLR 5# 2 x 15   LTR with ball 20x       Sitting therex:   Citizen of [place] twists 2000g ball 2 x10     10x reps of B LE squats on leg press with #135lbs applied           Written Home Exercises: 6/28/18- modified scalene stretch, illustrated copy to be provided  1/29/18- side stepping at counter with 1-2 UE support  12/4/17- AROM cervical SB and rotation  assisted quad/ hip flexor stretch in side lying  eval- chin tucks, rows with OTB, hip flexor and knee extensor stretch in sidelying  Pt demo fair understanding of the education provided. Brigid demonstrated fair return demonstration of activities.      Education provided re: 12/18/17- cervical collar application  POC, HEP     Pt has no cultural, educational or language " "barriers to learning provided.     Assessment   Brigid tolerated treatment well with no problems.  No changes noted in any exercises.  Asked to only perform supine and sitting therex and practice ambulation next tx session.   Pt can benefit from continued skilled PT to address impairments to improve consistency with mobility, prevent further functional decline, and maintain pt's independence for as long as possible.       Pt prognosis is Good. Pt will continue to benefit from skilled outpatient physical therapy to address the deficits listed in the problem list, provide pt/family education and to maximize pt's level of independence in the home and community environment.      Activity limitations/ Participation Restrictions:   Fall Risk - impaired balance   Weakness  Impaired muscle tone  Poor head control  Decreased ROM  Gait deviations   Decreased ambulation   Decreased activity tolerance   Decreased independence with daily activities   Requires skilled supervision to complete and progress HEP   Requires skilled PT for DME/ orthotic recommendations     Pt's spiritual, cultural and educational needs considered and pt agreeable to plan of care and GOALS as stated below:   Short term goals: 6 weeks, pt agrees to goals set. (As of 6/5/18)  1. Pt will perform chin tucks in supine with supervision to improve independent with HEP. Met 7/5/17  2. Assist pt with obtaining appropriate cervical brace to improve head control. Met 1/9/18- pt has but is not consistently wearing  3. Pt will demonstrate improved knee ext strength on left to 4/5 to improve safety with ambulation and sit<>stand. Met 9/28/17   4. Pt will demonstrate improved cervical rotation PROM to 20 degrees to improve functional head control. Met 7/5/17  5. Pt will perform sit<>stand consistently from 22" height surface with supervision to improve independence. Met 7/5/17                Revised 8/31/17:  Pt will perform sit<>stand from 19" height chair to " "improve independence with sitting in various chairs around her home and decreased need for "pop up seat". improved- pt stood from 19" height with BUE and 1 attempts, supervision without verbal cues      Long term goals: 12 weeks, pt agrees to goals set  6. Pt will perform basic HEP for gross strengthening with supervision to deter worsening of functional limitations. Met 8/31/17- pt's sister reports poor current compliance  7. Pt will demonstrate improved bilateral hip strength to 3/5 to improve balance and independence with mobility. Met 7/5/17  8. New 7/5/17: Pt will demonstrate improved bilateral hip strength to 4/5 to improve balance and independence with mobility. Improved/ Nearly met-  hip flex WALESKA= 5/5, hip ext R= 3+/5, L= 4-/5, hip abd R=4+/5, L=4+/5, hip add R= 4-/5, L= 3+/5  9. Pt will demonstrate improved cervical extension strength to 3+/5 to improve head control. Met 7/31/17  10. Pt will demonstrate improved cervical deep flexor strength to 3+/5 to improve head control. Met 7/31/17   11. Pt will demonstrate improve balance and gait by scoring 14/28 on Tinetti. Met 8/31/17- 17/28                                      Revised 8/31/17: pt will demonstrate improved balance/ gait and decreased fall risk to moderate by scoring 19/28 on Tinetti                                            Assessment. met- 19/28                       12. Pt will demonstrate improved cervical PROM for rotation to 40 degrees to improve functional head control. Declined for R-  6 deg AAROM R rotation      Plan   Continue PT 2x weekly under established Plan of Care, with treatment to include: pt education, HEP, therapeutic exercises, neuromuscular re-education/balance exercises, therapeutic activities, joint mobilizations, manual therapy, gait training PRN, and modalities PRN, to work towards established goals. Pt may be seen by PTA to carry out plan of care.      Mae Caicedo, PTA    7/11/2018      "

## 2018-07-16 ENCOUNTER — CLINICAL SUPPORT (OUTPATIENT)
Dept: REHABILITATION | Facility: HOSPITAL | Age: 53
End: 2018-07-16
Payer: COMMERCIAL

## 2018-07-16 ENCOUNTER — DOCUMENTATION ONLY (OUTPATIENT)
Dept: REHABILITATION | Facility: HOSPITAL | Age: 53
End: 2018-07-16

## 2018-07-16 DIAGNOSIS — R29.898 DECREASED ROM OF NECK: ICD-10-CM

## 2018-07-16 DIAGNOSIS — Z74.09 IMPAIRED FUNCTIONAL MOBILITY, BALANCE, GAIT, AND ENDURANCE: ICD-10-CM

## 2018-07-16 DIAGNOSIS — R53.1 DECREASED STRENGTH: ICD-10-CM

## 2018-07-16 PROCEDURE — 97110 THERAPEUTIC EXERCISES: CPT | Mod: PO

## 2018-07-16 PROCEDURE — 97116 GAIT TRAINING THERAPY: CPT | Mod: PO

## 2018-07-16 NOTE — PROGRESS NOTES
"Physical Therapy Progress Note      Name: Brigid Jernigan  Clinic Number: [id number]  Diagnosis: G71.11 (ICD-10-CM) - Myotonic dystrophy  Physician: Luis Felipe Vo MD  Treatment Orders: PT Eval and Treat          Past Medical History:   Diagnosis Date    Abnormal EKG      Myotonic dystrophy      Pacemaker      PAF (paroxysmal atrial fibrillation)      Sleep apnea           Precautions: standard  Visit #:  3     (previous visits 2018= 36)   (Visit total 2017= 44)  Date of Eval: 5/9/2017  Extended POC: 8/9/2018     Subjective   Pt reports: " How you been?  I haven't seen you in awhile."   Sister reported: " Did we get approved for more appointments."  Pain Scale: denies     Objective    Pt 15 min to tx session.  Unable to accommodate.  Patient received individual therapy to increase strength, endurance, ROM, flexibility, posture, core stabilization and head control with activities as follows:      Pt participated in therapeutic exercises x 15 minutes to address ROM and cervical alignment:    Supine therex including:          bridging with feet on ball 2 x 10   LTR with ball 20x       Sitting therex:   [place] twists 2000g ball 2 x10     10x reps of B LE squats on leg press with #135lbs applied     Gait: x 15 min    Pt ambulated x 6 laps of forward ambulation with LBQC and CGA.  Cervical collar donned, VC's for upright posture.            Written Home Exercises: 6/28/18- modified scalene stretch, illustrated copy to be provided  1/29/18- side stepping at counter with 1-2 UE support  12/4/17- AROM cervical SB and rotation  assisted quad/ hip flexor stretch in side lying  eval- chin tucks, rows with OTB, hip flexor and knee extensor stretch in sidelying  Pt demo fair understanding of the education provided. Brigid demonstrated fair return demonstration of activities.      Education provided re: 12/18/17- cervical collar application  POC, HEP     Pt has no cultural, educational or language barriers to learning " "provided.     Assessment   Brigid tolerated treatment well with no problems. Pt agreeable to ambulation with LBQC and required CGA for safety.  No other changes noted.    Pt can benefit from continued skilled PT to address impairments to improve consistency with mobility, prevent further functional decline, and maintain pt's independence for as long as possible.       Pt prognosis is Good. Pt will continue to benefit from skilled outpatient physical therapy to address the deficits listed in the problem list, provide pt/family education and to maximize pt's level of independence in the home and community environment.      Activity limitations/ Participation Restrictions:   Fall Risk - impaired balance   Weakness  Impaired muscle tone  Poor head control  Decreased ROM  Gait deviations   Decreased ambulation   Decreased activity tolerance   Decreased independence with daily activities   Requires skilled supervision to complete and progress HEP   Requires skilled PT for DME/ orthotic recommendations     Pt's spiritual, cultural and educational needs considered and pt agreeable to plan of care and GOALS as stated below:   Short term goals: 6 weeks, pt agrees to goals set. (As of 6/5/18)  1. Pt will perform chin tucks in supine with supervision to improve independent with HEP. Met 7/5/17  2. Assist pt with obtaining appropriate cervical brace to improve head control. Met 1/9/18- pt has but is not consistently wearing  3. Pt will demonstrate improved knee ext strength on left to 4/5 to improve safety with ambulation and sit<>stand. Met 9/28/17   4. Pt will demonstrate improved cervical rotation PROM to 20 degrees to improve functional head control. Met 7/5/17  5. Pt will perform sit<>stand consistently from 22" height surface with supervision to improve independence. Met 7/5/17                Revised 8/31/17:  Pt will perform sit<>stand from 19" height chair to improve independence with sitting in various chairs around " "her home and decreased need for "pop up seat". improved- pt stood from 19" height with BUE and 1 attempts, supervision without verbal cues      Long term goals: 12 weeks, pt agrees to goals set  6. Pt will perform basic HEP for gross strengthening with supervision to deter worsening of functional limitations. Met 8/31/17- pt's sister reports poor current compliance  7. Pt will demonstrate improved bilateral hip strength to 3/5 to improve balance and independence with mobility. Met 7/5/17  8. New 7/5/17: Pt will demonstrate improved bilateral hip strength to 4/5 to improve balance and independence with mobility. Improved/ Nearly met-  hip flex WAELSKA= 5/5, hip ext R= 3+/5, L= 4-/5, hip abd R=4+/5, L=4+/5, hip add R= 4-/5, L= 3+/5  9. Pt will demonstrate improved cervical extension strength to 3+/5 to improve head control. Met 7/31/17  10. Pt will demonstrate improved cervical deep flexor strength to 3+/5 to improve head control. Met 7/31/17   11. Pt will demonstrate improve balance and gait by scoring 14/28 on Tinetti. Met 8/31/17- 17/28                                      Revised 8/31/17: pt will demonstrate improved balance/ gait and decreased fall risk to moderate by scoring 19/28 on Tinetti                                            Assessment. met- 19/28                       12. Pt will demonstrate improved cervical PROM for rotation to 40 degrees to improve functional head control. Declined for R-  6 deg AAROM R rotation      Plan   Continue PT 2x weekly under established Plan of Care, with treatment to include: pt education, HEP, therapeutic exercises, neuromuscular re-education/balance exercises, therapeutic activities, joint mobilizations, manual therapy, gait training PRN, and modalities PRN, to work towards established goals. Pt may be seen by PTA to carry out plan of care.      Mae Caicedo, PTA    7/16/2018      "

## 2018-07-18 ENCOUNTER — CLINICAL SUPPORT (OUTPATIENT)
Dept: REHABILITATION | Facility: HOSPITAL | Age: 53
End: 2018-07-18
Attending: STUDENT IN AN ORGANIZED HEALTH CARE EDUCATION/TRAINING PROGRAM
Payer: COMMERCIAL

## 2018-07-18 DIAGNOSIS — Z74.09 IMPAIRED FUNCTIONAL MOBILITY, BALANCE, GAIT, AND ENDURANCE: ICD-10-CM

## 2018-07-18 DIAGNOSIS — R53.1 DECREASED STRENGTH: ICD-10-CM

## 2018-07-18 DIAGNOSIS — R29.898 DECREASED ROM OF NECK: ICD-10-CM

## 2018-07-18 PROCEDURE — 97110 THERAPEUTIC EXERCISES: CPT | Mod: PO

## 2018-07-18 PROCEDURE — 97116 GAIT TRAINING THERAPY: CPT | Mod: PO

## 2018-07-18 NOTE — PROGRESS NOTES
"Physical Therapy Progress Note      Name: Brigid Jernigan  Clinic Number: [id number]  Diagnosis: G71.11 (ICD-10-CM) - Myotonic dystrophy  Physician: Luis Felipe Vo MD  Treatment Orders: PT Eval and Treat          Past Medical History:   Diagnosis Date    Abnormal EKG      Myotonic dystrophy      Pacemaker      PAF (paroxysmal atrial fibrillation)      Sleep apnea           Precautions: standard  Visit #:  4     (previous visits 2018= 36)   (Visit total 2017= 44)  Date of Eval: 5/9/2017  Extended POC: 8/9/2018     Subjective   Pt reports: " I'm ok."   Sister reported: No significant comment  Pain Scale: denies     Objective    Pt 15 min to tx session.  Able to accommodate  Patient received individual therapy to increase strength, endurance, ROM, flexibility, posture, core stabilization and head control with activities as follows:      Pt participated in therapeutic exercises x 30 minutes to address ROM and cervical alignment:  X 10 min on Sci Fit recumbent stepper.  Level 12.0  B UE/LE     Supine therex including:          bridging with feet on ball 2 x 10   LTR with ball 20x      Cervical AAROM x 10 reps   Cervical distraction x 3 reps x 30 sec     Leg press:    1 x 15 reps of B LE squats with #135lbs applied.     Gait: x 15 min    Pt ambulated x 6 laps of forward ambulation with LBQC and CGA.  Cervical collar donned, VC's for upright posture.            Written Home Exercises: 6/28/18- modified scalene stretch, illustrated copy to be provided  1/29/18- side stepping at counter with 1-2 UE support  12/4/17- AROM cervical SB and rotation  assisted quad/ hip flexor stretch in side lying  eval- chin tucks, rows with OTB, hip flexor and knee extensor stretch in sidelying  Pt demo fair understanding of the education provided. Brigid demonstrated fair return demonstration of activities.      Education provided re: 12/18/17- cervical collar application  POC, HEP     Pt has no cultural, educational or language " "barriers to learning provided.     Assessment   Brigid tolerated treatment well with no problems. Pt agreeable to ambulation with LBQC and required CGA for safety as well as VC's for upright posture and to look up.    No other changes noted.    Pt can benefit from continued skilled PT to address impairments to improve consistency with mobility, prevent further functional decline, and maintain pt's independence for as long as possible.       Pt prognosis is Good. Pt will continue to benefit from skilled outpatient physical therapy to address the deficits listed in the problem list, provide pt/family education and to maximize pt's level of independence in the home and community environment.      Activity limitations/ Participation Restrictions:   Fall Risk - impaired balance   Weakness  Impaired muscle tone  Poor head control  Decreased ROM  Gait deviations   Decreased ambulation   Decreased activity tolerance   Decreased independence with daily activities   Requires skilled supervision to complete and progress HEP   Requires skilled PT for DME/ orthotic recommendations     Pt's spiritual, cultural and educational needs considered and pt agreeable to plan of care and GOALS as stated below:   Short term goals: 6 weeks, pt agrees to goals set. (As of 6/5/18)  1. Pt will perform chin tucks in supine with supervision to improve independent with HEP. Met 7/5/17  2. Assist pt with obtaining appropriate cervical brace to improve head control. Met 1/9/18- pt has but is not consistently wearing  3. Pt will demonstrate improved knee ext strength on left to 4/5 to improve safety with ambulation and sit<>stand. Met 9/28/17   4. Pt will demonstrate improved cervical rotation PROM to 20 degrees to improve functional head control. Met 7/5/17  5. Pt will perform sit<>stand consistently from 22" height surface with supervision to improve independence. Met 7/5/17                Revised 8/31/17:  Pt will perform sit<>stand from 19" " "height chair to improve independence with sitting in various chairs around her home and decreased need for "pop up seat". improved- pt stood from 19" height with BUE and 1 attempts, supervision without verbal cues      Long term goals: 12 weeks, pt agrees to goals set  6. Pt will perform basic HEP for gross strengthening with supervision to deter worsening of functional limitations. Met 8/31/17- pt's sister reports poor current compliance  7. Pt will demonstrate improved bilateral hip strength to 3/5 to improve balance and independence with mobility. Met 7/5/17  8. New 7/5/17: Pt will demonstrate improved bilateral hip strength to 4/5 to improve balance and independence with mobility. Improved/ Nearly met-  hip flex W[place]KA= 5/5, hip ext R= 3+/5, L= 4-/5, hip abd R=4+/5, L=4+/5, hip add R= 4-/5, L= 3+/5  9. Pt will demonstrate improved cervical extension strength to 3+/5 to improve head control. Met 7/31/17  10. Pt will demonstrate improved cervical deep flexor strength to 3+/5 to improve head control. Met 7/31/17   11. Pt will demonstrate improve balance and gait by scoring 14/28 on Tinetti. Met 8/31/17- 17/28                                      Revised 8/31/17: pt will demonstrate improved balance/ gait and decreased fall risk to moderate by scoring 19/28 on Tinetti                                            Assessment. met- 19/28                       12. Pt will demonstrate improved cervical PROM for rotation to 40 degrees to improve functional head control. Declined for R-  6 deg AAROM R rotation      Plan   Continue PT 2x weekly under established Plan of Care, with treatment to include: pt education, HEP, therapeutic exercises, neuromuscular re-education/balance exercises, therapeutic activities, joint mobilizations, manual therapy, gait training PRN, and modalities PRN, to work towards established goals. Pt may be seen by PTA to carry out plan of care.      Mae Caicedo, PTA    7/18/2018      "

## 2018-07-23 ENCOUNTER — CLINICAL SUPPORT (OUTPATIENT)
Dept: REHABILITATION | Facility: HOSPITAL | Age: 53
End: 2018-07-23
Attending: STUDENT IN AN ORGANIZED HEALTH CARE EDUCATION/TRAINING PROGRAM
Payer: COMMERCIAL

## 2018-07-23 DIAGNOSIS — R29.898 DECREASED ROM OF NECK: ICD-10-CM

## 2018-07-23 DIAGNOSIS — Z74.09 IMPAIRED FUNCTIONAL MOBILITY, BALANCE, GAIT, AND ENDURANCE: ICD-10-CM

## 2018-07-23 DIAGNOSIS — R53.1 DECREASED STRENGTH: ICD-10-CM

## 2018-07-23 PROCEDURE — 97110 THERAPEUTIC EXERCISES: CPT | Mod: PO

## 2018-07-23 PROCEDURE — 97116 GAIT TRAINING THERAPY: CPT | Mod: PO

## 2018-07-23 NOTE — PROGRESS NOTES
"Physical Therapy Progress Note      Name: Brigid Jernigan  Clinic Number: [id number]  Diagnosis: G71.11 (ICD-10-CM) - Myotonic dystrophy  Physician: Luis Felipe Vo MD  Treatment Orders: PT Eval and Treat          Past Medical History:   Diagnosis Date    Abnormal EKG      Myotonic dystrophy      Pacemaker      PAF (paroxysmal atrial fibrillation)      Sleep apnea           Precautions: standard  Visit #:  5     (previous visits 2018= 36)   (Visit total 2017= 44)  Date of Eval: 5/9/2017  Extended POC: 8/9/2018     Subjective   Pt reports: " I'm ok."   Sister reported: No significant comment  Pain Scale: denies     Objective    Pt 10 min to tx session.  Able to accommodate  Patient received individual therapy to increase strength, endurance, ROM, flexibility, posture, core stabilization and head control with activities as follows:      Pt participated in therapeutic exercises x 30 minutes to address ROM and cervical alignment:  X 10 min on Sci Fit recumbent stepper.  Level 12.5  B UE/LE     Supine therex:   DKTC x 20 reps with yellow ball    Sidelying therex including:         B LE clamshells 2 x 10 reps with MTB    Sitting therex including:    X 20 reps of [place] twists with Blue weighted ball     Leg press:    1 x 15 reps of B LE squats with #135lbs applied.     Gait: x 15 min    Pt ambulated x 6 laps of forward ambulation with LBQC and CGA.  Cervical collar donned, VC's for upright posture.            Written Home Exercises: 6/28/18- modified scalene stretch, illustrated copy to be provided  1/29/18- side stepping at counter with 1-2 UE support  12/4/17- AROM cervical SB and rotation  assisted quad/ hip flexor stretch in side lying  eval- chin tucks, rows with OTB, hip flexor and knee extensor stretch in sidelying  Pt demo fair understanding of the education provided. Brigid demonstrated fair return demonstration of activities.      Education provided re: 12/18/17- cervical collar application  POC, " "HEP     Pt has no cultural, educational or language barriers to learning provided.     Assessment   Brigid tolerated treatment well with no problems. Pt with MTB for clamshells and cont with supine and sitting core strengthening exercises.   Pt agreeable to gait with cane but only 6 laps and did not want to try more.  Pt can benefit from continued skilled PT to address impairments to improve consistency with mobility, prevent further functional decline, and maintain pt's independence for as long as possible.       Pt prognosis is Good. Pt will continue to benefit from skilled outpatient physical therapy to address the deficits listed in the problem list, provide pt/family education and to maximize pt's level of independence in the home and community environment.      Activity limitations/ Participation Restrictions:   Fall Risk - impaired balance   Weakness  Impaired muscle tone  Poor head control  Decreased ROM  Gait deviations   Decreased ambulation   Decreased activity tolerance   Decreased independence with daily activities   Requires skilled supervision to complete and progress HEP   Requires skilled PT for DME/ orthotic recommendations     Pt's spiritual, cultural and educational needs considered and pt agreeable to plan of care and GOALS as stated below:   Short term goals: 6 weeks, pt agrees to goals set. (As of 6/5/18)  1. Pt will perform chin tucks in supine with supervision to improve independent with HEP. Met 7/5/17  2. Assist pt with obtaining appropriate cervical brace to improve head control. Met 1/9/18- pt has but is not consistently wearing  3. Pt will demonstrate improved knee ext strength on left to 4/5 to improve safety with ambulation and sit<>stand. Met 9/28/17   4. Pt will demonstrate improved cervical rotation PROM to 20 degrees to improve functional head control. Met 7/5/17  5. Pt will perform sit<>stand consistently from 22" height surface with supervision to improve independence. Met " "7/5/17                Revised 8/31/17:  Pt will perform sit<>stand from 19" height chair to improve independence with sitting in various chairs around her home and decreased need for "pop up seat". improved- pt stood from 19" height with BUE and 1 attempts, supervision without verbal cues      Long term goals: 12 weeks, pt agrees to goals set  6. Pt will perform basic HEP for gross strengthening with supervision to deter worsening of functional limitations. Met 8/31/17- pt's sister reports poor current compliance  7. Pt will demonstrate improved bilateral hip strength to 3/5 to improve balance and independence with mobility. Met 7/5/17  8. New 7/5/17: Pt will demonstrate improved bilateral hip strength to 4/5 to improve balance and independence with mobility. Improved/ Nearly met-  hip flex [place]= 5/5, hip ext R= 3+/5, L= 4-/5, hip abd R=4+/5, L=4+/5, hip add R= 4-/5, L= 3+/5  9. Pt will demonstrate improved cervical extension strength to 3+/5 to improve head control. Met 7/31/17  10. Pt will demonstrate improved cervical deep flexor strength to 3+/5 to improve head control. Met 7/31/17   11. Pt will demonstrate improve balance and gait by scoring 14/28 on Tinetti. Met 8/31/17- 17/28                                      Revised 8/31/17: pt will demonstrate improved balance/ gait and decreased fall risk to moderate by scoring 19/28 on Tinetti                                            Assessment. met- 19/28                       12. Pt will demonstrate improved cervical PROM for rotation to 40 degrees to improve functional head control. Declined for R-  6 deg AAROM R rotation      Plan   Continue PT 2x weekly under established Plan of Care, with treatment to include: pt education, HEP, therapeutic exercises, neuromuscular re-education/balance exercises, therapeutic activities, joint mobilizations, manual therapy, gait training PRN, and modalities PRN, to work towards established goals. Pt may be seen by PTA to carry " out plan of care.      Mae Caicedo, PTA    7/23/2018

## 2018-08-01 ENCOUNTER — CLINICAL SUPPORT (OUTPATIENT)
Dept: REHABILITATION | Facility: HOSPITAL | Age: 53
End: 2018-08-01
Attending: STUDENT IN AN ORGANIZED HEALTH CARE EDUCATION/TRAINING PROGRAM
Payer: COMMERCIAL

## 2018-08-01 DIAGNOSIS — R29.898 DECREASED ROM OF NECK: ICD-10-CM

## 2018-08-01 DIAGNOSIS — Z74.09 IMPAIRED FUNCTIONAL MOBILITY, BALANCE, GAIT, AND ENDURANCE: ICD-10-CM

## 2018-08-01 DIAGNOSIS — R53.1 DECREASED STRENGTH: ICD-10-CM

## 2018-08-01 PROCEDURE — 97110 THERAPEUTIC EXERCISES: CPT | Mod: PO | Performed by: PHYSICAL THERAPIST

## 2018-08-06 ENCOUNTER — CLINICAL SUPPORT (OUTPATIENT)
Dept: REHABILITATION | Facility: HOSPITAL | Age: 53
End: 2018-08-06
Attending: STUDENT IN AN ORGANIZED HEALTH CARE EDUCATION/TRAINING PROGRAM
Payer: COMMERCIAL

## 2018-08-06 DIAGNOSIS — R29.898 DECREASED ROM OF NECK: ICD-10-CM

## 2018-08-06 DIAGNOSIS — R53.1 DECREASED STRENGTH: ICD-10-CM

## 2018-08-06 DIAGNOSIS — Z74.09 IMPAIRED FUNCTIONAL MOBILITY, BALANCE, GAIT, AND ENDURANCE: ICD-10-CM

## 2018-08-06 PROCEDURE — 97110 THERAPEUTIC EXERCISES: CPT | Mod: PO | Performed by: PHYSICAL THERAPIST

## 2018-08-06 PROCEDURE — 97116 GAIT TRAINING THERAPY: CPT | Mod: PO | Performed by: PHYSICAL THERAPIST

## 2018-08-06 NOTE — PLAN OF CARE
"Physical Therapy Progress Note      Name: Brigid Jernigan  Clinic Number: [id number]  Diagnosis: G71.11 (ICD-10-CM) - Myotonic dystrophy  Physician: Luis Felipe Vo MD  Treatment Orders: PT Eval and Treat          Past Medical History:   Diagnosis Date    Abnormal EKG      Myotonic dystrophy      Pacemaker      PAF (paroxysmal atrial fibrillation)      Sleep apnea           Precautions: standard  Visit #:  4/8 (expires 8/9)     (Visit total 2017= 44)  Date of Eval: 5/9/2017  Extended POC: 9/4/2018     Subjective   Pt reports: " I'm ok."   Sister reported: No significant comment  Pain Scale: denies     Objective     Patient received individual therapy to increase strength, endurance, ROM, flexibility, posture, core stabilization and head control with activities as follows:      Pt participated in therapeutic exercises x 43 minutes to address ROM and cervical alignment:  Cervical AROM supine: R= 14 deg, L= 54 deg    Strength:   Hip flex: WALESKA= 5/5  Hip abd: R= 4+/5, L= 4-/5  Hip add: R= 3+/5, L= 3+/5  Hip ext: R= 3+/5  HS: R= 2+/5, L= 2+/5    Sit<>stand from 19"= able to stand with UE support consistent with 1-2 attempts    X 10 min on Sci Fit recumbent stepper.  Level 12.5  B UE/LE     Sitting: HS curls GTB 2 x 10, 3 sec  Leg press:    1 x 15 reps of B LE squats with #135lbs applied.     Tinetti Balance Assessment  Balance:  1. Sitting Balance 1   Leans/ slides in chair= 0   Steady= 1  2. Rises from chair 1   Unable without help= 0   Able, uses arms= 1   Able without use of arms= 2  3. Attempts to rise 2   Unable without help= 0   Able, >1 attmept required-= 1   Able, 1 attempt= 2  4. Immediate standing balance (1st 5 seconds) 1   Unsteady (swaggers, moves feet, trunk sway)= 0   Steady but uses walker or other support= 1   Narrow base of support without walker or support= 2  5. Nudged 2   Begins to fall= 0   Staggers, grabs, catches self= 1   Steady= 2  6. Standing balance 2   Unsteady= 0   Steady but wide LEYDI " or uses AD=1   Narrow LEYDI without AD=2  7. Eyes closed 0   Unsteady= 0   Steady= 1  8. Turning 360 degrees 0   Discontinuous steps= 0   Continuous steps= 1   Unsteady= 0   Steady= 1  9. Sitting down 1   Unsafe= 0   Uses arms or not in a smooth motion= 1   Safe, smooth motion= 2  Balance score= 10/16  Gait:  1. Initiation 1   hesitates or multiple attempts to start= 0   No hesitancy= 1  2. Step length 2   Step to= 0   One foot passes= 1   reciprocal pattern= 2  3. Step height 2    Neither foot clears floor= 0   One foot clears floor= 1   Both feet clear floor= 2  4. Step symmetry 1   Not symmetrical= 0   Appears symmetrical= 1  5. Step continuity 1   Not continuous= 0   Appears continuous= 1  6. Path 1   Marked deviation= 0   Mild/moderate deviation or uses A.D.= 1   Straight without A.D.= 2  7. Trunk 0   Marked sway or uses A.D.= 0   No sway, but flexes knees or back, spread arms out while walking= 1   No sway, no flexion, no use of UE, no use of A.D.= 2  8. Walking stance 1   Heels apart= 0   Heels almost touching while walking= 1  Gait score= 9/12  Total score= 22/28 , moderate fall risk         NP today:   Supine therex:   DKTC x 20 reps with yellow ball  Sidelying therex including:         B LE clamshells 2 x 10 reps with MTB  Sitting therex including:    X 20 reps of Russian twists with Blue weighted ball      Gait: x 0 min   NP today:    Pt ambulated x 6 laps of forward ambulation with LBQC and CGA.  Cervical collar donned, VC's for upright posture.            Written Home Exercises: 6/28/18- modified scalene stretch, illustrated copy to be provided  1/29/18- side stepping at counter with 1-2 UE support  12/4/17- AROM cervical SB and rotation  assisted quad/ hip flexor stretch in side lying  eval- chin tucks, rows with OTB, hip flexor and knee extensor stretch in sidelying  Pt demo fair understanding of the education provided. Brigid demonstrated fair return demonstration of activities.      Education  "provided re: 12/18/17- cervical collar application  POC, HEP     Pt has no cultural, educational or language barriers to learning provided.     Assessment   Brigid tolerate treatments well and continues to demonstrate improvements in strength, ROM, balance, gait, and mobility. Pt demonstrates improved cervical rotation AROM, but R rotation is significantly decreased as compared to L. Pt demonstrates improved consistency for performing sit<>Stand from lower surfaces (19") with >1 attempt and UE support. Pt is also demonstrating improved gait and balance via Tinetti assessment. Per formal assessment, pt is at moderate risk for falls. PT has been addressing gait with LBQC to improve LE and core strength to improve overall balance and mobility. Pt demonstrates decreased core and hip strength by demonstrating increased hip flex/ forward lean during swing phases with LBQC. Pt was not ambulating with AD prior to LE fracture 1-2 years ago. New goal set to improve pt's mobility to PLOF.  Pt can benefit from continued skilled PT to address impairments to improve consistency with mobility, prevent further functional decline, and maintain pt's independence for as long as possible.       Pt prognosis is Good. Pt will continue to benefit from skilled outpatient physical therapy to address the deficits listed in the problem list, provide pt/family education and to maximize pt's level of independence in the home and community environment.      Activity limitations/ Participation Restrictions:   Fall Risk - impaired balance   Weakness  Impaired muscle tone  Poor head control  Decreased ROM  Gait deviations   Decreased ambulation   Decreased activity tolerance   Decreased independence with daily activities   Requires skilled supervision to complete and progress HEP   Requires skilled PT for DME/ orthotic recommendations     Pt's spiritual, cultural and educational needs considered and pt agreeable to plan of care and GOALS as stated " "below:   Short term goals: 6 weeks, pt agrees to goals set. (As of 8/1/18)  1. Pt will perform chin tucks in supine with supervision to improve independent with HEP. Met 7/5/17  2. Assist pt with obtaining appropriate cervical brace to improve head control. Met 1/9/18- pt has but is not consistently wearing  3. Pt will demonstrate improved knee ext strength on left to 4/5 to improve safety with ambulation and sit<>stand. Met 9/28/17   4. Pt will demonstrate improved cervical rotation PROM to 20 degrees to improve functional head control. Met 7/5/17  5. Pt will perform sit<>stand consistently from 22" height surface with supervision to improve independence. Met 7/5/17                Revised 8/31/17:  Pt will perform sit<>stand from 19" height chair to improve independence with sitting in various chairs around her home and decreased need for "pop up seat". improved- pt stood from 19" height with BUE and 1-2 attempts, supervision without verbal cues      Long term goals: 12 weeks, pt agrees to goals set  6. Pt will perform basic HEP for gross strengthening with supervision to deter worsening of functional limitations. Met 8/31/17- pt's sister reports poor current compliance  7. Pt will demonstrate improved bilateral hip strength to 3/5 to improve balance and independence with mobility. Met 7/5/17  8. New 7/5/17: Pt will demonstrate improved bilateral hip strength to 4/5 to improve balance and independence with mobility. same/ Nearly met-  hip flex WALESKA= 5/5, hip ext B= 3+/5, hip abd R=4+/5, L=4-/5, hip add R= 3+/5, L= 3+/5  9. Pt will demonstrate improved cervical extension strength to 3+/5 to improve head control. Met 7/31/17  10. Pt will demonstrate improved cervical deep flexor strength to 3+/5 to improve head control. Met 7/31/17   11. Pt will demonstrate improve balance and gait by scoring 14/28 on Tinetti. Met 8/31/17- 17/28                                      Revised 8/31/17: pt will demonstrate improved " balance/ gait and decreased fall risk to moderate by scoring 19/28 on Tinetti                                            Assessment. met- 19/28, continues to improve- 22/28 (8/1/18)                                  12. Pt will demonstrate improved cervical PROM for rotation to 40 degrees to improve functional head control. Improved/ partially met-  14 deg AAROM R rotation,      54 deg L                   13. New 8/1/18- pt will ambulate 50ft with LBQC and supervision over level tile to demonstrate improved household mobility with decreased AD- ~60ft      with LBQC and min HHA     Plan   POC extended x 8 weeks to allow for continued progress with mobiltiy. Continue PT 2x weekly under established Plan of Care, with treatment to include: pt education, HEP, therapeutic exercises, neuromuscular re-education/balance exercises, therapeutic activities, joint mobilizations, manual therapy, gait training PRN, and modalities PRN, to work towards established goals. Pt may be seen by PTA to carry out plan of care.     Yadi Mercado, PT    8/1/2018      I certify the need for these services furnished under this plan of treatment and while under my care.  ____________________________________ Physician/Referring Practitioner   Date of Signature

## 2018-08-06 NOTE — PROGRESS NOTES
"Physical Therapy Progress Note      Name: Brigid Jernigan  Clinic Number: [id number]  Diagnosis: G71.11 (ICD-10-CM) - Myotonic dystrophy  Physician: Luis Felipe Vo MD  Treatment Orders: PT Eval and Treat          Past Medical History:   Diagnosis Date    Abnormal EKG      Myotonic dystrophy      Pacemaker      PAF (paroxysmal atrial fibrillation)      Sleep apnea           Precautions: standard  Visit #:  5/8 (expires 8/9)     (Visit total 2017= 44)  Date of Eval: 5/9/2017  Extended POC: 9/4/2018     Subjective   Pt reports: " I'm ok."   Sister reported: No significant comment  Pain Scale: denies     Objective      Patient received individual therapy to increase strength, endurance, ROM, flexibility, posture, core stabilization and head control with activities as follows:      Pt participated in therapeutic exercises x 32 minutes to address ROM and cervical alignment:     supine: PROM cervical rotation, SB   Bridging with feet on ball 2 x 10   1/2 bridge 15x      Sit<>stand from 19"= able to stand with UE support consistent with 1-2 attempts     X 10 min on Sci Fit recumbent stepper.  Level 10  B UE/LE      Leg press:               1 x 15 reps of B LE squats with #135lbs applied.                NP today:   Supine therex:         DKTC x 20 reps with yellow ball  Sidelying therex including:         B LE clamshells 2 x 10 reps with MTB  Sitting therex including:               X 20 reps of Russian twists with Blue weighted ball   Sitting: HS curls GTB 2 x 10, 3 sec       Gait: x 15 min   Ballet bar:               Pt ambulated x 3 full laps of forward ambulation with 1 UE support and supervision.  Cervical collar donned, VC's for upright posture.   ambulated 3 laps with LQC and HHA (min A x 1), mod VC for upright posture             Written Home Exercises: 6/28/18- modified scalene stretch, illustrated copy to be provided  1/29/18- side stepping at counter with 1-2 UE support  12/4/17- AROM cervical SB and " rotation  assisted quad/ hip flexor stretch in side lying  eval- chin tucks, rows with OTB, hip flexor and knee extensor stretch in sidelying  Pt demo fair understanding of the education provided. Brigid demonstrated fair return demonstration of activities.      Education provided re: 12/18/17- cervical collar application  POC, HEP     Pt has no cultural, educational or language barriers to learning provided.     Assessment   Brigid tolerated treatment well. Pt demonstrates improved tolerance to ambulation with 1 UE support. Pt can tolerate a longer duration, but has difficulty maintaining upright posture during L stance. PT will  continue to address gait with LBQC to improve LE and core strength to improve overall balance and mobility.  Pt was not ambulating with AD prior to LE fracture 1-2 years ago.  Pt can benefit from continued skilled PT to address impairments to improve consistency with mobility, prevent further functional decline, and maintain pt's independence for as long as possible.       Pt prognosis is Good. Pt will continue to benefit from skilled outpatient physical therapy to address the deficits listed in the problem list, provide pt/family education and to maximize pt's level of independence in the home and community environment.      Activity limitations/ Participation Restrictions:   Fall Risk - impaired balance   Weakness  Impaired muscle tone  Poor head control  Decreased ROM  Gait deviations   Decreased ambulation   Decreased activity tolerance   Decreased independence with daily activities   Requires skilled supervision to complete and progress HEP   Requires skilled PT for DME/ orthotic recommendations     Pt's spiritual, cultural and educational needs considered and pt agreeable to plan of care and GOALS as stated below:   Short term goals: 6 weeks, pt agrees to goals set. (As of 8/1/18)  1. Pt will perform chin tucks in supine with supervision to improve independent with HEP. Met  "7/5/17  2. Assist pt with obtaining appropriate cervical brace to improve head control. Met 1/9/18- pt has but is not consistently wearing  3. Pt will demonstrate improved knee ext strength on left to 4/5 to improve safety with ambulation and sit<>stand. Met 9/28/17   4. Pt will demonstrate improved cervical rotation PROM to 20 degrees to improve functional head control. Met 7/5/17  5. Pt will perform sit<>stand consistently from 22" height surface with supervision to improve independence. Met 7/5/17                Revised 8/31/17:  Pt will perform sit<>stand from 19" height chair to improve independence with sitting in various chairs around her home and decreased need for "pop up seat". improved- pt stood from 19" height with BUE and 1-2 attempts, supervision without verbal cues      Long term goals: 12 weeks, pt agrees to goals set  6. Pt will perform basic HEP for gross strengthening with supervision to deter worsening of functional limitations. Met 8/31/17- pt's sister reports poor current compliance  7. Pt will demonstrate improved bilateral hip strength to 3/5 to improve balance and independence with mobility. Met 7/5/17  8. New 7/5/17: Pt will demonstrate improved bilateral hip strength to 4/5 to improve balance and independence with mobility. same/ Nearly met-  hip flex WALESKA= 5/5, hip ext B= 3+/5, hip abd R=4+/5, L=4-/5, hip add R= 3+/5, L= 3+/5  9. Pt will demonstrate improved cervical extension strength to 3+/5 to improve head control. Met 7/31/17  10. Pt will demonstrate improved cervical deep flexor strength to 3+/5 to improve head control. Met 7/31/17   11. Pt will demonstrate improve balance and gait by scoring 14/28 on Tinetti. Met 8/31/17- 17/28                                      Revised 8/31/17: pt will demonstrate improved balance/ gait and decreased fall risk to moderate by scoring 19/28 on Tinetti                                            Assessment. met- 19/28, continues to improve- 22/28 " (8/1/18)                                         12. Pt will demonstrate improved cervical PROM for rotation to 40 degrees to improve functional head control. Improved/ partially met-  14 deg AAROM R rotation,                                                       54 deg L                                         13. New 8/1/18- pt will ambulate 50ft with LBQC and supervision over level tile to demonstrate improved household mobility with decreased AD- ~60ft                                                     with LBQC and min HHA     Plan   Continue PT 2x weekly under established Plan of Care, with treatment to include: pt education, HEP, therapeutic exercises, neuromuscular re-education/balance exercises, therapeutic activities, joint mobilizations, manual therapy, gait training PRN, and modalities PRN, to work towards established goals. Pt may be seen by PTA to carry out plan of care.      Yadi Mercado, PT   8/6/2018

## 2018-08-09 ENCOUNTER — CLINICAL SUPPORT (OUTPATIENT)
Dept: REHABILITATION | Facility: HOSPITAL | Age: 53
End: 2018-08-09
Attending: STUDENT IN AN ORGANIZED HEALTH CARE EDUCATION/TRAINING PROGRAM
Payer: COMMERCIAL

## 2018-08-09 DIAGNOSIS — R29.898 DECREASED ROM OF NECK: ICD-10-CM

## 2018-08-09 DIAGNOSIS — Z74.09 IMPAIRED FUNCTIONAL MOBILITY, BALANCE, GAIT, AND ENDURANCE: ICD-10-CM

## 2018-08-09 DIAGNOSIS — R53.1 DECREASED STRENGTH: ICD-10-CM

## 2018-08-09 PROCEDURE — 97116 GAIT TRAINING THERAPY: CPT | Mod: PO | Performed by: PHYSICAL THERAPIST

## 2018-08-09 PROCEDURE — 97110 THERAPEUTIC EXERCISES: CPT | Mod: PO | Performed by: PHYSICAL THERAPIST

## 2018-08-09 NOTE — PROGRESS NOTES
"Physical Therapy Progress Note      Name: Brigid Jernigan  Clinic Number: [id number]  Diagnosis: G71.11 (ICD-10-CM) - Myotonic dystrophy  Physician: Luis Felipe Vo MD  Treatment Orders: PT Eval and Treat          Past Medical History:   Diagnosis Date    Abnormal EKG      Myotonic dystrophy      Pacemaker      PAF (paroxysmal atrial fibrillation)      Sleep apnea           Precautions: standard  Visit #:  6/8 (expires 8/9)     (Visit total 2017= 44)  Date of Eval: 5/9/2017  Extended POC: 9/4/2018     Subjective   Pt reports: no new complaints  Sister reported: No significant comment  Pain Scale: denies     Objective      Patient received individual therapy to increase strength, endurance, ROM, flexibility, posture, core stabilization and head control with activities as follows:      Pt participated in therapeutic exercises x 25 minutes to address ROM and cervical alignment:     supine: PROM cervical rotation, SB, pec PROM with cervical AROM rotation    X 10 min on Sci Fit recumbent stepper.  Level 12.5  B UE/LE       Leg press:               1 x 10 reps of B LE squats with #140lbs applied.                NP today:   Sidelying therex including:         B LE clamshells 2 x 10 reps with MTB  Sitting therex including:               X 20 reps of Russian twists with Blue weighted ball   Sitting: HS curls GTB 2 x 10, 3 sec  supine: Bridging with feet on ball 2 x 10   1/2 bridge 15x  Sit<>stand from 19"= able to stand with UE support consistent with 1-2 attempts          Gait: x 15 min   Ballet bar:    ambulated 3 laps with LBQC and intermittent touch on bar, mod VC for upright posture             Written Home Exercises: 6/28/18- modified scalene stretch, illustrated copy to be provided  1/29/18- side stepping at counter with 1-2 UE support  12/4/17- AROM cervical SB and rotation  assisted quad/ hip flexor stretch in side lying  eval- chin tucks, rows with OTB, hip flexor and knee extensor stretch in sidelying  Pt " demo fair understanding of the education provided. Brigid demonstrated fair return demonstration of activities.      Education provided re: 12/18/17- cervical collar application  POC, HEP     Pt has no cultural, educational or language barriers to learning provided.     Assessment   Brigid tolerated treatment well. Pt demonstrated decreased need for support other than LBQC with ambulation practice. Pt did require intermittent UE touch especially with turning around. Pt tolerated a 5# increase on leg press today.  Pt can benefit from continued skilled PT to address impairments to improve consistency with mobility, prevent further functional decline, and maintain pt's independence for as long as possible.       Pt prognosis is Good. Pt will continue to benefit from skilled outpatient physical therapy to address the deficits listed in the problem list, provide pt/family education and to maximize pt's level of independence in the home and community environment.      Activity limitations/ Participation Restrictions:   Fall Risk - impaired balance   Weakness  Impaired muscle tone  Poor head control  Decreased ROM  Gait deviations   Decreased ambulation   Decreased activity tolerance   Decreased independence with daily activities   Requires skilled supervision to complete and progress HEP   Requires skilled PT for DME/ orthotic recommendations     Pt's spiritual, cultural and educational needs considered and pt agreeable to plan of care and GOALS as stated below:   Short term goals: 6 weeks, pt agrees to goals set. (As of 8/1/18)  1. Pt will perform chin tucks in supine with supervision to improve independent with HEP. Met 7/5/17  2. Assist pt with obtaining appropriate cervical brace to improve head control. Met 1/9/18- pt has but is not consistently wearing  3. Pt will demonstrate improved knee ext strength on left to 4/5 to improve safety with ambulation and sit<>stand. Met 9/28/17   4. Pt will demonstrate improved  "cervical rotation PROM to 20 degrees to improve functional head control. Met 7/5/17  5. Pt will perform sit<>stand consistently from 22" height surface with supervision to improve independence. Met 7/5/17                Revised 8/31/17:  Pt will perform sit<>stand from 19" height chair to improve independence with sitting in various chairs around her home and decreased need for "pop up seat". improved- pt stood from 19" height with BUE and 1-2 attempts, supervision without verbal cues      Long term goals: 12 weeks, pt agrees to goals set  6. Pt will perform basic HEP for gross strengthening with supervision to deter worsening of functional limitations. Met 8/31/17- pt's sister reports poor current compliance  7. Pt will demonstrate improved bilateral hip strength to 3/5 to improve balance and independence with mobility. Met 7/5/17  8. New 7/5/17: Pt will demonstrate improved bilateral hip strength to 4/5 to improve balance and independence with mobility. same/ Nearly met-  hip flex WALESKA= 5/5, hip ext B= 3+/5, hip abd R=4+/5, L=4-/5, hip add R= 3+/5, L= 3+/5  9. Pt will demonstrate improved cervical extension strength to 3+/5 to improve head control. Met 7/31/17  10. Pt will demonstrate improved cervical deep flexor strength to 3+/5 to improve head control. Met 7/31/17   11. Pt will demonstrate improve balance and gait by scoring 14/28 on Tinetti. Met 8/31/17- 17/28                                      Revised 8/31/17: pt will demonstrate improved balance/ gait and decreased fall risk to moderate by scoring 19/28 on Tinetti                                            Assessment. met- 19/28, continues to improve- 22/28 (8/1/18)                                         12. Pt will demonstrate improved cervical PROM for rotation to 40 degrees to improve functional head control. Improved/ partially met-  14 deg AAROM R rotation,                                                       54 deg L                                  "        13. New 8/1/18- pt will ambulate 50ft with LBQC and supervision over level tile to demonstrate improved household mobility with decreased AD- ~60ft                                                     with LBQC and min HHA     Plan   Continue PT 2x weekly under established Plan of Care, with treatment to include: pt education, HEP, therapeutic exercises, neuromuscular re-education/balance exercises, therapeutic activities, joint mobilizations, manual therapy, gait training PRN, and modalities PRN, to work towards established goals. Pt may be seen by PTA to carry out plan of care.      Yadi Mercado, PT   8/9/2018

## 2018-08-13 ENCOUNTER — CLINICAL SUPPORT (OUTPATIENT)
Dept: REHABILITATION | Facility: HOSPITAL | Age: 53
End: 2018-08-13
Attending: STUDENT IN AN ORGANIZED HEALTH CARE EDUCATION/TRAINING PROGRAM
Payer: COMMERCIAL

## 2018-08-13 DIAGNOSIS — Z74.09 IMPAIRED FUNCTIONAL MOBILITY, BALANCE, GAIT, AND ENDURANCE: ICD-10-CM

## 2018-08-13 DIAGNOSIS — R53.1 DECREASED STRENGTH: ICD-10-CM

## 2018-08-13 DIAGNOSIS — R29.898 DECREASED ROM OF NECK: ICD-10-CM

## 2018-08-13 PROCEDURE — 97110 THERAPEUTIC EXERCISES: CPT | Mod: PO | Performed by: PHYSICAL THERAPIST

## 2018-08-13 NOTE — PROGRESS NOTES
"Physical Therapy Progress Note      Name: Brigid Jernigan  Clinic Number: [id number]  Diagnosis: G71.11 (ICD-10-CM) - Myotonic dystrophy  Physician: Luis Felipe Vo MD  Treatment Orders: PT Eval and Treat          Past Medical History:   Diagnosis Date    Abnormal EKG      Myotonic dystrophy      Pacemaker      PAF (paroxysmal atrial fibrillation)      Sleep apnea           Precautions: standard  Visit #:  7/8 (expires 8/9)     (Visit total 2017= 44)  Date of Eval: 5/9/2017  Extended POC: 9/4/2018     Subjective   Pt reports: no new complaints  Sister reported: No significant comment  Pain Scale: denies     Objective      Patient received individual therapy to increase strength, endurance, ROM, flexibility, posture, core stabilization and head control with activities as follows:      Pt participated in therapeutic exercises x 40 minutes to address ROM and cervical alignment:     supine: PROM cervical rotation, SB, pec PROM with cervical AROM rotation    X 10 min on Sci Fit recumbent stepper.  Level 12.5  B UE/LE      Sidelying therex including:         B LE clamshells 2 x 10 reps with MTB  supine: Bridging with feet on ball 2 x 10   LTR 20x   WALESKA knees to chest with ball 2 x 10    Sit<>stand from 19"= able to stand with UE support consistent with 1-2 attempts           Leg press:               1 x 10 reps of B LE squats with #140lbs applied.       NP today:   Sitting: HS curls GTB 2 x 10, 3 sec   supine: PROM cervical rotation, SB, pec PROM with cervical AROM rotation       Gait: x 0 min   NP today:   Ballet bar:    ambulated 3 laps with LBQC and intermittent touch on bar, mod VC for upright posture             Written Home Exercises: 6/28/18- modified scalene stretch, illustrated copy to be provided  1/29/18- side stepping at counter with 1-2 UE support  12/4/17- AROM cervical SB and rotation  assisted quad/ hip flexor stretch in side lying  eval- chin tucks, rows with OTB, hip flexor and knee extensor " stretch in sidelying  Pt demo fair understanding of the education provided. Brigid demonstrated fair return demonstration of activities.      Education provided re: 12/18/17- cervical collar application  POC, HEP     Pt has no cultural, educational or language barriers to learning provided.     Assessment   Brigid tolerated treatment well. Pt participated in therapeutic exercises to improve trunk and BLE strength to improve pt's standing balance and ability to ambulate with decreased UE support. Increase reps for leg press next visit. Pt can benefit from continued skilled PT to address impairments to improve consistency with mobility, prevent further functional decline, and maintain pt's independence for as long as possible.       Pt prognosis is Good. Pt will continue to benefit from skilled outpatient physical therapy to address the deficits listed in the problem list, provide pt/family education and to maximize pt's level of independence in the home and community environment.      Activity limitations/ Participation Restrictions:   Fall Risk - impaired balance   Weakness  Impaired muscle tone  Poor head control  Decreased ROM  Gait deviations   Decreased ambulation   Decreased activity tolerance   Decreased independence with daily activities   Requires skilled supervision to complete and progress HEP   Requires skilled PT for DME/ orthotic recommendations     Pt's spiritual, cultural and educational needs considered and pt agreeable to plan of care and GOALS as stated below:   Short term goals: 6 weeks, pt agrees to goals set. (As of 8/1/18)  1. Pt will perform chin tucks in supine with supervision to improve independent with HEP. Met 7/5/17  2. Assist pt with obtaining appropriate cervical brace to improve head control. Met 1/9/18- pt has but is not consistently wearing  3. Pt will demonstrate improved knee ext strength on left to 4/5 to improve safety with ambulation and sit<>stand. Met 9/28/17   4. Pt will  "demonstrate improved cervical rotation PROM to 20 degrees to improve functional head control. Met 7/5/17  5. Pt will perform sit<>stand consistently from 22" height surface with supervision to improve independence. Met 7/5/17                Revised 8/31/17:  Pt will perform sit<>stand from 19" height chair to improve independence with sitting in various chairs around her home and decreased need for "pop up seat". improved- pt stood from 19" height with BUE and 1-2 attempts, supervision without verbal cues      Long term goals: 12 weeks, pt agrees to goals set  6. Pt will perform basic HEP for gross strengthening with supervision to deter worsening of functional limitations. Met 8/31/17- pt's sister reports poor current compliance  7. Pt will demonstrate improved bilateral hip strength to 3/5 to improve balance and independence with mobility. Met 7/5/17  8. New 7/5/17: Pt will demonstrate improved bilateral hip strength to 4/5 to improve balance and independence with mobility. same/ Nearly met-  hip flex WALESKA= 5/5, hip ext B= 3+/5, hip abd R=4+/5, L=4-/5, hip add R= 3+/5, L= 3+/5  9. Pt will demonstrate improved cervical extension strength to 3+/5 to improve head control. Met 7/31/17  10. Pt will demonstrate improved cervical deep flexor strength to 3+/5 to improve head control. Met 7/31/17   11. Pt will demonstrate improve balance and gait by scoring 14/28 on Tinetti. Met 8/31/17- 17/28                                      Revised 8/31/17: pt will demonstrate improved balance/ gait and decreased fall risk to moderate by scoring 19/28 on Tinetti                                            Assessment. met- 19/28, continues to improve- 22/28 (8/1/18)                                         12. Pt will demonstrate improved cervical PROM for rotation to 40 degrees to improve functional head control. Improved/ partially met-  14 deg AAROM R rotation,                                                       54 deg L             "                             13. New 8/1/18- pt will ambulate 50ft with LBQC and supervision over level tile to demonstrate improved household mobility with decreased AD- ~60ft                                                     with LBQC and min HHA     Plan   Continue PT 2x weekly under established Plan of Care, with treatment to include: pt education, HEP, therapeutic exercises, neuromuscular re-education/balance exercises, therapeutic activities, joint mobilizations, manual therapy, gait training PRN, and modalities PRN, to work towards established goals. Pt may be seen by PTA to carry out plan of care.      Yadi Mercado, PT   8/13/2018

## 2018-08-16 ENCOUNTER — CLINICAL SUPPORT (OUTPATIENT)
Dept: REHABILITATION | Facility: HOSPITAL | Age: 53
End: 2018-08-16
Attending: STUDENT IN AN ORGANIZED HEALTH CARE EDUCATION/TRAINING PROGRAM
Payer: COMMERCIAL

## 2018-08-16 DIAGNOSIS — Z74.09 IMPAIRED FUNCTIONAL MOBILITY, BALANCE, GAIT, AND ENDURANCE: ICD-10-CM

## 2018-08-16 DIAGNOSIS — R53.1 DECREASED STRENGTH: ICD-10-CM

## 2018-08-16 DIAGNOSIS — R29.898 DECREASED ROM OF NECK: ICD-10-CM

## 2018-08-16 PROCEDURE — 97110 THERAPEUTIC EXERCISES: CPT | Mod: PO | Performed by: PHYSICAL THERAPIST

## 2018-08-16 NOTE — PROGRESS NOTES
Physical Therapy Progress Note      Name: Brigid Jernigan  Clinic Number: [id number]  Diagnosis: G71.11 (ICD-10-CM) - Myotonic dystrophy  Physician: Luis Felipe Vo MD  Treatment Orders: PT Eval and Treat          Past Medical History:   Diagnosis Date    Abnormal EKG      Myotonic dystrophy      Pacemaker      PAF (paroxysmal atrial fibrillation)      Sleep apnea           Precautions: standard  Visit #:  8/8 (expires 8/9)     (Visit total 2017= 44)  Date of Eval: 5/9/2017  Extended POC: 9/4/2018     Subjective   Pt reports: no new complaints  Sister reported: No significant comment  Pain Scale: denies     Objective      Patient received individual therapy to increase strength, endurance, ROM, flexibility, posture, core stabilization and head control with activities as follows:      Pt participated in therapeutic exercises x 47 minutes to address ROM and cervical alignment:     supine: PROM cervical rotation, SB, stretching of SCM     Sidelying therex including:         B LE clamshells 2 x 10 reps with MTB  supine: Bridging with feet on ball 2 x 10   1/2 bridge 15   WALESKA knees to chest with ball 2 x 10  Sitting: sit ups from wedge with 2000g ball 2 x 10   [place]ian twists 2000g ball 2 x 10   Overhead lift w/ bar- poor quality   Rowing EOM BTB 2 x 10           Leg press:               1 x 15 reps of B LE squats with #140lbs applied.       NP today:   Sitting: HS curls GTB 2 x 10, 3 sec  Bridging with feet on ball 2 x 10  X 10 min on Sci Fit recumbent stepper.  Level 12.5  B UE/LE        Gait: x 0 min   NP today:   Ballet bar:    ambulated 3 laps with LBQC and intermittent touch on bar, mod VC for upright posture             Written Home Exercises: 6/28/18- modified scalene stretch, illustrated copy to be provided  1/29/18- side stepping at counter with 1-2 UE support  12/4/17- AROM cervical SB and rotation  assisted quad/ hip flexor stretch in side lying  eval- chin tucks, rows with OTB, hip flexor and knee  extensor stretch in sidelying  Pt demo fair understanding of the education provided. Brigid demonstrated fair return demonstration of activities.      Education provided re: 12/18/17- cervical collar application  POC, HEP     Pt has no cultural, educational or language barriers to learning provided.     Assessment   Brigid tolerated treatment well. Pt had increased difficulty rising from mat today, this may have been due to new exericses initiated (incresaed muscular fatigue). PT had pt perform rowing to address upper back// posterior chain to improve standing posture/ gait ability with LBQC. Pt can benefit from continued skilled PT to address impairments to improve consistency with mobility, prevent further functional decline, and maintain pt's independence for as long as possible.       Pt prognosis is Good. Pt will continue to benefit from skilled outpatient physical therapy to address the deficits listed in the problem list, provide pt/family education and to maximize pt's level of independence in the home and community environment.      Activity limitations/ Participation Restrictions:   Fall Risk - impaired balance   Weakness  Impaired muscle tone  Poor head control  Decreased ROM  Gait deviations   Decreased ambulation   Decreased activity tolerance   Decreased independence with daily activities   Requires skilled supervision to complete and progress HEP   Requires skilled PT for DME/ orthotic recommendations     Pt's spiritual, cultural and educational needs considered and pt agreeable to plan of care and GOALS as stated below:   Short term goals: 6 weeks, pt agrees to goals set. (As of 8/1/18)  1. Pt will perform chin tucks in supine with supervision to improve independent with HEP. Met 7/5/17  2. Assist pt with obtaining appropriate cervical brace to improve head control. Met 1/9/18- pt has but is not consistently wearing  3. Pt will demonstrate improved knee ext strength on left to 4/5 to improve safety  "with ambulation and sit<>stand. Met 9/28/17   4. Pt will demonstrate improved cervical rotation PROM to 20 degrees to improve functional head control. Met 7/5/17  5. Pt will perform sit<>stand consistently from 22" height surface with supervision to improve independence. Met 7/5/17                Revised 8/31/17:  Pt will perform sit<>stand from 19" height chair to improve independence with sitting in various chairs around her home and decreased need for "pop up seat". improved- pt stood from 19" height with BUE and 1-2 attempts, supervision without verbal cues      Long term goals: 12 weeks, pt agrees to goals set  6. Pt will perform basic HEP for gross strengthening with supervision to deter worsening of functional limitations. Met 8/31/17- pt's sister reports poor current compliance  7. Pt will demonstrate improved bilateral hip strength to 3/5 to improve balance and independence with mobility. Met 7/5/17  8. New 7/5/17: Pt will demonstrate improved bilateral hip strength to 4/5 to improve balance and independence with mobility. same/ Nearly met-  hip flex WALESKA= 5/5, hip ext B= 3+/5, hip abd R=4+/5, L=4-/5, hip add R= 3+/5, L= 3+/5  9. Pt will demonstrate improved cervical extension strength to 3+/5 to improve head control. Met 7/31/17  10. Pt will demonstrate improved cervical deep flexor strength to 3+/5 to improve head control. Met 7/31/17   11. Pt will demonstrate improve balance and gait by scoring 14/28 on Tinetti. Met 8/31/17- 17/28                                      Revised 8/31/17: pt will demonstrate improved balance/ gait and decreased fall risk to moderate by scoring 19/28 on Tinetti                                            Assessment. met- 19/28, continues to improve- 22/28 (8/1/18)                                         12. Pt will demonstrate improved cervical PROM for rotation to 40 degrees to improve functional head control. Improved/ partially met-  14 deg AAROM R rotation,                   "                                     54 deg L                                         13. New 8/1/18- pt will ambulate 50ft with LBQC and supervision over level tile to demonstrate improved household mobility with decreased AD- ~60ft                                                     with LBQC and min HHA     Plan   Continue PT 2x weekly under established Plan of Care, with treatment to include: pt education, HEP, therapeutic exercises, neuromuscular re-education/balance exercises, therapeutic activities, joint mobilizations, manual therapy, gait training PRN, and modalities PRN, to work towards established goals. Pt may be seen by PTA to carry out plan of care.      Yadi Mercado, PT   8/16/2018

## 2018-08-20 ENCOUNTER — CLINICAL SUPPORT (OUTPATIENT)
Dept: REHABILITATION | Facility: HOSPITAL | Age: 53
End: 2018-08-20
Attending: STUDENT IN AN ORGANIZED HEALTH CARE EDUCATION/TRAINING PROGRAM
Payer: COMMERCIAL

## 2018-08-20 ENCOUNTER — DOCUMENTATION ONLY (OUTPATIENT)
Dept: REHABILITATION | Facility: HOSPITAL | Age: 53
End: 2018-08-20

## 2018-08-20 PROCEDURE — 97116 GAIT TRAINING THERAPY: CPT | Mod: PO

## 2018-08-20 PROCEDURE — 97110 THERAPEUTIC EXERCISES: CPT | Mod: PO

## 2018-08-20 NOTE — PROGRESS NOTES
PT/PTA met face to face to discuss pt's treatment plan and progress towards established goals.  Patient will be seen by physical therapist at least every sixth treatment or 30 days, whichever occurs first.    Mae Caicedo PTA  08/20/2018        Meeting held on 8/20/18  Yadi Mercado DPT  8/21/2018

## 2018-08-20 NOTE — PROGRESS NOTES
"Physical Therapy Progress Note      Name: Brigid Jernigan  Clinic Number: [id number]  Diagnosis: G71.11 (ICD-10-CM) - Myotonic dystrophy  Physician: Luis Felipe Vo MD  Treatment Orders: PT Eval and Treat          Past Medical History:   Diagnosis Date    Abnormal EKG      Myotonic dystrophy      Pacemaker      PAF (paroxysmal atrial fibrillation)      Sleep apnea           Precautions: standard  Visit #:  9/8 (expires 8/9)     (Visit total 2017= 44)  Date of Eval: 5/9/2017  Extended POC: 9/4/2018     Subjective   Pt reports: " I'm doing ok."   Sister reported: " I thought we had Yadi today so we can get more authorization."   Pain Scale: denies     Objective      Patient received individual therapy to increase strength, endurance, ROM, flexibility, posture, core stabilization and head control with activities as follows:      Pt participated in therapeutic exercises x 35 minutes to address ROM and cervical alignment:   X 10 min on Sci Fit recumbent stepper.  Level 12.5  B UE/LE     Supine: Bridging with feet on ball 2 x 10   LTR x 20 reps with ball under feet      Sitting: sit ups wedge with BTB ball 2 x 10   [place]anish twists 4000g ball 2 x 10   Rowing EOM BTB 2 x 10           Leg press:               1 x 15 reps of B LE squats with #140lbs applied.        Gait: x 10  min    Ballet bar:    ambulated 3 laps with LBQC and intermittent touch on bar, mod VC for upright posture             Written Home Exercises: 6/28/18- modified scalene stretch, illustrated copy to be provided  1/29/18- side stepping at counter with 1-2 UE support  12/4/17- AROM cervical SB and rotation  assisted quad/ hip flexor stretch in side lying  eval- chin tucks, rows with OTB, hip flexor and knee extensor stretch in sidelying  Pt demo fair understanding of the education provided. Brigid demonstrated fair return demonstration of activities.      Education provided re: 12/18/17- cervical collar application  POC, HEP     Pt has no " "cultural, educational or language barriers to learning provided.     Assessment   Brigid tolerated treatment well and did not have any problems.  Pt with increased weight on the russian twists and was able to ambulate about 4 laps with close CGA with LBQC.  No other changes noted.   Pt can benefit from continued skilled PT to address impairments to improve consistency with mobility, prevent further functional decline, and maintain pt's independence for as long as possible.       Pt prognosis is Good. Pt will continue to benefit from skilled outpatient physical therapy to address the deficits listed in the problem list, provide pt/family education and to maximize pt's level of independence in the home and community environment.      Activity limitations/ Participation Restrictions:   Fall Risk - impaired balance   Weakness  Impaired muscle tone  Poor head control  Decreased ROM  Gait deviations   Decreased ambulation   Decreased activity tolerance   Decreased independence with daily activities   Requires skilled supervision to complete and progress HEP   Requires skilled PT for DME/ orthotic recommendations     Pt's spiritual, cultural and educational needs considered and pt agreeable to plan of care and GOALS as stated below:   Short term goals: 6 weeks, pt agrees to goals set. (As of 8/1/18)  1. Pt will perform chin tucks in supine with supervision to improve independent with HEP. Met 7/5/17  2. Assist pt with obtaining appropriate cervical brace to improve head control. Met 1/9/18- pt has but is not consistently wearing  3. Pt will demonstrate improved knee ext strength on left to 4/5 to improve safety with ambulation and sit<>stand. Met 9/28/17   4. Pt will demonstrate improved cervical rotation PROM to 20 degrees to improve functional head control. Met 7/5/17  5. Pt will perform sit<>stand consistently from 22" height surface with supervision to improve independence. Met 7/5/17                Revised 8/31/17: " " Pt will perform sit<>stand from 19" height chair to improve independence with sitting in various chairs around her home and decreased need for "pop up seat". improved- pt stood from 19" height with BUE and 1-2 attempts, supervision without verbal cues      Long term goals: 12 weeks, pt agrees to goals set  6. Pt will perform basic HEP for gross strengthening with supervision to deter worsening of functional limitations. Met 8/31/17- pt's sister reports poor current compliance  7. Pt will demonstrate improved bilateral hip strength to 3/5 to improve balance and independence with mobility. Met 7/5/17  8. New 7/5/17: Pt will demonstrate improved bilateral hip strength to 4/5 to improve balance and independence with mobility. same/ Nearly met-  hip flex WALESKA= 5/5, hip ext B= 3+/5, hip abd R=4+/5, L=4-/5, hip add R= 3+/5, L= 3+/5  9. Pt will demonstrate improved cervical extension strength to 3+/5 to improve head control. Met 7/31/17  10. Pt will demonstrate improved cervical deep flexor strength to 3+/5 to improve head control. Met 7/31/17   11. Pt will demonstrate improve balance and gait by scoring 14/28 on Tinetti. Met 8/31/17- 17/28                                      Revised 8/31/17: pt will demonstrate improved balance/ gait and decreased fall risk to moderate by scoring 19/28 on Tinetti                                            Assessment. met- 19/28, continues to improve- 22/28 (8/1/18)                                         12. Pt will demonstrate improved cervical PROM for rotation to 40 degrees to improve functional head control. Improved/ partially met-  14 deg AAROM R rotation,                                                       54 deg L                                         13. New 8/1/18- pt will ambulate 50ft with LBQC and supervision over level tile to demonstrate improved household mobility with decreased AD- ~60ft                                                     with LBQC and min " HHA     Plan   Continue PT 2x weekly under established Plan of Care, with treatment to include: pt education, HEP, therapeutic exercises, neuromuscular re-education/balance exercises, therapeutic activities, joint mobilizations, manual therapy, gait training PRN, and modalities PRN, to work towards established goals. Pt may be seen by PTA to carry out plan of care.      Mae Caicedo, PTA     8/20/2018

## 2018-08-30 ENCOUNTER — CLINICAL SUPPORT (OUTPATIENT)
Dept: REHABILITATION | Facility: HOSPITAL | Age: 53
End: 2018-08-30
Attending: STUDENT IN AN ORGANIZED HEALTH CARE EDUCATION/TRAINING PROGRAM
Payer: COMMERCIAL

## 2018-08-30 DIAGNOSIS — R53.1 DECREASED STRENGTH: ICD-10-CM

## 2018-08-30 DIAGNOSIS — R29.898 DECREASED ROM OF NECK: ICD-10-CM

## 2018-08-30 DIAGNOSIS — Z74.09 IMPAIRED FUNCTIONAL MOBILITY, BALANCE, GAIT, AND ENDURANCE: ICD-10-CM

## 2018-08-30 PROCEDURE — 97110 THERAPEUTIC EXERCISES: CPT | Mod: PO | Performed by: PHYSICAL THERAPIST

## 2018-08-30 NOTE — PROGRESS NOTES
"Physical Therapy Progress Note      Name: Brigid Jernigan  Clinic Number: [id number]  Diagnosis: G71.11 (ICD-10-CM) - Myotonic dystrophy  Physician: Luis Felipe Vo MD  Treatment Orders: PT Eval and Treat          Past Medical History:   Diagnosis Date    Abnormal EKG      Myotonic dystrophy      Pacemaker      PAF (paroxysmal atrial fibrillation)      Sleep apnea           Precautions: standard  Visit #: 2/6  (expires 9/4)     (Visit total 2017= 44)  Date of Eval: 5/9/2017  Extended POC: 10/30/2018     Subjective   Pt reports: " I'm doing ok."   Sister reported: " I thought we had Yadi today so we can get more authorization."   Pain Scale: denies     Objective      Patient received individual therapy to increase strength, endurance, ROM, flexibility, posture, core stabilization and head control with activities as follows:      Pt participated in therapeutic exercises x 16 minutes to address ROM and cervical alignment:   X 10 min on Sci Fit recumbent stepper.  Level 12.5  B UE/LE    Sitting: sit ups wedge with 4000g ball 2 x 10      Supine: cervical PRM for SB and rotation    pec PROM   Standing: static standing with horizontal head turns- fair balance, 10x tolerated    Trunk rotation without UE support- fair- to poor+ balance, 3x tolerated    supervised with PT tech:   Supine: Bridging with feet on ball 2 x 10  Sitting:  [place] twists 4000g ball 2 x 10      NP today:         Leg press:               1 x 15 reps of B LE squats with #140lbs applied.    Rowing EOM BTB 2 x 10    Gait: x 0  min    Standing without UE support-   NP today  Ballet bar:    ambulated 3 laps with LBQC and intermittent touch on bar, mod VC for upright posture             Written Home Exercises: 6/28/18- modified scalene stretch, illustrated copy to be provided  1/29/18- side stepping at counter with 1-2 UE support  12/4/17- AROM cervical SB and rotation  assisted quad/ hip flexor stretch in side lying  eval- chin tucks, rows with " OTB, hip flexor and knee extensor stretch in sidelying  Pt demo fair understanding of the education provided. Brigid demonstrated fair return demonstration of activities.      Education provided re: 12/18/17- cervical collar application  POC, HEP     Pt has no cultural, educational or language barriers to learning provided.     Assessment   Brigid tolerated treatment well. PT continues to address cervical and pectoral ROM to improve cervical ROM and general strengthening to improve gait. Pt also participated in standing balance activities to improve stability with LBQC vs. RW use for house hold ambulation. Pt still has a fear of falling that affects balance and gait training during sessions.   Pt can benefit from continued skilled PT to address impairments to improve consistency with mobility, prevent further functional decline, and maintain pt's independence for as long as possible.       Pt prognosis is Good. Pt will continue to benefit from skilled outpatient physical therapy to address the deficits listed in the problem list, provide pt/family education and to maximize pt's level of independence in the home and community environment.      Activity limitations/ Participation Restrictions:   Fall Risk - impaired balance   Weakness  Impaired muscle tone  Poor head control  Decreased ROM  Gait deviations   Decreased ambulation   Decreased activity tolerance   Decreased independence with daily activities   Requires skilled supervision to complete and progress HEP   Requires skilled PT for DME/ orthotic recommendations     Pt's spiritual, cultural and educational needs considered and pt agreeable to plan of care and GOALS as stated below:   Short term goals: 6 weeks, pt agrees to goals set. (As of 8/1/18)  1. Pt will perform chin tucks in supine with supervision to improve independent with HEP. Met 7/5/17  2. Assist pt with obtaining appropriate cervical brace to improve head control. Met 1/9/18- pt has but is not  "consistently wearing  3. Pt will demonstrate improved knee ext strength on left to 4/5 to improve safety with ambulation and sit<>stand. Met 9/28/17   4. Pt will demonstrate improved cervical rotation PROM to 20 degrees to improve functional head control. Met 7/5/17  5. Pt will perform sit<>stand consistently from 22" height surface with supervision to improve independence. Met 7/5/17                Revised 8/31/17:  Pt will perform sit<>stand from 19" height chair to improve independence with sitting in various chairs around her home and decreased need for "pop up seat". improved- pt stood from 19" height with BUE and 1-2 attempts, supervision without verbal cues      Long term goals: 12 weeks, pt agrees to goals set  6. Pt will perform basic HEP for gross strengthening with supervision to deter worsening of functional limitations. Met 8/31/17- pt's sister reports poor current compliance  7. Pt will demonstrate improved bilateral hip strength to 3/5 to improve balance and independence with mobility. Met 7/5/17  8. New 7/5/17: Pt will demonstrate improved bilateral hip strength to 4/5 to improve balance and independence with mobility. same/ Nearly met-  hip flex WALESKA= 5/5, hip ext B= 3+/5, hip abd R=4+/5, L=4-/5, hip add R= 3+/5, L= 3+/5  9. Pt will demonstrate improved cervical extension strength to 3+/5 to improve head control. Met 7/31/17  10. Pt will demonstrate improved cervical deep flexor strength to 3+/5 to improve head control. Met 7/31/17   11. Pt will demonstrate improve balance and gait by scoring 14/28 on Tinetti. Met 8/31/17- 17/28                                      Revised 8/31/17: pt will demonstrate improved balance/ gait and decreased fall risk to moderate by scoring 19/28 on Tinetti                                            Assessment. met- 19/28, continues to improve- 22/28 (8/1/18)                                         12. Pt will demonstrate improved cervical PROM for rotation to 40 degrees " to improve functional head control. Improved/ partially met-  14 deg AAROM R rotation,                                                       54 deg L                                         13. New 8/1/18- pt will ambulate 50ft with LBQC and supervision over level tile to demonstrate improved household mobility with decreased AD- ~60ft                                                     with LBQC and min HHA     Plan   POC extended x 8 weeks to allow for continued progress with gait and balance to decrease fall risk and improve household safety. Continue PT 2x weekly under established Plan of Care, with treatment to include: pt education, HEP, therapeutic exercises, neuromuscular re-education/balance exercises, therapeutic activities, joint mobilizations, manual therapy, gait training PRN, and modalities PRN, to work towards established goals. Pt may be seen by PTA to carry out plan of care.      Yadi Mercado, PT     8/30/2018

## 2018-09-10 ENCOUNTER — CLINICAL SUPPORT (OUTPATIENT)
Dept: REHABILITATION | Facility: HOSPITAL | Age: 53
End: 2018-09-10
Attending: STUDENT IN AN ORGANIZED HEALTH CARE EDUCATION/TRAINING PROGRAM
Payer: COMMERCIAL

## 2018-09-10 DIAGNOSIS — R29.898 DECREASED ROM OF NECK: ICD-10-CM

## 2018-09-10 DIAGNOSIS — Z74.09 IMPAIRED FUNCTIONAL MOBILITY, BALANCE, GAIT, AND ENDURANCE: ICD-10-CM

## 2018-09-10 DIAGNOSIS — R53.1 DECREASED STRENGTH: ICD-10-CM

## 2018-09-10 PROCEDURE — 97110 THERAPEUTIC EXERCISES: CPT | Mod: PO | Performed by: PHYSICAL THERAPIST

## 2018-09-13 ENCOUNTER — CLINICAL SUPPORT (OUTPATIENT)
Dept: REHABILITATION | Facility: HOSPITAL | Age: 53
End: 2018-09-13
Attending: STUDENT IN AN ORGANIZED HEALTH CARE EDUCATION/TRAINING PROGRAM
Payer: COMMERCIAL

## 2018-09-13 DIAGNOSIS — Z74.09 IMPAIRED FUNCTIONAL MOBILITY, BALANCE, GAIT, AND ENDURANCE: ICD-10-CM

## 2018-09-13 DIAGNOSIS — R29.898 DECREASED ROM OF NECK: ICD-10-CM

## 2018-09-13 DIAGNOSIS — R53.1 DECREASED STRENGTH: ICD-10-CM

## 2018-09-13 PROCEDURE — 97110 THERAPEUTIC EXERCISES: CPT | Mod: PO | Performed by: PHYSICAL THERAPIST

## 2018-09-13 NOTE — PROGRESS NOTES
"Physical Therapy Progress Note      Name: Brigid Jernigan  Clinic Number: [id number]  Diagnosis: G71.11 (ICD-10-CM) - Myotonic dystrophy  Physician: Luis Felipe Vo MD  Treatment Orders: PT Eval and Treat          Past Medical History:   Diagnosis Date    Abnormal EKG      Myotonic dystrophy      Pacemaker      PAF (paroxysmal atrial fibrillation)      Sleep apnea           Precautions: standard  Visit #: 2/6  (expires 10/22/18)     (Visit total 2017= 44)  Date of Eval: 5/9/2017  Extended POC: 10/30/2018     Subjective   Pt reports: " I'm doing ok."   Pain Scale: denies     Objective      Patient received individual therapy to increase strength, endurance, ROM, flexibility, posture, core stabilization and head control with activities as follows:      Pt participated in therapeutic exercises x 47 minutes to address ROM and cervical alignment:  Recumbent stepper BUE/LE L 12.5 x 10 min    Supine: cervical PROM for SB and rotation, distraction   pec stretching   DKTC with ball between knees 20x    Bridging on ball with alternate LE lift, 10x, min A    Side lying: clamshells PTB 30x    Sitting: Rowing EOM BTB 2 x 10     Leg press:               1 x 10 reps of B LE squats with 140# applied.          NP today:   Standing with RW: hip abd OTB 10x   Hip ext OTB 10x  Gait with LBQC and HHA ~40ft with min A- increased forward trunk flexion during stance phase L>R    Standing: static standing with horizontal head turns- fair balance, 10x tolerated    Trunk rotation without UE support- fair- to poor+ balance, 3x tolerated  Supine: BLE lift 6" with TA activation 10x  Sitting:  [place] twists 4000g ball 2 x 10               sit ups wedge with 4000g ball 2 x 10      Written Home Exercises: 6/28/18- modified scalene stretch, illustrated copy to be provided  1/29/18- side stepping at counter with 1-2 UE support  12/4/17- AROM cervical SB and rotation  assisted quad/ hip flexor stretch in side lying  eval- chin tucks, rows with " OTB, hip flexor and knee extensor stretch in sidelying  Pt demo fair understanding of the education provided. Brigid demonstrated fair return demonstration of activities.      Education provided re: 12/18/17- cervical collar application  POC, HEP     Pt has no cultural, educational or language barriers to learning provided.     Assessment   Brigid tolerated treatment well. Pt was only able to tolerate 10 reps on leg press today (-5x) due to lack of performing this exercise. Pt was advanced to advanced bridging with LE lift with improved hip clearance as compared to the last visit.  Pt can benefit from continued skilled PT to address impairments to improve consistency with mobility, prevent further functional decline, and maintain pt's independence for as long as possible.       Pt prognosis is Good. Pt will continue to benefit from skilled outpatient physical therapy to address the deficits listed in the problem list, provide pt/family education and to maximize pt's level of independence in the home and community environment.      Activity limitations/ Participation Restrictions:   Fall Risk - impaired balance   Weakness  Impaired muscle tone  Poor head control  Decreased ROM  Gait deviations   Decreased ambulation   Decreased activity tolerance   Decreased independence with daily activities   Requires skilled supervision to complete and progress HEP   Requires skilled PT for DME/ orthotic recommendations     Pt's spiritual, cultural and educational needs considered and pt agreeable to plan of care and GOALS as stated below:   Short term goals: 6 weeks, pt agrees to goals set. (As of 9/10/18)  1. Pt will perform chin tucks in supine with supervision to improve independent with HEP. Met 7/5/17  2. Assist pt with obtaining appropriate cervical brace to improve head control. Met 1/9/18- pt has but is not consistently wearing  3. Pt will demonstrate improved knee ext strength on left to 4/5 to improve safety with  "ambulation and sit<>stand. Met 9/28/17   4. Pt will demonstrate improved cervical rotation PROM to 20 degrees to improve functional head control. Met 7/5/17  5. Pt will perform sit<>stand consistently from 22" height surface with supervision to improve independence. Met 7/5/17                                                         Revised 8/31/17:  Pt will perform sit<>stand from 19" height chair to improve independence with sitting in various chairs around her home and decreased                                               need for "pop up seat". ~same pt stood from 19" height with BUE and 1-2 attempts, supervision without verbal cues      Long term goals: 12 weeks, pt agrees to goals set  6. Pt will perform basic HEP for gross strengthening with supervision to deter worsening of functional limitations. Met 8/31/17- pt's sister reports poor current compliance  7. Pt will demonstrate improved bilateral hip strength to 3/5 to improve balance and independence with mobility. Met 7/5/17  8. New 7/5/17: Pt will demonstrate improved bilateral hip strength to 4/5 to improve balance and independence with mobility. same/ Nearly met-  hip flex WALESKA= 4+ to 5/5, hip ext B= 3+/5, hip abd WALESKA=4-/5, hip add R= 4-/5, L= 4/5  9. Pt will demonstrate improved cervical extension strength to 3+/5 to improve head control. Met 7/31/17  10. Pt will demonstrate improved cervical deep flexor strength to 3+/5 to improve head control. Met 7/31/17   11. Pt will demonstrate improve balance and gait by scoring 14/28 on Tinetti. Met 8/31/17- 17/28                                      Revised 8/31/17: pt will demonstrate improved balance/ gait and decreased fall risk to moderate by scoring 19/28 on Tinetti                                            Assessment. met- 19/28, continues to improve- 22/28 (8/1/18)                                         12. Pt will demonstrate improved cervical PROM for rotation to 40 degrees to improve functional head " control. same-  14 deg AAROM R rotation (after stretching),                                                       54 deg L                                         13. New 8/1/18- pt will ambulate 50ft with LBQC and supervision over level tile to demonstrate improved household mobility with decreased AD- improved- ~40ft                                                     with LBQC and min HHA, away from supporting surface       Plan   Continue PT 2x weekly under established Plan of Care, with treatment to include: pt education, HEP, therapeutic exercises, neuromuscular re-education/balance exercises, therapeutic activities, joint mobilizations, manual therapy, gait training PRN, and modalities PRN, to work towards established goals. Pt may be seen by PTA to carry out plan of care.      Yadi Mercado, PT     9/13/2018

## 2018-09-13 NOTE — PLAN OF CARE
"Physical Therapy Progress Note      Name: Brigid Jernigan  Clinic Number: [id number]  Diagnosis: G71.11 (ICD-10-CM) - Myotonic dystrophy  Physician: Luis Felipe Vo MD  Treatment Orders: PT Eval and Treat          Past Medical History:   Diagnosis Date    Abnormal EKG      Myotonic dystrophy      Pacemaker      PAF (paroxysmal atrial fibrillation)      Sleep apnea           Precautions: standard  Visit #: 2/6  (expires 10/22/18)     (Visit total 2017= 44)  Date of Eval: 5/9/2017  Extended POC: 10/30/2018     Subjective   Pt reports: " I'm doing ok."   Pain Scale: denies     Objective      Patient received individual therapy to increase strength, endurance, ROM, flexibility, posture, core stabilization and head control with activities as follows:      Pt participated in therapeutic exercises x 43 minutes to address ROM and cervical alignment:     cervical Rotation ROM supine: R= 9 deg, L= 30 deg  After stretching rotation R= 14 deg, L= 42 deg    Supine: cervical PROM for SB and rotation    Bridging on ball with alternate LE lift, 10x, min A   BLE lift 6" with TA activation 10x  Standing with RW: hip abd OTB 10x   Hip ext OTB 10x    Strength:   Hip flex: B= 4+ to 5/5  Hip abd: B= 4-/5  Hip add: R=4/5 L= 4-/5  Hip ext: B= 3+/5  Knee flex: R= 4-/5, L= 4/5      Gait with LBQC and HHA ~40ft with min A- increased forward trunk flexion during stance phase L>R      NP today:    X 10 min on Sci Fit recumbent stepper.  Level 12.5  B UE/LE    Sitting: sit ups wedge with 4000g ball 2 x 10      Standing: static standing with horizontal head turns- fair balance, 10x tolerated    Trunk rotation without UE support- fair- to poor+ balance, 3x tolerated  Supine: Bridging with feet on ball 2 x 10  Sitting:  Congolese twists 4000g ball 2 x 10        Leg press:               1 x 15 reps of B LE squats with #140lbs applied.    Rowing EOM BTB 2 x 10          Written Home Exercises: 6/28/18- modified scalene stretch, illustrated copy " "to be provided  1/29/18- side stepping at counter with 1-2 UE support  12/4/17- AROM cervical SB and rotation  assisted quad/ hip flexor stretch in side lying  eval- chin tucks, rows with OTB, hip flexor and knee extensor stretch in sidelying  Pt demo fair understanding of the education provided. Brigid demonstrated fair return demonstration of activities.      Education provided re: 12/18/17- cervical collar application  POC, HEP     Pt has no cultural, educational or language barriers to learning provided.     Assessment   Assessment period: 8/6/2018 to 9/10/18. Brigid tolerates treatments well. PT continues to address cervical and pectoral ROM to improve cervical ROM and general strengthening to improve gait. Pt's cervical ROM is grossly unchanged and pt continues to demonstrate a forward head/rounded shoulders posture. This posture along with increased cervical muscle tone and ridigitiy limits any progress with ROM. Pt reports she is compliant with watching TV towards the R side to assist with maintaining ROM. PT has been addressing manual cervical ROM with only maintenance of current ROM noted. Pt is demonstrating good consistency with sit<>stand of various surface heights, as low as 19-20". Pt demonstrated improved ambulation ability with LBQC and HHA in an open area. Pt continues to demonstrate increased trunk flexion/ forward lean with L midstance. This is due to decreased balance and decreased strength.  Pt can benefit from continued skilled PT to address impairments to improve consistency with mobility, prevent further functional decline, and maintain pt's independence for as long as possible.       Pt prognosis is Good. Pt will continue to benefit from skilled outpatient physical therapy to address the deficits listed in the problem list, provide pt/family education and to maximize pt's level of independence in the home and community environment.      Activity limitations/ Participation Restrictions: " "  Fall Risk - impaired balance   Weakness  Impaired muscle tone  Poor head control  Decreased ROM  Gait deviations   Decreased ambulation   Decreased activity tolerance   Decreased independence with daily activities   Requires skilled supervision to complete and progress HEP   Requires skilled PT for DME/ orthotic recommendations     Pt's spiritual, cultural and educational needs considered and pt agreeable to plan of care and GOALS as stated below:   Short term goals: 6 weeks, pt agrees to goals set. (As of 9/10/18)  1. Pt will perform chin tucks in supine with supervision to improve independent with HEP. Met 7/5/17  2. Assist pt with obtaining appropriate cervical brace to improve head control. Met 1/9/18- pt has but is not consistently wearing  3. Pt will demonstrate improved knee ext strength on left to 4/5 to improve safety with ambulation and sit<>stand. Met 9/28/17   4. Pt will demonstrate improved cervical rotation PROM to 20 degrees to improve functional head control. Met 7/5/17  5. Pt will perform sit<>stand consistently from 22" height surface with supervision to improve independence. Met 7/5/17                     Revised 8/31/17:  Pt will perform sit<>stand from 19" height chair to improve independence with sitting in various chairs around her home and decreased       need for "pop up seat". ~same pt stood from 19" height with BUE and 1-2 attempts, supervision without verbal cues      Long term goals: 12 weeks, pt agrees to goals set  6. Pt will perform basic HEP for gross strengthening with supervision to deter worsening of functional limitations. Met 8/31/17- pt's sister reports poor current compliance  7. Pt will demonstrate improved bilateral hip strength to 3/5 to improve balance and independence with mobility. Met 7/5/17  8. New 7/5/17: Pt will demonstrate improved bilateral hip strength to 4/5 to improve balance and independence with mobility. same/ Nearly met-  hip flex WALESKA= 4+ to 5/5, hip ext " B= 3+/5, hip abd WALESKA=4-/5, hip add R= 4-/5, L= 4/5  9. Pt will demonstrate improved cervical extension strength to 3+/5 to improve head control. Met 7/31/17  10. Pt will demonstrate improved cervical deep flexor strength to 3+/5 to improve head control. Met 7/31/17   11. Pt will demonstrate improve balance and gait by scoring 14/28 on Tinetti. Met 8/31/17- 17/28                                      Revised 8/31/17: pt will demonstrate improved balance/ gait and decreased fall risk to moderate by scoring 19/28 on Tinetti                                            Assessment. met- 19/28, continues to improve- 22/28 (8/1/18)                                         12. Pt will demonstrate improved cervical PROM for rotation to 40 degrees to improve functional head control. same-  14 deg AAROM R rotation (after stretching),                                                       54 deg L                                         13. New 8/1/18- pt will ambulate 50ft with LBQC and supervision over level tile to demonstrate improved household mobility with decreased AD- improved- ~40ft                                                     with LBQC and min HHA, away from supporting surface     Plan   Continue PT 2x weekly under established Plan of Care, with treatment to include: pt education, HEP, therapeutic exercises, neuromuscular re-education/balance exercises, therapeutic activities, joint mobilizations, manual therapy, gait training PRN, and modalities PRN, to work towards established goals. Pt may be seen by PTA to carry out plan of care.      Yadi Mercado, PT     9/10/2018

## 2018-09-17 ENCOUNTER — DOCUMENTATION ONLY (OUTPATIENT)
Dept: REHABILITATION | Facility: HOSPITAL | Age: 53
End: 2018-09-17

## 2018-09-17 ENCOUNTER — CLINICAL SUPPORT (OUTPATIENT)
Dept: REHABILITATION | Facility: HOSPITAL | Age: 53
End: 2018-09-17
Attending: STUDENT IN AN ORGANIZED HEALTH CARE EDUCATION/TRAINING PROGRAM
Payer: COMMERCIAL

## 2018-09-17 DIAGNOSIS — Z74.09 IMPAIRED FUNCTIONAL MOBILITY, BALANCE, GAIT, AND ENDURANCE: ICD-10-CM

## 2018-09-17 DIAGNOSIS — R29.898 DECREASED ROM OF NECK: ICD-10-CM

## 2018-09-17 DIAGNOSIS — R53.1 DECREASED STRENGTH: ICD-10-CM

## 2018-09-17 PROCEDURE — 97110 THERAPEUTIC EXERCISES: CPT | Mod: PO

## 2018-09-17 NOTE — PROGRESS NOTES
PT/PTA met face to face to discuss pt's treatment plan and progress towards established goals.  Continue with current PT POC with focus on cervical stretching, static standing balance w/o support, gait with SC, core strengthening and endurance.  Patient will be seen by physical therapist at least every sixth treatment or 30 days, whichever occurs first.    Mae Caicedo, PTA  09/17/2018

## 2018-09-17 NOTE — PROGRESS NOTES
"Physical Therapy Progress Note      Name: Brigid Jernigan  Clinic Number: [id number]  Diagnosis: G71.11 (ICD-10-CM) - Myotonic dystrophy  Physician: Luis Felipe Vo MD  Treatment Orders: PT Eval and Treat          Past Medical History:   Diagnosis Date    Abnormal EKG      Myotonic dystrophy      Pacemaker      PAF (paroxysmal atrial fibrillation)      Sleep apnea           Precautions: standard  Visit #: 3/6  (expires 10/22/18)     (Visit total 2017= 44)  Date of Eval: 5/9/2017  Extended POC: 10/30/2018     Subjective   Pt reports: " I'm doing ok."  Sister reported : " We have to leave by 5:15 pm today, sorry."    Pain Scale: denies     Objective      Patient received individual therapy to increase strength, endurance, ROM, flexibility, posture, core stabilization and head control with activities as follows:      Pt participated in therapeutic exercises x 30 minutes to address ROM and cervical alignment:    Supine: cervical PROM for SB and rotation, distraction   pec stretching   DKTC with ball between knees 20x    Bridging on ball with alternate LE lift, 10x, min A      Standing with RW:    Static standing w/o UE support x 2 mins   Weight shifting medial/lateral x 1 mins with CGA.  1 UE support occasionally        Written Home Exercises: 6/28/18- modified scalene stretch, illustrated copy to be provided  1/29/18- side stepping at counter with 1-2 UE support  12/4/17- AROM cervical SB and rotation  assisted quad/ hip flexor stretch in side lying  eval- chin tucks, rows with OTB, hip flexor and knee extensor stretch in sidelying  Pt demo fair understanding of the education provided. Brigid demonstrated fair return demonstration of activities.      Education provided re: 12/18/17- cervical collar application  POC, HEP     Pt has no cultural, educational or language barriers to learning provided.     Assessment   Brigid tolerated treatment session fairly well.  Pt limited in progression 2* time constraint.  " "No new exercises noted.   Pt can benefit from continued skilled PT to address impairments to improve consistency with mobility, prevent further functional decline, and maintain pt's independence for as long as possible.       Pt prognosis is Good. Pt will continue to benefit from skilled outpatient physical therapy to address the deficits listed in the problem list, provide pt/family education and to maximize pt's level of independence in the home and community environment.      Activity limitations/ Participation Restrictions:   Fall Risk - impaired balance   Weakness  Impaired muscle tone  Poor head control  Decreased ROM  Gait deviations   Decreased ambulation   Decreased activity tolerance   Decreased independence with daily activities   Requires skilled supervision to complete and progress HEP   Requires skilled PT for DME/ orthotic recommendations     Pt's spiritual, cultural and educational needs considered and pt agreeable to plan of care and GOALS as stated below:   Short term goals: 6 weeks, pt agrees to goals set. (As of 9/10/18)  1. Pt will perform chin tucks in supine with supervision to improve independent with HEP. Met 7/5/17  2. Assist pt with obtaining appropriate cervical brace to improve head control. Met 1/9/18- pt has but is not consistently wearing  3. Pt will demonstrate improved knee ext strength on left to 4/5 to improve safety with ambulation and sit<>stand. Met 9/28/17   4. Pt will demonstrate improved cervical rotation PROM to 20 degrees to improve functional head control. Met 7/5/17  5. Pt will perform sit<>stand consistently from 22" height surface with supervision to improve independence. Met 7/5/17                                                         Revised 8/31/17:  Pt will perform sit<>stand from 19" height chair to improve independence with sitting in various chairs around her home and decreased                                               need for "pop up seat". ~same pt " "stood from 19" height with BUE and 1-2 attempts, supervision without verbal cues      Long term goals: 12 weeks, pt agrees to goals set  6. Pt will perform basic HEP for gross strengthening with supervision to deter worsening of functional limitations. Met 8/31/17- pt's sister reports poor current compliance  7. Pt will demonstrate improved bilateral hip strength to 3/5 to improve balance and independence with mobility. Met 7/5/17  8. New 7/5/17: Pt will demonstrate improved bilateral hip strength to 4/5 to improve balance and independence with mobility. same/ Nearly met-  hip flex WALESKA= 4+ to 5/5, hip ext B= 3+/5, hip abd WALESKA=4-/5, hip add R= 4-/5, L= 4/5  9. Pt will demonstrate improved cervical extension strength to 3+/5 to improve head control. Met 7/31/17  10. Pt will demonstrate improved cervical deep flexor strength to 3+/5 to improve head control. Met 7/31/17   11. Pt will demonstrate improve balance and gait by scoring 14/28 on Tinetti. Met 8/31/17- 17/28                                      Revised 8/31/17: pt will demonstrate improved balance/ gait and decreased fall risk to moderate by scoring 19/28 on Tinetti                                            Assessment. met- 19/28, continues to improve- 22/28 (8/1/18)                                         12. Pt will demonstrate improved cervical PROM for rotation to 40 degrees to improve functional head control. same-  14 deg AAROM R rotation (after stretching),                                                       54 deg L                                         13. New 8/1/18- pt will ambulate 50ft with LBQC and supervision over level tile to demonstrate improved household mobility with decreased AD- improved- ~40ft                                                     with LBQC and min HHA, away from supporting surface       Plan   Continue PT 2x weekly under established Plan of Care, with treatment to include: pt education, HEP, therapeutic exercises, " neuromuscular re-education/balance exercises, therapeutic activities, joint mobilizations, manual therapy, gait training PRN, and modalities PRN, to work towards established goals. Pt may be seen by PTA to carry out plan of care.      Mae Caicedo, PTA     9/17/2018

## 2018-09-24 ENCOUNTER — CLINICAL SUPPORT (OUTPATIENT)
Dept: REHABILITATION | Facility: HOSPITAL | Age: 53
End: 2018-09-24
Attending: STUDENT IN AN ORGANIZED HEALTH CARE EDUCATION/TRAINING PROGRAM
Payer: COMMERCIAL

## 2018-09-24 DIAGNOSIS — Z74.09 IMPAIRED FUNCTIONAL MOBILITY, BALANCE, GAIT, AND ENDURANCE: ICD-10-CM

## 2018-09-24 DIAGNOSIS — R29.898 DECREASED ROM OF NECK: ICD-10-CM

## 2018-09-24 DIAGNOSIS — R53.1 DECREASED STRENGTH: ICD-10-CM

## 2018-09-24 PROCEDURE — 97110 THERAPEUTIC EXERCISES: CPT | Mod: PO

## 2018-09-24 PROCEDURE — 97116 GAIT TRAINING THERAPY: CPT | Mod: PO

## 2018-09-24 NOTE — PROGRESS NOTES
"Physical Therapy Progress Note      Name: Brigid Jernigan  Clinic Number: [id number]  Diagnosis: G71.11 (ICD-10-CM) - Myotonic dystrophy  Physician: Luis Felipe Vo MD  Treatment Orders: PT Eval and Treat          Past Medical History:   Diagnosis Date    Abnormal EKG      Myotonic dystrophy      Pacemaker      PAF (paroxysmal atrial fibrillation)      Sleep apnea           Precautions: standard  Visit #: 4/6  (expires 10/22/18)     (Visit total 2017= 44)  Date of Eval: 5/9/2017  Extended POC: 10/30/2018     Subjective   Pt reports: " I'm doing good."     Pain Scale: denies     Objective      Patient received individual therapy to increase strength, endurance, ROM, flexibility, posture, core stabilization and head control with activities as follows:      Pt participated in therapeutic exercises x 30 minutes to address ROM and cervical alignment:  X 10 min on Sci Fit recumbent stepper.  Level 12.5, B UE/B LE    Supine: cervical PROM for SB and rotation, distraction   pec stretching   LTR x 20 reps with green physioball      Standing with RW:    Static standing w/o UE support x 5 mins   Weight shifting medial/lateral x 1 mins with CGA.  1 UE support occasionally     Gait:    X 6 laps of forward ambulation with LBQC and CGA.  Pt relying less on ballet bar today.      Written Home Exercises: 6/28/18- modified scalene stretch, illustrated copy to be provided  1/29/18- side stepping at counter with 1-2 UE support  12/4/17- AROM cervical SB and rotation  assisted quad/ hip flexor stretch in side lying  eval- chin tucks, rows with OTB, hip flexor and knee extensor stretch in sidelying  Pt demo fair understanding of the education provided. Brigid demonstrated fair return demonstration of activities.      Education provided re: 12/18/17- cervical collar application  POC, HEP     Pt has no cultural, educational or language barriers to learning provided.     Assessment   Brigid tolerated treatment session well and did " "not have any problems.  Pt relied less on the ballet bar today with gait with the QC and was able to increase static standing time w/o UE support.  No loss of balance noted.       Pt prognosis is Good. Pt will continue to benefit from skilled outpatient physical therapy to address the deficits listed in the problem list, provide pt/family education and to maximize pt's level of independence in the home and community environment.      Activity limitations/ Participation Restrictions:   Fall Risk - impaired balance   Weakness  Impaired muscle tone  Poor head control  Decreased ROM  Gait deviations   Decreased ambulation   Decreased activity tolerance   Decreased independence with daily activities   Requires skilled supervision to complete and progress HEP   Requires skilled PT for DME/ orthotic recommendations     Pt's spiritual, cultural and educational needs considered and pt agreeable to plan of care and GOALS as stated below:   Short term goals: 6 weeks, pt agrees to goals set. (As of 9/10/18)  1. Pt will perform chin tucks in supine with supervision to improve independent with HEP. Met 7/5/17  2. Assist pt with obtaining appropriate cervical brace to improve head control. Met 1/9/18- pt has but is not consistently wearing  3. Pt will demonstrate improved knee ext strength on left to 4/5 to improve safety with ambulation and sit<>stand. Met 9/28/17   4. Pt will demonstrate improved cervical rotation PROM to 20 degrees to improve functional head control. Met 7/5/17  5. Pt will perform sit<>stand consistently from 22" height surface with supervision to improve independence. Met 7/5/17                                                         Revised 8/31/17:  Pt will perform sit<>stand from 19" height chair to improve independence with sitting in various chairs around her home and decreased                                               need for "pop up seat". ~same pt stood from 19" height with BUE and 1-2 attempts, " supervision without verbal cues      Long term goals: 12 weeks, pt agrees to goals set  6. Pt will perform basic HEP for gross strengthening with supervision to deter worsening of functional limitations. Met 8/31/17- pt's sister reports poor current compliance  7. Pt will demonstrate improved bilateral hip strength to 3/5 to improve balance and independence with mobility. Met 7/5/17  8. New 7/5/17: Pt will demonstrate improved bilateral hip strength to 4/5 to improve balance and independence with mobility. same/ Nearly met-  hip flex WALESKA= 4+ to 5/5, hip ext B= 3+/5, hip abd WALESKA=4-/5, hip add R= 4-/5, L= 4/5  9. Pt will demonstrate improved cervical extension strength to 3+/5 to improve head control. Met 7/31/17  10. Pt will demonstrate improved cervical deep flexor strength to 3+/5 to improve head control. Met 7/31/17   11. Pt will demonstrate improve balance and gait by scoring 14/28 on Tinetti. Met 8/31/17- 17/28                                      Revised 8/31/17: pt will demonstrate improved balance/ gait and decreased fall risk to moderate by scoring 19/28 on Tinetti                                            Assessment. met- 19/28, continues to improve- 22/28 (8/1/18)                                         12. Pt will demonstrate improved cervical PROM for rotation to 40 degrees to improve functional head control. same-  14 deg AAROM R rotation (after stretching),                                                       54 deg L                                         13. New 8/1/18- pt will ambulate 50ft with LBQC and supervision over level tile to demonstrate improved household mobility with decreased AD- improved- ~40ft                                                     with LBQC and min HHA, away from supporting surface       Plan   Continue PT 2x weekly under established Plan of Care, with treatment to include: pt education, HEP, therapeutic exercises, neuromuscular re-education/balance exercises,  therapeutic activities, joint mobilizations, manual therapy, gait training PRN, and modalities PRN, to work towards established goals. Pt may be seen by PTA to carry out plan of care.      Mae Caicedo, PTA     9/24/2018

## 2018-09-27 ENCOUNTER — CLINICAL SUPPORT (OUTPATIENT)
Dept: REHABILITATION | Facility: HOSPITAL | Age: 53
End: 2018-09-27
Attending: STUDENT IN AN ORGANIZED HEALTH CARE EDUCATION/TRAINING PROGRAM
Payer: COMMERCIAL

## 2018-09-27 DIAGNOSIS — R53.1 DECREASED STRENGTH: ICD-10-CM

## 2018-09-27 DIAGNOSIS — Z74.09 IMPAIRED FUNCTIONAL MOBILITY, BALANCE, GAIT, AND ENDURANCE: ICD-10-CM

## 2018-09-27 DIAGNOSIS — R29.898 DECREASED ROM OF NECK: ICD-10-CM

## 2018-09-27 PROCEDURE — 97110 THERAPEUTIC EXERCISES: CPT | Mod: PO | Performed by: PHYSICAL THERAPIST

## 2018-09-27 NOTE — PROGRESS NOTES
"Physical Therapy Progress Note      Name: Brigid Jernigan  Clinic Number: [id number]  Diagnosis: G71.11 (ICD-10-CM) - Myotonic dystrophy  Physician: Luis Felipe Vo MD  Treatment Orders: PT Eval and Treat          Past Medical History:   Diagnosis Date    Abnormal EKG      Myotonic dystrophy      Pacemaker      PAF (paroxysmal atrial fibrillation)      Sleep apnea           Precautions: standard  Visit #: 4/6  (expires 10/22/18)     (Visit total 2017= 44)  Date of Eval: 5/9/2017  Extended POC: 10/30/2018     Subjective   Pt reports: " I'm doing good."     Pain Scale: denies     Objective      Patient received individual therapy to increase strength, endurance, ROM, flexibility, posture, core stabilization and head control with activities as follows:      Pt participated in therapeutic exercises x 43 minutes to address ROM and cervical alignment:  X 10 min on Sci Fit recumbent stepper.  Level 12.5, B UE/B LE    Supine: cervical PROM for SB and rotation, distraction   pec stretching   bridging with ball with leg lift 10x, min A to control ball   BLE lift 6" with TA activation 5 sec 10x  Side lying: clamshells PTB 20x    Sitting:       [place] twists 4000g ball 2 x 10                 Leg press BLE squats 145# 10x    ambulation with LBQC, WALESKA AFOs 45 ft with HHA/  Min A, mod VC to tighten L gluts to improve upright posture    NP today:   LTR x 20 reps with green physioball  Standing with RW:    Static standing w/o UE support x 5 mins   Weight shifting medial/lateral x 1 mins with CGA.  1 UE support occasionally   Gait:    X 6 laps of forward ambulation with LBQC and CGA.  Pt relying less on ballet bar today.      Written Home Exercises: 6/28/18- modified scalene stretch, illustrated copy to be provided  1/29/18- side stepping at counter with 1-2 UE support  12/4/17- AROM cervical SB and rotation  assisted quad/ hip flexor stretch in side lying  eval- chin tucks, rows with OTB, hip flexor and knee extensor stretch " in sidelying  Pt demo fair understanding of the education provided. Brigid demonstrated fair return demonstration of activities.      Education provided re: 12/18/17- cervical collar application  POC, HEP     Pt has no cultural, educational or language barriers to learning provided.     Assessment   Brigid tolerated treatment session well. Pt tolerated a 5# increase on leg press. Pt also demonstrated improved gait ability with LBQC. Pt is able to demonstrate improved upright posture during L stance phase with moderate verbal cues to tighten gluts. PT provided HHA/ min A on L for gait training with LBQC. PT will contiue to address core/ LE strengthening and balance training to improve pt's safety with mobility and delay onset of further functional limitations.      Pt prognosis is Good. Pt will continue to benefit from skilled outpatient physical therapy to address the deficits listed in the problem list, provide pt/family education and to maximize pt's level of independence in the home and community environment.      Activity limitations/ Participation Restrictions:   Fall Risk - impaired balance   Weakness  Impaired muscle tone  Poor head control  Decreased ROM  Gait deviations   Decreased ambulation   Decreased activity tolerance   Decreased independence with daily activities   Requires skilled supervision to complete and progress HEP   Requires skilled PT for DME/ orthotic recommendations     Pt's spiritual, cultural and educational needs considered and pt agreeable to plan of care and GOALS as stated below:   Short term goals: 6 weeks, pt agrees to goals set. (As of 9/10/18)  1. Pt will perform chin tucks in supine with supervision to improve independent with HEP. Met 7/5/17  2. Assist pt with obtaining appropriate cervical brace to improve head control. Met 1/9/18- pt has but is not consistently wearing  3. Pt will demonstrate improved knee ext strength on left to 4/5 to improve safety with ambulation and  "sit<>stand. Met 9/28/17   4. Pt will demonstrate improved cervical rotation PROM to 20 degrees to improve functional head control. Met 7/5/17  5. Pt will perform sit<>stand consistently from 22" height surface with supervision to improve independence. Met 7/5/17                                                         Revised 8/31/17:  Pt will perform sit<>stand from 19" height chair to improve independence with sitting in various chairs around her home and decreased                                               need for "pop up seat". ~same pt stood from 19" height with BUE and 1-2 attempts, supervision without verbal cues      Long term goals: 12 weeks, pt agrees to goals set  6. Pt will perform basic HEP for gross strengthening with supervision to deter worsening of functional limitations. Met 8/31/17- pt's sister reports poor current compliance  7. Pt will demonstrate improved bilateral hip strength to 3/5 to improve balance and independence with mobility. Met 7/5/17  8. New 7/5/17: Pt will demonstrate improved bilateral hip strength to 4/5 to improve balance and independence with mobility. same/ Nearly met-  hip flex WALESKA= 4+ to 5/5, hip ext B= 3+/5, hip abd WALESKA=4-/5, hip add R= 4-/5, L= 4/5  9. Pt will demonstrate improved cervical extension strength to 3+/5 to improve head control. Met 7/31/17  10. Pt will demonstrate improved cervical deep flexor strength to 3+/5 to improve head control. Met 7/31/17   11. Pt will demonstrate improve balance and gait by scoring 14/28 on Tinetti. Met 8/31/17- 17/28                                      Revised 8/31/17: pt will demonstrate improved balance/ gait and decreased fall risk to moderate by scoring 19/28 on Tinetti                                            Assessment. met- 19/28, continues to improve- 22/28 (8/1/18)                                         12. Pt will demonstrate improved cervical PROM for rotation to 40 degrees to improve functional head control. same- "  14 deg AAROM R rotation (after stretching),                                                       54 deg L                                         13. New 8/1/18- pt will ambulate 50ft with LBQC and supervision over level tile to demonstrate improved household mobility with decreased AD- improved- ~40ft                                                     with LBQC and min HHA, away from supporting surface       Plan   Continue PT 2x weekly under established Plan of Care, with treatment to include: pt education, HEP, therapeutic exercises, neuromuscular re-education/balance exercises, therapeutic activities, joint mobilizations, manual therapy, gait training PRN, and modalities PRN, to work towards established goals. Pt may be seen by PTA to carry out plan of care.      Yadi Mercado, PT     9/27/2018

## 2018-10-01 ENCOUNTER — CLINICAL SUPPORT (OUTPATIENT)
Dept: REHABILITATION | Facility: HOSPITAL | Age: 53
End: 2018-10-01
Attending: STUDENT IN AN ORGANIZED HEALTH CARE EDUCATION/TRAINING PROGRAM
Payer: COMMERCIAL

## 2018-10-01 DIAGNOSIS — R53.1 DECREASED STRENGTH: ICD-10-CM

## 2018-10-01 DIAGNOSIS — Z74.09 IMPAIRED FUNCTIONAL MOBILITY, BALANCE, GAIT, AND ENDURANCE: ICD-10-CM

## 2018-10-01 DIAGNOSIS — R29.898 DECREASED ROM OF NECK: ICD-10-CM

## 2018-10-01 PROCEDURE — 97110 THERAPEUTIC EXERCISES: CPT | Mod: PO | Performed by: PHYSICAL THERAPIST

## 2018-10-01 NOTE — PROGRESS NOTES
"Physical Therapy Progress Note      Name: Brigid Jernigan  Clinic Number: [id number]  Diagnosis: G71.11 (ICD-10-CM) - Myotonic dystrophy  Physician: Luis Felipe Vo MD  Treatment Orders: PT Eval and Treat          Past Medical History:   Diagnosis Date    Abnormal EKG      Myotonic dystrophy      Pacemaker      PAF (paroxysmal atrial fibrillation)      Sleep apnea           Precautions: standard  Visit #: 6/12  (expires 10/22/18)     (Visit total 2017= 44)  Date of Eval: 5/9/2017  Extended POC: 10/30/2018     Subjective   Pt reports: " I'm doing good." no new complaints    Pain Scale: denies     Objective      Patient received individual therapy to increase strength, endurance, ROM, flexibility, posture, core stabilization and head control with activities as follows:      Pt participated in therapeutic exercises x 45 minutes to address ROM and cervical alignment:    Supine: cervical PROM for SB and rotation, distraction   pec stretching   bridging with ball with leg lift 10x, min A to control ball   BLE lift 6" with TA activation 5 sec 10x   LTR x 20 reps with green physioball   SLR 5# 3 sec x 10      Standing with RW: hip abd OTB 10x   Hip ext OTB 10x   Trunk rotation/ LE weight shifts without UE support    Sit>stand from 19" height to RW with min A to maintain forward trunk lean        NP today:   Standing with RW:    Static standing w/o UE support x 5 mins   Weight shifting medial/lateral x 1 mins with CGA.  1 UE support occasionally   Gait:    X 6 laps of forward ambulation with LBQC and CGA.  Pt relying less on ballet bar today.    X 10 min on Sci Fit recumbent stepper.  Level 12.5, B UE/B LE  Side lying: clamshells PTB 20x  Sitting:       Marshallese twists 4000g ball 2 x 10           Leg press BLE squats 145# 10x  ambulation with LBQC, WALESKA AFOs 45 ft with HHA/  Min A, mod VC to tighten L gluts to improve upright posture      Written Home Exercises: 6/28/18- modified scalene stretch, illustrated copy to be " "provided  1/29/18- side stepping at counter with 1-2 UE support  12/4/17- AROM cervical SB and rotation  assisted quad/ hip flexor stretch in side lying  eval- chin tucks, rows with OTB, hip flexor and knee extensor stretch in sidelying  Pt demo fair understanding of the education provided. Brigid demonstrated fair return demonstration of activities.      Education provided re: 12/18/17- cervical collar application  POC, HEP     Pt has no cultural, educational or language barriers to learning provided.     Assessment   Brigid tolerated treatment session well. PT addressed LE/ core strength to improve standing and functional mobility. Pt demonstrated improved RW management when performing hip abd, pt had to push RW against firm surface for support during hip ext. Pt required multiple attempts and min A to perform sit>stand successfully from 19" height. Pt can get initial lift off, but is unable to maintain forward lean prior to fully extending LE. Pt demo'd fair- standing balance without UE support with trunk rotation/ weight shifts.  PT will contiue to address core/ LE strengthening and balance training to improve pt's safety with mobility and delay onset of further functional limitations.      Pt prognosis is Good. Pt will continue to benefit from skilled outpatient physical therapy to address the deficits listed in the problem list, provide pt/family education and to maximize pt's level of independence in the home and community environment.      Activity limitations/ Participation Restrictions:   Fall Risk - impaired balance   Weakness  Impaired muscle tone  Poor head control  Decreased ROM  Gait deviations   Decreased ambulation   Decreased activity tolerance   Decreased independence with daily activities   Requires skilled supervision to complete and progress HEP   Requires skilled PT for DME/ orthotic recommendations     Pt's spiritual, cultural and educational needs considered and pt agreeable to plan of " "care and GOALS as stated below:   Short term goals: 6 weeks, pt agrees to goals set. (As of 9/10/18)  1. Pt will perform chin tucks in supine with supervision to improve independent with HEP. Met 7/5/17  2. Assist pt with obtaining appropriate cervical brace to improve head control. Met 1/9/18- pt has but is not consistently wearing  3. Pt will demonstrate improved knee ext strength on left to 4/5 to improve safety with ambulation and sit<>stand. Met 9/28/17   4. Pt will demonstrate improved cervical rotation PROM to 20 degrees to improve functional head control. Met 7/5/17  5. Pt will perform sit<>stand consistently from 22" height surface with supervision to improve independence. Met 7/5/17                                                         Revised 8/31/17:  Pt will perform sit<>stand from 19" height chair to improve independence with sitting in various chairs around her home and decreased                                               need for "pop up seat". ~same pt stood from 19" height with BUE and 1-2 attempts, supervision without verbal cues      Long term goals: 12 weeks, pt agrees to goals set  6. Pt will perform basic HEP for gross strengthening with supervision to deter worsening of functional limitations. Met 8/31/17- pt's sister reports poor current compliance  7. Pt will demonstrate improved bilateral hip strength to 3/5 to improve balance and independence with mobility. Met 7/5/17  8. New 7/5/17: Pt will demonstrate improved bilateral hip strength to 4/5 to improve balance and independence with mobility. same/ Nearly met-  hip flex WALESKA= 4+ to 5/5, hip ext B= 3+/5, hip abd WALESKA=4-/5, hip add R= 4-/5, L= 4/5  9. Pt will demonstrate improved cervical extension strength to 3+/5 to improve head control. Met 7/31/17  10. Pt will demonstrate improved cervical deep flexor strength to 3+/5 to improve head control. Met 7/31/17   11. Pt will demonstrate improve balance and gait by scoring 14/28 on Tinetti. " Met 8/31/17- 17/28                                      Revised 8/31/17: pt will demonstrate improved balance/ gait and decreased fall risk to moderate by scoring 19/28 on Tinetti                                            Assessment. met- 19/28, continues to improve- 22/28 (8/1/18)                                         12. Pt will demonstrate improved cervical PROM for rotation to 40 degrees to improve functional head control. same-  14 deg AAROM R rotation (after stretching),                                                       54 deg L                                         13. New 8/1/18- pt will ambulate 50ft with LBQC and supervision over level tile to demonstrate improved household mobility with decreased AD- improved- ~40ft                                                     with LBQC and min HHA, away from supporting surface       Plan   Continue PT 2x weekly under established Plan of Care, with treatment to include: pt education, HEP, therapeutic exercises, neuromuscular re-education/balance exercises, therapeutic activities, joint mobilizations, manual therapy, gait training PRN, and modalities PRN, to work towards established goals. Pt may be seen by PTA to carry out plan of care.      Yadi Mercado, PT     10/1/2018

## 2018-10-03 ENCOUNTER — CLINICAL SUPPORT (OUTPATIENT)
Dept: REHABILITATION | Facility: HOSPITAL | Age: 53
End: 2018-10-03
Attending: STUDENT IN AN ORGANIZED HEALTH CARE EDUCATION/TRAINING PROGRAM
Payer: COMMERCIAL

## 2018-10-03 DIAGNOSIS — R29.898 DECREASED ROM OF NECK: ICD-10-CM

## 2018-10-03 DIAGNOSIS — Z74.09 IMPAIRED FUNCTIONAL MOBILITY, BALANCE, GAIT, AND ENDURANCE: ICD-10-CM

## 2018-10-03 DIAGNOSIS — R53.1 DECREASED STRENGTH: ICD-10-CM

## 2018-10-03 PROCEDURE — 97110 THERAPEUTIC EXERCISES: CPT | Mod: PO

## 2018-10-03 PROCEDURE — 97140 MANUAL THERAPY 1/> REGIONS: CPT | Mod: PO

## 2018-10-03 NOTE — PROGRESS NOTES
"Physical Therapy Progress Note      Name: Brigid Jernigan  Clinic Number: [id number]  Diagnosis: G71.11 (ICD-10-CM) - Myotonic dystrophy  Physician: Luis Felipe Vo MD  Treatment Orders: PT Eval and Treat          Past Medical History:   Diagnosis Date    Abnormal EKG      Myotonic dystrophy      Pacemaker      PAF (paroxysmal atrial fibrillation)      Sleep apnea           Precautions: standard  Visit #: 7/12  (expires 10/22/18)     (Visit total 2017= 44)  Date of Eval: 5/9/2017  Extended POC: 10/30/2018     Subjective   Pt reports: " I don't feel like doing the stepper today. "    Pain Scale: denies     Objective      Patient received individual therapy to increase strength, endurance, ROM, flexibility, posture, core stabilization and head control with activities as follows:      Pt participated in therapeutic exercises x 45 minutes to address ROM and cervical alignment:    Supine: cervical PROM for SB and rotation, distraction   pec stretching    Sitting:    Posterior lean backs x 15 reps         Russian twists 4000g ball 2 x 10         Standing with RW:    Static standing w/o UE support x 5 mins, 2 mins and 1 min.  Supported standing break noted in between each set    Sit>stand from 19" height to RW with min A to maintain forward trunk lean  Leg press BLE squats 145# 10x      Written Home Exercises: 6/28/18- modified scalene stretch, illustrated copy to be provided  1/29/18- side stepping at counter with 1-2 UE support  12/4/17- AROM cervical SB and rotation  assisted quad/ hip flexor stretch in side lying  eval- chin tucks, rows with OTB, hip flexor and knee extensor stretch in sidelying  Pt demo fair understanding of the education provided. Brigid demonstrated fair return demonstration of activities.      Education provided re: 12/18/17- cervical collar application  POC, HEP     Pt has no cultural, educational or language barriers to learning provided.     Assessment   Brigid tolerated treatment " "session well with no problems.  Pt was able to increase static standing time w/o support.  No other changes noted.  PT will contiue to address core/ LE strengthening and balance training to improve pt's safety with mobility and delay onset of further functional limitations.      Pt prognosis is Good. Pt will continue to benefit from skilled outpatient physical therapy to address the deficits listed in the problem list, provide pt/family education and to maximize pt's level of independence in the home and community environment.      Activity limitations/ Participation Restrictions:   Fall Risk - impaired balance   Weakness  Impaired muscle tone  Poor head control  Decreased ROM  Gait deviations   Decreased ambulation   Decreased activity tolerance   Decreased independence with daily activities   Requires skilled supervision to complete and progress HEP   Requires skilled PT for DME/ orthotic recommendations     Pt's spiritual, cultural and educational needs considered and pt agreeable to plan of care and GOALS as stated below:   Short term goals: 6 weeks, pt agrees to goals set. (As of 9/10/18)  1. Pt will perform chin tucks in supine with supervision to improve independent with HEP. Met 7/5/17  2. Assist pt with obtaining appropriate cervical brace to improve head control. Met 1/9/18- pt has but is not consistently wearing  3. Pt will demonstrate improved knee ext strength on left to 4/5 to improve safety with ambulation and sit<>stand. Met 9/28/17   4. Pt will demonstrate improved cervical rotation PROM to 20 degrees to improve functional head control. Met 7/5/17  5. Pt will perform sit<>stand consistently from 22" height surface with supervision to improve independence. Met 7/5/17                                                         Revised 8/31/17:  Pt will perform sit<>stand from 19" height chair to improve independence with sitting in various chairs around her home and decreased                               " "                need for "pop up seat". ~same pt stood from 19" height with BUE and 1-2 attempts, supervision without verbal cues      Long term goals: 12 weeks, pt agrees to goals set  6. Pt will perform basic HEP for gross strengthening with supervision to deter worsening of functional limitations. Met 8/31/17- pt's sister reports poor current compliance  7. Pt will demonstrate improved bilateral hip strength to 3/5 to improve balance and independence with mobility. Met 7/5/17  8. New 7/5/17: Pt will demonstrate improved bilateral hip strength to 4/5 to improve balance and independence with mobility. same/ Nearly met-  hip flex WALESKA= 4+ to 5/5, hip ext B= 3+/5, hip abd WALESKA=4-/5, hip add R= 4-/5, L= 4/5  9. Pt will demonstrate improved cervical extension strength to 3+/5 to improve head control. Met 7/31/17  10. Pt will demonstrate improved cervical deep flexor strength to 3+/5 to improve head control. Met 7/31/17   11. Pt will demonstrate improve balance and gait by scoring 14/28 on Tinetti. Met 8/31/17- 17/28                                      Revised 8/31/17: pt will demonstrate improved balance/ gait and decreased fall risk to moderate by scoring 19/28 on Tinetti                                            Assessment. met- 19/28, continues to improve- 22/28 (8/1/18)                                         12. Pt will demonstrate improved cervical PROM for rotation to 40 degrees to improve functional head control. same-  14 deg AAROM R rotation (after stretching),                                                       54 deg L                                         13. New 8/1/18- pt will ambulate 50ft with LBQC and supervision over level tile to demonstrate improved household mobility with decreased AD- improved- ~40ft                                                     with LBQC and min HHA, away from supporting surface       Plan   Continue PT 2x weekly under established Plan of Care, with treatment to " include: pt education, HEP, therapeutic exercises, neuromuscular re-education/balance exercises, therapeutic activities, joint mobilizations, manual therapy, gait training PRN, and modalities PRN, to work towards established goals. Pt may be seen by PTA to carry out plan of care.      Mae Caicedo, PTA     10/3/2018

## 2018-10-08 ENCOUNTER — CLINICAL SUPPORT (OUTPATIENT)
Dept: REHABILITATION | Facility: HOSPITAL | Age: 53
End: 2018-10-08
Attending: STUDENT IN AN ORGANIZED HEALTH CARE EDUCATION/TRAINING PROGRAM
Payer: COMMERCIAL

## 2018-10-08 DIAGNOSIS — Z74.09 IMPAIRED FUNCTIONAL MOBILITY, BALANCE, GAIT, AND ENDURANCE: ICD-10-CM

## 2018-10-08 DIAGNOSIS — R29.898 DECREASED ROM OF NECK: ICD-10-CM

## 2018-10-08 DIAGNOSIS — R53.1 DECREASED STRENGTH: ICD-10-CM

## 2018-10-08 PROCEDURE — 97110 THERAPEUTIC EXERCISES: CPT | Mod: PO | Performed by: PHYSICAL THERAPIST

## 2018-10-08 PROCEDURE — 97116 GAIT TRAINING THERAPY: CPT | Mod: PO | Performed by: PHYSICAL THERAPIST

## 2018-10-09 NOTE — PLAN OF CARE
"Physical Therapy Progress Note      Name: Brigid Jernigan  Clinic Number: [id number]  Diagnosis: G71.11 (ICD-10-CM) - Myotonic dystrophy  Physician: Luis Felipe Vo MD  Treatment Orders: PT Eval and Treat          Past Medical History:   Diagnosis Date    Abnormal EKG      Myotonic dystrophy      Pacemaker      PAF (paroxysmal atrial fibrillation)      Sleep apnea           Precautions: standard  Visit #: 8/12  (expires 10/22/18)     (Visit total 2017= 44)  Date of Eval: 5/9/2017  Extended POC: 10/30/2018     Subjective   Pt reports: no new complaints or symptoms.    Pain Scale: denies     Objective      Patient received individual therapy to increase strength, endurance, ROM, flexibility, posture, core stabilization and head control with activities as follows:      Pt participated in therapeutic exercises x 35 minutes to address ROM and cervical alignment:    Strength:   Hip flex: B= 4+/5  Hip abd: B= 4-/5  Hip add: R=4/5 L= 4/5  Hip ext: R= 4-/5, L= 4/5  Knee flex: R= 4-/5, L= 4/5    Supine: SLR 5# 3 sec x 10  Side lying: Clamshells with purple theraband, 2 sets of 10 BL  Sitting: LAQ 5# 3 sec x 10 BLE      Sit>stand from 19" height to RW with SBA, 3 attempts to be successful    Cervical PROM rotation: L= 31 degrees, R= 11 degrees    Pt participated in gait training x 14 minutes    Ambulation with the LBQC for 70 feet, min A with HHA x 1      Tinetti Balance Assessment  Balance:  1. Sitting Balance 1   Leans/ slides in chair= 0   Steady= 1  2. Rises from chair 1   Unable without help= 0   Able, uses arms= 1   Able without use of arms= 2  3. Attempts to rise 1   Unable without help= 0   Able, >1 attmept required-= 1   Able, 1 attempt= 2  4. Immediate standing balance (1st 5 seconds) 1   Unsteady (swaggers, moves feet, trunk sway)= 0   Steady but uses walker or other support= 1   Narrow base of support without walker or support= 2  5. Nudged 1   Begins to fall= 0   Staggers, grabs, catches self= 1   Steady= " 2  6. Standing balance 1   Unsteady= 0   Steady but wide LEYDI or uses AD=1   Narrow LEYDI without AD=2  7. Eyes closed 1   Unsteady= 0   Steady= 1  8. Turning 360 degrees 0   Discontinuous steps= 0   Continuous steps= 1   Unsteady= 0   Steady= 1  9. Sitting down 1   Unsafe= 0   Uses arms or not in a smooth motion= 1   Safe, smooth motion= 2  Balance score= 8  Gait:  1. Initiation 1   hesitates or multiple attempts to start= 0   No hesitancy= 1  2. Step length 2   Step to= 0   One foot passes= 1   reciprocal pattern= 2  3. Step height 2   Neither foot clears floor= 0   One foot clears floor= 1   Both feet clear floor= 2  4. Step symmetry 1   Not symmetrical= 0   Appears symmetrical= 1  5. Step continuity 1   Not continuous= 0   Appears continuous= 1  6. Path 1   Marked deviation= 0   Mild/moderate deviation or uses A.D.= 1   Straight without A.D.= 2  7. Trunk 0   Marked sway or uses A.D.= 0   No sway, but flexes knees or back, spread arms out while walking= 1   No sway, no flexion, no use of UE, no use of A.D.= 2  8. Walking stance 1   Heels apart= 0   Heels almost touching while walking= 1  Gait score= 9  Total score= 17/30 , moderate fall risk    0-18= high fall risk  19-23= moderate fall risk  24-28= low fall risk       NP today:   Standing with RW:    Static standing w/o UE support x 5 mins   Weight shifting medial/lateral x 1 mins with CGA.  1 UE support occasionally   Gait:    X 6 laps of forward ambulation with LBQC and CGA.  Pt relying less on ballet bar today.    X 10 min on Sci Fit recumbent stepper.  Level 12.5, B UE/B LE  Side lying: clamshells PTB 20x  Sitting:       American twists 4000g ball 2 x 10           Leg press BLE squats 145# 10x  ambulation with LBQC, WALESKA AFOs 45 ft with HHA/  Min A, mod VC to tighten L gluts to improve upright posture      Written Home Exercises: 6/28/18- modified scalene stretch, illustrated copy to be provided  1/29/18- side stepping at counter with 1-2 UE support  12/4/17-  "AROM cervical SB and rotation  assisted quad/ hip flexor stretch in side lying  eval- chin tucks, rows with OTB, hip flexor and knee extensor stretch in sidelying  Pt demo fair understanding of the education provided. Brigid demonstrated fair return demonstration of activities.      Education provided re: 12/18/17- cervical collar application  POC, HEP     Pt has no cultural, educational or language barriers to learning provided.     Assessment   assessment period:9/13/18 to 10/8/18. Brigid tolerated treatment session well. Pt improved with sit to stand from a lower surface with decreased assistance of supervision and three attempts to perform sit>stand successfully from 19" height. Pt has exhibited inconsistency with balance impairments as seen by the Tinetti. Pt has a continued classification of a moderate fall risk as shown by the Tinetti . Pt showed significant improvement with ambulation using the LBQC and HHA x1. Pt was able to ambulate 70 feet with good tolerance, more pt confidence, and decreased assistance of min A. Pt's ambulation with LBQC is progressing and pt demonstrates decreased anterior trunk lean with L stance phase. Cervical PROM has remained about the same bilaterally. Pt also demonstrated improvement of B hip strength to a 4-/5 or above. PT today focused on continuing this hip strengthening for greater functional mobility. PT will continue to address core/ LE strengthening, gait training, and balance training to improve pt's safety with mobility and delay onset of further functional limitations.      Pt prognosis is Good. Pt will continue to benefit from skilled outpatient physical therapy to address the deficits listed in the problem list, provide pt/family education and to maximize pt's level of independence in the home and community environment.      Activity limitations/ Participation Restrictions:   Fall Risk - impaired balance   Weakness  Impaired muscle tone  Poor head " "control  Decreased ROM  Gait deviations   Decreased ambulation   Decreased activity tolerance   Decreased independence with daily activities   Requires skilled supervision to complete and progress HEP   Requires skilled PT for DME/ orthotic recommendations     Pt's spiritual, cultural and educational needs considered and pt agreeable to plan of care and GOALS as stated below:   Short term goals: 6 weeks, pt agrees to goals set. (As of 10/08/18)  1. Pt will perform chin tucks in supine with supervision to improve independent with HEP. Met 7/5/17  2. Assist pt with obtaining appropriate cervical brace to improve head control. Met 1/9/18- pt has but is not consistently wearing  3. Pt will demonstrate improved knee ext strength on left to 4/5 to improve safety with ambulation and sit<>stand. Met 9/28/17   4. Pt will demonstrate improved cervical rotation PROM to 20 degrees to improve functional head control. Met 7/5/17  5. Pt will perform sit<>stand consistently from 22" height surface with supervision to improve independence. Met 7/5/17                                                         Revised 8/31/17:  Pt will perform sit<>stand from 19" height chair to improve independence with sitting in various chairs around her home and decreased                                               need for "pop up seat". ~same pt stood from 19" height with BUE and 3 attempts, supervision without verbal cues (10/08/2018)     Long term goals: 12 weeks, pt agrees to goals set  6. Pt will perform basic HEP for gross strengthening with supervision to deter worsening of functional limitations. Met 8/31/17- pt's sister reports poor current compliance  7. Pt will demonstrate improved bilateral hip strength to 3/5 to improve balance and independence with mobility. Met 7/5/17  8. New 7/5/17: Pt will demonstrate improved bilateral hip strength to 4/5 to improve balance and independence with mobility. same/ Nearly met-  hip flex WALESKA= " 4+/5, hip ext R= 4-/5, L= 4/5,  hip abd WALESKA=4-/5, hip add B= 4/5 (10/08/2018)  9. Pt will demonstrate improved cervical extension strength to 3+/5 to improve head control. Met 7/31/17  10. Pt will demonstrate improved cervical deep flexor strength to 3+/5 to improve head control. Met 7/31/17   11. Pt will demonstrate improve balance and gait by scoring 14/28 on Tinetti. Met 8/31/17- 17/28                                      Revised 8/31/17: pt will demonstrate improved balance/ gait and decreased fall risk to moderate by scoring 19/28 on Tinetti                                            Assessment. met- 19/28, continues to improve- 22/28 (8/1/18), 17/30 (10/08/2018)                                         12. Pt will demonstrate improved cervical PROM for rotation to 40 degrees to improve functional head control. same-  14 deg AAROM R rotation (after stretching),54 deg L; 11 degree AAROM R rotation, 31 degree AAROM L rotation (10/08/18)                                         13. New 8/1/18- pt will ambulate 50ft with LBQC and supervision over level tile to demonstrate improved household mobility with decreased AD- MET 70 feet with LBQC and min HHA, away from supporting surface (10/08/2018)       Plan   Continue PT 2x weekly under established Plan of Care, with treatment to include: pt education, HEP, therapeutic exercises, neuromuscular re-education/balance exercises, therapeutic activities, joint mobilizations, manual therapy, gait training PRN, and modalities PRN, to work towards established goals. Pt may be seen by PTA to carry out plan of care.      Valerie Barber, SPT     10/8/2018      I, Yadi Mercado, DPT, certify that I was present in the room directing the student in service delivery and guiding them using my skilled judgment. As the co-signing therapist I have reviewed the students documentation and am responsible for the treatment, assessment, and plan.     Yadi Mercado,  JORDYT  10/8/2018

## 2018-10-10 ENCOUNTER — CLINICAL SUPPORT (OUTPATIENT)
Dept: REHABILITATION | Facility: HOSPITAL | Age: 53
End: 2018-10-10
Attending: STUDENT IN AN ORGANIZED HEALTH CARE EDUCATION/TRAINING PROGRAM
Payer: COMMERCIAL

## 2018-10-10 DIAGNOSIS — Z74.09 IMPAIRED FUNCTIONAL MOBILITY, BALANCE, GAIT, AND ENDURANCE: ICD-10-CM

## 2018-10-10 DIAGNOSIS — R53.1 DECREASED STRENGTH: ICD-10-CM

## 2018-10-10 DIAGNOSIS — R29.898 DECREASED ROM OF NECK: ICD-10-CM

## 2018-10-10 PROCEDURE — 97110 THERAPEUTIC EXERCISES: CPT | Mod: PO

## 2018-10-10 NOTE — PROGRESS NOTES
"Physical Therapy Progress Note      Name: Brigid Jernigan  Clinic Number: [id number]  Diagnosis: G71.11 (ICD-10-CM) - Myotonic dystrophy  Physician: Luis Felipe Vo MD  Treatment Orders: PT Eval and Treat          Past Medical History:   Diagnosis Date    Abnormal EKG      Myotonic dystrophy      Pacemaker      PAF (paroxysmal atrial fibrillation)      Sleep apnea           Precautions: standard  Visit #: 9/12  (expires 10/22/18)     (Visit total 2017= 44)  Date of Eval: 5/9/2017  Extended POC: 10/30/2018     Subjective   Pt reports: " I'm ok."    Pain Scale: denies     Objective      Patient received individual therapy to increase strength, endurance, ROM, flexibility, posture, core stabilization and head control with activities as follows:      Pt participated in therapeutic exercises x 45 minutes to address ROM and cervical alignment:    Supine: cervical PROM for SB and rotation, distraction   pec stretching   Chin tucks x 10 reps, 3 sec hold   LTR x 20 reps with green physioball   X 10 reps of bridges with B LE on round side of BOSU   DKTC with ball between knees 20x   X 10 reps of B UE overhead flex/ext with 1.5lb dowel     Sitting:    [place] twists 4000g ball 2 x 10             Sit>stand from 19" height to RW with CGA to maintain forward trunk lean  Leg press BLE squats 145# 10x      Written Home Exercises: 6/28/18- modified scalene stretch, illustrated copy to be provided  1/29/18- side stepping at counter with 1-2 UE support  12/4/17- AROM cervical SB and rotation  assisted quad/ hip flexor stretch in side lying  eval- chin tucks, rows with OTB, hip flexor and knee extensor stretch in sidelying  Pt demo fair understanding of the education provided. Brigid demonstrated fair return demonstration of activities.      Education provided re: 12/18/17- cervical collar application  POC, HEP     Pt has no cultural, educational or language barriers to learning provided.     Assessment   Brigid tolerated " "treatment session well with no problems.  Cont with core strengthening exercises and balance activities. Began B UE overhead flex/ext for ROM for B UE core strengthening.    No other changes noted.  PT will contiue to address core/ LE strengthening and balance training to improve pt's safety with mobility and delay onset of further functional limitations.      Pt prognosis is Good. Pt will continue to benefit from skilled outpatient physical therapy to address the deficits listed in the problem list, provide pt/family education and to maximize pt's level of independence in the home and community environment.      Activity limitations/ Participation Restrictions:   Fall Risk - impaired balance   Weakness  Impaired muscle tone  Poor head control  Decreased ROM  Gait deviations   Decreased ambulation   Decreased activity tolerance   Decreased independence with daily activities   Requires skilled supervision to complete and progress HEP   Requires skilled PT for DME/ orthotic recommendations     Pt's spiritual, cultural and educational needs considered and pt agreeable to plan of care and GOALS as stated below:   Short term goals: 6 weeks, pt agrees to goals set. (As of 9/10/18)  1. Pt will perform chin tucks in supine with supervision to improve independent with HEP. Met 7/5/17  2. Assist pt with obtaining appropriate cervical brace to improve head control. Met 1/9/18- pt has but is not consistently wearing  3. Pt will demonstrate improved knee ext strength on left to 4/5 to improve safety with ambulation and sit<>stand. Met 9/28/17   4. Pt will demonstrate improved cervical rotation PROM to 20 degrees to improve functional head control. Met 7/5/17  5. Pt will perform sit<>stand consistently from 22" height surface with supervision to improve independence. Met 7/5/17                                                         Revised 8/31/17:  Pt will perform sit<>stand from 19" height chair to improve independence with " "sitting in various chairs around her home and decreased                                               need for "pop up seat". ~same pt stood from 19" height with BUE and 1-2 attempts, supervision without verbal cues      Long term goals: 12 weeks, pt agrees to goals set  6. Pt will perform basic HEP for gross strengthening with supervision to deter worsening of functional limitations. Met 8/31/17- pt's sister reports poor current compliance  7. Pt will demonstrate improved bilateral hip strength to 3/5 to improve balance and independence with mobility. Met 7/5/17  8. New 7/5/17: Pt will demonstrate improved bilateral hip strength to 4/5 to improve balance and independence with mobility. same/ Nearly met-  hip flex WALESKA= 4+ to 5/5, hip ext B= 3+/5, hip abd WALESKA=4-/5, hip add R= 4-/5, L= 4/5  9. Pt will demonstrate improved cervical extension strength to 3+/5 to improve head control. Met 7/31/17  10. Pt will demonstrate improved cervical deep flexor strength to 3+/5 to improve head control. Met 7/31/17   11. Pt will demonstrate improve balance and gait by scoring 14/28 on Tinetti. Met 8/31/17- 17/28                                      Revised 8/31/17: pt will demonstrate improved balance/ gait and decreased fall risk to moderate by scoring 19/28 on Tinetti                                            Assessment. met- 19/28, continues to improve- 22/28 (8/1/18)                                         12. Pt will demonstrate improved cervical PROM for rotation to 40 degrees to improve functional head control. same-  14 deg AAROM R rotation (after stretching),                                                       54 deg L                                         13. New 8/1/18- pt will ambulate 50ft with LBQC and supervision over level tile to demonstrate improved household mobility with decreased AD- improved- ~40ft                                                     with LBQC and min HHA, away from supporting " surface       Plan   Continue PT 2x weekly under established Plan of Care, with treatment to include: pt education, HEP, therapeutic exercises, neuromuscular re-education/balance exercises, therapeutic activities, joint mobilizations, manual therapy, gait training PRN, and modalities PRN, to work towards established goals. Pt may be seen by PTA to carry out plan of care.      Mae Caicedo, PTA     10/10/2018

## 2018-10-15 ENCOUNTER — DOCUMENTATION ONLY (OUTPATIENT)
Dept: REHABILITATION | Facility: HOSPITAL | Age: 53
End: 2018-10-15

## 2018-10-15 ENCOUNTER — CLINICAL SUPPORT (OUTPATIENT)
Dept: REHABILITATION | Facility: HOSPITAL | Age: 53
End: 2018-10-15
Attending: STUDENT IN AN ORGANIZED HEALTH CARE EDUCATION/TRAINING PROGRAM
Payer: COMMERCIAL

## 2018-10-15 DIAGNOSIS — Z74.09 IMPAIRED FUNCTIONAL MOBILITY, BALANCE, GAIT, AND ENDURANCE: ICD-10-CM

## 2018-10-15 DIAGNOSIS — R53.1 DECREASED STRENGTH: ICD-10-CM

## 2018-10-15 DIAGNOSIS — R29.898 DECREASED ROM OF NECK: ICD-10-CM

## 2018-10-15 PROCEDURE — 97110 THERAPEUTIC EXERCISES: CPT | Mod: PO | Performed by: PHYSICAL THERAPIST

## 2018-10-15 NOTE — PROGRESS NOTES
Physical Therapy Progress Note      Name: Brigid Jernigan  Clinic Number: [id number]  Diagnosis: G71.11 (ICD-10-CM) - Myotonic dystrophy  Physician: Luis Felipe Vo MD  Treatment Orders: PT Eval and Treat          Past Medical History:   Diagnosis Date    Abnormal EKG      Myotonic dystrophy      Pacemaker      PAF (paroxysmal atrial fibrillation)      Sleep apnea           Precautions: standard  Visit #: 10/12  (expires 10/22/18)     (Visit total 2017= 44)  Date of Eval: 5/9/2017  Extended POC: 10/30/2018     Subjective   Pt reports: that she is doing well and has no new complaints or symptoms.    Pain Scale: denies     Objective      Patient received individual therapy to increase strength, endurance, ROM, flexibility, posture, core stabilization and head control with activities as follows:      Pt participated in therapeutic exercises x 45 minutes to address ROM, strength, balance, and mobility:  10 minutes on the [employer] stepper, level 13.0, BUE/LE  Supine:   SLR- forward flexion, 5#, 2 sets of 10, bilaterally  Bridging with green physioball and alternating LE extension, 10x  Bridging with green physioball, 10x    Standing:  Hip abduction with orange theraband, 10x, bilaterally  Hip extension with orange theraband, 10x, bilaterally    Ambulation with LBQC for 98 feet, min A with HHA x 1           Written Home Exercises: 6/28/18- modified scalene stretch, illustrated copy to be provided  1/29/18- side stepping at counter with 1-2 UE support  12/4/17- AROM cervical SB and rotation  assisted quad/ hip flexor stretch in side lying  eval- chin tucks, rows with OTB, hip flexor and knee extensor stretch in sidelying  Pt demo fair understanding of the education provided. Brigid demonstrated fair return demonstration of activities.      Education provided re: 12/18/17- cervical collar application  POC, HEP     Pt has no cultural, educational or language barriers to learning provided.     Assessment   Brigid  "tolerated treatment session well with no problems. Pt was able to continue to progress with core and BLE strengthening with good tolerance and minimal rest breaks. Pt demonstrated good tolerance to increased resistance on stepper. Pt was also able to progress with increased ambulation distance using the LBQC to 98 feet with min A and HHA x 1 as needed. PT will continue to address core/ LE strengthening and balance training to improve pt's safety with mobility and delay onset of further functional limitations.      Pt prognosis is Good. Pt will continue to benefit from skilled outpatient physical therapy to address the deficits listed in the problem list, provide pt/family education and to maximize pt's level of independence in the home and community environment.      Activity limitations/ Participation Restrictions:   Fall Risk - impaired balance   Weakness  Impaired muscle tone  Poor head control  Decreased ROM  Gait deviations   Decreased ambulation   Decreased activity tolerance   Decreased independence with daily activities   Requires skilled supervision to complete and progress HEP   Requires skilled PT for DME/ orthotic recommendations     Pt's spiritual, cultural and educational needs considered and pt agreeable to plan of care and GOALS as stated below:   Short term goals: 6 weeks, pt agrees to goals set. (As of 9/10/18)  1. Pt will perform chin tucks in supine with supervision to improve independent with HEP. Met 7/5/17  2. Assist pt with obtaining appropriate cervical brace to improve head control. Met 1/9/18- pt has but is not consistently wearing  3. Pt will demonstrate improved knee ext strength on left to 4/5 to improve safety with ambulation and sit<>stand. Met 9/28/17   4. Pt will demonstrate improved cervical rotation PROM to 20 degrees to improve functional head control. Met 7/5/17  5. Pt will perform sit<>stand consistently from 22" height surface with supervision to improve independence. Met " "7/5/17                                                         Revised 8/31/17:  Pt will perform sit<>stand from 19" height chair to improve independence with sitting in various chairs around her home and decreased                                               need for "pop up seat". ~same pt stood from 19" height with BUE and 1-2 attempts, supervision without verbal cues      Long term goals: 12 weeks, pt agrees to goals set  6. Pt will perform basic HEP for gross strengthening with supervision to deter worsening of functional limitations. Met 8/31/17- pt's sister reports poor current compliance  7. Pt will demonstrate improved bilateral hip strength to 3/5 to improve balance and independence with mobility. Met 7/5/17  8. New 7/5/17: Pt will demonstrate improved bilateral hip strength to 4/5 to improve balance and independence with mobility. same/ Nearly met-  hip flex WALESKA= 4+ to 5/5, hip ext B= 3+/5, hip abd WALESKA=4-/5, hip add R= 4-/5, L= 4/5  9. Pt will demonstrate improved cervical extension strength to 3+/5 to improve head control. Met 7/31/17  10. Pt will demonstrate improved cervical deep flexor strength to 3+/5 to improve head control. Met 7/31/17   11. Pt will demonstrate improve balance and gait by scoring 14/28 on Tinetti. Met 8/31/17- 17/28                                      Revised 8/31/17: pt will demonstrate improved balance/ gait and decreased fall risk to moderate by scoring 19/28 on Tinetti                                            Assessment. met- 19/28, continues to improve- 22/28 (8/1/18)                                         12. Pt will demonstrate improved cervical PROM for rotation to 40 degrees to improve functional head control. same-  14 deg AAROM R rotation (after stretching),                                                       54 deg L                                         13. New 8/1/18- pt will ambulate 50ft with LBQC and supervision over level tile to demonstrate improved " household mobility with decreased AD- improved- ~40ft                                                     with LBQC and min HHA, away from supporting surface       Plan   Continue PT 2x weekly under established Plan of Care, with treatment to include: pt education, HEP, therapeutic exercises, neuromuscular re-education/balance exercises, therapeutic activities, joint mobilizations, manual therapy, gait training PRN, and modalities PRN, to work towards established goals. Pt may be seen by PTA to carry out plan of care.      Valerie Barber, SPT     10/15/2018    I, Yadi Mercado, DPT, certify that I was present in the room directing the student in service delivery and guiding them using my skilled judgment. As the co-signing therapist I have reviewed the students documentation and am responsible for the treatment, assessment, and plan.     Yadi Mercado DPT  10/15/2018

## 2018-10-15 NOTE — PROGRESS NOTES
I, Yadi Mercado, DPT, met with Mae Caicedo PTA to discuss this pt's POC. Continue to address cervical rotation ROM to maintain/ improve cervical mobility. continue with LE strengthening. Pt is tolerating increased training with LBQC without additional support, continue to progress as able.     Yadi Mercado, DPT  10/15/2018

## 2018-10-22 ENCOUNTER — CLINICAL SUPPORT (OUTPATIENT)
Dept: REHABILITATION | Facility: HOSPITAL | Age: 53
End: 2018-10-22
Attending: STUDENT IN AN ORGANIZED HEALTH CARE EDUCATION/TRAINING PROGRAM
Payer: COMMERCIAL

## 2018-10-22 ENCOUNTER — DOCUMENTATION ONLY (OUTPATIENT)
Dept: REHABILITATION | Facility: HOSPITAL | Age: 53
End: 2018-10-22

## 2018-10-22 DIAGNOSIS — Z74.09 IMPAIRED FUNCTIONAL MOBILITY, BALANCE, GAIT, AND ENDURANCE: ICD-10-CM

## 2018-10-22 DIAGNOSIS — R29.898 DECREASED ROM OF NECK: ICD-10-CM

## 2018-10-22 DIAGNOSIS — R53.1 DECREASED STRENGTH: ICD-10-CM

## 2018-10-22 PROCEDURE — 97110 THERAPEUTIC EXERCISES: CPT | Mod: PO | Performed by: PHYSICAL THERAPIST

## 2018-10-22 NOTE — PROGRESS NOTES
Physical Therapy Progress Note      Name: Brigid Jernigan  Clinic Number: [id number]  Diagnosis: G71.11 (ICD-10-CM) - Myotonic dystrophy  Physician: Luis Felipe Vo MD  Treatment Orders: PT Eval and Treat          Past Medical History:   Diagnosis Date    Abnormal EKG      Myotonic dystrophy      Pacemaker      PAF (paroxysmal atrial fibrillation)      Sleep apnea           Precautions: standard  Visit #: 11/12  (expires 10/22/18)     (Visit total 2017= 44)  Date of Eval: 5/9/2017  Extended POC: 10/30/2018     Subjective   Pt reports: that she is doing well and has no new complaints or symptoms.    Pain Scale: denies     Objective      Patient received individual therapy to increase strength, endurance, ROM, flexibility, posture, core stabilization and head control with activities as follows:      Pt participated in therapeutic exercises x 48 minutes to address ROM, strength, balance, and mobility:    Supine: PROM cervical rotation    PROM upper trap stretch    Inhibitive distraction    Bridging with ball and alt LE lift 10x    Bridging on ball 2 x 10    WALESKA shoulder flex 5# 2 x 10    Chest press 5# bar 15x    Side lying: [facility] PTB 2 x 20    Ambulation with LBQC for 108 feet, min A with intermittent HHA x 1, mod VC for improving upright posture    NP today:   10 minutes on the [employer] stepper, level 13.0, BUE/LE  Supine:   SLR- forward flexion, 5#, 2 sets of 10, bilaterally    Standing:  Hip abduction with orange theraband, 10x, bilaterally  Hip extension with orange theraband, 10x, bilaterally           Written Home Exercises: 6/28/18- modified scalene stretch, illustrated copy to be provided  1/29/18- side stepping at counter with 1-2 UE support  12/4/17- AROM cervical SB and rotation  assisted quad/ hip flexor stretch in side lying  eval- chin tucks, rows with OTB, hip flexor and knee extensor stretch in sidelying  Pt demo fair understanding of the education provided. Brigid demonstrated fair return  "demonstration of activities.      Education provided re: 12/18/17- cervical collar application  POC, HEP     Pt has no cultural, educational or language barriers to learning provided.     Assessment   Brigid tolerated treatment session well with no problems. Pt was able to tolerate increased distance during gait training with LBQC. Pt required moderate verbal cues to "keep chest up" when weight bearing during L stance phases. Pt was initially able to correct (for first 15-20'), but demonstrated increased difficulty with fatigue. Increased ROM noted during upper trap stretching, cervical rotation continues to be limited. PT will continue to address core/ LE strengthening and balance training to improve pt's safety with mobility and delay onset of further functional limitations.      Pt prognosis is Good. Pt will continue to benefit from skilled outpatient physical therapy to address the deficits listed in the problem list, provide pt/family education and to maximize pt's level of independence in the home and community environment.      Activity limitations/ Participation Restrictions:   Fall Risk - impaired balance   Weakness  Impaired muscle tone  Poor head control  Decreased ROM  Gait deviations   Decreased ambulation   Decreased activity tolerance   Decreased independence with daily activities   Requires skilled supervision to complete and progress HEP   Requires skilled PT for DME/ orthotic recommendations     Pt's spiritual, cultural and educational needs considered and pt agreeable to plan of care and GOALS as stated below:    Short term goals: 6 weeks, pt agrees to goals set. (As of 10/08/18)  1. Pt will perform chin tucks in supine with supervision to improve independent with HEP. Met 7/5/17  2. Assist pt with obtaining appropriate cervical brace to improve head control. Met 1/9/18- pt has but is not consistently wearing  3. Pt will demonstrate improved knee ext strength on left to 4/5 to improve safety with " "ambulation and sit<>stand. Met 9/28/17   4. Pt will demonstrate improved cervical rotation PROM to 20 degrees to improve functional head control. Met 7/5/17  5. Pt will perform sit<>stand consistently from 22" height surface with supervision to improve independence. Met 7/5/17                                                         Revised 8/31/17:  Pt will perform sit<>stand from 19" height chair to improve independence with sitting in various chairs around her home and decreased                                               need for "pop up seat". ~same pt stood from 19" height with BUE and 3 attempts, supervision without verbal cues (10/08/2018)     Long term goals: 12 weeks, pt agrees to goals set  6. Pt will perform basic HEP for gross strengthening with supervision to deter worsening of functional limitations. Met 8/31/17- pt's sister reports poor current compliance  7. Pt will demonstrate improved bilateral hip strength to 3/5 to improve balance and independence with mobility. Met 7/5/17  8. New 7/5/17: Pt will demonstrate improved bilateral hip strength to 4/5 to improve balance and independence with mobility. same/ Nearly met-  hip flex WALESKA= 4+/5, hip ext R= 4-/5, L= 4/5,  hip abd WALESKA=4-/5, hip add B= 4/5 (10/08/2018)  9. Pt will demonstrate improved cervical extension strength to 3+/5 to improve head control. Met 7/31/17  10. Pt will demonstrate improved cervical deep flexor strength to 3+/5 to improve head control. Met 7/31/17   11. Pt will demonstrate improve balance and gait by scoring 14/28 on Tinetti. Met 8/31/17- 17/28                                      Revised 8/31/17: pt will demonstrate improved balance/ gait and decreased fall risk to moderate by scoring 19/28 on Tinetti                                            Assessment. met- 19/28, continues to improve- 22/28 (8/1/18), 17/30 (10/08/2018)                                         12. Pt will demonstrate improved cervical PROM for rotation to " 40 degrees to improve functional head control. same-  14 deg AAROM R rotation (after stretching),54 deg L; 11 degree AAROM R rotation, 31 degree AAROM L rotation (10/08/18)                                         13. New 8/1/18- pt will ambulate 50ft with LBQC and supervision over level tile to demonstrate improved household mobility with decreased AD- MET 70 feet with LBQC and min HHA, away from supporting surface (10/08/2018)    Plan   Continue PT 2x weekly under established Plan of Care, with treatment to include: pt education, HEP, therapeutic exercises, neuromuscular re-education/balance exercises, therapeutic activities, joint mobilizations, manual therapy, gait training PRN, and modalities PRN, to work towards established goals. Pt may be seen by PTA to carry out plan of care.      Yadi Mercado, PT     10/22/2018

## 2018-10-25 ENCOUNTER — CLINICAL SUPPORT (OUTPATIENT)
Dept: REHABILITATION | Facility: HOSPITAL | Age: 53
End: 2018-10-25
Attending: STUDENT IN AN ORGANIZED HEALTH CARE EDUCATION/TRAINING PROGRAM
Payer: COMMERCIAL

## 2018-10-25 DIAGNOSIS — Z74.09 IMPAIRED FUNCTIONAL MOBILITY, BALANCE, GAIT, AND ENDURANCE: ICD-10-CM

## 2018-10-25 DIAGNOSIS — R29.898 DECREASED ROM OF NECK: ICD-10-CM

## 2018-10-25 DIAGNOSIS — R53.1 DECREASED STRENGTH: ICD-10-CM

## 2018-10-25 PROCEDURE — 97110 THERAPEUTIC EXERCISES: CPT | Mod: PO | Performed by: PHYSICAL THERAPIST

## 2018-10-25 NOTE — PROGRESS NOTES
"Physical Therapy Progress Note      Name: Brigid Jernigan  Clinic Number: [id number]  Diagnosis: G71.11 (ICD-10-CM) - Myotonic dystrophy  Physician: Luis Felipe Vo MD  Treatment Orders: PT Eval and Treat          Past Medical History:   Diagnosis Date    Abnormal EKG      Myotonic dystrophy      Pacemaker      PAF (paroxysmal atrial fibrillation)      Sleep apnea           Precautions: standard  Visit #: 1     (Visit total 2017= 56)  Date of Eval: 5/9/2017  POC expiration date: 10/30/2018  Extended POC: 10/25/18 to 12/17/18    Subjective   Pt reports: that she is doing well without any new complaints.     Pain Scale: denies     Objective      Patient received individual therapy to increase strength, endurance, ROM, flexibility, posture, core stabilization and head control with activities as follows:      Pt participated in therapeutic exercises x 40 minutes to address ROM, strength, balance, and mobility:    Supine: Bridging with ball and alt LE lift 10x            SLR, forward flexion, 3# for LLE, 4# for RLE, 3 sets of 10 bilaterally      Hip flex: R=4-/5 , L= 4/5  Hip abd: R= 4/5, L= 4/5  Hip add: R=4/5, L= 4+/5  Hip ext: R= 4-/5, L= 4+/5    Sit to stand from 19" height: 1 attempt with supervision and BUE support on mat    Standing hip extension with OTB, 1 set of 10 bilaterally    Supine:   AROM R Cervical rotation: 14 degrees  AROM L Cervical rotation:  45 degrees       Tinetti Balance Assessment  Balance:  1. Sitting Balance 1   Leans/ slides in chair= 0   Steady= 1  2. Rises from chair 1   Unable without help= 0   Able, uses arms= 1   Able without use of arms= 2  3. Attempts to rise 1   Unable without help= 0   Able, >1 attmept required-= 1   Able, 1 attempt= 2  4. Immediate standing balance (1st 5 seconds) 2   Unsteady (swaggers, moves feet, trunk sway)= 0   Steady but uses walker or other support= 1   Narrow base of support without walker or support= 2  5. Nudged 2   Begins to fall= 0   Staggers, " grabs, catches self= 1   Steady= 2  6. Standing balance 1   Unsteady= 0   Steady but wide LEYDI or uses AD=1   Narrow LEYDI without AD=2  7. Eyes closed 0   Unsteady= 0   Steady= 1  8. Turning 360 degrees 0   Discontinuous steps= 0   Continuous steps= 1   Unsteady= 0   Steady= 1  9. Sitting down 1   Unsafe= 0   Uses arms or not in a smooth motion= 1   Safe, smooth motion= 2  Balance score= 9  Gait:  1. Initiation 1   hesitates or multiple attempts to start= 0   No hesitancy= 1  2. Step length 2   Step to= 0   One foot passes= 1   reciprocal pattern= 2  3. Step height 2   Neither foot clears floor= 0   One foot clears floor= 1   Both feet clear floor= 2  4. Step symmetry 1   Not symmetrical= 0   Appears symmetrical= 1  5. Step continuity 1   Not continuous= 0   Appears continuous= 1  6. Path 1   Marked deviation= 0   Mild/moderate deviation or uses A.D.= 1   Straight without A.D.= 2  7. Trunk 1   Marked sway or uses A.D.= 0   No sway, but flexes knees or back, spread arms out while walking= 1   No sway, no flexion, no use of UE, no use of A.D.= 2  8. Walking stance 1   Heels apart= 0   Heels almost touching while walking= 1  Gait score= 10  Total score= 21/28 , moderate fall risk    0-18= high fall risk  19-23= moderate fall risk  24-28= low fall risk     NP today:   10 minutes on the [employer] stepper, level 13.0, BUE/LE  Supine:   SLR- forward flexion, 5#, 2 sets of 10, bilaterally    Standing:  Hip abduction with orange theraband, 10x, bilaterally  Hip extension with orange theraband, 10x, bilaterally      Written Home Exercises: 6/28/18- modified scalene stretch, illustrated copy to be provided  1/29/18- side stepping at counter with 1-2 UE support  12/4/17- AROM cervical SB and rotation  assisted quad/ hip flexor stretch in side lying  eval- chin tucks, rows with OTB, hip flexor and knee extensor stretch in sidelying  Pt demo fair understanding of the education provided. Brigid demonstrated fair return demonstration  "of activities.      Education provided re: 12/18/17- cervical collar application  POC, HEP     Pt has no cultural, educational or language barriers to learning provided.     Assessment   Brigid tolerated treatment session well with no problems. Pt has made significant improvement with balance impairments as exhibited by the increased Tinetti score. Per assessment, pt has improved from a high fall risk to a moderate fall risk. Pt has remaining balance impairments with turning 360 degrees, standing with eyes closed, and standing with a narrow LEYDI. Pt has also shown improvements with B hip strength especially with increases in hip abduction and extension. These improvements with balance and strength increases the pt's ability to participate in functional mobility and daily activities at home and within the community with reduced restrictions. Pt continues to progress with ambulation using the LBQC with min A for HHA x 1 occasionally. Pt has been able to consistently increase distances ambulated exhibiting an increase in endurance. Pt would like to progress to use of LBQC inside the home. Pt also shows consistent sit to stand transfer with supervision and 4 attempts to make a successful sit to stand transfer from a 19" height surface (normal seating height). At the end of the treatment session after practicing the transfer and performing strengthening exercises for the BLE, the pt was able to complete the sit to stand transfer with 1 attempt and supervision. Plan to continue focus on improving sit to stand transfer from a 19'' height in order to increase pt's ability to perform transfer from multiple chairs with reduced restrictions. Lastly, pt showed improvement with cervical rotation AROM on the right and left side. This will improve pt's ability to see obstacles while walking and improve balance. Plan to also focus on improving cervical mobility in future treatment sessions. Pt is appropriate to continue physical " "therapy services in order to continue to address core/ LE strengthening, balance training, and improvement of pt impairments listed below to improve pt's safety with mobility and delay onset of further functional limitations.      Pt prognosis is Good. Pt will continue to benefit from skilled outpatient physical therapy to address the deficits listed in the problem list, provide pt/family education and to maximize pt's level of independence in the home and community environment.      Activity limitations/ Participation Restrictions:   Fall Risk - impaired balance   Weakness  Impaired muscle tone  Poor head control  Decreased ROM  Gait deviations   Decreased ambulation   Decreased activity tolerance   Decreased independence with daily activities   Requires skilled supervision to complete and progress HEP   Requires skilled PT for DME/ orthotic recommendations     Pt's spiritual, cultural and educational needs considered and pt agreeable to plan of care and GOALS as stated below:    Short term goals: 6 weeks, pt agrees to goals set. (As of 10/25/18)  1. Pt will perform chin tucks in supine with supervision to improve independent with HEP. Met 7/5/17  2. Assist pt with obtaining appropriate cervical brace to improve head control. Met 1/9/18- pt has but is not consistently wearing  3. Pt will demonstrate improved knee ext strength on left to 4/5 to improve safety with ambulation and sit<>stand. Met 9/28/17   4. Pt will demonstrate improved cervical rotation PROM to 20 degrees to improve functional head control. Met 7/5/17  5. Pt will perform sit<>stand consistently from 22" height surface with supervision to improve independence. Met 7/5/17                                                         Revised 8/31/17:  Pt will perform sit<>stand from 19" height chair to improve independence with sitting in various chairs around her home and decreased                                               need for "pop up seat". ~same " "pt stood from 19" height with BUE and 4 attempts, supervision without verbal cues (10/25/2018)     Long term goals: 12 weeks, pt agrees to goals set  6. Pt will perform basic HEP for gross strengthening with supervision to deter worsening of functional limitations. Met 8/31/17- pt's sister reports poor current compliance  7. Pt will demonstrate improved bilateral hip strength to 3/5 to improve balance and independence with mobility. Met 7/5/17  8. New 7/5/17: Pt will demonstrate improved bilateral hip strength to 4/5 to improve balance and independence with mobility. same/ Nearly met-  hip flex L= 4-/5, R= 4/5, hip ext R= 4-/5, L= 4+/5,  hip abd WALESKA=4/5, hip add R= 4/5, L= 4+/5  9. Pt will demonstrate improved cervical extension strength to 3+/5 to improve head control. Met 7/31/17  10. Pt will demonstrate improved cervical deep flexor strength to 3+/5 to improve head control. Met 7/31/17   11. Pt will demonstrate improve balance and gait by scoring 14/28 on Tinetti. Met 8/31/17- 17/28                                      Revised 8/31/17: pt will demonstrate improved balance/ gait and decreased fall risk to moderate by scoring 19/28 on Tinetti                                            Assessment. met- 19/28, continues to improve- 22/28 (8/1/18), 17/30 (10/08/2018); improved 21/28 (10/25/18)                                         12. Pt will demonstrate improved cervical PROM for rotation to 40 degrees to improve functional head control. same-  14 deg AAROM R rotation, 45 deg L;                                          13. New 8/1/18- pt will ambulate 50ft with LBQC and supervision over level tile to demonstrate improved household mobility with decreased AD- improving about 108 feet with LBQC and min HHA, away from supporting surface , mod VC to improve posture    Plan   POC extended x 12 weeks in order to continue progress with improving BLE strength, improve balance, improve sit to stand transfers, improve " ambulation distance and decreased assistance in order to improve pt's ability to participate in community and home activities with reduced restrictions. Continue PT 2x weekly under established Plan of Care, with treatment to include: pt education, HEP, therapeutic exercises, neuromuscular re-education/balance exercises, therapeutic activities, joint mobilizations, manual therapy, gait training PRN, and modalities PRN, to work towards established goals. Pt may be seen by PTA to carry out plan of care.      Valerie Barber, SPT     10/25/2018     I, Yadi Mercado, JORDYT, certify that I was present in the room directing the student in service delivery and guiding them using my skilled judgment. As the co-signing therapist I have reviewed the students documentation and am responsible for the treatment, assessment, and plan.     Yadi Mercado DPT  10/26/2018

## 2018-10-29 ENCOUNTER — CLINICAL SUPPORT (OUTPATIENT)
Dept: REHABILITATION | Facility: HOSPITAL | Age: 53
End: 2018-10-29
Attending: STUDENT IN AN ORGANIZED HEALTH CARE EDUCATION/TRAINING PROGRAM
Payer: COMMERCIAL

## 2018-10-29 DIAGNOSIS — R53.1 DECREASED STRENGTH: ICD-10-CM

## 2018-10-29 DIAGNOSIS — Z74.09 IMPAIRED FUNCTIONAL MOBILITY, BALANCE, GAIT, AND ENDURANCE: ICD-10-CM

## 2018-10-29 DIAGNOSIS — R29.898 DECREASED ROM OF NECK: ICD-10-CM

## 2018-10-29 PROCEDURE — 97116 GAIT TRAINING THERAPY: CPT | Mod: PO

## 2018-10-29 PROCEDURE — 97140 MANUAL THERAPY 1/> REGIONS: CPT | Mod: PO

## 2018-10-29 PROCEDURE — 97110 THERAPEUTIC EXERCISES: CPT | Mod: PO

## 2018-10-29 NOTE — PROGRESS NOTES
"Physical Therapy Progress Note      Name: Brigid Jernigan  Clinic Number: [id number]  Diagnosis: G71.11 (ICD-10-CM) - Myotonic dystrophy  Physician: Luis Felipe Vo MD  Treatment Orders: PT Eval and Treat          Past Medical History:   Diagnosis Date    Abnormal EKG      Myotonic dystrophy      Pacemaker      PAF (paroxysmal atrial fibrillation)      Sleep apnea           Precautions: standard  Visit #: 2     (Visit total 2017= 56)  Date of Eval: 5/9/2017  POC expiration date: 10/30/2018  Extended POC: 10/25/18 to 12/17/18    Subjective   Pt reports: "I walked about 90 something feet last week."  Sister reported: " I have her brace."   Pain Scale: denies     Objective      Patient received individual therapy to increase strength, endurance, ROM, flexibility, posture, core stabilization and head control with activities as follows:      Pt participated in therapeutic exercises x 45 minutes to address ROM, strength, balance, and mobility:     Supine: cervical PROM for SB and rotation, distraction              pec stretching              Chin tucks x 10 reps, 3 sec hold    Leg press:   X 10 reps of B LE squats on leg press with # 145lbs applied.  Had to decrease to 140 after 5 reps    Gait:          Pt ambulated ~ 134ft with LBQC and close CGA/,  Pt with 2 loss of balances noted with CGA to recover.          Written Home Exercises: 6/28/18- modified scalene stretch, illustrated copy to be provided  1/29/18- side stepping at counter with 1-2 UE support  12/4/17- AROM cervical SB and rotation  assisted quad/ hip flexor stretch in side lying  eval- chin tucks, rows with OTB, hip flexor and knee extensor stretch in sidelying  Pt demo fair understanding of the education provided. Brigid demonstrated fair return demonstration of activities.      Education provided re: 12/18/17- cervical collar application  POC, HEP     Pt has no cultural, educational or language barriers to learning provided.     Assessment " "  Brigid tolerated treatment session well with no problems.  Pt would not don on cervical brace during tx session.  Pt with increased gait distance noted with LBQC.  Cont to require Close CGA for safety.       Pt prognosis is Good. Pt will continue to benefit from skilled outpatient physical therapy to address the deficits listed in the problem list, provide pt/family education and to maximize pt's level of independence in the home and community environment.      Activity limitations/ Participation Restrictions:   Fall Risk - impaired balance   Weakness  Impaired muscle tone  Poor head control  Decreased ROM  Gait deviations   Decreased ambulation   Decreased activity tolerance   Decreased independence with daily activities   Requires skilled supervision to complete and progress HEP   Requires skilled PT for DME/ orthotic recommendations     Pt's spiritual, cultural and educational needs considered and pt agreeable to plan of care and GOALS as stated below:    Short term goals: 6 weeks, pt agrees to goals set. (As of 10/25/18)  1. Pt will perform chin tucks in supine with supervision to improve independent with HEP. Met 7/5/17  2. Assist pt with obtaining appropriate cervical brace to improve head control. Met 1/9/18- pt has but is not consistently wearing  3. Pt will demonstrate improved knee ext strength on left to 4/5 to improve safety with ambulation and sit<>stand. Met 9/28/17   4. Pt will demonstrate improved cervical rotation PROM to 20 degrees to improve functional head control. Met 7/5/17  5. Pt will perform sit<>stand consistently from 22" height surface with supervision to improve independence. Met 7/5/17                                                         Revised 8/31/17:  Pt will perform sit<>stand from 19" height chair to improve independence with sitting in various chairs around her home and decreased                                               need for "pop up seat". ~same pt stood from 19" " height with BUE and 4 attempts, supervision without verbal cues (10/25/2018)     Long term goals: 12 weeks, pt agrees to goals set  6. Pt will perform basic HEP for gross strengthening with supervision to deter worsening of functional limitations. Met 8/31/17- pt's sister reports poor current compliance  7. Pt will demonstrate improved bilateral hip strength to 3/5 to improve balance and independence with mobility. Met 7/5/17  8. New 7/5/17: Pt will demonstrate improved bilateral hip strength to 4/5 to improve balance and independence with mobility. same/ Nearly met-  hip flex L= 4-/5, R= 4/5, hip ext R= 4-/5, L= 4+/5,  hip abd WALESKA=4/5, hip add R= 4/5, L= 4+/5  9. Pt will demonstrate improved cervical extension strength to 3+/5 to improve head control. Met 7/31/17  10. Pt will demonstrate improved cervical deep flexor strength to 3+/5 to improve head control. Met 7/31/17   11. Pt will demonstrate improve balance and gait by scoring 14/28 on Tinetti. Met 8/31/17- 17/28                                      Revised 8/31/17: pt will demonstrate improved balance/ gait and decreased fall risk to moderate by scoring 19/28 on Tinetti                                            Assessment. met- 19/28, continues to improve- 22/28 (8/1/18), 17/30 (10/08/2018); improved 21/28 (10/25/18)                                         12. Pt will demonstrate improved cervical PROM for rotation to 40 degrees to improve functional head control. same-  14 deg AAROM R rotation, 45 deg L;                                          13. New 8/1/18- pt will ambulate 50ft with LBQC and supervision over level tile to demonstrate improved household mobility with decreased AD- improving about 108 feet with LBQC and min HHA, away from supporting surface , mod VC to improve posture    Plan   POC extended x 12 weeks in order to continue progress with improving BLE strength, improve balance, improve sit to stand transfers, improve ambulation distance and  decreased assistance in order to improve pt's ability to participate in community and home activities with reduced restrictions. Continue PT 2x weekly under established Plan of Care, with treatment to include: pt education, HEP, therapeutic exercises, neuromuscular re-education/balance exercises, therapeutic activities, joint mobilizations, manual therapy, gait training PRN, and modalities PRN, to work towards established goals. Pt may be seen by PTA to carry out plan of care.      Mae Caicedo, PTA     10/29/2018

## 2018-11-01 ENCOUNTER — CLINICAL SUPPORT (OUTPATIENT)
Dept: REHABILITATION | Facility: HOSPITAL | Age: 53
End: 2018-11-01
Attending: STUDENT IN AN ORGANIZED HEALTH CARE EDUCATION/TRAINING PROGRAM
Payer: COMMERCIAL

## 2018-11-01 DIAGNOSIS — R29.898 DECREASED ROM OF NECK: ICD-10-CM

## 2018-11-01 DIAGNOSIS — R53.1 DECREASED STRENGTH: ICD-10-CM

## 2018-11-01 DIAGNOSIS — Z74.09 IMPAIRED FUNCTIONAL MOBILITY, BALANCE, GAIT, AND ENDURANCE: ICD-10-CM

## 2018-11-01 PROCEDURE — 97110 THERAPEUTIC EXERCISES: CPT | Mod: PO | Performed by: PHYSICAL THERAPIST

## 2018-11-01 NOTE — PROGRESS NOTES
Physical Therapy Progress Note      Name: Brigid Jernigan  Clinic Number: [id number]  Diagnosis: G71.11 (ICD-10-CM) - Myotonic dystrophy  Physician: Luis Felipe Vo MD  Treatment Orders: PT Eval and Treat          Past Medical History:   Diagnosis Date    Abnormal EKG      Myotonic dystrophy      Pacemaker      PAF (paroxysmal atrial fibrillation)      Sleep apnea           Precautions: standard  Visit #: 3/?     (Visit total 2017= 56)  Date of Eval: 5/9/2017  POC expiration date: 10/30/2018  Extended POC: 10/25/18 to 12/17/18    Subjective   Pt reports: that she is doing well without any new complaints or symptoms.  Pain Scale: denies     Objective      Patient received individual therapy to increase strength, endurance, ROM, flexibility, posture, core stabilization and head control with activities as follows:      Pt participated in therapeutic exercises x 50 minutes to address ROM, strength, balance, and mobility:   10 minutes on [employer] stepper, level 13.0 with use of BLE/BUE    Supine: cervical PROM for SB and rotation, distraction              Pec stretching, 3 x 30 sec       SLR, forward flexion, 3# for LLE, 4# for RLE, 3 sets of 10 bilaterally, min VC and min TC to prevent L knee from bending       WALESKA shoulder flex 5# 2 x 10       Chest press 5# bar 15x  Standing:hip extension with OTB, 1 set of 10 bilaterally      Hip abduction with OTB, 2 sets of 10 bilaterally        Written Home Exercises: 6/28/18- modified scalene stretch, illustrated copy to be provided  1/29/18- side stepping at counter with 1-2 UE support  12/4/17- AROM cervical SB and rotation  assisted quad/ hip flexor stretch in side lying  eval- chin tucks, rows with OTB, hip flexor and knee extensor stretch in sidelying  Pt demo fair understanding of the education provided. Brigid demonstrated fair return demonstration of activities.      Education provided re: 12/18/17- cervical collar application  POC, HEP     Pt has no cultural,  educational or language barriers to learning provided.     Assessment   Brigid tolerated treatment session well with no problems. Pt was able to continue progression with strengthening activities for the BLE as well as exercises to increase cervical ROM mobility. Pt was able to particiapte in all exercises focusing on cervical mobility with good tolerance and minimal reports of pain when stretching in cervical rotation. Pt showed continued limitation primarily with right cervical rotation with both PROM and AROM. Pt was also able to continue progression with BLE strengthening with a focus on the B hips. Pt reported moderate fatigue during strengthening exercises with a need for rest breaks between each set for each exercise to reduce muscle fatigue. Pt is appropriate to continue physical therapy services in order to address the remaining impairments listed on the problem list to increase pt's ability to participate in functional mobility and daily activities within the community and home with reduced restrictions.      Pt prognosis is Good. Pt will continue to benefit from skilled outpatient physical therapy to address the deficits listed in the problem list, provide pt/family education and to maximize pt's level of independence in the home and community environment.      Activity limitations/ Participation Restrictions:   Fall Risk - impaired balance   Weakness  Impaired muscle tone  Poor head control  Decreased ROM  Gait deviations   Decreased ambulation   Decreased activity tolerance   Decreased independence with daily activities   Requires skilled supervision to complete and progress HEP   Requires skilled PT for DME/ orthotic recommendations     Pt's spiritual, cultural and educational needs considered and pt agreeable to plan of care and GOALS as stated below:    Short term goals: 6 weeks, pt agrees to goals set. (As of 10/25/18)  1. Pt will perform chin tucks in supine with supervision to improve independent  "with HEP. Met 7/5/17  2. Assist pt with obtaining appropriate cervical brace to improve head control. Met 1/9/18- pt has but is not consistently wearing  3. Pt will demonstrate improved knee ext strength on left to 4/5 to improve safety with ambulation and sit<>stand. Met 9/28/17   4. Pt will demonstrate improved cervical rotation PROM to 20 degrees to improve functional head control. Met 7/5/17  5. Pt will perform sit<>stand consistently from 22" height surface with supervision to improve independence. Met 7/5/17                                                         Revised 8/31/17:  Pt will perform sit<>stand from 19" height chair to improve independence with sitting in various chairs around her home and decreased                                               need for "pop up seat". ~same pt stood from 19" height with BUE and 4 attempts, supervision without verbal cues (10/25/2018)     Long term goals: 12 weeks, pt agrees to goals set  6. Pt will perform basic HEP for gross strengthening with supervision to deter worsening of functional limitations. Met 8/31/17- pt's sister reports poor current compliance  7. Pt will demonstrate improved bilateral hip strength to 3/5 to improve balance and independence with mobility. Met 7/5/17  8. New 7/5/17: Pt will demonstrate improved bilateral hip strength to 4/5 to improve balance and independence with mobility. same/ Nearly met-  hip flex L= 4-/5, R= 4/5, hip ext R= 4-/5, L= 4+/5,  hip abd WALESKA=4/5, hip add R= 4/5, L= 4+/5  9. Pt will demonstrate improved cervical extension strength to 3+/5 to improve head control. Met 7/31/17  10. Pt will demonstrate improved cervical deep flexor strength to 3+/5 to improve head control. Met 7/31/17   11. Pt will demonstrate improve balance and gait by scoring 14/28 on Tinetti. Met 8/31/17- 17/28                                      Revised 8/31/17: pt will demonstrate improved balance/ gait and decreased fall risk to moderate by scoring " 19/28 on Tinetti                                            Assessment. met- 19/28, continues to improve- 22/28 (8/1/18), 17/30 (10/08/2018); improved 21/28 (10/25/18)                                         12. Pt will demonstrate improved cervical PROM for rotation to 40 degrees to improve functional head control. same-  14 deg AAROM R rotation, 45 deg L;                                          13. New 8/1/18- pt will ambulate 50ft with LBQC and supervision over level tile to demonstrate improved household mobility with decreased AD- improving about 108 feet with LBQC and min HHA, away from supporting surface , mod VC to improve posture    Plan    Continue PT 2x weekly under established Plan of Care, with treatment to include: pt education, HEP, therapeutic exercises, neuromuscular re-education/balance exercises, therapeutic activities, joint mobilizations, manual therapy, gait training PRN, and modalities PRN, to work towards established goals. Pt may be seen by PTA to carry out plan of care.      Valerie Barber, SPT     11/1/2018     I, Yadi Mercado, JORYDT, certify that I was present in the room directing the student in service delivery and guiding them using my skilled judgment. As the co-signing therapist I have reviewed the students documentation and am responsible for the treatment, assessment, and plan.     Yadi Mercado DPT  11/1/2018

## 2018-11-08 ENCOUNTER — CLINICAL SUPPORT (OUTPATIENT)
Dept: REHABILITATION | Facility: HOSPITAL | Age: 53
End: 2018-11-08
Attending: STUDENT IN AN ORGANIZED HEALTH CARE EDUCATION/TRAINING PROGRAM
Payer: COMMERCIAL

## 2018-11-08 DIAGNOSIS — R53.1 DECREASED STRENGTH: ICD-10-CM

## 2018-11-08 DIAGNOSIS — R29.898 DECREASED ROM OF NECK: ICD-10-CM

## 2018-11-08 DIAGNOSIS — Z74.09 IMPAIRED FUNCTIONAL MOBILITY, BALANCE, GAIT, AND ENDURANCE: ICD-10-CM

## 2018-11-08 PROCEDURE — 97110 THERAPEUTIC EXERCISES: CPT | Mod: PO | Performed by: PHYSICAL THERAPIST

## 2018-11-08 NOTE — PROGRESS NOTES
Physical Therapy Progress Note      Name: Brigid Jernigan  Clinic Number: [id number]  Diagnosis: G71.11 (ICD-10-CM) - Myotonic dystrophy  Physician: Luis Felipe Vo MD  Treatment Orders: PT Eval and Treat          Past Medical History:   Diagnosis Date    Abnormal EKG      Myotonic dystrophy      Pacemaker      PAF (paroxysmal atrial fibrillation)      Sleep apnea           Precautions: standard  Visit #: 1/14 (exp. 12/17/18)     (Visit total 2017= 56)  Date of Eval: 5/9/2017  POC expiration date: 10/30/2018  Extended POC: 10/25/18 to 12/17/18    Subjective   Pt reports: that she is doing well without any new complaints or symptoms.  Pain Scale: denies     Objective      Patient received individual therapy to increase strength, endurance, ROM, flexibility, posture, core stabilization and head control with activities as follows:      Pt participated in therapeutic exercises x 45 minutes to address ROM, strength, balance, and mobility    Supine: cervical PROM for SB and rotation, distraction              Pec stretching, 3 x 30 sec              Chin tucks, 2 sets of 10       SLR, forward flexion, 3# for LLE, 4# for RLE, 3 sets of 10 bilaterally, min VC and min TC to prevent L knee from bending       WALESKA shoulder flexion 5# 2 x 10       Chest press 5# bar 2 x 10x        Bridging with physioball, 2 sets of 10        Trunk rotation with ball, 2 sets of 10    Sitting at EOM: Rows with OTB, 10x, min VC for correct form                             WALESKA shoulder ext, 10x , min VC to prevent R shoulder shrug              Written Home Exercises: 6/28/18- modified scalene stretch, illustrated copy to be provided  1/29/18- side stepping at counter with 1-2 UE support  12/4/17- AROM cervical SB and rotation  assisted quad/ hip flexor stretch in side lying  eval- chin tucks, rows with OTB, hip flexor and knee extensor stretch in sidelying  Pt demo fair understanding of the education provided. Brigid demonstrated fair return  demonstration of activities.      Education provided re: 12/18/17- cervical collar application  POC, HEP     Pt has no cultural, educational or language barriers to learning provided.     Assessment   Brigid tolerated treatment session well with no problems. PT focused on increasing cervical mobility, increasing scapular and pectoral strength, and increasing BLE strength. Pt was able to participate in all exercises increasing cervical mobility with good tolerance. Pt exhibited continued restrictions in motion with cervical rotation bilaterally with the R rotation primarily limited. Pt also participated in strengthening of the BLE and scapular/chest musculature in order to increase upright posture and improve sit to stand transfers. Pt needed rest breaks between each exercise in order to reduce fatigue. Pt exhibited adequate challenge with all exercises as seen with moderate fatigue and need for rest breaks in between each exercise. Plan to increase focus on ambulation using the LBQC during the next treatment session in order to increase ability of the patient to perform functional mobility with reduced restrictions.  Pt is appropriate to continue physical therapy services in order to address the remaining impairments listed on the problem list to increase pt's ability to participate in functional mobility and daily activities within the community and home with reduced restrictions.      Pt prognosis is Good. Pt will continue to benefit from skilled outpatient physical therapy to address the deficits listed in the problem list, provide pt/family education and to maximize pt's level of independence in the home and community environment.      Activity limitations/ Participation Restrictions:   Fall Risk - impaired balance   Weakness  Impaired muscle tone  Poor head control  Decreased ROM  Gait deviations   Decreased ambulation   Decreased activity tolerance   Decreased independence with daily activities   Requires  "skilled supervision to complete and progress HEP   Requires skilled PT for DME/ orthotic recommendations     Pt's spiritual, cultural and educational needs considered and pt agreeable to plan of care and GOALS as stated below:    Short term goals: 6 weeks, pt agrees to goals set. (As of 10/25/18)  1. Pt will perform chin tucks in supine with supervision to improve independent with HEP. Met 7/5/17  2. Assist pt with obtaining appropriate cervical brace to improve head control. Met 1/9/18- pt has but is not consistently wearing  3. Pt will demonstrate improved knee ext strength on left to 4/5 to improve safety with ambulation and sit<>stand. Met 9/28/17   4. Pt will demonstrate improved cervical rotation PROM to 20 degrees to improve functional head control. Met 7/5/17  5. Pt will perform sit<>stand consistently from 22" height surface with supervision to improve independence. Met 7/5/17                                                         Revised 8/31/17:  Pt will perform sit<>stand from 19" height chair to improve independence with sitting in various chairs around her home and decreased                                               need for "pop up seat". ~same pt stood from 19" height with BUE and 4 attempts, supervision without verbal cues (10/25/2018)     Long term goals: 12 weeks, pt agrees to goals set  6. Pt will perform basic HEP for gross strengthening with supervision to deter worsening of functional limitations. Met 8/31/17- pt's sister reports poor current compliance  7. Pt will demonstrate improved bilateral hip strength to 3/5 to improve balance and independence with mobility. Met 7/5/17  8. New 7/5/17: Pt will demonstrate improved bilateral hip strength to 4/5 to improve balance and independence with mobility. same/ Nearly met-  hip flex L= 4-/5, R= 4/5, hip ext R= 4-/5, L= 4+/5,  hip abd WALESKA=4/5, hip add R= 4/5, L= 4+/5  9. Pt will demonstrate improved cervical extension strength to 3+/5 to improve " head control. Met 7/31/17  10. Pt will demonstrate improved cervical deep flexor strength to 3+/5 to improve head control. Met 7/31/17   11. Pt will demonstrate improve balance and gait by scoring 14/28 on Tinetti. Met 8/31/17- 17/28                                      Revised 8/31/17: pt will demonstrate improved balance/ gait and decreased fall risk to moderate by scoring 19/28 on Tinetti                                            Assessment. met- 19/28, continues to improve- 22/28 (8/1/18), 17/30 (10/08/2018); improved 21/28 (10/25/18)                                         12. Pt will demonstrate improved cervical PROM for rotation to 40 degrees to improve functional head control. same-  14 deg AAROM R rotation, 45 deg L;                                          13. New 8/1/18- pt will ambulate 50ft with LBQC and supervision over level tile to demonstrate improved household mobility with decreased AD- improving about 108 feet with LBQC and min HHA, away from supporting surface , mod VC to improve posture    Plan    Continue PT 2x weekly under established Plan of Care, with treatment to include: pt education, HEP, therapeutic exercises, neuromuscular re-education/balance exercises, therapeutic activities, joint mobilizations, manual therapy, gait training PRN, and modalities PRN, to work towards established goals. Pt may be seen by PTA to carry out plan of care.      Valerie Barber, SPT     11/8/2018     I, Yadi Mercado, DPT, certify that I was present in the room directing the student in service delivery and guiding them using my skilled judgment. As the co-signing therapist I have reviewed the students documentation and am responsible for the treatment, assessment, and plan.     Yadi Mercado, JORDYT  11/8/2018

## 2018-11-12 ENCOUNTER — DOCUMENTATION ONLY (OUTPATIENT)
Dept: REHABILITATION | Facility: HOSPITAL | Age: 53
End: 2018-11-12

## 2018-11-12 NOTE — PROGRESS NOTES
Face to face meeting completed with Yadi Mercado PT regarding current status and progress of   Brigid Jernigan .  Mae Caicedo, PTA

## 2018-11-16 ENCOUNTER — CLINICAL SUPPORT (OUTPATIENT)
Dept: REHABILITATION | Facility: HOSPITAL | Age: 53
End: 2018-11-16
Attending: STUDENT IN AN ORGANIZED HEALTH CARE EDUCATION/TRAINING PROGRAM
Payer: COMMERCIAL

## 2018-11-16 DIAGNOSIS — R53.1 DECREASED STRENGTH: ICD-10-CM

## 2018-11-16 DIAGNOSIS — R29.898 DECREASED ROM OF NECK: ICD-10-CM

## 2018-11-16 DIAGNOSIS — Z74.09 IMPAIRED FUNCTIONAL MOBILITY, BALANCE, GAIT, AND ENDURANCE: ICD-10-CM

## 2018-11-16 PROCEDURE — 97110 THERAPEUTIC EXERCISES: CPT | Mod: PO | Performed by: PHYSICAL THERAPIST

## 2018-11-16 PROCEDURE — 97116 GAIT TRAINING THERAPY: CPT | Mod: PO | Performed by: PHYSICAL THERAPIST

## 2018-11-16 NOTE — PROGRESS NOTES
Physical Therapy Progress Note      Name: Brigid Jernigan  Clinic Number: [id number]  Diagnosis: G71.11 (ICD-10-CM) - Myotonic dystrophy  Physician: Luis Felipe Vo MD  Treatment Orders: PT Eval and Treat          Past Medical History:   Diagnosis Date    Abnormal EKG      Myotonic dystrophy      Pacemaker      PAF (paroxysmal atrial fibrillation)      Sleep apnea           Precautions: standard  Visit #: 2/14 (exp. 12/17/18)     (Visit total 2017= 56)  Date of Eval: 5/9/2017  POC expiration date: 12/17/18    Subjective   Pt reports: that she is doing well without any new symptoms.  Pain Scale: denies     Objective      Patient received individual therapy to increase strength, endurance, ROM, flexibility, posture, core stabilization and head control with activities as follows:      Pt participated in therapeutic exercises x 38 minutes to address ROM, strength, balance, and mobility which includes:    10 minutes on the [employer] stepper, level 13.0, BUE/LE    Supine: SLR, forward flexion, 3# for LLE, 4# for RLE, 3 sets of 10 bilaterally, min VC and min TC to prevent L knee from bending        Bridging with physioball, 1 set of 10        Bridging with physioball with BLE extension, 1 set of 10        DKTC with physioball, 2 sets of 10        Trunk rotation with ball, 2 sets of 10           Pt participated in gait training x 12 minutes to improve current level of mobility, decrease physical assistance, and decrease AD which includes:        Ambulation with LBQC for 104 feet, min A with HHA x 1, mod VC for improving upright posture    Written Home Exercises: 6/28/18- modified scalene stretch, illustrated copy to be provided  1/29/18- side stepping at counter with 1-2 UE support  12/4/17- AROM cervical SB and rotation  assisted quad/ hip flexor stretch in side lying  eval- chin tucks, rows with OTB, hip flexor and knee extensor stretch in sidelying  Pt demo fair understanding of the education provided. Brigid  demonstrated fair return demonstration of activities.      Education provided re: 12/18/17- cervical collar application  POC, HEP     Pt has no cultural, educational or language barriers to learning provided.     Assessment   Brigid tolerated treatment session well with no problems. PT focused on increasing core stabilization, improving B hip strength, and increasing ambulation tolerance with decreased AD and decreased physical assistance. Pt was able to participate in all strengthening exercises with moderate fatigue and rest breaks between each exercise. Pt was also able to tolerate level 13 of the [employer] stepper as exhibited by not needing a rest break, decrease in resistance, or the machine turning off due to lack of consistent propulsion. Pt was also able to participate in ambulation with the LBQC without a rest break. Pt did need min A for HHA x 1 the entire distance ambulated in order to prevent LOB. Pt needed mod VC in order to maintain upright posture throughout the entire distance ambulated. Plan to continue gait training with the LBQC in order to improve current level of mobility, increase ambulation tolerance, and increase pt's independence with ambulation. Pt is appropriate to continue physical therapy services in order to address the remaining impairments listed on the problem list to increase pt's ability to participate in functional mobility and daily activities within the community and home with reduced restrictions.      Pt prognosis is Good. Pt will continue to benefit from skilled outpatient physical therapy to address the deficits listed in the problem list, provide pt/family education and to maximize pt's level of independence in the home and community environment.      Activity limitations/ Participation Restrictions:   Fall Risk - impaired balance   Weakness  Impaired muscle tone  Poor head control  Decreased ROM  Gait deviations   Decreased ambulation   Decreased activity tolerance  "  Decreased independence with daily activities   Requires skilled supervision to complete and progress HEP   Requires skilled PT for DME/ orthotic recommendations     Pt's spiritual, cultural and educational needs considered and pt agreeable to plan of care and GOALS as stated below:    Short term goals: 6 weeks, pt agrees to goals set. (As of 10/25/18)  1. Pt will perform chin tucks in supine with supervision to improve independent with HEP. Met 7/5/17  2. Assist pt with obtaining appropriate cervical brace to improve head control. Met 1/9/18- pt has but is not consistently wearing  3. Pt will demonstrate improved knee ext strength on left to 4/5 to improve safety with ambulation and sit<>stand. Met 9/28/17   4. Pt will demonstrate improved cervical rotation PROM to 20 degrees to improve functional head control. Met 7/5/17  5. Pt will perform sit<>stand consistently from 22" height surface with supervision to improve independence. Met 7/5/17                                                         Revised 8/31/17:  Pt will perform sit<>stand from 19" height chair to improve independence with sitting in various chairs around her home and decreased                                               need for "pop up seat". ~same pt stood from 19" height with BUE and 4 attempts, supervision without verbal cues (10/25/2018)     Long term goals: 12 weeks, pt agrees to goals set  6. Pt will perform basic HEP for gross strengthening with supervision to deter worsening of functional limitations. Met 8/31/17- pt's sister reports poor current compliance  7. Pt will demonstrate improved bilateral hip strength to 3/5 to improve balance and independence with mobility. Met 7/5/17  8. New 7/5/17: Pt will demonstrate improved bilateral hip strength to 4/5 to improve balance and independence with mobility. same/ Nearly met-  hip flex L= 4-/5, R= 4/5, hip ext R= 4-/5, L= 4+/5,  hip abd WALESKA=4/5, hip add R= 4/5, L= 4+/5  9. Pt will " demonstrate improved cervical extension strength to 3+/5 to improve head control. Met 7/31/17  10. Pt will demonstrate improved cervical deep flexor strength to 3+/5 to improve head control. Met 7/31/17   11. Pt will demonstrate improve balance and gait by scoring 14/28 on Tinetti. Met 8/31/17- 17/28                                      Revised 8/31/17: pt will demonstrate improved balance/ gait and decreased fall risk to moderate by scoring 19/28 on Tinetti                                            Assessment. met- 19/28, continues to improve- 22/28 (8/1/18), 17/30 (10/08/2018); improved 21/28 (10/25/18)                                         12. Pt will demonstrate improved cervical PROM for rotation to 40 degrees to improve functional head control. same-  14 deg AAROM R rotation, 45 deg L;                                          13. New 8/1/18- pt will ambulate 50ft with LBQC and supervision over level tile to demonstrate improved household mobility with decreased AD- improving about 108 feet with LBQC and min HHA, away from supporting surface , mod VC to improve posture    Plan    Continue PT 2x weekly under established Plan of Care, with treatment to include: pt education, HEP, therapeutic exercises, neuromuscular re-education/balance exercises, therapeutic activities, joint mobilizations, manual therapy, gait training PRN, and modalities PRN, to work towards established goals. Pt may be seen by PTA to carry out plan of care.      Valerie Barber, SPT     11/16/2018       I, Yadi Mercado, DPT, certify that I was present in the room directing the student in service delivery and guiding them using my skilled judgment. As the co-signing therapist I have reviewed the students documentation and am responsible for the treatment, assessment, and plan.     Yadi Mercado DPT  11/16/2018

## 2018-11-19 ENCOUNTER — CLINICAL SUPPORT (OUTPATIENT)
Dept: REHABILITATION | Facility: HOSPITAL | Age: 53
End: 2018-11-19
Attending: STUDENT IN AN ORGANIZED HEALTH CARE EDUCATION/TRAINING PROGRAM
Payer: COMMERCIAL

## 2018-11-19 DIAGNOSIS — Z74.09 IMPAIRED FUNCTIONAL MOBILITY, BALANCE, GAIT, AND ENDURANCE: ICD-10-CM

## 2018-11-19 DIAGNOSIS — R29.898 DECREASED ROM OF NECK: ICD-10-CM

## 2018-11-19 DIAGNOSIS — R53.1 DECREASED STRENGTH: ICD-10-CM

## 2018-11-19 PROCEDURE — 97110 THERAPEUTIC EXERCISES: CPT | Mod: PO | Performed by: PHYSICAL THERAPIST

## 2018-11-20 NOTE — PLAN OF CARE
Physical Therapy Progress Note      Name: Brigid Jernigan  Clinic Number: [id number]  Diagnosis: G71.11 (ICD-10-CM) - Myotonic dystrophy  Physician: Luis Felipe Vo MD  Treatment Orders: PT Eval and Treat          Past Medical History:   Diagnosis Date    Abnormal EKG      Myotonic dystrophy      Pacemaker      PAF (paroxysmal atrial fibrillation)      Sleep apnea           Precautions: standard  Visit #: 3/14 (exp. 12/17/18)     (Visit total 2017= 56)  Date of Eval: 5/9/2017  POC expiration date: 12/17/18    Subjective   Pt reports: that she is doing well without any new complaints or symptoms.   Pain Scale: denies     Objective      Patient received individual therapy to increase strength, endurance, ROM, flexibility, posture, core stabilization and head control with activities as follows:      Pt participated in therapeutic exercises x 45 minutes to address ROM, strength, balance, and mobility which includes:    Supine: SLR, forward flexion, 3# for LLE, 4# for RLE, 3 sets of 10 bilaterally, min VC and min TC to prevent L knee from bending             cervical PROM for SB and rotation, distraction              Pec stretching, 3 x 30 sec       WALESKA shoulder flexion 5# 2 x 10      Chest press 5# bar 2 x 10x    L cervical rotation: 40 degrees  R cervical rotation: 9 degrees    Hip flex: R=4-/5 , L= 4/5  Hip abd: R= 4+/5, L= 4+/5  Hip add: R=4+/5, L= 4-/5  Hip ext: R= 4/5, L= 4+/5      Tinetti Balance Assessment  Balance:  1. Sitting Balance 1   Leans/ slides in chair= 0   Steady= 1  2. Rises from chair 1   Unable without help= 0   Able, uses arms= 1   Able without use of arms= 2  3. Attempts to rise 1   Unable without help= 0    Able, >1 attmept required-= 1   Able, 1 attempt= 2  4. Immediate standing balance (1st 5 seconds) 1   Unsteady (swaggers, moves feet, trunk sway)= 0   Steady but uses walker or other support= 1   Narrow base of support without walker or support= 2  5. Nudged 1   Begins to fall=  0   Staggers, grabs, catches self= 1   Steady= 2  6. Standing balance 2   Unsteady= 0   Steady but wide LEYDI or uses AD=1   Narrow LEYDI without AD=2  7. Eyes closed 1   Unsteady= 0   Steady= 1  8. Turning 360 degrees 0   Discontinuous steps= 0   Continuous steps= 1   Unsteady= 0   Steady= 1  9. Sitting down 1   Unsafe= 0   Uses arms or not in a smooth motion= 1   Safe, smooth motion= 2  Balance score= 9  Gait:  1. Initiation 1   hesitates or multiple attempts to start= 0   No hesitancy= 1  2. Step length 2   Step to= 0   One foot passes= 1   reciprocal pattern= 2  3. Step height 2    Neither foot clears floor= 0   One foot clears floor= 1   Both feet clear floor= 2  4. Step symmetry 1   Not symmetrical= 0   Appears symmetrical= 1  5. Step continuity 1   Not continuous= 0   Appears continuous= 1  6. Path 1   Marked deviation= 0   Mild/moderate deviation or uses A.D.= 1   Straight without A.D.= 2  7. Trunk 0   Marked sway or uses A.D.= 0   No sway, but flexes knees or back, spread arms out while walking= 1   No sway, no flexion, no use of UE, no use of A.D.= 2  8. Walking stance 1   Heels apart= 0   Heels almost touching while walking= 1  Gait score= 9  Total score= 18/28 , high fall risk    0-18= high fall risk  19-23= moderate fall risk  24-28= low fall risk        Written Home Exercises: 6/28/18- modified scalene stretch, illustrated copy to be provided  1/29/18- side stepping at counter with 1-2 UE support  12/4/17- AROM cervical SB and rotation  assisted quad/ hip flexor stretch in side lying  eval- chin tucks, rows with OTB, hip flexor and knee extensor stretch in sidelying  Pt demo fair understanding of the education provided. Brigid demonstrated fair return demonstration of activities.      Education provided re: 12/18/17- cervical collar application  POC, HEP     Pt has no cultural, educational or language barriers to learning provided.     Assessment   Brigid tolerated treatment session well with no  problems. Pt has made significant improvements with B hip strength primarily with B hip extension and abduction. Primary B hip strength impairments include B hip flexion and L hip adduction. Pt has also shown improvement with ambulation using a LBQC for a distance of about 108 feet with min-mod HHA x 1 intermittently. This improvement exhibits an increased ambulation tolerance and decreased physical assistance needed to ambulate. Plan to continue to incorporate ambulation with a LBQC in future treatment session in order to further  pt's need for physical assistance and increase independence with mobility. Pt exhibited a slight decline in the Tinetti score assessing balance impairments and an increase in the height for successfully performing the sit to stand transfer. This decline can be secondary to increased fatigue today. Pt had primary balance impairments with turning in a 360 degree [place] and prevent LOB when given an external pertubation. Plan to incorporate improving balance impairments and the sit to stand transfer from a decreased height to increase pt's safety and independence with functional mobility. Lastly, pt exhibited slightly decreased cervical rotation of the R.  Pt is appropriate to continue physical therapy services in order to address the remaining impairments listed on the problem list to increase pt's ability to participate in functional mobility and daily activities within the community and home with reduced restrictions.      Pt prognosis is Good. Pt will continue to benefit from skilled outpatient physical therapy to address the deficits listed in the problem list, provide pt/family education and to maximize pt's level of independence in the home and community environment.      Activity limitations/ Participation Restrictions:   Fall Risk - impaired balance   Weakness  Impaired muscle tone  Poor head control  Decreased ROM  Gait deviations   Decreased ambulation   Decreased activity  "tolerance   Decreased independence with daily activities   Requires skilled supervision to complete and progress HEP   Requires skilled PT for DME/ orthotic recommendations     Pt's spiritual, cultural and educational needs considered and pt agreeable to plan of care and GOALS as stated below:    Short term goals: 6 weeks, pt agrees to goals set. (As of 11/19/18)  1. Pt will perform chin tucks in supine with supervision to improve independent with HEP. Met 7/5/17  2. Assist pt with obtaining appropriate cervical brace to improve head control. Met 1/9/18- pt has but is not consistently wearing  3. Pt will demonstrate improved knee ext strength on left to 4/5 to improve safety with ambulation and sit<>stand. Met 9/28/17   4. Pt will demonstrate improved cervical rotation PROM to 20 degrees to improve functional head control. Met 7/5/17  5. Pt will perform sit<>stand consistently from 22" height surface with supervision to improve independence. Met 7/5/17                                                         Revised 8/31/17:  Pt will perform sit<>stand from 19" height chair to improve independence with sitting in various chairs around her home and decreased need for "pop up seat". ~same pt stood from 19" height with BUE and 4 attempts, supervision without verbal cues (10/25/2018); inconsistent/decline to 20" height chair with greater than 4 attempts to rise with BUE support     Long term goals: 12 weeks, pt agrees to goals set  6. Pt will perform basic HEP for gross strengthening with supervision to deter worsening of functional limitations. Met 8/31/17- pt's sister reports poor current compliance  7. Pt will demonstrate improved bilateral hip strength to 3/5 to improve balance and independence with mobility. Met 7/5/17  8. New 7/5/17: Pt will demonstrate improved bilateral hip strength to 4/5 to improve balance and independence with mobility. same/ Nearly met-  Hip flex: R=4-/5 , L= 4/5; Hip abd: R= 4+/5, L= 4+/5; " Hip add: R=4+/5, L= 4-/5; Hip ext: R= 4/5, L= 4+/5  9. Pt will demonstrate improved cervical extension strength to 3+/5 to improve head control. Met 7/31/17  10. Pt will demonstrate improved cervical deep flexor strength to 3+/5 to improve head control. Met 7/31/17   11. Pt will demonstrate improve balance and gait by scoring 14/28 on Tinetti. Met 8/31/17- 17/28                                      Revised 8/31/17: pt will demonstrate improved balance/ gait and decreased fall risk to moderate by scoring 19/28 on Tinetti                                            Assessment. met- 19/28, continues to improve- 22/28 (8/1/18), 17/30 (10/08/2018); improved 21/28 (10/25/18); varies 18/28 (11/19/18)                                         12. Pt will demonstrate improved cervical PROM for rotation to 40 degrees to improve functional head control. declined-  9 deg AAROM R rotation, 40 deg L;                                          13. New 8/1/18- pt will ambulate 50ft with LBQC and supervision over level tile to demonstrate improved household mobility with decreased AD- improving about 108 feet with LBQC and min-mod HHA x 1, mod VC to improve posture    Plan    Continue PT 2x weekly under established Plan of Care, with treatment to include: pt education, HEP, therapeutic exercises, neuromuscular re-education/balance exercises, therapeutic activities, joint mobilizations, manual therapy, gait training PRN, and modalities PRN, to work towards established goals. Pt may be seen by PTA to carry out plan of care.      Valerie Barber, SPT     11/19/2018       I, Yadi Mercado, DPT, certify that I was present in the room directing the student in service delivery and guiding them using my skilled judgment. As the co-signing therapist I have reviewed the students documentation and am responsible for the treatment, assessment, and plan.     Yadi Mercado DPT  11/19/2018

## 2018-11-26 ENCOUNTER — CLINICAL SUPPORT (OUTPATIENT)
Dept: REHABILITATION | Facility: HOSPITAL | Age: 53
End: 2018-11-26
Attending: STUDENT IN AN ORGANIZED HEALTH CARE EDUCATION/TRAINING PROGRAM
Payer: COMMERCIAL

## 2018-11-26 DIAGNOSIS — R53.1 DECREASED STRENGTH: ICD-10-CM

## 2018-11-26 DIAGNOSIS — R29.898 DECREASED ROM OF NECK: ICD-10-CM

## 2018-11-26 DIAGNOSIS — Z74.09 IMPAIRED FUNCTIONAL MOBILITY, BALANCE, GAIT, AND ENDURANCE: ICD-10-CM

## 2018-11-26 PROCEDURE — 97112 NEUROMUSCULAR REEDUCATION: CPT | Mod: PO

## 2018-11-26 PROCEDURE — 97116 GAIT TRAINING THERAPY: CPT | Mod: PO

## 2018-11-26 PROCEDURE — 97140 MANUAL THERAPY 1/> REGIONS: CPT | Mod: PO

## 2018-11-26 PROCEDURE — 97110 THERAPEUTIC EXERCISES: CPT | Mod: PO

## 2018-11-26 NOTE — PROGRESS NOTES
"Physical Therapy Progress Note      Name: Brigid Jernigan  Clinic Number: [id number]  Diagnosis: G71.11 (ICD-10-CM) - Myotonic dystrophy  Physician: Luis Felipe Vo MD  Treatment Orders: PT Eval and Treat          Past Medical History:   Diagnosis Date    Abnormal EKG      Myotonic dystrophy      Pacemaker      PAF (paroxysmal atrial fibrillation)      Sleep apnea           Precautions: standard  Visit #: 4/14 (exp. 12/17/18)     (Visit total 2017= 56)  Date of Eval: 5/9/2017  POC expiration date: 12/17/18    Subjective   Pt reports: " I'm doing ok."   Pain Scale: denies     Objective      Patient received individual therapy to increase strength, endurance, ROM, flexibility, posture, core stabilization and head control with activities as follows:     Supine manual stretching x 15 mins               cervical PROM for SB and rotation, distraction              pec stretching              Chin tucks x 10 reps, 3 sec hold    Pt participated in therapeutic exercises x 20 minutes to address ROM, strength, balance, and mobility which includes:    10 minutes on the [place] stepper, level 13.0, BUE/LE    Supine:         Bridging with physioball, 1 set of 10        Bridging with physioball with BLE extension, 1 set of 10        Trunk rotation with ball, 2 sets of 10         Neuromuscular re-education x 10  Min including:                 X 5 min of static standing with weight shifting medial/lateral and occasional 1 UE support for balance.                   1 x 15 reps of B UE chest press with ball while in standing with RW and Close CGA    Pt participated in gait training x 15 minutes to improve current level of mobility, decrease physical assistance, and decrease AD which includes:        Ambulation with LBQC for 147 feet, min A with HHA x 1, mod VC for improving upright posture    Written Home Exercises: 6/28/18- modified scalene stretch, illustrated copy to be provided  1/29/18- side stepping at counter with 1-2 UE " support  12/4/17- AROM cervical SB and rotation  assisted quad/ hip flexor stretch in side lying  eval- chin tucks, rows with OTB, hip flexor and knee extensor stretch in sidelying  Pt demo fair understanding of the education provided. Brigid demonstrated fair return demonstration of activities.      Education provided re: 12/18/17- cervical collar application  POC, HEP     Pt has no cultural, educational or language barriers to learning provided.     Assessment   Brigid tolerated treatment session well and did not have any problems.  Pt was able to improve her gait distance and agreeable to more standing activities.  PT requires periodic UE support for weight shifting activity with the RW.  CGA for all activities and no loss of balance noted.  Cont with POC.       Pt prognosis is Good. Pt will continue to benefit from skilled outpatient physical therapy to address the deficits listed in the problem list, provide pt/family education and to maximize pt's level of independence in the home and community environment.      Activity limitations/ Participation Restrictions:   Fall Risk - impaired balance   Weakness  Impaired muscle tone  Poor head control  Decreased ROM  Gait deviations   Decreased ambulation   Decreased activity tolerance   Decreased independence with daily activities   Requires skilled supervision to complete and progress HEP   Requires skilled PT for DME/ orthotic recommendations     Pt's spiritual, cultural and educational needs considered and pt agreeable to plan of care and GOALS as stated below:    Short term goals: 6 weeks, pt agrees to goals set. (As of 10/25/18)  1. Pt will perform chin tucks in supine with supervision to improve independent with HEP. Met 7/5/17  2. Assist pt with obtaining appropriate cervical brace to improve head control. Met 1/9/18- pt has but is not consistently wearing  3. Pt will demonstrate improved knee ext strength on left to 4/5 to improve safety with ambulation and  "sit<>stand. Met 9/28/17   4. Pt will demonstrate improved cervical rotation PROM to 20 degrees to improve functional head control. Met 7/5/17  5. Pt will perform sit<>stand consistently from 22" height surface with supervision to improve independence. Met 7/5/17                                                         Revised 8/31/17:  Pt will perform sit<>stand from 19" height chair to improve independence with sitting in various chairs around her home and decreased                                               need for "pop up seat". ~same pt stood from 19" height with BUE and 4 attempts, supervision without verbal cues (10/25/2018)     Long term goals: 12 weeks, pt agrees to goals set  6. Pt will perform basic HEP for gross strengthening with supervision to deter worsening of functional limitations. Met 8/31/17- pt's sister reports poor current compliance  7. Pt will demonstrate improved bilateral hip strength to 3/5 to improve balance and independence with mobility. Met 7/5/17  8. New 7/5/17: Pt will demonstrate improved bilateral hip strength to 4/5 to improve balance and independence with mobility. same/ Nearly met-  hip flex L= 4-/5, R= 4/5, hip ext R= 4-/5, L= 4+/5,  hip abd WALESKA=4/5, hip add R= 4/5, L= 4+/5  9. Pt will demonstrate improved cervical extension strength to 3+/5 to improve head control. Met 7/31/17  10. Pt will demonstrate improved cervical deep flexor strength to 3+/5 to improve head control. Met 7/31/17   11. Pt will demonstrate improve balance and gait by scoring 14/28 on Tinetti. Met 8/31/17- 17/28                                      Revised 8/31/17: pt will demonstrate improved balance/ gait and decreased fall risk to moderate by scoring 19/28 on Tinetti                                            Assessment. met- 19/28, continues to improve- 22/28 (8/1/18), 17/30 (10/08/2018); improved 21/28 (10/25/18)                                         12. Pt will demonstrate improved cervical PROM " for rotation to 40 degrees to improve functional head control. same-  14 deg AAROM R rotation, 45 deg L;                                          13. New 8/1/18- pt will ambulate 50ft with LBQC and supervision over level tile to demonstrate improved household mobility with decreased AD- improving about 108 feet with LBQC and min HHA, away from supporting surface , mod VC to improve posture    Plan    Continue PT 2x weekly under established Plan of Care, with treatment to include: pt education, HEP, therapeutic exercises, neuromuscular re-education/balance exercises, therapeutic activities, joint mobilizations, manual therapy, gait training PRN, and modalities PRN, to work towards established goals. Pt may be seen by PTA to carry out plan of care.          Mae Caicedo, PTA    11/26/2018

## 2018-11-28 ENCOUNTER — DOCUMENTATION ONLY (OUTPATIENT)
Dept: REHABILITATION | Facility: HOSPITAL | Age: 53
End: 2018-11-28

## 2018-11-29 ENCOUNTER — CLINICAL SUPPORT (OUTPATIENT)
Dept: REHABILITATION | Facility: HOSPITAL | Age: 53
End: 2018-11-29
Attending: STUDENT IN AN ORGANIZED HEALTH CARE EDUCATION/TRAINING PROGRAM
Payer: COMMERCIAL

## 2018-11-29 DIAGNOSIS — R29.898 DECREASED ROM OF NECK: ICD-10-CM

## 2018-11-29 DIAGNOSIS — R53.1 DECREASED STRENGTH: ICD-10-CM

## 2018-11-29 DIAGNOSIS — Z74.09 IMPAIRED FUNCTIONAL MOBILITY, BALANCE, GAIT, AND ENDURANCE: ICD-10-CM

## 2018-11-29 PROCEDURE — 97110 THERAPEUTIC EXERCISES: CPT | Mod: PO | Performed by: PHYSICAL THERAPIST

## 2018-11-29 NOTE — PROGRESS NOTES
Physical Therapy Progress Note      Name: Brigid Jernigan  Clinic Number: [id number]  Diagnosis: G71.11 (ICD-10-CM) - Myotonic dystrophy  Physician: Luis Felipe Vo MD  Treatment Orders: PT Eval and Treat          Past Medical History:   Diagnosis Date    Abnormal EKG      Myotonic dystrophy      Pacemaker      PAF (paroxysmal atrial fibrillation)      Sleep apnea           Precautions: standard  Visit #: 5/14 (exp. 12/17/18)     (Visit total 2017= 56)  Date of Eval: 5/9/2017  POC expiration date: 12/17/18    Subjective   Pt reports: she is doing well without any new complaints.  Pain Scale: denies     Objective    Pt arrived 15 minutes late to physical therapy treatment, unable to accommodate.     Patient received individual therapy to increase strength, endurance, ROM, flexibility, posture, core stabilization and head control with activities as follows:     Pt participated in therapeutic exercises x 32 minutes to address ROM, strength, balance, and mobility which includes:   Supine:               cervical PROM for SB and rotation, distraction              pec stretching              Chin tucks x 10 reps, 3 sec hold      WALESKA shoulder flexion 5# 15x      Chest press 5# bar 2 x 10x       SLR, forward flexion, 3# for LLE, 4# for RLE, 3 sets of 10 bilaterally, min VC and min TC to prevent L knee from bending        Trunk rotation with ball, 1 set of 10          Written Home Exercises: 6/28/18- modified scalene stretch, illustrated copy to be provided  1/29/18- side stepping at counter with 1-2 UE support  12/4/17- AROM cervical SB and rotation  assisted quad/ hip flexor stretch in side lying  eval- chin tucks, rows with OTB, hip flexor and knee extensor stretch in sidelying  Pt demo fair understanding of the education provided. Brigid demonstrated fair return demonstration of activities.      Education provided re: 12/18/17- cervical collar application  POC, HEP     Pt has no cultural, educational or language  barriers to learning provided.     Assessment   Brigid tolerated treatment session well.  PT focused on increasing cervical mobility, improving upright posture, and increasing B hip strength. Pt was able to toelrate all exercises with moderate fatigue and need for minimal rest breaks. Pt was able to continue progress with strengthening of the B hips. Pt needed min Vc in order maintain proper form and prevent knee from bending. Pt exhibited observable slightly improve cervical mobility after participating in stretching of the cervical and thoracic musculature as well as strengthening exercises to increase cervical stability. Pt is appropriate to continue physical therapy services in order to address the remaining impairments listed on the problem list to increase pt's ability to participate in functional mobility and daily activities within the community and home with reduced restrictions.       Pt prognosis is Good. Pt will continue to benefit from skilled outpatient physical therapy to address the deficits listed in the problem list, provide pt/family education and to maximize pt's level of independence in the home and community environment.      Activity limitations/ Participation Restrictions:   Fall Risk - impaired balance   Weakness  Impaired muscle tone  Poor head control  Decreased ROM  Gait deviations   Decreased ambulation   Decreased activity tolerance   Decreased independence with daily activities   Requires skilled supervision to complete and progress HEP   Requires skilled PT for DME/ orthotic recommendations     Pt's spiritual, cultural and educational needs considered and pt agreeable to plan of care and GOALS as stated below:    Short term goals: 6 weeks, pt agrees to goals set. (As of 11/19/18)  1. Pt will perform chin tucks in supine with supervision to improve independent with HEP. Met 7/5/17  2. Assist pt with obtaining appropriate cervical brace to improve head control. Met 1/9/18- pt has but  "is not consistently wearing  3. Pt will demonstrate improved knee ext strength on left to 4/5 to improve safety with ambulation and sit<>stand. Met 9/28/17   4. Pt will demonstrate improved cervical rotation PROM to 20 degrees to improve functional head control. Met 7/5/17  5. Pt will perform sit<>stand consistently from 22" height surface with supervision to improve independence. Met 7/5/17                                                         Revised 8/31/17:  Pt will perform sit<>stand from 19" height chair to improve independence with sitting in various chairs around her home and decreased need for "pop up seat". ~same pt stood from 19" height with BUE and 4 attempts, supervision without verbal cues (10/25/2018); inconsistent/decline to 20" height chair with greater than 4 attempts to rise with BUE support     Long term goals: 12 weeks, pt agrees to goals set  6. Pt will perform basic HEP for gross strengthening with supervision to deter worsening of functional limitations. Met 8/31/17- pt's sister reports poor current compliance  7. Pt will demonstrate improved bilateral hip strength to 3/5 to improve balance and independence with mobility. Met 7/5/17  8. New 7/5/17: Pt will demonstrate improved bilateral hip strength to 4/5 to improve balance and independence with mobility. same/ Nearly met-  Hip flex: R=4-/5 , L= 4/5; Hip abd: R= 4+/5, L= 4+/5; Hip add: R=4+/5, L= 4-/5; Hip ext: R= 4/5, L= 4+/5  9. Pt will demonstrate improved cervical extension strength to 3+/5 to improve head control. Met 7/31/17  10. Pt will demonstrate improved cervical deep flexor strength to 3+/5 to improve head control. Met 7/31/17   11. Pt will demonstrate improve balance and gait by scoring 14/28 on Tinetti. Met 8/31/17- 17/28                                      Revised 8/31/17: pt will demonstrate improved balance/ gait and decreased fall risk to moderate by scoring 19/28 on Tinetti "                                            Assessment. met- 19/28, continues to improve- 22/28 (8/1/18), 17/30 (10/08/2018); improved 21/28 (10/25/18); varies 18/28 (11/19/18)                                         12. Pt will demonstrate improved cervical PROM for rotation to 40 degrees to improve functional head control. declined-  9 deg AAROM R rotation, 40 deg L;                                          13. New 8/1/18- pt will ambulate 50ft with LBQC and supervision over level tile to demonstrate improved household mobility with decreased AD- improving about 108 feet with LBQC and min-mod HHA x 1, mod VC to improve posture    Plan    Continue PT 2x weekly under established Plan of Care, with treatment to include: pt education, HEP, therapeutic exercises, neuromuscular re-education/balance exercises, therapeutic activities, joint mobilizations, manual therapy, gait training PRN, and modalities PRN, to work towards established goals. Pt may be seen by PTA to carry out plan of care.          Valerie Barber, SPT    11/29/2018        I, Yadi Mercado, DPT, certify that I was present in the room directing the student in service delivery and guiding them using my skilled judgment. As the co-signing therapist I have reviewed the students documentation and am responsible for the treatment, assessment, and plan.     Yadi Mercado DPT  11/29/2018

## 2018-12-05 ENCOUNTER — CLINICAL SUPPORT (OUTPATIENT)
Dept: REHABILITATION | Facility: HOSPITAL | Age: 53
End: 2018-12-05
Attending: STUDENT IN AN ORGANIZED HEALTH CARE EDUCATION/TRAINING PROGRAM
Payer: COMMERCIAL

## 2018-12-05 DIAGNOSIS — Z74.09 IMPAIRED FUNCTIONAL MOBILITY, BALANCE, GAIT, AND ENDURANCE: ICD-10-CM

## 2018-12-05 DIAGNOSIS — R53.1 DECREASED STRENGTH: ICD-10-CM

## 2018-12-05 DIAGNOSIS — R29.898 DECREASED ROM OF NECK: ICD-10-CM

## 2018-12-05 PROCEDURE — 97110 THERAPEUTIC EXERCISES: CPT | Mod: PO

## 2018-12-05 NOTE — PROGRESS NOTES
Physical Therapy Progress Note      Name: Brigid Jernigan  Clinic Number: [id number]  Diagnosis: G71.11 (ICD-10-CM) - Myotonic dystrophy  Physician: Luis Felipe Vo MD  Treatment Orders: PT Eval and Treat          Past Medical History:   Diagnosis Date    Abnormal EKG      Myotonic dystrophy      Pacemaker      PAF (paroxysmal atrial fibrillation)      Sleep apnea           Precautions: standard  Visit #: 6/14 (exp. 12/17/18)     (Visit total 2017= 56)  Date of Eval: 5/9/2017  POC expiration date: 12/17/18    Subjective   Pt reports:   Pain Scale: denies     Objective    Pt arrived 15 minutes late to physical therapy treatment, able to accommodate.     Patient received individual therapy to increase strength, endurance, ROM, flexibility, posture, core stabilization and head control with activities as follows:     Pt participated in therapeutic exercises x 45 minutes to address ROM, strength, balance, and mobility which includes:   Supine:               cervical PROM for SB and rotation, distraction              pec stretching              Chin tucks x 10 reps, 3 sec hold      WALESKA shoulder flexion 5# 15x      Chest press 5# bar 2 x 10x        Trunk rotation with ball, 2 set of 10      Static standing w/o UE support x 7 min and 40 sec    Written Home Exercises: 6/28/18- modified scalene stretch, illustrated copy to be provided  1/29/18- side stepping at counter with 1-2 UE support  12/4/17- AROM cervical SB and rotation  assisted quad/ hip flexor stretch in side lying  eval- chin tucks, rows with OTB, hip flexor and knee extensor stretch in sidelying  Pt demo fair understanding of the education provided. Brigid demonstrated fair return demonstration of activities.      Education provided re: 12/18/17- cervical collar application  POC, HEP     Pt has no cultural, educational or language barriers to learning provided.     Assessment   Brigid tolerated treatment session well and did not have any problems.  Pt  "was able to stand statically longer with out UE support today then previous tx sessions.  No other changes noted.  Cont with POC      Pt prognosis is Good. Pt will continue to benefit from skilled outpatient physical therapy to address the deficits listed in the problem list, provide pt/family education and to maximize pt's level of independence in the home and community environment.      Activity limitations/ Participation Restrictions:   Fall Risk - impaired balance   Weakness  Impaired muscle tone  Poor head control  Decreased ROM  Gait deviations   Decreased ambulation   Decreased activity tolerance   Decreased independence with daily activities   Requires skilled supervision to complete and progress HEP   Requires skilled PT for DME/ orthotic recommendations     Pt's spiritual, cultural and educational needs considered and pt agreeable to plan of care and GOALS as stated below:    Short term goals: 6 weeks, pt agrees to goals set. (As of 11/19/18)  1. Pt will perform chin tucks in supine with supervision to improve independent with HEP. Met 7/5/17  2. Assist pt with obtaining appropriate cervical brace to improve head control. Met 1/9/18- pt has but is not consistently wearing  3. Pt will demonstrate improved knee ext strength on left to 4/5 to improve safety with ambulation and sit<>stand. Met 9/28/17   4. Pt will demonstrate improved cervical rotation PROM to 20 degrees to improve functional head control. Met 7/5/17  5. Pt will perform sit<>stand consistently from 22" height surface with supervision to improve independence. Met 7/5/17                                                         Revised 8/31/17:  Pt will perform sit<>stand from 19" height chair to improve independence with sitting in various chairs around her home and decreased need for "pop up seat". ~same pt stood from 19" height with BUE and 4 attempts, supervision without verbal cues (10/25/2018); inconsistent/decline to 20" height chair with " greater than 4 attempts to rise with BUE support     Long term goals: 12 weeks, pt agrees to goals set  6. Pt will perform basic HEP for gross strengthening with supervision to deter worsening of functional limitations. Met 8/31/17- pt's sister reports poor current compliance  7. Pt will demonstrate improved bilateral hip strength to 3/5 to improve balance and independence with mobility. Met 7/5/17  8. New 7/5/17: Pt will demonstrate improved bilateral hip strength to 4/5 to improve balance and independence with mobility. same/ Nearly met-  Hip flex: R=4-/5 , L= 4/5; Hip abd: R= 4+/5, L= 4+/5; Hip add: R=4+/5, L= 4-/5; Hip ext: R= 4/5, L= 4+/5  9. Pt will demonstrate improved cervical extension strength to 3+/5 to improve head control. Met 7/31/17  10. Pt will demonstrate improved cervical deep flexor strength to 3+/5 to improve head control. Met 7/31/17   11. Pt will demonstrate improve balance and gait by scoring 14/28 on Tinetti. Met 8/31/17- 17/28                                      Revised 8/31/17: pt will demonstrate improved balance/ gait and decreased fall risk to moderate by scoring 19/28 on Tinetti                                            Assessment. met- 19/28, continues to improve- 22/28 (8/1/18), 17/30 (10/08/2018); improved 21/28 (10/25/18); varies 18/28 (11/19/18)                                         12. Pt will demonstrate improved cervical PROM for rotation to 40 degrees to improve functional head control. declined-  9 deg AAROM R rotation, 40 deg L;                                          13. New 8/1/18- pt will ambulate 50ft with LBQC and supervision over level tile to demonstrate improved household mobility with decreased AD- improving about 108 feet with LBQC and min-mod HHA x 1, mod VC to improve posture    Plan    Continue PT 2x weekly under established Plan of Care, with treatment to include: pt education, HEP, therapeutic exercises, neuromuscular re-education/balance exercises,  therapeutic activities, joint mobilizations, manual therapy, gait training PRN, and modalities PRN, to work towards established goals. Pt may be seen by PTA to carry out plan of care.          Mae Caicedo, PTA    12/5/2018

## 2018-12-10 ENCOUNTER — CLINICAL SUPPORT (OUTPATIENT)
Dept: REHABILITATION | Facility: HOSPITAL | Age: 53
End: 2018-12-10
Attending: STUDENT IN AN ORGANIZED HEALTH CARE EDUCATION/TRAINING PROGRAM
Payer: COMMERCIAL

## 2018-12-10 DIAGNOSIS — Z74.09 IMPAIRED FUNCTIONAL MOBILITY, BALANCE, GAIT, AND ENDURANCE: ICD-10-CM

## 2018-12-10 DIAGNOSIS — R29.898 DECREASED ROM OF NECK: ICD-10-CM

## 2018-12-10 DIAGNOSIS — R53.1 DECREASED STRENGTH: ICD-10-CM

## 2018-12-10 PROCEDURE — 97110 THERAPEUTIC EXERCISES: CPT | Mod: PO | Performed by: PHYSICAL THERAPIST

## 2018-12-10 PROCEDURE — 97150 GROUP THERAPEUTIC PROCEDURES: CPT | Mod: PO | Performed by: PHYSICAL THERAPIST

## 2018-12-12 ENCOUNTER — CLINICAL SUPPORT (OUTPATIENT)
Dept: REHABILITATION | Facility: HOSPITAL | Age: 53
End: 2018-12-12
Attending: STUDENT IN AN ORGANIZED HEALTH CARE EDUCATION/TRAINING PROGRAM
Payer: COMMERCIAL

## 2018-12-12 DIAGNOSIS — R53.1 DECREASED STRENGTH: ICD-10-CM

## 2018-12-12 DIAGNOSIS — R29.898 DECREASED ROM OF NECK: ICD-10-CM

## 2018-12-12 DIAGNOSIS — Z74.09 IMPAIRED FUNCTIONAL MOBILITY, BALANCE, GAIT, AND ENDURANCE: ICD-10-CM

## 2018-12-12 PROCEDURE — 97110 THERAPEUTIC EXERCISES: CPT | Mod: PO

## 2018-12-12 PROCEDURE — 97116 GAIT TRAINING THERAPY: CPT | Mod: PO

## 2018-12-12 NOTE — PROGRESS NOTES
"Physical Therapy Progress Note      Name: Brigid Jernigan  Clinic Number: [id number]  Diagnosis: G71.11 (ICD-10-CM) - Myotonic dystrophy  Physician: Luis Felipe Vo MD  Treatment Orders: PT Eval and Treat          Past Medical History:   Diagnosis Date    Abnormal EKG      Myotonic dystrophy      Pacemaker      PAF (paroxysmal atrial fibrillation)      Sleep apnea           Precautions: standard  Visit #: 7/14 (exp. 12/17/18)     Time in: 1708  Time out: 1730     (Visit total 2017= 56)  Date of Eval: 5/9/2017  POC expiration date: 3/11/2019    Subjective   Pt reports: " [birth year] today."  Sister reported:" We have to leave at 5:30."  Pain Scale: denies     Objective    Pt arrived 20 minutes late to physical therapy treatment, unable to accommodate.     Patient received individual therapy to increase strength, endurance, ROM, flexibility, posture, core stabilization and head control with activities as follows:     Pt participated in therapeutic exercises x 10 minutes to address ROM, strength, balance, and mobility which includes:  1 x 15 reps of B LE squats on leg press with #145 lbs applied.     Gait x 15 min including:   Pt ambulated ~102ft with LBQC and CGA.  Pt required HHA x 2 times for balance.  NO loss of balance noted.     Written Home Exercises: 6/28/18- modified scalene stretch, illustrated copy to be provided  1/29/18- side stepping at counter with 1-2 UE support  12/4/17- AROM cervical SB and rotation  assisted quad/ hip flexor stretch in side lying  eval- chin tucks, rows with OTB, hip flexor and knee extensor stretch in sidelying  Pt demo fair understanding of the education provided. Brigid demonstrated fair return demonstration of activities.      Education provided re: 12/18/17- cervical collar application  POC, HEP     Pt has no cultural, educational or language barriers to learning provided.     Assessment   Brigid tolerated tx session fairly.  Limited in progression 2* time " "constraint.  Pt cont with gait training with the LBQC and squats on the leg press.  NO other changes noted.  Cont with POC      Pt prognosis is Good. Pt will continue to benefit from skilled outpatient physical therapy to address the deficits listed in the problem list, provide pt/family education and to maximize pt's level of independence in the home and community environment.      Activity limitations/ Participation Restrictions:   Fall Risk - impaired balance   Weakness  Impaired muscle tone  Poor head control  Decreased ROM  Gait deviations   Decreased ambulation   Decreased activity tolerance   Decreased independence with daily activities   Requires skilled supervision to complete and progress HEP   Requires skilled PT for DME/ orthotic recommendations  Pt's spiritual, cultural and educational needs considered and pt agreeable to plan of care and GOALS as stated below:    Short term goals: 6 weeks, pt agrees to goals set. (As of 12/11/18)  1. Pt will perform chin tucks in supine with supervision to improve independent with HEP. Met 7/5/17  2. Assist pt with obtaining appropriate cervical brace to improve head control. Met 1/9/18- pt has but is not consistently wearing  3. Pt will demonstrate improved knee ext strength on left to 4/5 to improve safety with ambulation and sit<>stand. Met 9/28/17   4. Pt will demonstrate improved cervical rotation PROM to 20 degrees to improve functional head control. Met 7/5/17  5. Pt will perform sit<>stand consistently from 22" height surface with supervision to improve independence. Met 7/5/17                                                         Revised 8/31/17:  Pt will perform sit<>stand from 19" height chair to improve independence with sitting in various chairs around her home and decreased                                                                      need for "pop up seat". improved pt stood from 19" height with BUE and 2 attempts for success.   Long term goals: " 12 weeks, pt agrees to goals set  6. Pt will perform basic HEP for gross strengthening with supervision to deter worsening of functional limitations. Met 8/31/17- pt's sister reports poor current compliance  7. Pt will demonstrate improved bilateral hip strength to 3/5 to improve balance and independence with mobility. Met 7/5/17  8. New 7/5/17: Pt will demonstrate improved bilateral hip strength to 4/5 to improve balance and independence with mobility. same/ Nearly met-  Hip flex: R=4-/5 , L= 4/5; Hip abd: R= 4+/5, L= 4+/5; Hip add: R=4+/5, L= 4-/5; Hip ext: R= 4/5, L= 4+/5  9. Pt will demonstrate improved cervical extension strength to 3+/5 to improve head control. Met 7/31/17  10. Pt will demonstrate improved cervical deep flexor strength to 3+/5 to improve head control. Met 7/31/17   11. Pt will demonstrate improve balance and gait by scoring 14/28 on Tinetti. Met 8/31/17- 17/28                                      Revised 8/31/17: pt will demonstrate improved balance/ gait and decreased fall risk to moderate by scoring 19/28 on Tinetti                                            Assessment. met- 19/28, continues to improve- 22/28 (8/1/18), 17/30 (10/08/2018); improved 21/28 (10/25/18); varies 18/28 (11/19/18)                                         12. Pt will demonstrate improved cervical PROM for rotation to 40 degrees to improve functional head control. Improved L only-  8 deg AAROM R rotation, 53 deg L                                         13. New 8/1/18- pt will ambulate 50ft with LBQC and supervision over level tile to demonstrate improved household mobility with decreased AD- improved- up to 108 feet with LBQC and min HHA x 1, mod VC to improve posture       Plan    Continue PT 2x weekly under established Plan of Care, with treatment to include: pt education, HEP, therapeutic exercises, neuromuscular re-education/balance exercises, therapeutic activities, joint mobilizations, manual therapy, gait  training PRN, and modalities PRN, to work towards established goals. Pt may be seen by PTA to carry out plan of care.          Mae Caicedo, PTA    12/12/2018

## 2018-12-12 NOTE — PLAN OF CARE
Physical Therapy Progress Note      Name: Brigid Jernigan  Clinic Number: [id number]  Diagnosis: G71.11 (ICD-10-CM) - Myotonic dystrophy  Physician: Luis Felipe Vo MD  Treatment Orders: PT Eval and Treat          Past Medical History:   Diagnosis Date    Abnormal EKG      Myotonic dystrophy      Pacemaker      PAF (paroxysmal atrial fibrillation)      Sleep apnea           Precautions: standard  Visit #: 7/14 (exp. 12/17/18)    Time in: 1700  Time out: 1750     (Visit total 2017= 56)  Date of Eval: 5/9/2017  POC expiration date: 3/11/2018    Subjective   Pt reports: no new complaints  Pain Scale: denies     Objective    Pt arrived 15 minutes late to physical therapy treatment, able to accommodate.     Patient received individual therapy to increase strength, endurance, ROM, flexibility, posture, core stabilization and head control with activities as follows:     Pt participated in therapeutic exercises x 20 minutes to address ROM, strength, balance, and mobility which includes:   stepper L12 x 10 min BUE/ LE- supervised tx    Cervical AROM (supine): rotation: R= 8 deg, L=53 deg    LE strength assessment: Hip flex: R=4-/5   L= 4/5                                            Hip abd: R= 4+/5  L= 4+/5           Hip add: R=4+/5   L= 4-/5                      Hip ext: R= 4/5   L= 4+/5    NP today:   cervical PROM for SB and rotation, distraction              pec stretching              Chin tucks x 10 reps, 3 sec hold    Static standing w/o UE support x 7 min and 40 sec    Tinetti Balance Assessment  Balance:  1. Sitting Balance 1   Leans/ slides in chair= 0   Steady= 1  2. Rises from chair 1   Unable without help= 0   Able, uses arms= 1   Able without use of arms= 2  3. Attempts to rise 2   Unable without help= 0   Able, >1 attmept required-= 1   Able, 1 attempt= 2  4. Immediate standing balance (1st 5 seconds) 1   Unsteady (swaggers, moves feet, trunk sway)= 0   Steady but uses walker or other support=  1   Narrow base of support without walker or support= 2  5. Nudged 1   Begins to fall= 0   Staggers, grabs, catches self= 1   Steady= 2  6. Standing balance 1   Unsteady= 0   Steady but wide LEYDI or uses AD=1   Narrow LEYDI without AD=2  7. Eyes closed 0   Unsteady= 0   Steady= 1  8. Turning 360 degrees 1   Discontinuous steps= 0   Continuous steps= 1   Unsteady= 0   Steady= 1  9. Sitting down 1   Unsafe= 0   Uses arms or not in a smooth motion= 1   Safe, smooth motion= 2  Balance score= 9/12  Gait:  1. Initiation 1   hesitates or multiple attempts to start= 0   No hesitancy= 1  2. Step length 2   Step to= 0   One foot passes= 1   reciprocal pattern= 2  3. Step height 2    Neither foot clears floor= 0   One foot clears floor= 1   Both feet clear floor= 2  4. Step symmetry 1   Not symmetrical= 0   Appears symmetrical= 1  5. Step continuity 1   Not continuous= 0   Appears continuous= 1  6. Path 1   Marked deviation= 0   Mild/moderate deviation or uses A.D.= 1   Straight without A.D.= 2  7. Trunk 0   Marked sway or uses A.D.= 0   No sway, but flexes knees or back, spread arms out while walking= 1   No sway, no flexion, no use of UE, no use of A.D.= 2  8. Walking stance 0   Heels apart= 0   Heels almost touching while walking= 1  Gait score= 8/16  Total score= 17/28 , high fall risk    0-18= high fall risk  19-23= moderate fall risk  24-28= low fall risk      Group therapy treatment x 25 min:    Supine:        WALESKA shoulder flexion 5# 15x      Chest press 5# bar 2 x 10x   Bridging with feet on ball 2 x 10   Bridging with ball with alt LE lift 10x    Side lying: hip abd 3# 3 x 10      Written Home Exercises: 6/28/18- modified scalene stretch, illustrated copy to be provided  1/29/18- side stepping at counter with 1-2 UE support  12/4/17- AROM cervical SB and rotation  assisted quad/ hip flexor stretch in side lying  eval- chin tucks, rows with OTB, hip flexor and knee extensor stretch in sidelying  Pt demo fair understanding  "of the education provided. Brigid demonstrated fair return demonstration of activities.      Education provided re: 12/18/17- cervical collar application  POC, HEP     Pt has no cultural, educational or language barriers to learning provided.     Assessment   Assessment period: 11/26/2018 to 12/11/2018. Pt was treated for 4 sessions since last assessment. PT continues to address cervical mobility, general strength, balance, and gait with decreased UE support. Brigid tolerates treatment sessions well. Pt demonstrates a good improvement in standing balance and gait with decreased UE support.  Pt was able to stand statically longer with out UE support than in the past (up to 7 min tolerated). Pt is also able to walk further distances over level tile with LBQC. Pt continues to require minimal hand held assist for gait with LBQC due to increased flexion at hips occurring during single limb stance phase. This is due to decreased hip and core strength and stability.  Improved consistently is noted during sit<>stand from 19" height surface. Pt uses wide base of support to perform. Consistency declines with fatigue. Cervical A/PROM and pectoral ROM continues to be significantly limited (especially towards the right). This limits pt's interaction with the environment and decreases vision while ambulating (due to inability to lift head up).  Pt can benefit from continued skilled PT to address impairments listed below to prevent functional decline (pt dx with progressive disease), improve strength, improve ROM, and improve balance to improve safety and independence with standing, ambulating, and mobility with decreased UE support.       Pt prognosis is Good. Pt will continue to benefit from skilled outpatient physical therapy to address the deficits listed in the problem list, provide pt/family education and to maximize pt's level of independence in the home and community environment.      Activity limitations/ Participation " "Restrictions:   Fall Risk - impaired balance   Weakness  Impaired muscle tone  Poor head control  Decreased ROM  Gait deviations   Decreased ambulation   Decreased activity tolerance   Decreased independence with daily activities   Requires skilled supervision to complete and progress HEP   Requires skilled PT for DME/ orthotic recommendations     Pt's spiritual, cultural and educational needs considered and pt agreeable to plan of care and GOALS as stated below:    Short term goals: 6 weeks, pt agrees to goals set. (As of 12/11/18)  1. Pt will perform chin tucks in supine with supervision to improve independent with HEP. Met 7/5/17  2. Assist pt with obtaining appropriate cervical brace to improve head control. Met 1/9/18- pt has but is not consistently wearing  3. Pt will demonstrate improved knee ext strength on left to 4/5 to improve safety with ambulation and sit<>stand. Met 9/28/17   4. Pt will demonstrate improved cervical rotation PROM to 20 degrees to improve functional head control. Met 7/5/17  5. Pt will perform sit<>stand consistently from 22" height surface with supervision to improve independence. Met 7/5/17                                                         Revised 8/31/17:  Pt will perform sit<>stand from 19" height chair to improve independence with sitting in various chairs around her home and decreased           need for "pop up seat". improved pt stood from 19" height with BUE and 2 attempts for success.   Long term goals: 12 weeks, pt agrees to goals set  6. Pt will perform basic HEP for gross strengthening with supervision to deter worsening of functional limitations. Met 8/31/17- pt's sister reports poor current compliance  7. Pt will demonstrate improved bilateral hip strength to 3/5 to improve balance and independence with mobility. Met 7/5/17  8. New 7/5/17: Pt will demonstrate improved bilateral hip strength to 4/5 to improve balance and independence with mobility. same/ Nearly met- "  Hip flex: R=4-/5 , L= 4/5; Hip abd: R= 4+/5, L= 4+/5; Hip add: R=4+/5, L= 4-/5; Hip ext: R= 4/5, L= 4+/5  9. Pt will demonstrate improved cervical extension strength to 3+/5 to improve head control. Met 7/31/17  10. Pt will demonstrate improved cervical deep flexor strength to 3+/5 to improve head control. Met 7/31/17   11. Pt will demonstrate improve balance and gait by scoring 14/28 on Tinetti. Met 8/31/17- 17/28                                      Revised 8/31/17: pt will demonstrate improved balance/ gait and decreased fall risk to moderate by scoring 19/28 on Tinetti                                            Assessment. met- 19/28, continues to improve- 22/28 (8/1/18), 17/30 (10/08/2018); improved 21/28 (10/25/18); varies 18/28 (11/19/18)                                         12. Pt will demonstrate improved cervical PROM for rotation to 40 degrees to improve functional head control. Improved L only-  8 deg AAROM R rotation, 53 deg L                                         13. New 8/1/18- pt will ambulate 50ft with LBQC and supervision over level tile to demonstrate improved household mobility with decreased AD- improved- up to 108 feet with LBQC and min HHA x 1, mod VC to improve posture    Plan   POC extended x 12 weeks to allow for continued progress towards functional goals.  Continue PT 2x weekly under established Plan of Care, with treatment to include: pt education, HEP, therapeutic exercises, neuromuscular re-education/balance exercises, therapeutic activities, joint mobilizations, manual therapy, gait training PRN, and modalities PRN, to work towards established goals. Pt may be seen by PTA to carry out plan of care.          Yadi Mercado, PT    12/10/2018         I certify the need for these services furnished under this plan of treatment and while under my care.  ____________________________________ Physician/Referring Practitioner   Date of Signature

## 2018-12-19 ENCOUNTER — CLINICAL SUPPORT (OUTPATIENT)
Dept: REHABILITATION | Facility: HOSPITAL | Age: 53
End: 2018-12-19
Attending: STUDENT IN AN ORGANIZED HEALTH CARE EDUCATION/TRAINING PROGRAM
Payer: COMMERCIAL

## 2018-12-19 DIAGNOSIS — R53.1 DECREASED STRENGTH: ICD-10-CM

## 2018-12-19 DIAGNOSIS — R29.898 DECREASED ROM OF NECK: ICD-10-CM

## 2018-12-19 DIAGNOSIS — Z74.09 IMPAIRED FUNCTIONAL MOBILITY, BALANCE, GAIT, AND ENDURANCE: ICD-10-CM

## 2018-12-19 PROCEDURE — 97112 NEUROMUSCULAR REEDUCATION: CPT | Mod: PO

## 2018-12-19 PROCEDURE — 97110 THERAPEUTIC EXERCISES: CPT | Mod: PO

## 2018-12-19 NOTE — PROGRESS NOTES
"Physical Therapy Progress Note      Name: Brigid Jernigan  Clinic Number: [id number]  Diagnosis: G71.11 (ICD-10-CM) - Myotonic dystrophy  Physician: Luis Felipe Vo MD  Treatment Orders: PT Eval and Treat          Past Medical History:   Diagnosis Date    Abnormal EKG      Myotonic dystrophy      Pacemaker      PAF (paroxysmal atrial fibrillation)      Sleep apnea           Precautions: standard  Visit #: 8/14 (exp. 12/17/18)     Time in: 1700  Time out: 1735     (Visit total 2017= 56)  Date of Eval: 5/9/2017  POC expiration date: 3/11/2019    Subjective   Pt reports: " I'm doing pretty good.  Can we work on my arms and balance today?"  Sister reported:" we have to leave at 5:30 again."   Pain Scale: denies     Objective    Pt arrived 15 minutes late to physical therapy treatment, unable to accommodate.     Patient received individual therapy to increase strength, endurance, ROM, flexibility, posture, core stabilization and head control with activities as follows:     Pt participated in therapeutic exercises x 20 minutes to address ROM, strength, balance, and mobility which includes:  Supine manual stretching                Cervical PROM for SB and rotation, distraction              pec stretching              Chin tucks x 10 reps, 3 sec hold    Supine therex:              X 15 reps of B UE chest press with #6 dowel             X 15 reps of B UE overhead flex/ext with #6 dowel.         Neuromuscular re-education x 10 min including:                      Static standing w/o UE support x 8 min and 15 sec.  Periodic 1 UE support for balance required and SBA for safety.    Written Home Exercises: 6/28/18- modified scalene stretch, illustrated copy to be provided  1/29/18- side stepping at counter with 1-2 UE support  12/4/17- AROM cervical SB and rotation  assisted quad/ hip flexor stretch in side lying  eval- chin tucks, rows with OTB, hip flexor and knee extensor stretch in sidelying  Pt demo fair understanding " "of the education provided. Brigid demonstrated fair return demonstration of activities.      Education provided re: 12/18/17- cervical collar application  POC, HEP     Pt has no cultural, educational or language barriers to learning provided.     Assessment   Brigid tolerated tx session fairly.  Limited in progression 2* time constraint.  Pt was able to increase weights on B UE chest press and OH flex/ext.  Pt was able to increase static standing balance time today.  Cont with POC      Pt prognosis is Good. Pt will continue to benefit from skilled outpatient physical therapy to address the deficits listed in the problem list, provide pt/family education and to maximize pt's level of independence in the home and community environment.      Activity limitations/ Participation Restrictions:   Fall Risk - impaired balance   Weakness  Impaired muscle tone  Poor head control  Decreased ROM  Gait deviations   Decreased ambulation   Decreased activity tolerance   Decreased independence with daily activities   Requires skilled supervision to complete and progress HEP   Requires skilled PT for DME/ orthotic recommendations  Pt's spiritual, cultural and educational needs considered and pt agreeable to plan of care and GOALS as stated below:    Short term goals: 6 weeks, pt agrees to goals set. (As of 12/11/18)  1. Pt will perform chin tucks in supine with supervision to improve independent with HEP. Met 7/5/17  2. Assist pt with obtaining appropriate cervical brace to improve head control. Met 1/9/18- pt has but is not consistently wearing  3. Pt will demonstrate improved knee ext strength on left to 4/5 to improve safety with ambulation and sit<>stand. Met 9/28/17   4. Pt will demonstrate improved cervical rotation PROM to 20 degrees to improve functional head control. Met 7/5/17  5. Pt will perform sit<>stand consistently from 22" height surface with supervision to improve independence. Met 7/5/17                 " "                                        Revised 8/31/17:  Pt will perform sit<>stand from 19" height chair to improve independence with sitting in various chairs around her home and decreased                                                                      need for "pop up seat". improved pt stood from 19" height with BUE and 2 attempts for success.   Long term goals: 12 weeks, pt agrees to goals set  6. Pt will perform basic HEP for gross strengthening with supervision to deter worsening of functional limitations. Met 8/31/17- pt's sister reports poor current compliance  7. Pt will demonstrate improved bilateral hip strength to 3/5 to improve balance and independence with mobility. Met 7/5/17  8. New 7/5/17: Pt will demonstrate improved bilateral hip strength to 4/5 to improve balance and independence with mobility. same/ Nearly met-  Hip flex: R=4-/5 , L= 4/5; Hip abd: R= 4+/5, L= 4+/5; Hip add: R=4+/5, L= 4-/5; Hip ext: R= 4/5, L= 4+/5  9. Pt will demonstrate improved cervical extension strength to 3+/5 to improve head control. Met 7/31/17  10. Pt will demonstrate improved cervical deep flexor strength to 3+/5 to improve head control. Met 7/31/17   11. Pt will demonstrate improve balance and gait by scoring 14/28 on Tinetti. Met 8/31/17- 17/28                                      Revised 8/31/17: pt will demonstrate improved balance/ gait and decreased fall risk to moderate by scoring 19/28 on Tinetti                                            Assessment. met- 19/28, continues to improve- 22/28 (8/1/18), 17/30 (10/08/2018); improved 21/28 (10/25/18); varies 18/28 (11/19/18)                                         12. Pt will demonstrate improved cervical PROM for rotation to 40 degrees to improve functional head control. Improved L only-  8 deg AAROM R rotation, 53 deg L                                         13. New 8/1/18- pt will ambulate 50ft with LBQC and supervision over level tile to demonstrate " improved household mobility with decreased AD- improved- up to 108 feet with LBQC and min HHA x 1, mod VC to improve posture       Plan    Continue PT 2x weekly under established Plan of Care, with treatment to include: pt education, HEP, therapeutic exercises, neuromuscular re-education/balance exercises, therapeutic activities, joint mobilizations, manual therapy, gait training PRN, and modalities PRN, to work towards established goals. Pt may be seen by PTA to carry out plan of care.          Mae Caicedo, PTA    12/19/2018

## 2019-01-03 ENCOUNTER — CLINICAL SUPPORT (OUTPATIENT)
Dept: REHABILITATION | Facility: HOSPITAL | Age: 54
End: 2019-01-03
Attending: STUDENT IN AN ORGANIZED HEALTH CARE EDUCATION/TRAINING PROGRAM
Payer: COMMERCIAL

## 2019-01-03 DIAGNOSIS — R29.898 DECREASED ROM OF NECK: ICD-10-CM

## 2019-01-03 DIAGNOSIS — Z74.09 IMPAIRED FUNCTIONAL MOBILITY, BALANCE, GAIT, AND ENDURANCE: ICD-10-CM

## 2019-01-03 DIAGNOSIS — R53.1 DECREASED STRENGTH: ICD-10-CM

## 2019-01-03 PROCEDURE — 97110 THERAPEUTIC EXERCISES: CPT | Mod: PO | Performed by: PHYSICAL THERAPIST

## 2019-01-03 NOTE — PLAN OF CARE
Physical Therapy Progress Note      Name: Brigid Jernigan  Clinic Number: [id number]  Diagnosis: G71.11 (ICD-10-CM) - Myotonic dystrophy  Physician: Luis Felipe Vo MD  Treatment Orders: PT Eval and Treat          Past Medical History:   Diagnosis Date    Abnormal EKG      Myotonic dystrophy      Pacemaker      PAF (paroxysmal atrial fibrillation)      Sleep apnea           Precautions: standard  Visit #: 1/?       Time in: 1705  Time out: 1745    (visit total 2018= 73)  (Visit total 2017= 56)  Date of Eval: 5/9/2017  POC expiration date: 3/11/2019    Subjective   Pt reports: no new complaints, denies HEP  Sister reported: no new complaints  Pain Scale: denies     Objective     Patient received individual therapy to increase strength, endurance, ROM, flexibility, posture, core stabilization and head control with activities as follows:     Pt participated in therapeutic exercises x 40 minutes to address ROM, strength, balance, and mobility which includes:  Cervical rotation (supine):  L= 40 deg, R= 13 deg    Strength: Hip flex: R=4/5 , L= 4/5     Hip abd: R= 5/5, L= 4+/5     Hip add: R=4+/5, L= 4/5    Hip ext: R= 4/5, L= 4-/5      Supine manual stretching while on towel roll:              PROM: upper traps and rotation     Suboccipital distraction               pec stretching    Supine: bridging with feet on ball 10x, min A to hold ball     Bridging with alternate leg lift on ball 10x, mod A to hold ball      Recumbent stepper L13 BUE/LE x 8.5 min              ambulation with LBQC, min A for balance, step to pattern x 127ft    NP today:   Chin tucks x 10 reps, 3 sec hold  Supine therex:              X 15 reps of B UE chest press with #6 dowel             X 15 reps of B UE overhead flex/ext with #6 dowel.         Neuromuscular re-education x 0 min including:                      Static standing w/o UE support x 8 min and 15 sec.  Periodic 1 UE support for balance required and SBA for safety.    Written Home  "Exercises: 6/28/18- modified scalene stretch, illustrated copy to be provided  1/29/18- side stepping at counter with 1-2 UE support  12/4/17- AROM cervical SB and rotation  assisted quad/ hip flexor stretch in side lying  eval- chin tucks, rows with OTB, hip flexor and knee extensor stretch in sidelying  Pt demo fair understanding of the education provided. Brigid demonstrated fair return demonstration of activities.      Education provided re: 12/18/17- cervical collar application  POC, HEP     Pt has no cultural, educational or language barriers to learning provided.     Assessment   assessment period: 12/12/2018 to 1/3/2019. Phani demonstrates progress with cervical ROM, LE strength, and ambulation with LBQC. Pt demonstrated a slight improvement of R cervical rotation in supine. Pt instructed to continue to sit with TV towards the right to encourage increased cervical AROM. Pt continues to present with shortened pecs, upper traps, and cervical musculature. Pt continues to stand and sit with increased thoracic kyphosis, rounded shoulders and forward head. Extent of thoracic kyphosis has improved since eval. Pt will likely continue to have an abnormal posture due to nature of diagnosis. Towel roll was added while pt received cervical PROM to encourage improved thoracic extension to decrease rounded shoulder posture. Pt demonstrates a good improvement of consistency of sit>stand from 19"height (~normal chair height) surface. Pt requires 2 attempts and BUE use for success. Pt continues to demonstrate improvements in LE strength. The following areas improved this assessment: R hip flex, R hip abd, and L hip add. L hip ext noted to be weaker than last assessment. Pt has continued weakness of [place] hip extension, limiting balance when ambulating with LBQC. Gait with LBQC has improved, with pt tolerating increased distance over level tile without hand held assist. Pt demonstrated a consistent step to gait with LBQC. Pt " "can benefit from continued skilled PT to address impairments listed below to allow pt to achieve the highest functional level possible and deter the worsening of ROM, mobility, and strength impairments due to progressive nature of diagnosis.       Pt prognosis is Good. Pt will continue to benefit from skilled outpatient physical therapy to address the deficits listed in the problem list, provide pt/family education and to maximize pt's level of independence in the home and community environment.      Activity limitations/ Participation Restrictions:   Fall Risk - impaired balance   Weakness  Impaired muscle tone  Poor head control  Decreased ROM  Gait deviations   Decreased ambulation   Decreased activity tolerance   Decreased independence with daily activities   Requires skilled supervision to complete and progress HEP   Requires skilled PT for DME/ orthotic recommendations    Pt's spiritual, cultural and educational needs considered and pt agreeable to plan of care and GOALS as stated below:    Short term goals: 6 weeks, pt agrees to goals set. (As of 12/11/18)  1. Pt will perform chin tucks in supine with supervision to improve independent with HEP. Met 7/5/17  2. Assist pt with obtaining appropriate cervical brace to improve head control. Met 1/9/18- pt has but is not consistently wearing  3. Pt will demonstrate improved knee ext strength on left to 4/5 to improve safety with ambulation and sit<>stand. Met 9/28/17   4. Pt will demonstrate improved cervical rotation PROM to 20 degrees to improve functional head control. Met 7/5/17  5. Pt will perform sit<>stand consistently from 22" height surface with supervision to improve independence. Met 7/5/17                                                         Revised 8/31/17:  Pt will perform sit<>stand from 19" height chair to improve independence with sitting in various chairs around her home and decreased                                                              " "        need for "pop up seat". improved pt stood from 19" height with BUE and 2 attempts for success.   Long term goals: 12 weeks, pt agrees to goals set  6. Pt will perform basic HEP for gross strengthening with supervision to deter worsening of functional limitations. Met 8/31/17- pt's sister reports poor current compliance  7. Pt will demonstrate improved bilateral hip strength to 3/5 to improve balance and independence with mobility. Met 7/5/17  8. New 7/5/17: Pt will demonstrate improved bilateral hip strength to 4/5 to improve balance and independence with mobility. improved-  Hip flex: R=4/5 , L= 4/5; Hip abd: R= 5/5, L= 4+/5; Hip add: R=4+/5, L= 4/5; Hip ext: R= 4/5, L= 4-/5  9. Pt will demonstrate improved cervical extension strength to 3+/5 to improve head control. Met 7/31/17  10. Pt will demonstrate improved cervical deep flexor strength to 3+/5 to improve head control. Met 7/31/17   11. Pt will demonstrate improve balance and gait by scoring 14/28 on Tinetti. Met 8/31/17- 17/28                                      Revised 8/31/17: pt will demonstrate improved balance/ gait and decreased fall risk to moderate by scoring 19/28 on Tinetti                                            Assessment. met previously- 19/28, continues to improve- 22/28 (8/1/18), 17/30 (10/08/2018); improved 21/28 (10/25/18); varies 18/28 (11/19/18)                                         12. Pt will demonstrate improved cervical PROM for rotation to 40 degrees to improve functional head control. Improved-  13 deg AAROM R rotation, 40 deg L                                         13. New 8/1/18- pt will ambulate 50ft with LBQC and supervision over level tile to demonstrate improved household mobility with decreased AD- improved- up to 127 feet with LBQC and min A x 1       Plan    Continue PT 2x weekly under established Plan of Care, with treatment to include: pt education, HEP, therapeutic exercises, neuromuscular " re-education/balance exercises, therapeutic activities,  manual therapy PRN, gait training, and modalities PRN, to work towards established goals. Pt may be seen by PTA to carry out plan of care.          Yadi Mercado, PT    1/3/2019

## 2019-01-07 ENCOUNTER — CLINICAL SUPPORT (OUTPATIENT)
Dept: REHABILITATION | Facility: HOSPITAL | Age: 54
End: 2019-01-07
Attending: STUDENT IN AN ORGANIZED HEALTH CARE EDUCATION/TRAINING PROGRAM
Payer: COMMERCIAL

## 2019-01-07 DIAGNOSIS — R29.898 DECREASED ROM OF NECK: ICD-10-CM

## 2019-01-07 DIAGNOSIS — R53.1 DECREASED STRENGTH: ICD-10-CM

## 2019-01-07 DIAGNOSIS — Z74.09 IMPAIRED FUNCTIONAL MOBILITY, BALANCE, GAIT, AND ENDURANCE: ICD-10-CM

## 2019-01-07 PROCEDURE — 97110 THERAPEUTIC EXERCISES: CPT | Mod: PO | Performed by: PHYSICAL THERAPIST

## 2019-01-07 NOTE — PROGRESS NOTES
Physical Therapy Progress Note      Name: Brigid Jernigan  Clinic Number: [id number]  Diagnosis: G71.11 (ICD-10-CM) - Myotonic dystrophy  Physician: Luis Felipe Vo MD  Treatment Orders: PT Eval and Treat          Past Medical History:   Diagnosis Date    Abnormal EKG      Myotonic dystrophy      Pacemaker      PAF (paroxysmal atrial fibrillation)      Sleep apnea           Precautions: standard  Visit #: 2/?        Time in: 1655  Time out: 1745     (visit total 2018= 73)  (Visit total 2017= 56)  Date of Eval: 5/9/2017  POC expiration date: 3/11/2019     Subjective   Pt reports: no new complaints, denies HEP  Sister reported: no new complaints  Pain Scale: denies     Objective      Patient received individual therapy to increase strength, endurance, ROM, flexibility, posture, core stabilization and head control with activities as follows:      Pt participated in therapeutic exercises x 50 minutes to address ROM, strength, balance, and mobility which includes:     Supine manual stretching while on towel roll:              PROM: upper traps and rotation                          Suboccipital distraction                          pec stretching    X 15 reps of B UE chest press with #6 dowel              X 15 reps of B UE overhead flex/ext with #6 dowel.    Recumbent stepper L13 BUE/LE x 10 min               ambulation with LBQC, min A- CGA for balance, step to pattern x 120ft    Leg press 140# 10x    NP today:          Supine: bridging with feet on ball 10x, min A to hold ball                          Bridging with alternate leg lift on ball 10x, mod A to hold ball    Neuromuscular re-education x 0 min including:                                                       Static standing w/o UE support x 8 min and 15 sec.  Periodic 1 UE support for balance required and SBA for safety.     Written Home Exercises: 6/28/18- modified scalene stretch, illustrated copy to be provided  1/29/18- side stepping at counter with  1-2 UE support  12/4/17- AROM cervical SB and rotation  assisted quad/ hip flexor stretch in side lying  eval- chin tucks, rows with OTB, hip flexor and knee extensor stretch in sidelying  Pt demo fair understanding of the education provided. Brigid demonstrated fair return demonstration of activities.      Education provided re: 12/18/17- cervical collar application  POC, HEP     Pt has no cultural, educational or language barriers to learning provided.     Assessment   Phani tolerated treatment well.  Duration on stepper was increased to 10 min to resume prior level of CV activity that was being performed before the holidays. Pt demonstrates improved balance and stability when ambulating with LBQC over level tile. Pt continues to demonstrate a step to gait with LBQC due to impaired balance and decreased strength. Pt can benefit from continued skilled PT to address impairments listed below to allow pt to achieve the highest functional level possible and deter the worsening of ROM, mobility, and strength impairments due to progressive nature of diagnosis.         Pt prognosis is Good. Pt will continue to benefit from skilled outpatient physical therapy to address the deficits listed in the problem list, provide pt/family education and to maximize pt's level of independence in the home and community environment.      Activity limitations/ Participation Restrictions:   Fall Risk - impaired balance   Weakness  Impaired muscle tone  Poor head control  Decreased ROM  Gait deviations   Decreased ambulation   Decreased activity tolerance   Decreased independence with daily activities   Requires skilled supervision to complete and progress HEP   Requires skilled PT for DME/ orthotic recommendations     Pt's spiritual, cultural and educational needs considered and pt agreeable to plan of care and GOALS as stated below:     Short term goals: 6 weeks, pt agrees to goals set. (As of 1/3/19)  1. Pt will perform chin tucks in  "supine with supervision to improve independent with HEP. Met 7/5/17  2. Assist pt with obtaining appropriate cervical brace to improve head control. Met 1/9/18- pt has but is not consistently wearing  3. Pt will demonstrate improved knee ext strength on left to 4/5 to improve safety with ambulation and sit<>stand. Met 9/28/17   4. Pt will demonstrate improved cervical rotation PROM to 20 degrees to improve functional head control. Met 7/5/17  5. Pt will perform sit<>stand consistently from 22" height surface with supervision to improve independence. Met 7/5/17                                                         Revised 8/31/17:  Pt will perform sit<>stand from 19" height chair to improve independence with sitting in various chairs around her home and decreased                                                                      need for "pop up seat". improved pt stood from 19" height with BUE and 2 attempts for success.   Long term goals: 12 weeks, pt agrees to goals set  6. Pt will perform basic HEP for gross strengthening with supervision to deter worsening of functional limitations. Met 8/31/17- pt's sister reports poor current compliance  7. Pt will demonstrate improved bilateral hip strength to 3/5 to improve balance and independence with mobility. Met 7/5/17  8. New 7/5/17: Pt will demonstrate improved bilateral hip strength to 4/5 to improve balance and independence with mobility. improved-  Hip flex: R=4/5 , L= 4/5; Hip abd: R= 5/5, L= 4+/5; Hip add: R=4+/5, L= 4/5; Hip ext: R= 4/5, L= 4-/5  9. Pt will demonstrate improved cervical extension strength to 3+/5 to improve head control. Met 7/31/17  10. Pt will demonstrate improved cervical deep flexor strength to 3+/5 to improve head control. Met 7/31/17   11. Pt will demonstrate improve balance and gait by scoring 14/28 on Tinetti. Met 8/31/17- 17/28                                      Revised 8/31/17: pt will demonstrate improved balance/ gait and " decreased fall risk to moderate by scoring 19/28 on Tinetti                                            Assessment. met previously- 19/28, continues to improve- 22/28 (8/1/18), 17/30 (10/08/2018); improved 21/28 (10/25/18); varies 18/28 (11/19/18)                                         12. Pt will demonstrate improved cervical PROM for rotation to 40 degrees to improve functional head control. Improved-  13 deg AAROM R rotation, 40 deg L                                         13. New 8/1/18- pt will ambulate 50ft with LBQC and supervision over level tile to demonstrate improved household mobility with decreased AD- improved- up to 127 feet with LBQC and min A x 1     Plan    Continue PT 2x weekly under established Plan of Care, with treatment to include: pt education, HEP, therapeutic exercises, neuromuscular re-education/balance exercises, therapeutic activities,  manual therapy PRN, gait training, and modalities PRN, to work towards established goals. Pt may be seen by PTA to carry out plan of care.            Yadi Mercado, PT   1/7/2019

## 2019-01-08 ENCOUNTER — DOCUMENTATION ONLY (OUTPATIENT)
Dept: REHABILITATION | Facility: HOSPITAL | Age: 54
End: 2019-01-08

## 2019-01-09 ENCOUNTER — CLINICAL SUPPORT (OUTPATIENT)
Dept: REHABILITATION | Facility: HOSPITAL | Age: 54
End: 2019-01-09
Attending: STUDENT IN AN ORGANIZED HEALTH CARE EDUCATION/TRAINING PROGRAM
Payer: COMMERCIAL

## 2019-01-09 DIAGNOSIS — R53.1 DECREASED STRENGTH: ICD-10-CM

## 2019-01-09 DIAGNOSIS — R29.898 DECREASED ROM OF NECK: ICD-10-CM

## 2019-01-09 DIAGNOSIS — Z74.09 IMPAIRED FUNCTIONAL MOBILITY, BALANCE, GAIT, AND ENDURANCE: ICD-10-CM

## 2019-01-09 PROCEDURE — 97110 THERAPEUTIC EXERCISES: CPT | Mod: PO

## 2019-01-09 PROCEDURE — 97140 MANUAL THERAPY 1/> REGIONS: CPT | Mod: PO

## 2019-01-09 NOTE — PROGRESS NOTES
"Physical Therapy Progress Note      Name: Brigid Jernigan  Clinic Number: [id number]  Diagnosis: G71.11 (ICD-10-CM) - Myotonic dystrophy  Physician: Luis Felipe Vo MD  Treatment Orders: PT Eval and Treat          Past Medical History:   Diagnosis Date    Abnormal EKG      Myotonic dystrophy      Pacemaker      PAF (paroxysmal atrial fibrillation)      Sleep apnea           Precautions: standard  Visit #: 3        Time in: 1645  Time out: 1730     (visit total 2018= 73)  (Visit total 2017= 56)  Date of Eval: 5/9/2017  POC expiration date: 3/11/2019     Subjective   Pt reports: " Mae, I haven't seen you since last year."   Sister reported: "We have to leave at 5:30 today."  Pain Scale: denies     Objective      Patient received individual therapy to increase strength, endurance, ROM, flexibility, posture, core stabilization and head control with activities as follows:      Pt participated in therapeutic exercises x 45 minutes to address ROM, strength, balance, and mobility which includes:   Recumbent stepper L13.5 BUE/LE x 10 min     Supine manual stretching while on towel roll:              PROM: upper traps and rotation                          Suboccipital distraction                          pec stretching     X 15 reps of B UE chest press with #6 dowel              X 15 reps of B UE overhead flex/ext with #6 dowel.    Leg press 140# 10x      Neuromuscular re-education x 0 min including:                                                       Static standing w/o UE support x 8 min and 15 sec.  Periodic 1 UE support for balance required and SBA for safety.     Written Home Exercises: 6/28/18- modified scalene stretch, illustrated copy to be provided  1/29/18- side stepping at counter with 1-2 UE support  12/4/17- AROM cervical SB and rotation  assisted quad/ hip flexor stretch in side lying  eval- chin tucks, rows with OTB, hip flexor and knee extensor stretch in sidelying  Pt demo fair understanding of the " education provided. Brigid demonstrated fair return demonstration of activities.      Education provided re: 12/18/17- cervical collar application  POC, HEP     Pt has no cultural, educational or language barriers to learning provided.     Assessment   Phani tolerated treatment well with no problems noted.  Pt was able to increase resistance on the stepper slightly.  No other changes noted.  Pt can benefit from continued skilled PT to address impairments listed below to allow pt to achieve the highest functional level possible and deter the worsening of ROM, mobility, and strength impairments due to progressive nature of diagnosis.         Pt prognosis is Good. Pt will continue to benefit from skilled outpatient physical therapy to address the deficits listed in the problem list, provide pt/family education and to maximize pt's level of independence in the home and community environment.      Activity limitations/ Participation Restrictions:   Fall Risk - impaired balance   Weakness  Impaired muscle tone  Poor head control  Decreased ROM  Gait deviations   Decreased ambulation   Decreased activity tolerance   Decreased independence with daily activities   Requires skilled supervision to complete and progress HEP   Requires skilled PT for DME/ orthotic recommendations     Pt's spiritual, cultural and educational needs considered and pt agreeable to plan of care and GOALS as stated below:    Short term goals: 6 weeks, pt agrees to goals set. (As of 12/11/18)  1. Pt will perform chin tucks in supine with supervision to improve independent with HEP. Met 7/5/17  2. Assist pt with obtaining appropriate cervical brace to improve head control. Met 1/9/18- pt has but is not consistently wearing  3. Pt will demonstrate improved knee ext strength on left to 4/5 to improve safety with ambulation and sit<>stand. Met 9/28/17   4. Pt will demonstrate improved cervical rotation PROM to 20 degrees to improve functional head  "control. Met 7/5/17  5. Pt will perform sit<>stand consistently from 22" height surface with supervision to improve independence. Met 7/5/17                                                         Revised 8/31/17:  Pt will perform sit<>stand from 19" height chair to improve independence with sitting in various chairs around her home and decreased                                                                      need for "pop up seat". improved pt stood from 19" height with BUE and 2 attempts for success.   Long term goals: 12 weeks, pt agrees to goals set  6. Pt will perform basic HEP for gross strengthening with supervision to deter worsening of functional limitations. Met 8/31/17- pt's sister reports poor current compliance  7. Pt will demonstrate improved bilateral hip strength to 3/5 to improve balance and independence with mobility. Met 7/5/17  8. New 7/5/17: Pt will demonstrate improved bilateral hip strength to 4/5 to improve balance and independence with mobility. improved-  Hip flex: R=4/5 , L= 4/5; Hip abd: R= 5/5, L= 4+/5; Hip add: R=4+/5, L= 4/5; Hip ext: R= 4/5, L= 4-/5  9. Pt will demonstrate improved cervical extension strength to 3+/5 to improve head control. Met 7/31/17  10. Pt will demonstrate improved cervical deep flexor strength to 3+/5 to improve head control. Met 7/31/17   11. Pt will demonstrate improve balance and gait by scoring 14/28 on Tinetti. Met 8/31/17- 17/28                                      Revised 8/31/17: pt will demonstrate improved balance/ gait and decreased fall risk to moderate by scoring 19/28 on Tinetti                                            Assessment. met previously- 19/28, continues to improve- 22/28 (8/1/18), 17/30 (10/08/2018); improved 21/28 (10/25/18); varies 18/28 (11/19/18)                                         12. Pt will demonstrate improved cervical PROM for rotation to 40 degrees to improve functional head control. Improved-  13 deg AAROM R " rotation, 40 deg L                                         13. New 8/1/18- pt will ambulate 50ft with LBQC and supervision over level tile to demonstrate improved household mobility with decreased AD- improved- up to 127 feet with LBQC and min A x 1        Plan    Continue PT 2x weekly under established Plan of Care, with treatment to include: pt education, HEP, therapeutic exercises, neuromuscular re-education/balance exercises, therapeutic activities,  manual therapy PRN, gait training, and modalities PRN, to work towards established goals. Pt may be seen by PTA to carry out plan of care.            Mae Caicedo, PTA     1/9/2019

## 2019-01-14 ENCOUNTER — CLINICAL SUPPORT (OUTPATIENT)
Dept: REHABILITATION | Facility: HOSPITAL | Age: 54
End: 2019-01-14
Attending: STUDENT IN AN ORGANIZED HEALTH CARE EDUCATION/TRAINING PROGRAM
Payer: COMMERCIAL

## 2019-01-14 DIAGNOSIS — Z74.09 IMPAIRED FUNCTIONAL MOBILITY, BALANCE, GAIT, AND ENDURANCE: ICD-10-CM

## 2019-01-14 DIAGNOSIS — R29.898 DECREASED ROM OF NECK: ICD-10-CM

## 2019-01-14 DIAGNOSIS — R53.1 DECREASED STRENGTH: ICD-10-CM

## 2019-01-14 PROCEDURE — 97116 GAIT TRAINING THERAPY: CPT | Mod: PO

## 2019-01-14 PROCEDURE — 97110 THERAPEUTIC EXERCISES: CPT | Mod: PO

## 2019-01-14 NOTE — PROGRESS NOTES
"Physical Therapy Progress Note      Name: Brigid Jernigan  Clinic Number: [id number]  Diagnosis: G71.11 (ICD-10-CM) - Myotonic dystrophy  Physician: Luis Felipe Vo MD  Treatment Orders: PT Eval and Treat          Past Medical History:   Diagnosis Date    Abnormal EKG      Myotonic dystrophy      Pacemaker      PAF (paroxysmal atrial fibrillation)      Sleep apnea           Precautions: standard  Visit #: 4        Time in: 1650  Time out: 1735     (visit total 2018= 73)  (Visit total 2017= 56)  Date of Eval: 5/9/2017  POC expiration date: 3/11/2019     Subjective   Pt reports: " I'm doing pretty good."   Sister reported: no significant comment  Pain Scale: denies     Objective      Patient received individual therapy to increase strength, endurance, ROM, flexibility, posture, core stabilization and head control with activities as follows:      Pt participated in therapeutic exercises x 37 minutes to address ROM, strength, balance, and mobility which includes:   Recumbent Nu Step stepper L10 BUE/LE x 10 min     Supine manual stretching while on towel roll:              PROM: upper traps and rotation                          Suboccipital distraction                          pec stretching     Chin tucks x 10 reps with 5 sec hold.    X 15 reps of B UE chest press with #6 dowel              X 15 reps of B UE overhead flex/ext with #6 dowel.    Leg press 140# 15x    Gait x 8 min including:  Pt ambulated ~ 45 ft with LBQC and CGA.  No hand held assist required.     Neuromuscular re-education x 0 min including:                                                            Written Home Exercises: 6/28/18- modified scalene stretch, illustrated copy to be provided  1/29/18- side stepping at counter with 1-2 UE support  12/4/17- AROM cervical SB and rotation  assisted quad/ hip flexor stretch in side lying  eval- chin tucks, rows with OTB, hip flexor and knee extensor stretch in sidelying  Pt demo fair understanding of the " education provided. Brigid demonstrated fair return demonstration of activities.      Education provided re: 12/18/17- cervical collar application  POC, HEP     Pt has no cultural, educational or language barriers to learning provided.     Assessment   Phani tolerated treatment well with no problems noted.  Pt agreeable to some walking and therex.  Pt with decreased gait distance noted from previous tx sessions.   No other changes noted.  Pt can benefit from continued skilled PT to address impairments listed below to allow pt to achieve the highest functional level possible and deter the worsening of ROM, mobility, and strength impairments due to progressive nature of diagnosis.         Pt prognosis is Good. Pt will continue to benefit from skilled outpatient physical therapy to address the deficits listed in the problem list, provide pt/family education and to maximize pt's level of independence in the home and community environment.      Activity limitations/ Participation Restrictions:   Fall Risk - impaired balance   Weakness  Impaired muscle tone  Poor head control  Decreased ROM  Gait deviations   Decreased ambulation   Decreased activity tolerance   Decreased independence with daily activities   Requires skilled supervision to complete and progress HEP   Requires skilled PT for DME/ orthotic recommendations     Pt's spiritual, cultural and educational needs considered and pt agreeable to plan of care and GOALS as stated below:    Short term goals: 6 weeks, pt agrees to goals set. (As of 12/11/18)  1. Pt will perform chin tucks in supine with supervision to improve independent with HEP. Met 7/5/17  2. Assist pt with obtaining appropriate cervical brace to improve head control. Met 1/9/18- pt has but is not consistently wearing  3. Pt will demonstrate improved knee ext strength on left to 4/5 to improve safety with ambulation and sit<>stand. Met 9/28/17   4. Pt will demonstrate improved cervical rotation  "PROM to 20 degrees to improve functional head control. Met 7/5/17  5. Pt will perform sit<>stand consistently from 22" height surface with supervision to improve independence. Met 7/5/17                                                         Revised 8/31/17:  Pt will perform sit<>stand from 19" height chair to improve independence with sitting in various chairs around her home and decreased                                                                      need for "pop up seat". improved pt stood from 19" height with BUE and 2 attempts for success.   Long term goals: 12 weeks, pt agrees to goals set  6. Pt will perform basic HEP for gross strengthening with supervision to deter worsening of functional limitations. Met 8/31/17- pt's sister reports poor current compliance  7. Pt will demonstrate improved bilateral hip strength to 3/5 to improve balance and independence with mobility. Met 7/5/17  8. New 7/5/17: Pt will demonstrate improved bilateral hip strength to 4/5 to improve balance and independence with mobility. improved-  Hip flex: R=4/5 , L= 4/5; Hip abd: R= 5/5, L= 4+/5; Hip add: R=4+/5, L= 4/5; Hip ext: R= 4/5, L= 4-/5  9. Pt will demonstrate improved cervical extension strength to 3+/5 to improve head control. Met 7/31/17  10. Pt will demonstrate improved cervical deep flexor strength to 3+/5 to improve head control. Met 7/31/17   11. Pt will demonstrate improve balance and gait by scoring 14/28 on Tinetti. Met 8/31/17- 17/28                                      Revised 8/31/17: pt will demonstrate improved balance/ gait and decreased fall risk to moderate by scoring 19/28 on Tinetti                                            Assessment. met previously- 19/28, continues to improve- 22/28 (8/1/18), 17/30 (10/08/2018); improved 21/28 (10/25/18); varies 18/28 (11/19/18)                                         12. Pt will demonstrate improved cervical PROM for rotation to 40 degrees to improve functional " head control. Improved-  13 deg AAROM R rotation, 40 deg L                                         13. New 8/1/18- pt will ambulate 50ft with LBQC and supervision over level tile to demonstrate improved household mobility with decreased AD- improved- up to 127 feet with LBQC and min A x 1        Plan    Continue PT 2x weekly under established Plan of Care, with treatment to include: pt education, HEP, therapeutic exercises, neuromuscular re-education/balance exercises, therapeutic activities,  manual therapy PRN, gait training, and modalities PRN, to work towards established goals. Pt may be seen by PTA to carry out plan of care.            Mae Caciedo, PTA     1/14/2019

## 2019-01-21 ENCOUNTER — DOCUMENTATION ONLY (OUTPATIENT)
Dept: REHABILITATION | Facility: HOSPITAL | Age: 54
End: 2019-01-21

## 2019-01-24 ENCOUNTER — CLINICAL SUPPORT (OUTPATIENT)
Dept: REHABILITATION | Facility: HOSPITAL | Age: 54
End: 2019-01-24
Attending: STUDENT IN AN ORGANIZED HEALTH CARE EDUCATION/TRAINING PROGRAM
Payer: COMMERCIAL

## 2019-01-24 DIAGNOSIS — Z74.09 IMPAIRED FUNCTIONAL MOBILITY, BALANCE, GAIT, AND ENDURANCE: ICD-10-CM

## 2019-01-24 DIAGNOSIS — R53.1 DECREASED STRENGTH: ICD-10-CM

## 2019-01-24 DIAGNOSIS — R29.898 DECREASED ROM OF NECK: ICD-10-CM

## 2019-01-24 PROCEDURE — 97110 THERAPEUTIC EXERCISES: CPT | Mod: PO | Performed by: PHYSICAL THERAPIST

## 2019-01-24 NOTE — PROGRESS NOTES
"Physical Therapy Progress Note      Name: Brigid Jernigan  Clinic Number: [id number]  Diagnosis: G71.11 (ICD-10-CM) - Myotonic dystrophy  Physician: Luis Felipe Vo MD  Treatment Orders: PT Eval and Treat          Past Medical History:   Diagnosis Date    Abnormal EKG      Myotonic dystrophy      Pacemaker      PAF (paroxysmal atrial fibrillation)      Sleep apnea           Precautions: standard  auth period: 1/3/19 to 3/28/19  Visit #: 5/20        Time in: 1655  Time out: 1730  Total billable treatment time: 35 min     (visit total 2018= 73)  (Visit total 2017= 56)  Date of Eval: 5/9/2017  POC expiration date: 12/18/18 to 3/11/2019     Subjective   Pt reports: " I'm doing pretty good." pt ~10min late, unable to accommodate. Pt reports doing "a lot" of walking this week for MD appointments. Pt reports ability to rise unassisted from office chairs with armrests at MD appointments.   Sister reported: no significant comment  Pain Scale: denies     Objective      Patient received individual therapy to increase strength, endurance, ROM, flexibility, posture, core stabilization and head control with activities as follows:      Pt participated in therapeutic exercises x 35 minutes to address ROM, strength, balance, and mobility which includes:    Supine:              PROM: upper traps and rotation                          Suboccipital distraction                          pec stretching     Cervical extension 20x    X 15 reps of B UE chest press with #6 dowel              X 15 reps of B UE overhead flex/ext with #6 dowel.    bridging with alternate LE lift from ball 10x, min-mod A to stabilize ball    Standing w/ RW: hip abd orange theraband 2 x 10    Sit>stand from 20" height surface, mod I 1 attempt for success      NP today:   Leg press 140# 15x    Gait x 0 min including:  Pt ambulated ~ 45 ft with LBQC and CGA.  No hand held assist required.- NP                                                            Written " "Home Exercises: continue with previously issued HEP  Pt demo fair understanding of the education provided. Brigid demonstrated fair return demonstration of activities.      Education provided re: 12/18/17- cervical collar application  POC, HEP     Pt has no cultural, educational or language barriers to learning provided.     Assessment   Phani tolerated treatment well. Pt reports and demonstrates improved consistency with performing sit>stand from an average height chair with armrests without assistance. Pt can also perform form a 20" height without armrests, but with use of BUE to push up. Pt required less assistance to perform bridging with ball, poor hip clearance was intermittently noted with attempt at alternate leg lift.  Pt can benefit from continued skilled PT to address impairments listed below to allow pt to achieve the highest functional level possible and deter the worsening of ROM, mobility, and strength impairments due to progressive nature of diagnosis.         Pt prognosis is Good. Pt will continue to benefit from skilled outpatient physical therapy to address the deficits listed in the problem list, provide pt/family education and to maximize pt's level of independence in the home and community environment.      Activity limitations/ Participation Restrictions:   Fall Risk - impaired balance   Weakness  Impaired muscle tone  Poor head control  Decreased ROM  Gait deviations   Decreased ambulation   Decreased activity tolerance   Decreased independence with daily activities   Requires skilled supervision to complete and progress HEP   Requires skilled PT for DME/ orthotic recommendations     Pt's spiritual, cultural and educational needs considered and pt agreeable to plan of care and GOALS as stated below:     Short term goals: 6 weeks, pt agrees to goals set. (As of 1/3/19)  1. Pt will perform chin tucks in supine with supervision to improve independent with HEP. Met 7/5/17  2. Assist pt with " "obtaining appropriate cervical brace to improve head control. Met 1/9/18- pt has but is not consistently wearing  3. Pt will demonstrate improved knee ext strength on left to 4/5 to improve safety with ambulation and sit<>stand. Met 9/28/17   4. Pt will demonstrate improved cervical rotation PROM to 20 degrees to improve functional head control. Met 7/5/17  5. Pt will perform sit<>stand consistently from 22" height surface with supervision to improve independence. Met 7/5/17                                                         Revised 8/31/17:  Pt will perform sit<>stand from 19" height chair to improve independence with sitting in various chairs around her home and decreased                                                                      need for "pop up seat". improved pt stood from 19" height with BUE and 2 attempts for success.   Long term goals: 12 weeks, pt agrees to goals set  6. Pt will perform basic HEP for gross strengthening with supervision to deter worsening of functional limitations. Met 8/31/17- pt's sister reports poor current compliance  7. Pt will demonstrate improved bilateral hip strength to 3/5 to improve balance and independence with mobility. Met 7/5/17  8. New 7/5/17: Pt will demonstrate improved bilateral hip strength to 4/5 to improve balance and independence with mobility. improved-  Hip flex: R=4/5 , L= 4/5; Hip abd: R= 5/5, L= 4+/5; Hip add: R=4+/5, L= 4/5; Hip ext: R= 4/5, L= 4-/5  9. Pt will demonstrate improved cervical extension strength to 3+/5 to improve head control. Met 7/31/17  10. Pt will demonstrate improved cervical deep flexor strength to 3+/5 to improve head control. Met 7/31/17   11. Pt will demonstrate improve balance and gait by scoring 14/28 on Tinetti. Met 8/31/17- 17/28                                      Revised 8/31/17: pt will demonstrate improved balance/ gait and decreased fall risk to moderate by scoring 19/28 on Tinetti "                                            Assessment. met previously- 19/28, continues to improve- 22/28 (8/1/18), 17/30 (10/08/2018); improved 21/28 (10/25/18); varies 18/28 (11/19/18)                                         12. Pt will demonstrate improved cervical PROM for rotation to 40 degrees to improve functional head control. Improved-  13 deg AAROM R rotation, 40 deg L                                         13. New 8/1/18- pt will ambulate 50ft with LBQC and supervision over level tile to demonstrate improved household mobility with decreased AD- improved- up to 127 feet with LBQC and min A x 1  Plan    continue with cervical stretching and cervical/ scapular strengthening to prevent further decline in head control and posture. May issue theraband to progress to standing hip exercises at home next visit.  Perform leg press and ambulation with LBQC next visit, time limited today's session.         Yadi Mercado, PT     1/24/2019

## 2019-01-28 ENCOUNTER — CLINICAL SUPPORT (OUTPATIENT)
Dept: REHABILITATION | Facility: HOSPITAL | Age: 54
End: 2019-01-28
Attending: STUDENT IN AN ORGANIZED HEALTH CARE EDUCATION/TRAINING PROGRAM
Payer: COMMERCIAL

## 2019-01-28 DIAGNOSIS — Z74.09 IMPAIRED FUNCTIONAL MOBILITY, BALANCE, GAIT, AND ENDURANCE: ICD-10-CM

## 2019-01-28 DIAGNOSIS — R53.1 DECREASED STRENGTH: ICD-10-CM

## 2019-01-28 DIAGNOSIS — R29.898 DECREASED ROM OF NECK: ICD-10-CM

## 2019-01-28 PROCEDURE — 97110 THERAPEUTIC EXERCISES: CPT | Mod: PO

## 2019-01-28 PROCEDURE — 97116 GAIT TRAINING THERAPY: CPT | Mod: PO

## 2019-01-28 NOTE — PROGRESS NOTES
"Physical Therapy Progress Note      Name: Brigid Jernigan  Clinic Number: [id number]  Diagnosis: G71.11 (ICD-10-CM) - Myotonic dystrophy  Physician: Luis Felipe Vo MD  Treatment Orders: PT Eval and Treat          Past Medical History:   Diagnosis Date    Abnormal EKG      Myotonic dystrophy      Pacemaker      PAF (paroxysmal atrial fibrillation)      Sleep apnea           Precautions: standard  auth period: 1/3/19 to 3/28/19  Visit #: 6/20        Time in: 1655  Time out: 17  Total billable treatment time: 35 min     (visit total 2018= 73)  (Visit total 2017= 56)  Date of Eval: 5/9/2017  POC expiration date: 12/18/18 to 3/11/2019     Subjective   Pt reports: " I'm doing pretty good."   Pain Scale: denies     Objective    Pt 10 min late to tx session.  Able to accommodate.   Patient received individual therapy to increase strength, endurance, ROM, flexibility, posture, core stabilization and head control with activities as follows:      Pt participated in therapeutic exercises x 35 minutes to address ROM, strength, balance, and mobility which includes:  X 10 min on Nu Step recumbent stepper.  Level 9.0 B UE/B LE  Supine:              PROM: upper traps and rotation                          Suboccipital distraction                          pec stretching     Cervical extension 20x    X 15 reps of B UE chest press with #6 dowel              X 15 reps of B UE overhead flex/ext with #6 dowel.      Static standing x 2 min and 30 sec with out UE support and SBA.    Gait x 15 min including:  Pt ambulated ~ 102 ft with LBQC and CGA.  No hand held assist required.                                                            Written Home Exercises: continue with previously issued HEP  Pt demo fair understanding of the education provided. Brigid demonstrated fair return demonstration of activities.      Education provided re: 12/18/17- cervical collar application  POC, HEP     Pt has no cultural, educational or language " "barriers to learning provided.     Assessment   Phani tolerated treatment well with no problems noted.  Pt with increased gait distance today with QC and no HHA was required.  No other changes noted.  Pt can benefit from continued skilled PT to address impairments listed below to allow pt to achieve the highest functional level possible and deter the worsening of ROM, mobility, and strength impairments due to progressive nature of diagnosis.         Pt prognosis is Good. Pt will continue to benefit from skilled outpatient physical therapy to address the deficits listed in the problem list, provide pt/family education and to maximize pt's level of independence in the home and community environment.      Activity limitations/ Participation Restrictions:   Fall Risk - impaired balance   Weakness  Impaired muscle tone  Poor head control  Decreased ROM  Gait deviations   Decreased ambulation   Decreased activity tolerance   Decreased independence with daily activities   Requires skilled supervision to complete and progress HEP   Requires skilled PT for DME/ orthotic recommendations     Pt's spiritual, cultural and educational needs considered and pt agreeable to plan of care and GOALS as stated below:     Short term goals: 6 weeks, pt agrees to goals set. (As of 1/3/19)  1. Pt will perform chin tucks in supine with supervision to improve independent with HEP. Met 7/5/17  2. Assist pt with obtaining appropriate cervical brace to improve head control. Met 1/9/18- pt has but is not consistently wearing  3. Pt will demonstrate improved knee ext strength on left to 4/5 to improve safety with ambulation and sit<>stand. Met 9/28/17   4. Pt will demonstrate improved cervical rotation PROM to 20 degrees to improve functional head control. Met 7/5/17  5. Pt will perform sit<>stand consistently from 22" height surface with supervision to improve independence. Met 7/5/17                 " "                                        Revised 8/31/17:  Pt will perform sit<>stand from 19" height chair to improve independence with sitting in various chairs around her home and decreased                                                                      need for "pop up seat". improved pt stood from 19" height with BUE and 2 attempts for success.   Long term goals: 12 weeks, pt agrees to goals set  6. Pt will perform basic HEP for gross strengthening with supervision to deter worsening of functional limitations. Met 8/31/17- pt's sister reports poor current compliance  7. Pt will demonstrate improved bilateral hip strength to 3/5 to improve balance and independence with mobility. Met 7/5/17  8. New 7/5/17: Pt will demonstrate improved bilateral hip strength to 4/5 to improve balance and independence with mobility. improved-  Hip flex: R=4/5 , L= 4/5; Hip abd: R= 5/5, L= 4+/5; Hip add: R=4+/5, L= 4/5; Hip ext: R= 4/5, L= 4-/5  9. Pt will demonstrate improved cervical extension strength to 3+/5 to improve head control. Met 7/31/17  10. Pt will demonstrate improved cervical deep flexor strength to 3+/5 to improve head control. Met 7/31/17   11. Pt will demonstrate improve balance and gait by scoring 14/28 on Tinetti. Met 8/31/17- 17/28                                      Revised 8/31/17: pt will demonstrate improved balance/ gait and decreased fall risk to moderate by scoring 19/28 on Tinetti                                            Assessment. met previously- 19/28, continues to improve- 22/28 (8/1/18), 17/30 (10/08/2018); improved 21/28 (10/25/18); varies 18/28 (11/19/18)                                         12. Pt will demonstrate improved cervical PROM for rotation to 40 degrees to improve functional head control. Improved-  13 deg AAROM R rotation, 40 deg L                                         13. New 8/1/18- pt will ambulate 50ft with LBQC and supervision over level tile to demonstrate improved " household mobility with decreased AD- improved- up to 127 feet with LBQC and min A x 1  Plan    continue with cervical stretching and cervical/ scapular strengthening to prevent further decline in head control and posture. May issue theraband to progress to standing hip exercises at home next visit.  Perform leg press and ambulation with LBQC next visit, time limited today's session.         Mae Caicedo, PTA     1/28/2019

## 2019-01-31 ENCOUNTER — DOCUMENTATION ONLY (OUTPATIENT)
Dept: REHABILITATION | Facility: HOSPITAL | Age: 54
End: 2019-01-31

## 2019-02-04 ENCOUNTER — CLINICAL SUPPORT (OUTPATIENT)
Dept: REHABILITATION | Facility: HOSPITAL | Age: 54
End: 2019-02-04
Attending: STUDENT IN AN ORGANIZED HEALTH CARE EDUCATION/TRAINING PROGRAM
Payer: COMMERCIAL

## 2019-02-04 DIAGNOSIS — R29.898 DECREASED ROM OF NECK: ICD-10-CM

## 2019-02-04 DIAGNOSIS — Z74.09 IMPAIRED FUNCTIONAL MOBILITY, BALANCE, GAIT, AND ENDURANCE: ICD-10-CM

## 2019-02-04 DIAGNOSIS — R53.1 DECREASED STRENGTH: ICD-10-CM

## 2019-02-04 PROCEDURE — 97110 THERAPEUTIC EXERCISES: CPT | Mod: PO | Performed by: PHYSICAL THERAPIST

## 2019-02-04 PROCEDURE — 97116 GAIT TRAINING THERAPY: CPT | Mod: PO | Performed by: PHYSICAL THERAPIST

## 2019-02-06 ENCOUNTER — DOCUMENTATION ONLY (OUTPATIENT)
Dept: REHABILITATION | Facility: HOSPITAL | Age: 54
End: 2019-02-06

## 2019-02-06 NOTE — PROGRESS NOTES
Face to face consultation with supervision PT held on 02/06/2019    Mae Caicedo, PTA     Meeting held on 2/6/18  Yadi Mercado DPT  2/6/2019

## 2019-02-07 ENCOUNTER — CLINICAL SUPPORT (OUTPATIENT)
Dept: REHABILITATION | Facility: HOSPITAL | Age: 54
End: 2019-02-07
Attending: STUDENT IN AN ORGANIZED HEALTH CARE EDUCATION/TRAINING PROGRAM
Payer: COMMERCIAL

## 2019-02-07 DIAGNOSIS — R53.1 DECREASED STRENGTH: ICD-10-CM

## 2019-02-07 DIAGNOSIS — R29.898 DECREASED ROM OF NECK: ICD-10-CM

## 2019-02-07 DIAGNOSIS — Z74.09 IMPAIRED FUNCTIONAL MOBILITY, BALANCE, GAIT, AND ENDURANCE: ICD-10-CM

## 2019-02-07 PROCEDURE — 97110 THERAPEUTIC EXERCISES: CPT | Mod: PO | Performed by: PHYSICAL THERAPIST

## 2019-02-07 NOTE — PROGRESS NOTES
"Physical Therapy Progress Note      Name: Brigid Jernigan  Clinic Number: [id number]  Diagnosis: G71.11 (ICD-10-CM) - Myotonic dystrophy  Physician: Luis Felipe Vo MD  Treatment Orders: PT Eval and Treat          Past Medical History:   Diagnosis Date    Abnormal EKG      Myotonic dystrophy      Pacemaker      PAF (paroxysmal atrial fibrillation)      Sleep apnea           Precautions: standard  auth period: 1/3/19 to 3/28/19  Visit #: 8/20        Time in: 1653  Time out: 1732  Total billable treatment time: 39 min     (visit total 2018= 73)  (Visit total 2017= 56)  Date of Eval: 5/9/2017  POC expiration date: 12/18/18 to 3/11/2019     Subjective   Pt reports: no new complaints. Per pt's sister- "we have to leave at 5:30"  Pain Scale: denies     Objective     Patient received individual therapy to increase strength, endurance, ROM, flexibility, posture, core stabilization and head control with activities as follows:      Pt participated in therapeutic exercises x 39 minutes to address ROM, strength, balance, and mobility which includes:    Supine:              PROM upper traps 3 x 30 sec   PROM Cervical rotation 3 x 15-20 sec              PROM Suboccipital distraction              pec stretching   Cervical extension 10x, moderate resistance   X 15 reps of B UE chest press with #7 dowel      X 15 reps of B UE overhead flex/ext with #7 dowel    Leg press: squats 120# 15x    Standing: hip abd OTB 15x   Hip ext OTB 15x    NP today:   Static standing x 2 min and 30 sec with out UE support and SBA.                                                            Written Home Exercises: continue with previously issued HEP- issued orange theraband 2/7/2019 for standing hip exercises  Pt demo fair understanding of the education provided. Brigid demonstrated fair return demonstration of activities.      Education provided re: continue with HEP- advance as instructed on 2/7/2019   POC, HEP     Pt has no cultural, " "educational or language barriers to learning provided.     Assessment   Phani tolerated treatment well. pt tolerated increased resistance for UE strengthening and increased reps for LE strengthenining. Pt was issued an orange theraband to advance standing hip exercises at home. Pt can benefit from continued skilled PT to address impairments listed below to allow pt to achieve the highest functional level possible and deter the worsening of ROM, mobility, and strength impairments due to progressive nature of diagnosis.         Pt prognosis is Good. Pt will continue to benefit from skilled outpatient physical therapy to address the deficits listed in the problem list, provide pt/family education and to maximize pt's level of independence in the home and community environment.      Activity limitations/ Participation Restrictions:   Fall Risk - impaired balance   Weakness  Impaired muscle tone  Poor head control  Decreased ROM  Gait deviations   Decreased ambulation   Decreased activity tolerance   Decreased independence with daily activities   Requires skilled supervision to complete and progress HEP   Requires skilled PT for DME/ orthotic recommendations     Pt's spiritual, cultural and educational needs considered and pt agreeable to plan of care and GOALS as stated below:     Short term goals: 6 weeks, pt agrees to goals set. (As of 2/4/19)  1. Pt will perform chin tucks in supine with supervision to improve independent with HEP. Met 7/5/17  2. Assist pt with obtaining appropriate cervical brace to improve head control. Met 1/9/18- pt has but is not consistently wearing  3. Pt will demonstrate improved knee ext strength on left to 4/5 to improve safety with ambulation and sit<>stand. Met 9/28/17   4. Pt will demonstrate improved cervical rotation PROM to 20 degrees to improve functional head control. Met 7/5/17  5. Pt will perform sit<>stand consistently from 22" height surface with supervision to improve " "independence. Met 7/5/17                                                         Revised 8/31/17:  Pt will perform sit<>stand from 19" height chair to improve independence with sitting in various chairs around her home and decreased                                                                      need for "pop up seat". inconsistent- multiple attempts for sit>stand from 20" heigh, unable from 19" unassisted (was able to stand from 19" height with BUE and 2 attempts for success   Long term goals: 12 weeks, pt agrees to goals set  6. Pt will perform basic HEP for gross strengthening with supervision to deter worsening of functional limitations. Met 8/31/17- pt's sister reports poor current compliance  7. Pt will demonstrate improved bilateral hip strength to 3/5 to improve balance and independence with mobility. Met 7/5/17  8. New 7/5/17: Pt will demonstrate improved bilateral hip strength to 4/5 to improve balance and independence with mobility. improved-  Hip flex: WALESKA 4+/5; Hip abd: WALESKA 5/5; Hip add: R=4+/5, L= 4/5; Hip ext: R= 4/5, L= 4/5  9. Pt will demonstrate improved cervical extension strength to 3+/5 to improve head control. Met 7/31/17  10. Pt will demonstrate improved cervical deep flexor strength to 3+/5 to improve head control. Met 7/31/17   11. Pt will demonstrate improve balance and gait by scoring 14/28 on Tinetti. Met 8/31/17- 17/28                                      Revised 8/31/17: pt will demonstrate improved balance/ gait and decreased fall risk to moderate by scoring 19/28 on Tinetti                                            Assessment. inconsistent- 14/28 (18/28 (11/19/18))                                         12. Pt will demonstrate improved cervical PROM for rotation to 40 degrees to improve functional head control. ~same-  8 deg AAROM R rotation, 35 deg L                                         13. New 8/1/18- pt will ambulate 50ft with LBQC and supervision over level tile to " demonstrate improved household mobility with decreased AD- improved- up to 50 feet with LBQC and CGA- close S    Plan    continue with cervical stretching and cervical/ scapular strengthening to prevent further decline in head control and posture. Practice standing balance and gait next visit.      Yadi Mercado, PT     2/7/2019

## 2019-02-11 NOTE — PLAN OF CARE
"Physical Therapy Progress Note      Name: Brigid Jernigan  Clinic Number: [id number]  Diagnosis: G71.11 (ICD-10-CM) - Myotonic dystrophy  Physician: Luis Felipe Vo MD  Treatment Orders: PT Eval and Treat          Past Medical History:   Diagnosis Date    Abnormal EKG      Myotonic dystrophy      Pacemaker      PAF (paroxysmal atrial fibrillation)      Sleep apnea           Precautions: standard  auth period: 1/3/19 to 3/28/19  Visit #: 7/20        Time in: 1655  Time out: 1740  Total billable treatment time: 45 min     (visit total 2018= 73)  (Visit total 2017= 56)  Date of Eval: 5/9/2017  POC expiration date: 12/18/18 to 3/11/2019     Subjective   Pt reports: brought in neck brace today. Pt's sister reports pt almost fell when attempting to lean against an unstable object.   Pain Scale: denies     Objective    Pt 10 min late to tx session.  Able to accommodate.   Patient received individual therapy to increase strength, endurance, ROM, flexibility, posture, core stabilization and head control with activities as follows:      Pt participated in therapeutic exercises x 32 minutes to address ROM, strength, balance, and mobility which includes:    Cervical AROm (supine)  Rotation: L= 35 deg, R= 8 deg    Strength: Hip flex: R=4+/5 , L= 4+/5                   Hip abd: R= 5/5, L= 5/5                    Hip add: R=4+/5, L= 4/5                   Hip ext: R= 4/5, L= 4/5    Supine:              PROM: upper traps and rotation                          Suboccipital distraction                          pec stretching   Leg press: squats 120# 10x    NP today:     Cervical extension 20x    X 15 reps of B UE chest press with #6 dowel              X 15 reps of B UE overhead flex/ext with #6 dowel  Static standing x 2 min and 30 sec with out UE support and SBA.    Gait x 13 Min including:  Sit>stand from 19" height mat- unable  Sit>stand from 20" height mat, multiple attempts, supervision    Pt ambulated ~50 ft with LBQC and " CGA- Close S.  No hand held assist required.    Tinetti Balance Assessment  Balance:  1. Sitting Balance 1   Leans/ slides in chair= 0   Steady= 1  2. Rises from chair 1   Unable without help= 0   Able, uses arms= 1   Able without use of arms= 2  3. Attempts to rise 1   Unable without help= 0   Able, >1 attmept required-= 1   Able, 1 attempt= 2  4. Immediate standing balance (1st 5 seconds) 1   Unsteady (swaggers, moves feet, trunk sway)= 0   Steady but uses walker or other support= 1   Narrow base of support without walker or support= 2  5. Nudged 1   Begins to fall= 0   Staggers, grabs, catches self= 1   Steady= 2  6. Standing balance 1   Unsteady= 0   Steady but wide LEYDI or uses AD=1   Narrow LEYDI without AD=2  7. Eyes closed 0   Unsteady= 0   Steady= 1  8. Turning 360 degrees 1, steady   Discontinuous steps= 0   Continuous steps= 1   Unsteady= 0   Steady= 1  9. Sitting down 1   Unsafe= 0   Uses arms or not in a smooth motion= 1   Safe, smooth motion= 2  Balance score= 8/16  Gait:  1. Initiation 1   hesitates or multiple attempts to start= 0   No hesitancy= 1  2. Step length 1   Step to= 0   One foot passes= 1   reciprocal pattern= 2  3. Step height 2    Neither foot clears floor= 0   One foot clears floor= 1   Both feet clear floor= 2  4. Step symmetry 0   Not symmetrical= 0   Appears symmetrical= 1  5. Step continuity 0   Not continuous= 0   Appears continuous= 1  6. Path 1   Marked deviation= 0   Mild/moderate deviation or uses A.D.= 1   Straight without A.D.= 2  7. Trunk 0   Marked sway or uses A.D.= 0   No sway, but flexes knees or back, spread arms out while walking= 1   No sway, no flexion, no use of UE, no use of A.D.= 2  8. Walking stance 1   Heels apart= 0   Heels almost touching while walking= 1  Gait score= 6/12  Total score= 14/28 , high fall risk    0-18= high fall risk  19-23= moderate fall risk  24-28= low fall risk                                                                 Written Home  "Exercises: continue with previously issued HEP  Pt demo fair understanding of the education provided. Brigid demonstrated fair return demonstration of activities.      Education provided re: 12/18/17- cervical collar application  POC, HEP     Pt has no cultural, educational or language barriers to learning provided.     Assessment   Assessment period: 1/7/2019 to 2/4/2019. Phani tolerates treatments well. Pt reports intermittent compliance with HEP. Pt's sister reports poor compliance with neck brace. Pt's sister bring neck brace into sessions for in standing. Pt experienced a recent fall from poor balance after attempting to lean on an unstable object. Pt demonstrates inconsistent balance via Tinetti Assessment and is considered a high fall risk. Pt demonstrates inconsistent ability with rising independently from an average height chair (~19"). Pt's bilateral hip strength continues to improve. Pt is making improvements in ambulating with a large based quad cane. Pt can now use this assistive device to ambulate over level tile without hand held assistance. PT continues to address pt's cervical ROM and posture as pt continues to present with significant ROM restrictions in all directions and demonstrates a semi- flexible forward head and rounded shoulder posture. Pt can benefit from continued skilled PT to address impairments listed below to allow pt to achieve the highest functional level possible and deter the worsening of ROM, mobility, and strength impairments due to progressive nature of diagnosis.         Pt prognosis is Good. Pt will continue to benefit from skilled outpatient physical therapy to address the deficits listed in the problem list, provide pt/family education and to maximize pt's level of independence in the home and community environment.      Activity limitations/ Participation Restrictions:   Fall Risk - impaired balance   Weakness  Impaired muscle tone  Poor head control  Decreased " "ROM  Gait deviations   Decreased ambulation   Decreased activity tolerance   Decreased independence with daily activities   Requires skilled supervision to complete and progress HEP   Requires skilled PT for DME/ orthotic recommendations     Pt's spiritual, cultural and educational needs considered and pt agreeable to plan of care and GOALS as stated below:     Short term goals: 6 weeks, pt agrees to goals set. (As of 2/4/19)  1. Pt will perform chin tucks in supine with supervision to improve independent with HEP. Met 7/5/17  2. Assist pt with obtaining appropriate cervical brace to improve head control. Met 1/9/18- pt has but is not consistently wearing  3. Pt will demonstrate improved knee ext strength on left to 4/5 to improve safety with ambulation and sit<>stand. Met 9/28/17   4. Pt will demonstrate improved cervical rotation PROM to 20 degrees to improve functional head control. Met 7/5/17  5. Pt will perform sit<>stand consistently from 22" height surface with supervision to improve independence. Met 7/5/17                                                         Revised 8/31/17:  Pt will perform sit<>stand from 19" height chair to improve independence with sitting in various chairs around her home and decreased                                                                      need for "pop up seat". inconsistent- multiple attempts for sit>stand from 20" heigh, unable from 19" unassisted (was able to stand from 19" height with BUE and 2 attempts for success   Long term goals: 12 weeks, pt agrees to goals set  6. Pt will perform basic HEP for gross strengthening with supervision to deter worsening of functional limitations. Met 8/31/17- pt's sister reports poor current compliance  7. Pt will demonstrate improved bilateral hip strength to 3/5 to improve balance and independence with mobility. Met 7/5/17  8. New 7/5/17: Pt will demonstrate improved bilateral hip strength to 4/5 to improve balance and " independence with mobility. improved-  Hip flex: WALESKA 4+/5; Hip abd: WALESKA 5/5; Hip add: R=4+/5, L= 4/5; Hip ext: R= 4/5, L= 4/5  9. Pt will demonstrate improved cervical extension strength to 3+/5 to improve head control. Met 7/31/17  10. Pt will demonstrate improved cervical deep flexor strength to 3+/5 to improve head control. Met 7/31/17   11. Pt will demonstrate improve balance and gait by scoring 14/28 on Tinetti. Met 8/31/17- 17/28                                      Revised 8/31/17: pt will demonstrate improved balance/ gait and decreased fall risk to moderate by scoring 19/28 on Tinetti                                            Assessment. inconsistent- 14/28 (18/28 (11/19/18))                                         12. Pt will demonstrate improved cervical PROM for rotation to 40 degrees to improve functional head control. ~same-  8 deg AAROM R rotation, 35 deg L                                         13. New 8/1/18- pt will ambulate 50ft with LBQC and supervision over level tile to demonstrate improved household mobility with decreased AD- improved- up to 50 feet with LBQC and CGA- close S  Plan    continue with cervical stretching and cervical/ scapular strengthening to prevent further decline in head control and posture. Address standing balance with more challenging tasks- I.e. Reaching, head turns, foam.  increase weight on leg press as tolerated.     Yadi Mercado, PT     2/4/2019

## 2019-02-14 ENCOUNTER — CLINICAL SUPPORT (OUTPATIENT)
Dept: REHABILITATION | Facility: HOSPITAL | Age: 54
End: 2019-02-14
Attending: STUDENT IN AN ORGANIZED HEALTH CARE EDUCATION/TRAINING PROGRAM
Payer: COMMERCIAL

## 2019-02-14 DIAGNOSIS — R29.898 DECREASED ROM OF NECK: ICD-10-CM

## 2019-02-14 DIAGNOSIS — R53.1 DECREASED STRENGTH: ICD-10-CM

## 2019-02-14 DIAGNOSIS — Z74.09 IMPAIRED FUNCTIONAL MOBILITY, BALANCE, GAIT, AND ENDURANCE: ICD-10-CM

## 2019-02-14 PROCEDURE — 97110 THERAPEUTIC EXERCISES: CPT | Mod: PO | Performed by: PHYSICAL THERAPIST

## 2019-02-14 NOTE — PROGRESS NOTES
"Physical Therapy Progress Note      Name: Brigid Jernigan  Clinic Number: [id number]  Diagnosis: G71.11 (ICD-10-CM) - Myotonic dystrophy  Physician: Luis Felipe Vo MD  Treatment Orders: PT Eval and Treat          Past Medical History:   Diagnosis Date    Abnormal EKG      Myotonic dystrophy      Pacemaker      PAF (paroxysmal atrial fibrillation)      Sleep apnea           Precautions: standard  auth period: 1/3/19 to 3/28/19  Visit #: 8/20        Time in: 1700  Time out: 1734  Total billable treatment time: 34 min     (visit total 2018= 73)  (Visit total 2017= 56)  Date of Eval: 5/9/2017  POC expiration date: 12/18/18 to 3/11/2019     Subjective   Pt reports: no new complaints. Per pt's sister- "we have to leave at 5:30" pt denies use of theraband issued at last appointment.   Pain Scale: denies     Objective   Pt 15 min late, unable to accommodate    Patient received individual therapy to increase strength, endurance, ROM, flexibility, posture, core stabilization and head control with activities as follows:      Pt participated in therapeutic exercises x 34 minutes to address ROM, strength, balance, and mobility which includes:    Supine:              PROM upper traps 3 x 30 sec   PROM Cervical rotation 3 x 15-20 sec              PROM Suboccipital distraction              pec stretching with AROM cervical rotation- 10x   Cervical extension 15x, green theraband   bridging with single leg lift from ball 10x   Lat  Pull downs green band 15x    Side lying: clamshell purple theraband 20x    NP today:   Supine:    X 15 reps of B UE chest press with #7 dowel      X 15 reps of B UE overhead flex/ext with #7 dowel  Leg press: squats 120# 15x    Standing: hip abd OTB 15x   Hip ext OTB 15x  Static standing x 2 min and 30 sec with out UE support and SBA.                                                            Written Home Exercises: continue with previously issued HEP- issued orange theraband 2/14/2019 for standing " hip exercises  Pt demo fair understanding of the education provided. Brigid demonstrated fair return demonstration of activities.      Education provided re: continue with HEP- advance as instructed on 2/14/2019   POC, HEP     Pt has no cultural, educational or language barriers to learning provided.     Assessment   Phani tolerated treatment well. PT initiated lat pull downs in supine to address rounded shoulders and forward head posture. Pt was able to tolerate moderate resistance. PT also advanced cervical extension strengthening with green theraband (moderate resistance) with pt demonstrating ability to perform additional reps. Pt conitues to demonstrate signifiacntr limitations in cervical ROM in all directions. This limits her safety with mobility and ability to interact within the environment.  Pt can benefit from continued skilled PT to address impairments listed below to allow pt to achieve the highest functional level possible and deter the worsening of ROM, mobility, and strength impairments due to progressive nature of diagnosis.         Pt prognosis is Good. Pt will continue to benefit from skilled outpatient physical therapy to address the deficits listed in the problem list, provide pt/family education and to maximize pt's level of independence in the home and community environment.      Activity limitations/ Participation Restrictions:   Fall Risk - impaired balance   Weakness  Impaired muscle tone  Poor head control  Decreased ROM  Gait deviations   Decreased ambulation   Decreased activity tolerance   Decreased independence with daily activities   Requires skilled supervision to complete and progress HEP   Requires skilled PT for DME/ orthotic recommendations     Pt's spiritual, cultural and educational needs considered and pt agreeable to plan of care and GOALS as stated below:     Short term goals: 6 weeks, pt agrees to goals set. (As of 2/4/19)  1. Pt will perform chin tucks in supine with  "supervision to improve independent with HEP. Met 7/5/17  2. Assist pt with obtaining appropriate cervical brace to improve head control. Met 1/9/18- pt has but is not consistently wearing  3. Pt will demonstrate improved knee ext strength on left to 4/5 to improve safety with ambulation and sit<>stand. Met 9/28/17   4. Pt will demonstrate improved cervical rotation PROM to 20 degrees to improve functional head control. Met 7/5/17  5. Pt will perform sit<>stand consistently from 22" height surface with supervision to improve independence. Met 7/5/17                                                         Revised 8/31/17:  Pt will perform sit<>stand from 19" height chair to improve independence with sitting in various chairs around her home and decreased                                                                      need for "pop up seat". inconsistent- multiple attempts for sit>stand from 20" heigh, unable from 19" unassisted (was able to stand from 19" height with BUE and 2 attempts for success   Long term goals: 12 weeks, pt agrees to goals set  6. Pt will perform basic HEP for gross strengthening with supervision to deter worsening of functional limitations. Met 8/31/17- pt's sister reports poor current compliance  7. Pt will demonstrate improved bilateral hip strength to 3/5 to improve balance and independence with mobility. Met 7/5/17  8. New 7/5/17: Pt will demonstrate improved bilateral hip strength to 4/5 to improve balance and independence with mobility. improved-  Hip flex: WALESKA 4+/5; Hip abd: WALESKA 5/5; Hip add: R=4+/5, L= 4/5; Hip ext: R= 4/5, L= 4/5  9. Pt will demonstrate improved cervical extension strength to 3+/5 to improve head control. Met 7/31/17  10. Pt will demonstrate improved cervical deep flexor strength to 3+/5 to improve head control. Met 7/31/17   11. Pt will demonstrate improve balance and gait by scoring 14/28 on Tinetti. Met 8/31/17- 17/28                                      Revised " 8/31/17: pt will demonstrate improved balance/ gait and decreased fall risk to moderate by scoring 19/28 on Tinetti                                            Assessment. inconsistent- 14/28 (18/28 (11/19/18))                                         12. Pt will demonstrate improved cervical PROM for rotation to 40 degrees to improve functional head control. ~same-  8 deg AAROM R rotation, 35 deg L                                         13. New 8/1/18- pt will ambulate 50ft with LBQC and supervision over level tile to demonstrate improved household mobility with decreased AD- improved- up to 50 feet with LBQC and CGA- close S    Plan           continue with cervical stretching and cervical/ scapular strengthening to prevent further decline in head control and posture. Practice standing balance next visit as able.      Yadi Mercado, PT     2/14/2019

## 2019-02-21 ENCOUNTER — DOCUMENTATION ONLY (OUTPATIENT)
Dept: REHABILITATION | Facility: HOSPITAL | Age: 54
End: 2019-02-21

## 2019-02-21 NOTE — PROGRESS NOTES
Pt called to cancel today's PT session due to a recent death in the family.     Yadi Mercado,DPT  2/21/2019

## 2019-02-25 ENCOUNTER — CLINICAL SUPPORT (OUTPATIENT)
Dept: REHABILITATION | Facility: HOSPITAL | Age: 54
End: 2019-02-25
Attending: STUDENT IN AN ORGANIZED HEALTH CARE EDUCATION/TRAINING PROGRAM
Payer: COMMERCIAL

## 2019-02-25 DIAGNOSIS — R29.898 DECREASED ROM OF NECK: ICD-10-CM

## 2019-02-25 DIAGNOSIS — R53.1 DECREASED STRENGTH: ICD-10-CM

## 2019-02-25 DIAGNOSIS — Z74.09 IMPAIRED FUNCTIONAL MOBILITY, BALANCE, GAIT, AND ENDURANCE: ICD-10-CM

## 2019-02-25 PROCEDURE — 97116 GAIT TRAINING THERAPY: CPT | Mod: PO

## 2019-02-25 PROCEDURE — 97110 THERAPEUTIC EXERCISES: CPT | Mod: PO

## 2019-02-25 NOTE — PROGRESS NOTES
"Physical Therapy Progress Note      Name: Brigid Jernigan  Clinic Number: [id number]  Diagnosis: G71.11 (ICD-10-CM) - Myotonic dystrophy  Physician: Luis Felipe Vo MD  Treatment Orders: PT Eval and Treat          Past Medical History:   Diagnosis Date    Abnormal EKG      Myotonic dystrophy      Pacemaker      PAF (paroxysmal atrial fibrillation)      Sleep apnea           Precautions: standard  auth period: 1/3/19 to 3/28/19  Visit #: 9/20        Time in: 1700  Time out: 1738  Total billable treatment time: 38 min     (visit total 2018= 73)  (Visit total 2017= 56)  Date of Eval: 5/9/2017  POC expiration date: 12/18/18 to 3/11/2019     Subjective   Pt reports:  " I'm doing ok."   Sister reported:" Is it ok to go make some more appointments for the ones she missed?"   Pain Scale: denies     Objective   Pt 15 min late, unable to accommodate    Patient received individual therapy to increase strength, endurance, ROM, flexibility, posture, core stabilization and head control with activities as follows:      Pt participated in therapeutic exercises x 15 minutes to address ROM, strength, balance, and mobility which includes:  X 10 min of Sci Fit recumbent stepper.  B UE/BLE level 13.-0  Static standing x 3 min and 33 sec with out UE support and SBA.    Gait x 23 min including:   Pt ambulated ~ 116ft with LBQC and CGA.  1 loss of balance noted self correct                                                        Written Home Exercises: continue with previously issued HEP- issued orange theraband 2/25/2019 for standing hip exercises  Pt demo fair understanding of the education provided. Brigid demonstrated fair return demonstration of activities.      Education provided re: continue with HEP- advance as instructed on 2/25/2019   POC, HEP     Pt has no cultural, educational or language barriers to learning provided.     Assessment   Phani tolerated treatment well and did not have any problems noted.  Pt was able to " "increase resistance on the stepper and was able to increase gait distance slightly.  Pt cont to require CGA for gait for safety.  Pt can benefit from continued skilled PT to address impairments listed below to allow pt to achieve the highest functional level possible and deter the worsening of ROM, mobility, and strength impairments due to progressive nature of diagnosis.         Pt prognosis is Good. Pt will continue to benefit from skilled outpatient physical therapy to address the deficits listed in the problem list, provide pt/family education and to maximize pt's level of independence in the home and community environment.      Activity limitations/ Participation Restrictions:   Fall Risk - impaired balance   Weakness  Impaired muscle tone  Poor head control  Decreased ROM  Gait deviations   Decreased ambulation   Decreased activity tolerance   Decreased independence with daily activities   Requires skilled supervision to complete and progress HEP   Requires skilled PT for DME/ orthotic recommendations     Pt's spiritual, cultural and educational needs considered and pt agreeable to plan of care and GOALS as stated below:     Short term goals: 6 weeks, pt agrees to goals set. (As of 2/4/19)  1. Pt will perform chin tucks in supine with supervision to improve independent with HEP. Met 7/5/17  2. Assist pt with obtaining appropriate cervical brace to improve head control. Met 1/9/18- pt has but is not consistently wearing  3. Pt will demonstrate improved knee ext strength on left to 4/5 to improve safety with ambulation and sit<>stand. Met 9/28/17   4. Pt will demonstrate improved cervical rotation PROM to 20 degrees to improve functional head control. Met 7/5/17  5. Pt will perform sit<>stand consistently from 22" height surface with supervision to improve independence. Met 7/5/17                                                         Revised 8/31/17:  Pt will perform sit<>stand from 19" height chair to improve " "independence with sitting in various chairs around her home and decreased                                                                      need for "pop up seat". inconsistent- multiple attempts for sit>stand from 20" heigh, unable from 19" unassisted (was able to stand from 19" height with BUE and 2 attempts for success   Long term goals: 12 weeks, pt agrees to goals set  6. Pt will perform basic HEP for gross strengthening with supervision to deter worsening of functional limitations. Met 8/31/17- pt's sister reports poor current compliance  7. Pt will demonstrate improved bilateral hip strength to 3/5 to improve balance and independence with mobility. Met 7/5/17  8. New 7/5/17: Pt will demonstrate improved bilateral hip strength to 4/5 to improve balance and independence with mobility. improved-  Hip flex: WALESKA 4+/5; Hip abd: WALESKA 5/5; Hip add: R=4+/5, L= 4/5; Hip ext: R= 4/5, L= 4/5  9. Pt will demonstrate improved cervical extension strength to 3+/5 to improve head control. Met 7/31/17  10. Pt will demonstrate improved cervical deep flexor strength to 3+/5 to improve head control. Met 7/31/17   11. Pt will demonstrate improve balance and gait by scoring 14/28 on Tinetti. Met 8/31/17- 17/28                                      Revised 8/31/17: pt will demonstrate improved balance/ gait and decreased fall risk to moderate by scoring 19/28 on Tinetti                                            Assessment. inconsistent- 14/28 (18/28 (11/19/18))                                         12. Pt will demonstrate improved cervical PROM for rotation to 40 degrees to improve functional head control. ~same-  8 deg AAROM R rotation, 35 deg L                                         13. New 8/1/18- pt will ambulate 50ft with LBQC and supervision over level tile to demonstrate improved household mobility with decreased AD- improved- up to 50 feet with LBQC and CGA- close S    Plan           continue with cervical stretching " and cervical/ scapular strengthening to prevent further decline in head control and posture. Practice standing balance next visit as able.      Mae Caicedo, PTA     2/25/2019

## 2019-02-28 ENCOUNTER — CLINICAL SUPPORT (OUTPATIENT)
Dept: REHABILITATION | Facility: HOSPITAL | Age: 54
End: 2019-02-28
Attending: STUDENT IN AN ORGANIZED HEALTH CARE EDUCATION/TRAINING PROGRAM
Payer: COMMERCIAL

## 2019-02-28 DIAGNOSIS — R53.1 DECREASED STRENGTH: ICD-10-CM

## 2019-02-28 DIAGNOSIS — R29.898 DECREASED ROM OF NECK: ICD-10-CM

## 2019-02-28 DIAGNOSIS — Z74.09 IMPAIRED FUNCTIONAL MOBILITY, BALANCE, GAIT, AND ENDURANCE: ICD-10-CM

## 2019-02-28 PROCEDURE — 97110 THERAPEUTIC EXERCISES: CPT | Mod: PO | Performed by: PHYSICAL THERAPIST

## 2019-02-28 NOTE — PROGRESS NOTES
"Physical Therapy Progress Note      Name: Brigid Jernigan  Clinic Number: [id number]  Diagnosis: G71.11 (ICD-10-CM) - Myotonic dystrophy  Physician: Luis Felipe Vo MD  Treatment Orders: PT Eval and Treat          Past Medical History:   Diagnosis Date    Abnormal EKG      Myotonic dystrophy      Pacemaker      PAF (paroxysmal atrial fibrillation)      Sleep apnea           Precautions: standard  auth period: 1/3/19 to 3/28/19  Visit #: 9/20        Time in: 1712  Time out: 1743  Total billable treatment time: 31 min     (visit total 2018= 73)  (Visit total 2017= 56)  Date of Eval: 5/9/2017  POC expiration date: 12/18/18 to 3/11/2019     Subjective   Pt reports:  " I'm doing ok." no adverse reactions to last treatment reported  Sister reported: no new complaints, pt has not been compliant with HEP for the last few weeks    Pain Scale: denies     Objective   Pt >15 min late, unable to fully accommodate    Patient received individual therapy to increase strength, endurance, ROM, flexibility, posture, core stabilization and head control with activities as follows:      Pt participated in therapeutic exercises x 31 minutes to address ROM, strength, balance, and mobility which includes:   Supine:  PROM upper traps 3 x 30 sec                          PROM Suboccipital distraction               pec stretching with AROM cervical rotation- 10x               Cervical extension 15x, green theraband               Lat  Pull downs green band 15x   Standing: hip abd GTB 10x, with RW    Hip ext GTB 10x, with RW    NP today:             bridging with single leg lift from ball 10x                                              Written Home Exercises: continue with previously issued HEP- issued orange theraband previous for standing hip exercises  Pt demo fair understanding of the education provided. Brigid demonstrated fair return demonstration of activities.      Education provided re: continue with HEP- advance as instructed " on previous visit  POC, HEP     Pt has no cultural, educational or language barriers to learning provided.     Assessment   Phani tolerated treatment well. Pt demonstrated good tolerance to cervical ROM and strengthening. Pt was able to tolerate increased resistance for standing hip exercises. Pt demonstrated increased forward trunk flexion with standing exercises today, requiring increased verbal cueing to correct.  Pt can benefit from continued skilled PT to address impairments listed below to allow pt to achieve the highest functional level possible and deter the worsening of ROM, mobility, and strength impairments due to progressive nature of diagnosis.         Pt prognosis is Good. Pt will continue to benefit from skilled outpatient physical therapy to address the deficits listed in the problem list, provide pt/family education and to maximize pt's level of independence in the home and community environment.      Activity limitations/ Participation Restrictions:   Fall Risk - impaired balance   Weakness  Impaired muscle tone  Poor head control  Decreased ROM  Gait deviations   Decreased ambulation   Decreased activity tolerance   Decreased independence with daily activities   Requires skilled supervision to complete and progress HEP   Requires skilled PT for DME/ orthotic recommendations     Pt's spiritual, cultural and educational needs considered and pt agreeable to plan of care and GOALS as stated below:     Short term goals: 6 weeks, pt agrees to goals set. (As of 2/4/19)  1. Pt will perform chin tucks in supine with supervision to improve independent with HEP. Met 7/5/17  2. Assist pt with obtaining appropriate cervical brace to improve head control. Met 1/9/18- pt has but is not consistently wearing  3. Pt will demonstrate improved knee ext strength on left to 4/5 to improve safety with ambulation and sit<>stand. Met 9/28/17   4. Pt will demonstrate improved cervical rotation PROM to 20 degrees to  "improve functional head control. Met 7/5/17  5. Pt will perform sit<>stand consistently from 22" height surface with supervision to improve independence. Met 7/5/17                                                         Revised 8/31/17:  Pt will perform sit<>stand from 19" height chair to improve independence with sitting in various chairs around her home and decreased                                                                      need for "pop up seat". inconsistent- multiple attempts for sit>stand from 20" heigh, unable from 19" unassisted (was able to stand from 19" height with BUE and 2 attempts for success   Long term goals: 12 weeks, pt agrees to goals set  6. Pt will perform basic HEP for gross strengthening with supervision to deter worsening of functional limitations. Met 8/31/17- pt's sister reports poor current compliance  7. Pt will demonstrate improved bilateral hip strength to 3/5 to improve balance and independence with mobility. Met 7/5/17  8. New 7/5/17: Pt will demonstrate improved bilateral hip strength to 4/5 to improve balance and independence with mobility. improved-  Hip flex: WALESKA 4+/5; Hip abd: WALESKA 5/5; Hip add: R=4+/5, L= 4/5; Hip ext: R= 4/5, L= 4/5  9. Pt will demonstrate improved cervical extension strength to 3+/5 to improve head control. Met 7/31/17  10. Pt will demonstrate improved cervical deep flexor strength to 3+/5 to improve head control. Met 7/31/17   11. Pt will demonstrate improve balance and gait by scoring 14/28 on Tinetti. Met 8/31/17- 17/28                                      Revised 8/31/17: pt will demonstrate improved balance/ gait and decreased fall risk to moderate by scoring 19/28 on Tinetti                                            Assessment. inconsistent- 14/28 (18/28 (11/19/18))                                         12. Pt will demonstrate improved cervical PROM for rotation to 40 degrees to improve functional head control. ~same-  8 deg AAROM R " rotation, 35 deg L                                         13. New 8/1/18- pt will ambulate 50ft with LBQC and supervision over level tile to demonstrate improved household mobility with decreased AD- improved- up to 50 feet with LBQC and CGA- close S    Plan           continue with cervical stretching and cervical/ scapular strengthening to prevent further decline in head control and posture. Practice gait next visit.      Yadi Mercado, PT     2/28/2019

## 2019-03-04 ENCOUNTER — DOCUMENTATION ONLY (OUTPATIENT)
Dept: REHABILITATION | Facility: HOSPITAL | Age: 54
End: 2019-03-04

## 2019-03-04 NOTE — PROGRESS NOTES
PT/PTA met face to face to discuss pt's treatment plan and progress towards established goals.  Continue with current PT POC with focus on cervical strength and ROM, standing balance, and gait with LBQC.  Patient will be seen by physical therapist at least every sixth treatment or 30 days, whichever occurs first.    Mae Caicedo PTA  03/04/2019     Meeting held on 3/4/2019  Yadi Mercado DPT  3/6/2019

## 2019-03-06 ENCOUNTER — CLINICAL SUPPORT (OUTPATIENT)
Dept: REHABILITATION | Facility: HOSPITAL | Age: 54
End: 2019-03-06
Attending: STUDENT IN AN ORGANIZED HEALTH CARE EDUCATION/TRAINING PROGRAM
Payer: COMMERCIAL

## 2019-03-06 DIAGNOSIS — R53.1 DECREASED STRENGTH: ICD-10-CM

## 2019-03-06 DIAGNOSIS — Z74.09 IMPAIRED FUNCTIONAL MOBILITY, BALANCE, GAIT, AND ENDURANCE: ICD-10-CM

## 2019-03-06 DIAGNOSIS — R29.898 DECREASED ROM OF NECK: ICD-10-CM

## 2019-03-06 PROCEDURE — 97110 THERAPEUTIC EXERCISES: CPT | Mod: PO

## 2019-03-06 PROCEDURE — 97116 GAIT TRAINING THERAPY: CPT | Mod: PO

## 2019-03-06 NOTE — PROGRESS NOTES
"Physical Therapy Progress Note      Name: Brigid Jernigan  Clinic Number: [id number]  Diagnosis: G71.11 (ICD-10-CM) - Myotonic dystrophy  Physician: Luis Felipe Vo MD  Treatment Orders: PT Eval and Treat          Past Medical History:   Diagnosis Date    Abnormal EKG      Myotonic dystrophy      Pacemaker      PAF (paroxysmal atrial fibrillation)      Sleep apnea           Precautions: standard  auth period: 1/3/19 to 3/28/19  Visit #: 10/20        Time in: 1705  Time out: 1758  Total billable treatment time: 53 min     (visit total 2018= 73)  (Visit total 2017= 56)  Date of Eval: 5/9/2017  POC expiration date: 12/18/18 to 3/11/2019     Subjective   Pt reports:  " I'm doing ok." no adverse reactions to last treatment reported  Sister reported: " We couldn't make it on Monday because they called to see if we could come in earlier, first at 10:30 and then at 2:30, we just couldn't make it, so we had to cancel"    Pain Scale: denies     Objective   Pt >20 min late, able  accommodate    Patient received individual therapy to increase strength, endurance, ROM, flexibility, posture, core stabilization and head control with activities as follows:      Pt participated in therapeutic exercises x 30 minutes to address ROM, strength, balance, and mobility which includes:  X 10 min on Sci Fit recumbent stepper.  B UE/B LE, level 13.0.  Pt with neck brace donned   Supine:  PROM upper traps 3 x 30 sec                          PROM Suboccipital distraction               pec stretching with AROM cervical rotation- 10x               Chin tucks x 10 reps x 5 sec each      Gait:  Pt ambulated ~220ft with LBQC and CGA.  Pt with 1 loss of balance and required CGA to recover.  Pt with a 3 point gait noted and VC's to look up.                                                Written Home Exercises: continue with previously issued HEP- issued orange theraband previous for standing hip exercises  Pt demo fair understanding of the " education provided. Brigid demonstrated fair return demonstration of activities.      Education provided re: continue with HEP- advance as instructed on previous visit  POC, HEP     Pt has no cultural, educational or language barriers to learning provided.     Assessment   Phani tolerated treatment well with no complaints.  Pt cont to report non compliance with HEP and only agreeable to wearing her neck brace for a short period of time in the therapy session.  Pt was able to increase gait distance today, but had a slower, 3 point gait noted.   Pt can benefit from continued skilled PT to address impairments listed below to allow pt to achieve the highest functional level possible and deter the worsening of ROM, mobility, and strength impairments due to progressive nature of diagnosis.         Pt prognosis is Good. Pt will continue to benefit from skilled outpatient physical therapy to address the deficits listed in the problem list, provide pt/family education and to maximize pt's level of independence in the home and community environment.      Activity limitations/ Participation Restrictions:   Fall Risk - impaired balance   Weakness  Impaired muscle tone  Poor head control  Decreased ROM  Gait deviations   Decreased ambulation   Decreased activity tolerance   Decreased independence with daily activities   Requires skilled supervision to complete and progress HEP   Requires skilled PT for DME/ orthotic recommendations     Pt's spiritual, cultural and educational needs considered and pt agreeable to plan of care and GOALS as stated below:     Short term goals: 6 weeks, pt agrees to goals set. (As of 2/4/19)  1. Pt will perform chin tucks in supine with supervision to improve independent with HEP. Met 7/5/17  2. Assist pt with obtaining appropriate cervical brace to improve head control. Met 1/9/18- pt has but is not consistently wearing  3. Pt will demonstrate improved knee ext strength on left to 4/5 to improve  "safety with ambulation and sit<>stand. Met 9/28/17   4. Pt will demonstrate improved cervical rotation PROM to 20 degrees to improve functional head control. Met 7/5/17  5. Pt will perform sit<>stand consistently from 22" height surface with supervision to improve independence. Met 7/5/17                                                         Revised 8/31/17:  Pt will perform sit<>stand from 19" height chair to improve independence with sitting in various chairs around her home and decreased                                                                      need for "pop up seat". inconsistent- multiple attempts for sit>stand from 20" heigh, unable from 19" unassisted (was able to stand from 19" height with BUE and 2 attempts for success   Long term goals: 12 weeks, pt agrees to goals set  6. Pt will perform basic HEP for gross strengthening with supervision to deter worsening of functional limitations. Met 8/31/17- pt's sister reports poor current compliance  7. Pt will demonstrate improved bilateral hip strength to 3/5 to improve balance and independence with mobility. Met 7/5/17  8. New 7/5/17: Pt will demonstrate improved bilateral hip strength to 4/5 to improve balance and independence with mobility. improved-  Hip flex: WALESKA 4+/5; Hip abd: WALESKA 5/5; Hip add: R=4+/5, L= 4/5; Hip ext: R= 4/5, L= 4/5  9. Pt will demonstrate improved cervical extension strength to 3+/5 to improve head control. Met 7/31/17  10. Pt will demonstrate improved cervical deep flexor strength to 3+/5 to improve head control. Met 7/31/17   11. Pt will demonstrate improve balance and gait by scoring 14/28 on Tinetti. Met 8/31/17- 17/28                                      Revised 8/31/17: pt will demonstrate improved balance/ gait and decreased fall risk to moderate by scoring 19/28 on Tinetti                                            Assessment. inconsistent- 14/28 (18/28 (11/19/18))                                         12. Pt will " demonstrate improved cervical PROM for rotation to 40 degrees to improve functional head control. ~same-  8 deg AAROM R rotation, 35 deg L                                         13. New 8/1/18- pt will ambulate 50ft with LBQC and supervision over level tile to demonstrate improved household mobility with decreased AD- improved- up to 50 feet with LBQC and CGA- close S    Plan           continue with cervical stretching and cervical/ scapular strengthening to prevent further decline in head control and posture. Practice gait next visit.      Mae Caicedo, PTA     3/6/2019

## 2019-03-11 ENCOUNTER — CLINICAL SUPPORT (OUTPATIENT)
Dept: REHABILITATION | Facility: HOSPITAL | Age: 54
End: 2019-03-11
Attending: STUDENT IN AN ORGANIZED HEALTH CARE EDUCATION/TRAINING PROGRAM
Payer: COMMERCIAL

## 2019-03-11 DIAGNOSIS — R53.1 DECREASED STRENGTH: ICD-10-CM

## 2019-03-11 DIAGNOSIS — Z74.09 IMPAIRED FUNCTIONAL MOBILITY, BALANCE, GAIT, AND ENDURANCE: ICD-10-CM

## 2019-03-11 DIAGNOSIS — R29.898 DECREASED ROM OF NECK: ICD-10-CM

## 2019-03-11 PROCEDURE — 97110 THERAPEUTIC EXERCISES: CPT | Mod: PO

## 2019-03-11 PROCEDURE — 97116 GAIT TRAINING THERAPY: CPT | Mod: PO

## 2019-03-11 NOTE — PROGRESS NOTES
"Physical Therapy Progress Note      Name: Brigid Jernigan  Clinic Number: [id number]  Diagnosis: G71.11 (ICD-10-CM) - Myotonic dystrophy  Physician: Luis Felipe Vo MD  Treatment Orders: PT Eval and Treat          Past Medical History:   Diagnosis Date    Abnormal EKG      Myotonic dystrophy      Pacemaker      PAF (paroxysmal atrial fibrillation)      Sleep apnea           Precautions: standard  auth period: 1/3/19 to 3/28/19  Visit #: 11/20        Time in: 1710  Time out: 1743  Total billable treatment time: 33 min     (visit total 2018= 73)  (Visit total 2017= 56)  Date of Eval: 5/9/2017  POC expiration date: 12/18/18 to 3/11/2019     Subjective   Pt reports:  " I'm doing ok."   Sister reported: " We are running late and we have to leave around 5:30/ 5:35pm."    Pain Scale: denies     Objective   Pt >25 min late, unable to accommodate    Patient received individual therapy to increase strength, endurance, ROM, flexibility, posture, core stabilization and head control with activities as follows:      Pt participated in therapeutic exercises x 15 minutes to address ROM, strength, balance, and mobility which includes:  X 10 min on Sci Fit recumbent stepper.  B UE/B LE, level 13.0.  Pt with neck brace donned   1 x 15 reps of B LE squats on leg press with #140lbs applied.       Gait: x 15 mins   Pt ambulated ~20ft and 88ft with LBQC and CGA.    Pt with a 3 point gait noted and VC's to look up.                                                Written Home Exercises: continue with previously issued HEP- issued orange theraband previous for standing hip exercises  Pt demo fair understanding of the education provided. Brigid demonstrated fair return demonstration of activities.      Education provided re: continue with HEP- advance as instructed on previous visit  POC, HEP     Pt has no cultural, educational or language barriers to learning provided.     Assessment   Phani tolerated treatment well with no " "complaints. Pt limited in progression 2* time constraint.  Decreased gait distance noted today.  No other changes noted.   Pt can benefit from continued skilled PT to address impairments listed below to allow pt to achieve the highest functional level possible and deter the worsening of ROM, mobility, and strength impairments due to progressive nature of diagnosis.         Pt prognosis is Good. Pt will continue to benefit from skilled outpatient physical therapy to address the deficits listed in the problem list, provide pt/family education and to maximize pt's level of independence in the home and community environment.      Activity limitations/ Participation Restrictions:   Fall Risk - impaired balance   Weakness  Impaired muscle tone  Poor head control  Decreased ROM  Gait deviations   Decreased ambulation   Decreased activity tolerance   Decreased independence with daily activities   Requires skilled supervision to complete and progress HEP   Requires skilled PT for DME/ orthotic recommendations     Pt's spiritual, cultural and educational needs considered and pt agreeable to plan of care and GOALS as stated below:     Short term goals: 6 weeks, pt agrees to goals set. (As of 2/4/19)  1. Pt will perform chin tucks in supine with supervision to improve independent with HEP. Met 7/5/17  2. Assist pt with obtaining appropriate cervical brace to improve head control. Met 1/9/18- pt has but is not consistently wearing  3. Pt will demonstrate improved knee ext strength on left to 4/5 to improve safety with ambulation and sit<>stand. Met 9/28/17   4. Pt will demonstrate improved cervical rotation PROM to 20 degrees to improve functional head control. Met 7/5/17  5. Pt will perform sit<>stand consistently from 22" height surface with supervision to improve independence. Met 7/5/17                                                         Revised 8/31/17:  Pt will perform sit<>stand from 19" height chair to improve " "independence with sitting in various chairs around her home and decreased                                                                      need for "pop up seat". inconsistent- multiple attempts for sit>stand from 20" heigh, unable from 19" unassisted (was able to stand from 19" height with BUE and 2 attempts for success   Long term goals: 12 weeks, pt agrees to goals set  6. Pt will perform basic HEP for gross strengthening with supervision to deter worsening of functional limitations. Met 8/31/17- pt's sister reports poor current compliance  7. Pt will demonstrate improved bilateral hip strength to 3/5 to improve balance and independence with mobility. Met 7/5/17  8. New 7/5/17: Pt will demonstrate improved bilateral hip strength to 4/5 to improve balance and independence with mobility. improved-  Hip flex: WALESKA 4+/5; Hip abd: WALESKA 5/5; Hip add: R=4+/5, L= 4/5; Hip ext: R= 4/5, L= 4/5  9. Pt will demonstrate improved cervical extension strength to 3+/5 to improve head control. Met 7/31/17  10. Pt will demonstrate improved cervical deep flexor strength to 3+/5 to improve head control. Met 7/31/17   11. Pt will demonstrate improve balance and gait by scoring 14/28 on Tinetti. Met 8/31/17- 17/28                                      Revised 8/31/17: pt will demonstrate improved balance/ gait and decreased fall risk to moderate by scoring 19/28 on Tinetti                                            Assessment. inconsistent- 14/28 (18/28 (11/19/18))                                         12. Pt will demonstrate improved cervical PROM for rotation to 40 degrees to improve functional head control. ~same-  8 deg AAROM R rotation, 35 deg L                                         13. New 8/1/18- pt will ambulate 50ft with LBQC and supervision over level tile to demonstrate improved household mobility with decreased AD- improved- up to 50 feet with LBQC and CGA- close S    Plan           continue with cervical stretching " and cervical/ scapular strengthening to prevent further decline in head control and posture. Practice gait next visit.      Mae Caicedo, PTA     3/11/2019

## 2019-03-14 ENCOUNTER — CLINICAL SUPPORT (OUTPATIENT)
Dept: REHABILITATION | Facility: HOSPITAL | Age: 54
End: 2019-03-14
Attending: STUDENT IN AN ORGANIZED HEALTH CARE EDUCATION/TRAINING PROGRAM
Payer: COMMERCIAL

## 2019-03-14 DIAGNOSIS — R53.1 DECREASED STRENGTH: ICD-10-CM

## 2019-03-14 DIAGNOSIS — Z74.09 IMPAIRED FUNCTIONAL MOBILITY, BALANCE, GAIT, AND ENDURANCE: ICD-10-CM

## 2019-03-14 DIAGNOSIS — R29.898 DECREASED ROM OF NECK: ICD-10-CM

## 2019-03-14 PROCEDURE — 97110 THERAPEUTIC EXERCISES: CPT | Mod: PO | Performed by: PHYSICAL THERAPIST

## 2019-03-18 NOTE — PLAN OF CARE
"Physical Therapy Progress Note      Name: Brigid Jernigan  Clinic Number: [id number]  Diagnosis: G71.11 (ICD-10-CM) - Myotonic dystrophy  Physician: Luis Felipe Vo MD  Treatment Orders: PT Eval and Treat          Past Medical History:   Diagnosis Date    Abnormal EKG      Myotonic dystrophy      Pacemaker      PAF (paroxysmal atrial fibrillation)      Sleep apnea           Precautions: standard  auth period: 1/3/19 to 3/28/19  Visit #: 12/20        Time in: 1705  Time out: 1750  Total billable treatment time: 45 min     (visit total 2018= 73)  (Visit total 2017= 56)  Date of Eval: 5/9/2017            New POC: 3/14/2019 to 5/9/2019.      Subjective   Pt reports:  no new complaints, denies HEP but reports attempting to watch TV with placement on the R side of body to encourage rotation. No adverse effects from last session reported.   Sister reported: "I reminder her to do her exercises, but she doesn't"    Pain Scale: denies     Objective   Pt ~15 min late, able to accommodate    Patient received individual therapy to increase strength, endurance, ROM, flexibility, posture, core stabilization and head control with activities as follows:      Pt participated in therapeutic exercises x 45 minutes to address ROM, strength, balance, and mobility which includes:  Cervical rotation AROM (supine) R= 3 deg, L=31 deg    Strength: Hip flex: R= 4/5, L 4+/5   Hip abd: WALESKA 5/5   Hip add: R=4+/5, L= 4+/5   Hip ext: R= 4/5, L= 4/5    Sit<>stand from ~19" height mat with supervision in 2-3 attempts    Supine on towel roll: PROM cervical extension 3 x 30 sec   PROM upper trap 3 x 30 sec   PROM cervical rotation 3 x 30 sec      Inhibitive distraction   Chest press 6# 20x   WALESKA shoulder flex 6# bar 20x    Side lying: open books 20x on R, 10x on L    Standing: hip ext OTB 20x with RW support    Tinetti Balance Assessment  Balance:  1. Sitting Balance 1   Leans/ slides in chair= 0   Steady= 1  2. Rises from chair 1   Unable " without help= 0   Able, uses arms= 1   Able without use of arms= 2  3. Attempts to rise 1   Unable without help= 0   Able, >1 attmept required-= 1   Able, 1 attempt= 2  4. Immediate standing balance (1st 5 seconds) 1   Unsteady (swaggers, moves feet, trunk sway)= 0   Steady but uses walker or other support= 1   Narrow base of support without walker or support= 2  5. Nudged 1   Begins to fall= 0   Staggers, grabs, catches self= 1   Steady= 2  6. Standing balance 1   Unsteady= 0   Steady but wide LEYDI or uses AD=1   Narrow LEYDI without AD=2  7. Eyes closed 0   Unsteady= 0   Steady= 1  8. Turning 360 degrees 0   Discontinuous steps= 0   Continuous steps= 1   Unsteady= 0   Steady= 1  9. Sitting down 1   Unsafe= 0   Uses arms or not in a smooth motion= 1   Safe, smooth motion= 2  Balance score= 7/16  Gait:  1. Initiation 1   hesitates or multiple attempts to start= 0   No hesitancy= 1  2. Step length 2   Step to= 0   One foot passes= 1   reciprocal pattern= 2  3. Step height 2    Neither foot clears floor= 0   One foot clears floor= 1   Both feet clear floor= 2  4. Step symmetry 1   Not symmetrical= 0   Appears symmetrical= 1  5. Step continuity 1   Not continuous= 0   Appears continuous= 1  6. Path 1   Marked deviation= 0   Mild/moderate deviation or uses A.D.= 1   Straight without A.D.= 2  7. Trunk 0   Marked sway or uses A.D.= 0   No sway, but flexes knees or back, spread arms out while walking= 1   No sway, no flexion, no use of UE, no use of A.D.= 2  8. Walking stance 1   Heels apart= 0   Heels almost touching while walking= 1  Gait score= 9/12  Total score= 16/28 , high fall risk    0-18= high fall risk  19-23= moderate fall risk  24-28= low fall risk       NP today:   X 10 min on Sci Fit recumbent stepper.  B UE/B LE, level 13.0.  Pt with neck brace donned   1 x 15 reps of B LE squats on leg press with #140lbs applied.                                                 Written Home Exercises: continue with previously  "issued HEP- issued orange theraband previous for standing hip exercises  Pt demo fair understanding of the education provided. Brigid demonstrated fair return demonstration of activities.      Education provided re: continue with HEP- advance as instructed on previous visit  POC, HEP     Pt has no cultural, educational or language barriers to learning provided.     Assessment   Assessment period: 2/14/2019 to 3/14/2019.  Phani tolerates treatments well. Pt demonstrated improvement in gait distance with LBQC. Pt still requires CGA for safety in case of loss of balance. Pt's ability to stand from a normal height for most chairs (19") is improving. Pt was able to stand from this height with BUE support and 2-3 attempts from success.  Pt continues to demonstrate poor cervical mobility in all directions, but increased muscle tone/ ridgity is noted on R side of cervical region.  R cervical AROM for rotation declined since last assessment. PT added open books to treatment plan to improve thoracic mobility to improve posture and cervical mobility. Gait per Tinetti Assessment has improved with pt demonstrating more symmetrical steps when using RW. Pt can benefit from continued skilled PT to address impairments listed below to allow pt to achieve the highest functional level possible and deter the worsening of ROM, mobility, and strength impairments due to progressive nature of diagnosis.         Pt prognosis is Good. Pt will continue to benefit from skilled outpatient physical therapy to address the deficits listed in the problem list, provide pt/family education and to maximize pt's level of independence in the home and community environment.      Activity limitations/ Participation Restrictions:   Fall Risk - impaired balance   Weakness  Impaired muscle tone  Poor head control  Decreased ROM  Gait deviations   Decreased ambulation   Decreased activity tolerance   Decreased independence with daily activities   Requires " "skilled supervision to complete and progress HEP   Requires skilled PT for DME/ orthotic recommendations     Pt's spiritual, cultural and educational needs considered and pt agreeable to plan of care and GOALS as stated below:     Short term goals: 6 weeks, pt agrees to goals set. (As of 3/14/19)  1. Pt will perform chin tucks in supine with supervision to improve independent with HEP. Met 7/5/17  2. Assist pt with obtaining appropriate cervical brace to improve head control. Met 1/9/18- pt has but is not consistently wearing  3. Pt will demonstrate improved knee ext strength on left to 4/5 to improve safety with ambulation and sit<>stand. Met 9/28/17   4. Pt will demonstrate improved cervical rotation PROM to 20 degrees to improve functional head control. Met 7/5/17  5. Pt will perform sit<>stand consistently from 22" height surface with supervision to improve independence. Met 7/5/17                                                         Revised 8/31/17:  Pt will perform sit<>stand from 19" height chair to improve independence with sitting in various chairs around her home and decreased                                                                      need for "pop up seat". improved- 2-3 attempts for sit>stand from 19" height,     Long term goals: 12 weeks, pt agrees to goals set  6. Pt will perform basic HEP for gross strengthening with supervision to deter worsening of functional limitations. Met 8/31/17- pt's sister reports poor current compliance  7. Pt will demonstrate improved bilateral hip strength to 3/5 to improve balance and independence with mobility. Met 7/5/17  8. New 7/5/17: Pt will demonstrate improved bilateral hip strength to 4/5 to improve balance and independence with mobility. improved-  Hip flex: R= 4/5, L= 4+/5; Hip abd: WALESKA 5/5; Hip add: WALESKA 4+/5; Hip ext: R= 4/5, L= 4/5  9. Pt will demonstrate improved cervical extension strength to 3+/5 to improve head control. Met 7/31/17  10. Pt " will demonstrate improved cervical deep flexor strength to 3+/5 to improve head control. Met 7/31/17   11. Pt will demonstrate improve balance and gait by scoring 14/28 on Tinetti. Met 8/31/17- 17/28                                      Revised 8/31/17: pt will demonstrate improved balance/ gait and decreased fall risk to moderate by scoring 19/28 on Tinetti                                            Assessment. improved- 16/28 (18/28 (11/19/18))                                         12. Pt will demonstrate improved cervical PROM for rotation to 40 degrees to improve functional head control. ~slight decline-  R= 3 deg, L=31 deg (was 8 deg AAROM R rotation, 35 deg L)                                         13. New 8/1/18- pt will ambulate 50ft with LBQC and supervision over level tile to demonstrate improved household mobility with decreased AD- improved- 80 to 200 feet with LBQC and CGA    Plan           New POC: 3/14/2019 to 5/9/2019.   continue with cervical stretching to prevent further decline in head control and posture. Address open books, rowing and lat pull downs to assist with decreasing forward head posture. Address glut and core strength.      Yadi Mercado, PT     3/14/2019          I certify the need for these services furnished under this plan of treatment and while under my care.  ____________________________________ Physician/Referring Practitioner   Date of Signature

## 2019-03-20 ENCOUNTER — CLINICAL SUPPORT (OUTPATIENT)
Dept: REHABILITATION | Facility: HOSPITAL | Age: 54
End: 2019-03-20
Attending: STUDENT IN AN ORGANIZED HEALTH CARE EDUCATION/TRAINING PROGRAM
Payer: COMMERCIAL

## 2019-03-20 DIAGNOSIS — R53.1 DECREASED STRENGTH: ICD-10-CM

## 2019-03-20 DIAGNOSIS — Z74.09 IMPAIRED FUNCTIONAL MOBILITY, BALANCE, GAIT, AND ENDURANCE: ICD-10-CM

## 2019-03-20 DIAGNOSIS — R29.898 DECREASED ROM OF NECK: ICD-10-CM

## 2019-03-20 PROCEDURE — 97110 THERAPEUTIC EXERCISES: CPT | Mod: PO

## 2019-03-25 ENCOUNTER — CLINICAL SUPPORT (OUTPATIENT)
Dept: REHABILITATION | Facility: HOSPITAL | Age: 54
End: 2019-03-25
Attending: STUDENT IN AN ORGANIZED HEALTH CARE EDUCATION/TRAINING PROGRAM
Payer: COMMERCIAL

## 2019-03-25 DIAGNOSIS — Z74.09 IMPAIRED FUNCTIONAL MOBILITY, BALANCE, GAIT, AND ENDURANCE: ICD-10-CM

## 2019-03-25 DIAGNOSIS — R53.1 DECREASED STRENGTH: ICD-10-CM

## 2019-03-25 DIAGNOSIS — R29.898 DECREASED ROM OF NECK: ICD-10-CM

## 2019-03-25 PROCEDURE — 97110 THERAPEUTIC EXERCISES: CPT | Mod: PO | Performed by: PHYSICAL THERAPIST

## 2019-03-25 PROCEDURE — 97116 GAIT TRAINING THERAPY: CPT | Mod: PO | Performed by: PHYSICAL THERAPIST

## 2019-03-25 NOTE — PROGRESS NOTES
"Physical Therapy Progress Note      Name: Himanshu Jernigan  Clinic Number: [id number]  Diagnosis: G71.11 (ICD-10-CM) - Myotonic dystrophy  Physician: Luis Felipe Vo MD  Treatment Orders: PT Eval and Treat          Past Medical History:   Diagnosis Date    Abnormal EKG      Myotonic dystrophy      Pacemaker      PAF (paroxysmal atrial fibrillation)      Sleep apnea           Precautions: standard  auth period: 1/3/19 to 3/28/19  Visit #: 16/20        Time in: 1659  Time out: 1742  Total billable treatment time: 43 min     (visit total 2018= 73)  (Visit total 2017= 56)  Date of Eval: 5/9/2017             New POC: 3/14/2019 to 5/9/2019.      Subjective   Pt reports:  No new complaints. Denies participation in HEP at home. No adverse effects   Sister reported: No changes. "we are trying to get pt to be more active at home"     Pain Scale: denies     Objective   Pt ~15 min late, able to accommodate    Patient received individual therapy to increase strength, endurance, ROM, flexibility, posture, core stabilization and head control with activities as follows:      Pt participated in therapeutic exercises x 30 minutes to address ROM, strength, balance, and mobility which includes:          Supine: inhibitive distraction   PROM cervical extension   PROM cervical rotation 3 x 30 sec   PROM pec stretch with cervical AROM rotation, towel roll on spine    Side lying: open books 10x   Clamshells PTB 10x    HIMANSHU participated in gait training to improve functional mobility and safety for 13  minutes, including:  Gait with LBQC and min A 135 ft, moderate cues for pattern and reciprocal stepping.   Sit>stand from mat (18") with min A to LBQC      Written Home Exercises: continue with previously issued HEP- issued orange theraband previous for standing hip exercises  Pt demo fair understanding of the education provided. Himanshu demonstrated fair return demonstration of activities.      Education provided re: continue with " HEP- advance as instructed on previous visit  POC, HEP     Pt has no cultural, educational or language barriers to learning provided.     Assessment   Phani tolerated treatment well. Pt demonstrates improved rounded shoulders after stretching. Forward head is still evident. Pt demonstrated step to gait with LBQC, pt was able to demonstrate a partial reciprocal gait with minimal assist for balance. Pt is apprehensive attempting a reciprocal gait with LBQC. PT will address more standing and gait with LBQC to improve confidence, balance and safety. PT will also continue to address cervical and upper thoracic stretching to decrease forward head/ rounded shoulders posture. Pt can benefit from continued skilled PT to address impairments listed below to allow pt to achieve the highest functional level possible and deter the worsening of ROM, mobility, and strength impairments due to progressive nature of diagnosis.         Pt prognosis is Good. Pt will continue to benefit from skilled outpatient physical therapy to address the deficits listed in the problem list, provide pt/family education and to maximize pt's level of independence in the home and community environment.      Activity limitations/ Participation Restrictions:   Fall Risk - impaired balance   Weakness  Impaired muscle tone  Poor head control  Decreased ROM  Gait deviations   Decreased ambulation   Decreased activity tolerance   Decreased independence with daily activities   Requires skilled supervision to complete and progress HEP   Requires skilled PT for DME/ orthotic recommendations     Pt's spiritual, cultural and educational needs considered and pt agreeable to plan of care and GOALS as stated below:    Short term goals: 6 weeks, pt agrees to goals set. (As of 3/14/19)  1. Pt will perform chin tucks in supine with supervision to improve independent with HEP. Met 7/5/17  2. Assist pt with obtaining appropriate cervical brace to improve head control.  "Met 1/9/18- pt has but is not consistently wearing  3. Pt will demonstrate improved knee ext strength on left to 4/5 to improve safety with ambulation and sit<>stand. Met 9/28/17   4. Pt will demonstrate improved cervical rotation PROM to 20 degrees to improve functional head control. Met 7/5/17  5. Pt will perform sit<>stand consistently from 22" height surface with supervision to improve independence. Met 7/5/17                                                         Revised 8/31/17:  Pt will perform sit<>stand from 19" height chair to improve independence with sitting in various chairs around her home and decreased                                                                      need for "pop up seat". improved- 2-3 attempts for sit>stand from 19" height,      Long term goals: 12 weeks, pt agrees to goals set  6. Pt will perform basic HEP for gross strengthening with supervision to deter worsening of functional limitations. Met 8/31/17- pt's sister reports poor current compliance  7. Pt will demonstrate improved bilateral hip strength to 3/5 to improve balance and independence with mobility. Met 7/5/17  8. New 7/5/17: Pt will demonstrate improved bilateral hip strength to 4/5 to improve balance and independence with mobility. improved-  Hip flex: R= 4/5, L= 4+/5; Hip abd: WALESKA 5/5; Hip add: WALESKA 4+/5; Hip ext: R= 4/5, L= 4/5  9. Pt will demonstrate improved cervical extension strength to 3+/5 to improve head control. Met 7/31/17  10. Pt will demonstrate improved cervical deep flexor strength to 3+/5 to improve head control. Met 7/31/17   11. Pt will demonstrate improve balance and gait by scoring 14/28 on Tinetti. Met 8/31/17- 17/28                                      Revised 8/31/17: pt will demonstrate improved balance/ gait and decreased fall risk to moderate by scoring 19/28 on Tinetti                                            Assessment. improved- 16/28 (18/28 (11/19/18))                        "                  12. Pt will demonstrate improved cervical PROM for rotation to 40 degrees to improve functional head control. ~slight decline-  R= 3 deg, L=31 deg (was 8 deg AAROM R rotation, 35 deg L)                                         13. New 8/1/18- pt will ambulate 50ft with LBQC and supervision over level tile to demonstrate improved household mobility with decreased AD- improved- 80 to 200 feet with LBQC and CGA    Plan          continue with cervical stretching to prevent further decline in head control and posture. Address open books, rowing and lat pull downs to assist with decreasing forward head posture. Perform more standing activities     Yadi Mercado, PT     3/25/2019

## 2019-03-27 ENCOUNTER — CLINICAL SUPPORT (OUTPATIENT)
Dept: REHABILITATION | Facility: HOSPITAL | Age: 54
End: 2019-03-27
Attending: STUDENT IN AN ORGANIZED HEALTH CARE EDUCATION/TRAINING PROGRAM
Payer: COMMERCIAL

## 2019-03-27 DIAGNOSIS — Z74.09 IMPAIRED FUNCTIONAL MOBILITY, BALANCE, GAIT, AND ENDURANCE: ICD-10-CM

## 2019-03-27 DIAGNOSIS — R53.1 DECREASED STRENGTH: ICD-10-CM

## 2019-03-27 DIAGNOSIS — R29.898 DECREASED ROM OF NECK: ICD-10-CM

## 2019-03-27 PROCEDURE — 97110 THERAPEUTIC EXERCISES: CPT | Mod: PO

## 2019-03-27 PROCEDURE — 97530 THERAPEUTIC ACTIVITIES: CPT | Mod: PO

## 2019-03-27 NOTE — PROGRESS NOTES
"Physical Therapy Progress Note      Name: Himanshu Jernigan  Clinic Number: [id number]  Diagnosis: G71.11 (ICD-10-CM) - Myotonic dystrophy  Physician: Luis Felipe Vo MD  Treatment Orders: PT Eval and Treat          Past Medical History:   Diagnosis Date    Abnormal EKG      Myotonic dystrophy      Pacemaker      PAF (paroxysmal atrial fibrillation)      Sleep apnea           Precautions: standard  auth period: 1/3/19 to 3/28/19  Visit #: 17/20        Time in: 1700  Time out: 1745  Total billable treatment time: 45 min     (visit total 2018= 73)  (Visit total 2017= 56)  Date of Eval: 5/9/2017             New POC: 3/14/2019 to 5/9/2019.      Subjective   Pt reports:  "I walked the other day."   Sister reported: No changes     Pain Scale: denies     Objective   Pt ~15 min late, able to accommodate    Patient received individual therapy to increase strength, endurance, ROM, flexibility, posture, core stabilization and head control with activities as follows:      Pt participated in therapeutic exercises x 35 minutes to address ROM, strength, balance, and mobility which includes:          Supine: inhibitive distraction   PROM cervical extension   PROM cervical rotation 3 x 30 sec   PROM pec stretch with cervical AROM rotation, towel roll on spine    Side lying: open books 10x     Therapeutic activities x 10 mins to improve functional mobility including:  X 10 min on static standing w/o UE support and SBA  X 3 min of static sitting on EOM while playing balloon volleyball.  Pt required VC's to sit up in upright position.      HIMANSHU participated in gait training to improve functional mobility and safety for 0  minutes, including:        Written Home Exercises: continue with previously issued HEP- issued orange theraband previous for standing hip exercises  Pt demo fair understanding of the education provided. Himanshu demonstrated fair return demonstration of activities.      Education provided re: continue with HEP- " "advance as instructed on previous visit  POC, HEP     Pt has no cultural, educational or language barriers to learning provided.     Assessment   Phani tolerated treatment well with no complaints noted.  Pt was able to increase static standing time w/o UE support and practiced EOM balance with volleyball.   Pt can benefit from continued skilled PT to address impairments listed below to allow pt to achieve the highest functional level possible and deter the worsening of ROM, mobility, and strength impairments due to progressive nature of diagnosis.         Pt prognosis is Good. Pt will continue to benefit from skilled outpatient physical therapy to address the deficits listed in the problem list, provide pt/family education and to maximize pt's level of independence in the home and community environment.      Activity limitations/ Participation Restrictions:   Fall Risk - impaired balance   Weakness  Impaired muscle tone  Poor head control  Decreased ROM  Gait deviations   Decreased ambulation   Decreased activity tolerance   Decreased independence with daily activities   Requires skilled supervision to complete and progress HEP   Requires skilled PT for DME/ orthotic recommendations     Pt's spiritual, cultural and educational needs considered and pt agreeable to plan of care and GOALS as stated below:    Short term goals: 6 weeks, pt agrees to goals set. (As of 3/14/19)  1. Pt will perform chin tucks in supine with supervision to improve independent with HEP. Met 7/5/17  2. Assist pt with obtaining appropriate cervical brace to improve head control. Met 1/9/18- pt has but is not consistently wearing  3. Pt will demonstrate improved knee ext strength on left to 4/5 to improve safety with ambulation and sit<>stand. Met 9/28/17   4. Pt will demonstrate improved cervical rotation PROM to 20 degrees to improve functional head control. Met 7/5/17  5. Pt will perform sit<>stand consistently from 22" height surface with " "supervision to improve independence. Met 7/5/17                                                         Revised 8/31/17:  Pt will perform sit<>stand from 19" height chair to improve independence with sitting in various chairs around her home and decreased                                                                      need for "pop up seat". improved- 2-3 attempts for sit>stand from 19" height,      Long term goals: 12 weeks, pt agrees to goals set  6. Pt will perform basic HEP for gross strengthening with supervision to deter worsening of functional limitations. Met 8/31/17- pt's sister reports poor current compliance  7. Pt will demonstrate improved bilateral hip strength to 3/5 to improve balance and independence with mobility. Met 7/5/17  8. New 7/5/17: Pt will demonstrate improved bilateral hip strength to 4/5 to improve balance and independence with mobility. improved-  Hip flex: R= 4/5, L= 4+/5; Hip abd: WALESKA 5/5; Hip add: WALESKA 4+/5; Hip ext: R= 4/5, L= 4/5  9. Pt will demonstrate improved cervical extension strength to 3+/5 to improve head control. Met 7/31/17  10. Pt will demonstrate improved cervical deep flexor strength to 3+/5 to improve head control. Met 7/31/17   11. Pt will demonstrate improve balance and gait by scoring 14/28 on Tinetti. Met 8/31/17- 17/28                                      Revised 8/31/17: pt will demonstrate improved balance/ gait and decreased fall risk to moderate by scoring 19/28 on Tinetti                                            Assessment. improved- 16/28 (18/28 (11/19/18))                                         12. Pt will demonstrate improved cervical PROM for rotation to 40 degrees to improve functional head control. ~slight decline-  R= 3 deg, L=31 deg (was 8 deg AAROM R rotation, 35 deg L)                                         13. New 8/1/18- pt will ambulate 50ft with LBQC and supervision over level tile to demonstrate improved household mobility with " decreased AD- improved- 80 to 200 feet with LBQC and CGA    Plan          continue with cervical stretching to prevent further decline in head control and posture. Address open books, rowing and lat pull downs to assist with decreasing forward head posture. Perform more standing activities     Mae Caicedo, PTA     3/27/2019

## 2019-04-01 ENCOUNTER — DOCUMENTATION ONLY (OUTPATIENT)
Dept: REHABILITATION | Facility: HOSPITAL | Age: 54
End: 2019-04-01

## 2019-04-01 NOTE — PROGRESS NOTES
PT/PTA met face to face to discuss pt's treatment plan and progress towards established goals.  Continue with current PT POC with focus on cervical strengthening and stretching, gait and standing balance.  Patient will be seen by physical therapist at least every sixth treatment or 30 days, whichever occurs first.    Mae Caicedo PTA  04/01/2019    Meeting held on 4/1/2019.  Ydai Mercado, JORDYT  4/4/2019

## 2019-04-04 ENCOUNTER — CLINICAL SUPPORT (OUTPATIENT)
Dept: REHABILITATION | Facility: HOSPITAL | Age: 54
End: 2019-04-04
Attending: STUDENT IN AN ORGANIZED HEALTH CARE EDUCATION/TRAINING PROGRAM
Payer: COMMERCIAL

## 2019-04-04 DIAGNOSIS — R29.898 DECREASED ROM OF NECK: ICD-10-CM

## 2019-04-04 DIAGNOSIS — R53.1 DECREASED STRENGTH: ICD-10-CM

## 2019-04-04 DIAGNOSIS — Z74.09 IMPAIRED FUNCTIONAL MOBILITY, BALANCE, GAIT, AND ENDURANCE: ICD-10-CM

## 2019-04-04 PROCEDURE — 97110 THERAPEUTIC EXERCISES: CPT | Mod: PO | Performed by: PHYSICAL THERAPIST

## 2019-04-04 NOTE — PROGRESS NOTES
Physical Therapy Progress Note      Name: Himanshu Jernigan  Clinic Number: [id number]  Diagnosis: G71.11 (ICD-10-CM) - Myotonic dystrophy  Physician: Luis Felipe Vo MD  Treatment Orders: PT Eval and Treat          Past Medical History:   Diagnosis Date    Abnormal EKG      Myotonic dystrophy      Pacemaker      PAF (paroxysmal atrial fibrillation)      Sleep apnea           Precautions: standard  auth period: 1/3/19 to 3/28/19- pending new auth from secondary  Visit #: 18/20        Time in: 1645  Time out: 1733  Total billable treatment time: 45 min     (visit total 2018= 73)  (Visit total 2017= 56)  Date of Eval: 5/9/2017             New POC: 3/14/2019 to 5/9/2019.      Subjective   Pt reports:  No new complaints. No adverse effects from last session. Denies HEP, but reports working on Emida  Sister reported: No changes     Pain Scale: denies     Objective       Patient received individual therapy to increase strength, endurance, ROM, flexibility, posture, core stabilization and head control with activities as follows:      Pt participated in therapeutic exercises x 45 minutes to address ROM, strength, balance, and mobility which includes:  Recumbent stepper BUE/ LE L 14 x 10 min            Supine on towel roll:    PROM upper trap stretch 2 x 30 sec   inhibitive distraction   PROM cervical extension    Cervical extension OTB 10x   PROM cervical rotation 3 x 30 sec   PROM pec stretch with cervical AROM rotation, towel roll on spine   Chin tuck 3 sec 10x    Side lying: open books 10x    Standing: rows pink thera tube 10x   Hip abd OTB 20x     Therapeutic activities x 0 mins to improve functional mobility including:  X 10 min on static standing w/o UE support and SBA  X 3 min of static sitting on EOM while playing balloon volleyball.  Pt required VC's to sit up in upright position.      HIMANSHU participated in gait training to improve functional mobility and safety for 0  minutes, including:        Written  Home Exercises: continue with previously issued HEP- issued orange theraband previous for standing hip exercises  Pt demo fair understanding of the education provided. Brigid demonstrated fair return demonstration of activities.      Education provided re: continue with HEP- advance as instructed on previous visit  POC, HEP     Pt has no cultural, educational or language barriers to learning provided.     Assessment   Phani tolerated treatment well. Pt tolerated increased resistance on recumbent stepper. Today PT focused on improving head control, neck strength, and decreasing forward head posture. Pt tolerated standing rows with min A for balance (standing in front of mat for safety).  Pt can benefit from continued skilled PT to address impairments listed below to allow pt to achieve the highest functional level possible and deter the worsening of ROM, mobility, and strength impairments due to progressive nature of diagnosis.         Pt prognosis is Good. Pt will continue to benefit from skilled outpatient physical therapy to address the deficits listed in the problem list, provide pt/family education and to maximize pt's level of independence in the home and community environment.      Activity limitations/ Participation Restrictions:   Fall Risk - impaired balance   Weakness  Impaired muscle tone  Poor head control  Decreased ROM  Gait deviations   Decreased ambulation   Decreased activity tolerance   Decreased independence with daily activities   Requires skilled supervision to complete and progress HEP   Requires skilled PT for DME/ orthotic recommendations     Pt's spiritual, cultural and educational needs considered and pt agreeable to plan of care and GOALS as stated below:    Short term goals: 6 weeks, pt agrees to goals set. (As of 3/14/19)  1. Pt will perform chin tucks in supine with supervision to improve independent with HEP. Met 7/5/17  2. Assist pt with obtaining appropriate cervical brace to  "improve head control. Met 1/9/18- pt has but is not consistently wearing  3. Pt will demonstrate improved knee ext strength on left to 4/5 to improve safety with ambulation and sit<>stand. Met 9/28/17   4. Pt will demonstrate improved cervical rotation PROM to 20 degrees to improve functional head control. Met 7/5/17  5. Pt will perform sit<>stand consistently from 22" height surface with supervision to improve independence. Met 7/5/17                                                         Revised 8/31/17:  Pt will perform sit<>stand from 19" height chair to improve independence with sitting in various chairs around her home and decreased                                                                      need for "pop up seat". improved- 2-3 attempts for sit>stand from 19" height,      Long term goals: 12 weeks, pt agrees to goals set  6. Pt will perform basic HEP for gross strengthening with supervision to deter worsening of functional limitations. Met 8/31/17- pt's sister reports poor current compliance  7. Pt will demonstrate improved bilateral hip strength to 3/5 to improve balance and independence with mobility. Met 7/5/17  8. New 7/5/17: Pt will demonstrate improved bilateral hip strength to 4/5 to improve balance and independence with mobility. improved-  Hip flex: R= 4/5, L= 4+/5; Hip abd: WALESKA 5/5; Hip add: WALESKA 4+/5; Hip ext: R= 4/5, L= 4/5  9. Pt will demonstrate improved cervical extension strength to 3+/5 to improve head control. Met 7/31/17  10. Pt will demonstrate improved cervical deep flexor strength to 3+/5 to improve head control. Met 7/31/17   11. Pt will demonstrate improve balance and gait by scoring 14/28 on Tinetti. Met 8/31/17- 17/28                                      Revised 8/31/17: pt will demonstrate improved balance/ gait and decreased fall risk to moderate by scoring 19/28 on Tinetti                                            Assessment. improved- 16/28 (18/28 " (11/19/18))                                         12. Pt will demonstrate improved cervical PROM for rotation to 40 degrees to improve functional head control. ~slight decline-  R= 3 deg, L=31 deg (was 8 deg AAROM R rotation, 35 deg L)                                         13. New 8/1/18- pt will ambulate 50ft with LBQC and supervision over level tile to demonstrate improved household mobility with decreased AD- improved- 80 to 200 feet with LBQC and CGA    Plan          continue with cervical stretching and strengthening to decrease forward head posture. Need to address standing hip ext and flexion next visit.      Yadi Mercado, PT     4/4/2019

## 2019-04-11 ENCOUNTER — CLINICAL SUPPORT (OUTPATIENT)
Dept: REHABILITATION | Facility: HOSPITAL | Age: 54
End: 2019-04-11
Attending: STUDENT IN AN ORGANIZED HEALTH CARE EDUCATION/TRAINING PROGRAM
Payer: COMMERCIAL

## 2019-04-11 DIAGNOSIS — R53.1 DECREASED STRENGTH: ICD-10-CM

## 2019-04-11 DIAGNOSIS — Z74.09 IMPAIRED FUNCTIONAL MOBILITY, BALANCE, GAIT, AND ENDURANCE: ICD-10-CM

## 2019-04-11 DIAGNOSIS — R29.898 DECREASED ROM OF NECK: ICD-10-CM

## 2019-04-11 PROCEDURE — 97116 GAIT TRAINING THERAPY: CPT | Mod: PO | Performed by: PHYSICAL THERAPIST

## 2019-04-11 PROCEDURE — 97110 THERAPEUTIC EXERCISES: CPT | Mod: PO | Performed by: PHYSICAL THERAPIST

## 2019-04-11 NOTE — PROGRESS NOTES
Physical Therapy Progress Note      Name: Himanshu Jernigan  Clinic Number: [id number]  Diagnosis: G71.11 (ICD-10-CM) - Myotonic dystrophy  Physician: Luis Felipe Vo MD  Treatment Orders: PT Eval and Treat          Past Medical History:   Diagnosis Date    Abnormal EKG      Myotonic dystrophy      Pacemaker      PAF (paroxysmal atrial fibrillation)      Sleep apnea           Precautions: standard  auth period: 4/4/19 to 5/9/19  Visit #: 2/10     Time in: 1714  Time out: 1745  Total billable treatment time: 31 min     (visit total 2018= 73)  (Visit total 2017= 56)  Date of Eval: 5/9/2017             New POC: 3/14/2019 to 5/9/2019.      Subjective   Pt reports:  No new complaints. No adverse effects from last session. Denies HEP.   Sister reported: No changes     Pain Scale: denies     Objective       Patient received individual therapy to increase strength, endurance, ROM, flexibility, posture, core stabilization and head control with activities as follows:      Pt participated in therapeutic exercises x 21 minutes to address ROM, strength, balance, and mobility which includes:            Supine:    PROM upper trap stretch 2 x 30 sec   inhibitive distraction   PROM cervical extension    PROM cervical rotation 3 x 30 sec   PROM pec stretch with cervical AROM rotation    Chest press 6# bar 2 x 10   WALESKA shoulder flexion 6# bar 2 x 10     PROM cervical R rotation= 8 deg    Hip strength:  R L  Hip flexion NT NT  Hip extension 3+/5 4-/5  Hip abduction 4+/5 4+/5  Hip adduction 4/5 4/5     HIMANSHU participated in gait training to improve functional mobility and safety for 10 minutes, including:  ambulation with LBQC and WALESKA AFOs 90 ft, min A    Tinetti Balance Assessment  Balance:  1. Sitting Balance 1   Leans/ slides in chair= 0   Steady= 1  2. Rises from chair 1   Unable without help= 0   Able, uses arms= 1   Able without use of arms= 2  3. Attempts to rise 1   Unable without help= 0   Able, >1 attmept required-=  1   Able, 1 attempt= 2  4. Immediate standing balance (1st 5 seconds) 1   Unsteady (swaggers, moves feet, trunk sway)= 0   Steady but uses walker or other support= 1   Narrow base of support without walker or support= 2  5. Nudged 2   Begins to fall= 0   Staggers, grabs, catches self= 1   Steady= 2  6. Standing balance 1   Unsteady= 0   Steady but wide LEYDI or uses AD=1   Narrow LEYDI without AD=2  7. Eyes closed 1   Unsteady= 0   Steady= 1  8. Turning 360 degrees 1, steady   Discontinuous steps= 0   Continuous steps= 1   Unsteady= 0   Steady= 1  9. Sitting down 1   Unsafe= 0   Uses arms or not in a smooth motion= 1   Safe, smooth motion= 2  Balance score= 10/16  Gait:  1. Initiation 1   hesitates or multiple attempts to start= 0   No hesitancy= 1  2. Step length 2   Step to= 0   One foot passes= 1   reciprocal pattern= 2  3. Step height 2    Neither foot clears floor= 0   One foot clears floor= 1   Both feet clear floor= 2  4. Step symmetry 1   Not symmetrical= 0   Appears symmetrical= 1  5. Step continuity 1   Not continuous= 0   Appears continuous= 1  6. Path 1   Marked deviation= 0   Mild/moderate deviation or uses A.D.= 1   Straight without A.D.= 2  7. Trunk 0   Marked sway or uses A.D.= 0   No sway, but flexes knees or back, spread arms out while walking= 1   No sway, no flexion, no use of UE, no use of A.D.= 2  8. Walking stance 1   Heels apart= 0   Heels almost touching while walking= 1  Gait score= 9/12  Total score= 19/28 , moderate fall risk    0-18= high fall risk  19-23= moderate fall risk  24-28= low fall risk         Written Home Exercises: continue with previously issued HEP  Pt demo fair understanding of the education provided. Brigid demonstrated fair return demonstration of activities.      Education provided re: perform HEP for improved carryover and consistency of progress       Pt has no cultural, educational or language barriers to learning provided.     Assessment   assessment period: 3/20/19  to 4/11/2019. Phani tolerates treatments well. Pt demonstrates inconsistent bilateral hip strength. Weakness remains in hip extension. Pt demonstrates improved consistency with sit<>stand from multiple height surfaces as low as 19 inches.  Pt continues to demonstrate forward head posture, rounded shoulders are improving. R cervical passive range of motion for rotation has improved slightly, but is still significantly limited (only 8 deg, normal= 90 deg). Pt continues to require at least minimal assistance to ambulate with large based quad cane over level surfaces due to impaired balance and decreased strength. Pt can benefit from continued skilled PT to address impairments listed below to allow pt to achieve the highest functional level possible and deter the worsening of ROM, mobility, and strength impairments due to progressive nature of diagnosis.         Pt prognosis is Good. Pt will continue to benefit from skilled outpatient physical therapy to address the deficits listed in the problem list, provide pt/family education and to maximize pt's level of independence in the home and community environment.      Activity limitations/ Participation Restrictions:   Fall Risk - impaired balance   Weakness  Impaired muscle tone  Poor head control  Decreased ROM  Gait deviations   Decreased ambulation   Decreased activity tolerance   Decreased independence with daily activities   Requires skilled supervision to complete and progress HEP   Requires skilled PT for DME/ orthotic recommendations     Pt's spiritual, cultural and educational needs considered and pt agreeable to plan of care and GOALS as stated below:    Short term goals: 6 weeks, pt agrees to goals set. (As of 4/16/19)  1. Pt will perform chin tucks in supine with supervision to improve independent with HEP. Met 7/5/17  2. Assist pt with obtaining appropriate cervical brace to improve head control. Met 1/9/18- pt has but is not consistently wearing  3. Pt  "will demonstrate improved knee ext strength on left to 4/5 to improve safety with ambulation and sit<>stand. Met 9/28/17   4. Pt will demonstrate improved cervical rotation PROM to 20 degrees to improve functional head control. Met 7/5/17  5. Pt will perform sit<>stand consistently from 22" height surface with supervision to improve independence. Met 7/5/17                                                         Revised 8/31/17:  Pt will perform sit<>stand from 19" height chair to improve independence with sitting in various chairs around her home and decreased                                                                      need for "pop up seat". ~same- 2-3 attempts for sit>stand from 19" height,      Long term goals: 12 weeks, pt agrees to goals set  6. Pt will perform basic HEP for gross strengthening with supervision to deter worsening of functional limitations. Met 8/31/17- pt's sister reports poor current compliance  7. Pt will demonstrate improved bilateral hip strength to 3/5 to improve balance and independence with mobility. Met 7/5/17  8. New 7/5/17: Pt will demonstrate improved bilateral hip strength to 4/5 to improve balance and independence with mobility. ~same/ inconsistent-  Hip abd: WALESKA 4+/5; Hip add: WALESKA 44/5; Hip ext: R=3+/5, L=4-/5  9. Pt will demonstrate improved cervical extension strength to 3+/5 to improve head control. Met 7/31/17  10. Pt will demonstrate improved cervical deep flexor strength to 3+/5 to improve head control. Met 7/31/17   11. Pt will demonstrate improve balance and gait by scoring 14/28 on Tinetti. Met 8/31/17- 17/28                                      Revised 8/31/17: pt will demonstrate improved balance/ gait and decreased fall risk to moderate by scoring 19/28 on Tinetti Assessment. NT today- 16/28 (18/28 (11/19/18))                                         12. Pt will demonstrate improved cervical PROM for rotation to 40 degrees to improve functional head control. " improved-  R= 8 deg,                                          13. New 8/1/18- pt will ambulate 50ft with LBQC and supervision over level tile to demonstrate improved household mobility with decreased AD- ~same 90 feet with LBQC and min A    Plan          continue with cervical stretching and strengthening to decrease forward head posture. Need to address hip strengthening to improve ambulation with large based quad cane        Yadi Mercado, PT     4/11/2019

## 2019-04-17 NOTE — PLAN OF CARE
Physical Therapy Progress Note      Name: Himanshu Jernigan  Clinic Number: [id number]  Diagnosis: G71.11 (ICD-10-CM) - Myotonic dystrophy  Physician: Luis Felipe Vo MD  Treatment Orders: PT Eval and Treat          Past Medical History:   Diagnosis Date    Abnormal EKG      Myotonic dystrophy      Pacemaker      PAF (paroxysmal atrial fibrillation)      Sleep apnea           Precautions: standard  auth period: 4/4/19 to 5/9/19  Visit #: 2/10     Time in: 1714  Time out: 1745  Total billable treatment time: 31 min     (visit total 2018= 73)  (Visit total 2017= 56)  Date of Eval: 5/9/2017             New POC: 3/14/2019 to 5/9/2019.      Subjective   Pt reports:  No new complaints. No adverse effects from last session. Denies HEP.   Sister reported: No changes     Pain Scale: denies     Objective       Patient received individual therapy to increase strength, endurance, ROM, flexibility, posture, core stabilization and head control with activities as follows:      Pt participated in therapeutic exercises x 21 minutes to address ROM, strength, balance, and mobility which includes:            Supine:    PROM upper trap stretch 2 x 30 sec   inhibitive distraction   PROM cervical extension    PROM cervical rotation 3 x 30 sec   PROM pec stretch with cervical AROM rotation    Chest press 6# bar 2 x 10   WALESKA shoulder flexion 6# bar 2 x 10     PROM cervical R rotation= 8 deg    Hip strength:  R L  Hip flexion NT NT  Hip extension 3+/5 4-/5  Hip abduction 4+/5 4+/5  Hip adduction 4/5 4/5     HIMANSHU participated in gait training to improve functional mobility and safety for 10 minutes, including:  ambulation with LBQC and WALESKA AFOs 90 ft, min A    Tinetti Balance Assessment  Balance:  1. Sitting Balance 1   Leans/ slides in chair= 0   Steady= 1  2. Rises from chair 1   Unable without help= 0   Able, uses arms= 1   Able without use of arms= 2  3. Attempts to rise 1   Unable without help= 0   Able, >1 attmept required-=  1   Able, 1 attempt= 2  4. Immediate standing balance (1st 5 seconds) 1   Unsteady (swaggers, moves feet, trunk sway)= 0   Steady but uses walker or other support= 1   Narrow base of support without walker or support= 2  5. Nudged 2   Begins to fall= 0   Staggers, grabs, catches self= 1   Steady= 2  6. Standing balance 1   Unsteady= 0   Steady but wide LEYDI or uses AD=1   Narrow LEYDI without AD=2  7. Eyes closed 1   Unsteady= 0   Steady= 1  8. Turning 360 degrees 1, steady   Discontinuous steps= 0   Continuous steps= 1   Unsteady= 0   Steady= 1  9. Sitting down 1   Unsafe= 0   Uses arms or not in a smooth motion= 1   Safe, smooth motion= 2  Balance score= 10/16  Gait:  1. Initiation 1   hesitates or multiple attempts to start= 0   No hesitancy= 1  2. Step length 2   Step to= 0   One foot passes= 1   reciprocal pattern= 2  3. Step height 2    Neither foot clears floor= 0   One foot clears floor= 1   Both feet clear floor= 2  4. Step symmetry 1   Not symmetrical= 0   Appears symmetrical= 1  5. Step continuity 1   Not continuous= 0   Appears continuous= 1  6. Path 1   Marked deviation= 0   Mild/moderate deviation or uses A.D.= 1   Straight without A.D.= 2  7. Trunk 0   Marked sway or uses A.D.= 0   No sway, but flexes knees or back, spread arms out while walking= 1   No sway, no flexion, no use of UE, no use of A.D.= 2  8. Walking stance 1   Heels apart= 0   Heels almost touching while walking= 1  Gait score= 9/12  Total score= 19/28 , moderate fall risk    0-18= high fall risk  19-23= moderate fall risk  24-28= low fall risk         Written Home Exercises: continue with previously issued HEP  Pt demo fair understanding of the education provided. Brigid demonstrated fair return demonstration of activities.      Education provided re: perform HEP for improved carryover and consistency of progress       Pt has no cultural, educational or language barriers to learning provided.     Assessment   assessment period: 3/20/19  to 4/11/2019. Phani tolerates treatments well. Pt demonstrates inconsistent bilateral hip strength. Weakness remains in hip extension. Pt demonstrates improved consistency with sit<>stand from multiple height surfaces as low as 19 inches.  Pt continues to demonstrate forward head posture, rounded shoulders are improving. R cervical passive range of motion for rotation has improved slightly, but is still significantly limited (only 8 deg, normal= 90 deg). Pt continues to require at least minimal assistance to ambulate with large based quad cane over level surfaces due to impaired balance and decreased strength. Pt can benefit from continued skilled PT to address impairments listed below to allow pt to achieve the highest functional level possible and deter the worsening of ROM, mobility, and strength impairments due to progressive nature of diagnosis.         Pt prognosis is Good. Pt will continue to benefit from skilled outpatient physical therapy to address the deficits listed in the problem list, provide pt/family education and to maximize pt's level of independence in the home and community environment.      Activity limitations/ Participation Restrictions:   Fall Risk - impaired balance   Weakness  Impaired muscle tone  Poor head control  Decreased ROM  Gait deviations   Decreased ambulation   Decreased activity tolerance   Decreased independence with daily activities   Requires skilled supervision to complete and progress HEP   Requires skilled PT for DME/ orthotic recommendations     Pt's spiritual, cultural and educational needs considered and pt agreeable to plan of care and GOALS as stated below:    Short term goals: 6 weeks, pt agrees to goals set. (As of 4/16/19)  1. Pt will perform chin tucks in supine with supervision to improve independent with HEP. Met 7/5/17  2. Assist pt with obtaining appropriate cervical brace to improve head control. Met 1/9/18- pt has but is not consistently wearing  3. Pt  "will demonstrate improved knee ext strength on left to 4/5 to improve safety with ambulation and sit<>stand. Met 9/28/17   4. Pt will demonstrate improved cervical rotation PROM to 20 degrees to improve functional head control. Met 7/5/17  5. Pt will perform sit<>stand consistently from 22" height surface with supervision to improve independence. Met 7/5/17                                                         Revised 8/31/17:  Pt will perform sit<>stand from 19" height chair to improve independence with sitting in various chairs around her home and decreased                                                                      need for "pop up seat". ~same- 2-3 attempts for sit>stand from 19" height,      Long term goals: 12 weeks, pt agrees to goals set  6. Pt will perform basic HEP for gross strengthening with supervision to deter worsening of functional limitations. Met 8/31/17- pt's sister reports poor current compliance  7. Pt will demonstrate improved bilateral hip strength to 3/5 to improve balance and independence with mobility. Met 7/5/17  8. New 7/5/17: Pt will demonstrate improved bilateral hip strength to 4/5 to improve balance and independence with mobility. ~same/ inconsistent-  Hip abd: WALESKA 4+/5; Hip add: WALESKA 44/5; Hip ext: R=3+/5, L=4-/5  9. Pt will demonstrate improved cervical extension strength to 3+/5 to improve head control. Met 7/31/17  10. Pt will demonstrate improved cervical deep flexor strength to 3+/5 to improve head control. Met 7/31/17   11. Pt will demonstrate improve balance and gait by scoring 14/28 on Tinetti. Met 8/31/17- 17/28                                      Revised 8/31/17: pt will demonstrate improved balance/ gait and decreased fall risk to moderate by scoring 19/28 on Tinetti Assessment. NT today- 16/28 (18/28 (11/19/18))                                         12. Pt will demonstrate improved cervical PROM for rotation to 40 degrees to improve functional head control. " improved-  R= 8 deg,                                          13. New 8/1/18- pt will ambulate 50ft with LBQC and supervision over level tile to demonstrate improved household mobility with decreased AD- ~same 90 feet with LBQC and min A    Plan          continue with cervical stretching and strengthening to decrease forward head posture. Need to address hip strengthening to improve ambulation with large based quad cane        Yadi Mercado, PT     4/11/2019     I certify the need for these services furnished under this plan of treatment and while under my care.  ____________________________________ Physician/Referring Practitioner   Date of Signature

## 2019-04-18 ENCOUNTER — DOCUMENTATION ONLY (OUTPATIENT)
Dept: REHABILITATION | Facility: HOSPITAL | Age: 54
End: 2019-04-18

## 2019-04-29 ENCOUNTER — CLINICAL SUPPORT (OUTPATIENT)
Dept: REHABILITATION | Facility: HOSPITAL | Age: 54
End: 2019-04-29
Attending: STUDENT IN AN ORGANIZED HEALTH CARE EDUCATION/TRAINING PROGRAM
Payer: COMMERCIAL

## 2019-04-29 DIAGNOSIS — R53.1 DECREASED STRENGTH: ICD-10-CM

## 2019-04-29 DIAGNOSIS — R29.898 DECREASED ROM OF NECK: ICD-10-CM

## 2019-04-29 DIAGNOSIS — Z74.09 IMPAIRED FUNCTIONAL MOBILITY, BALANCE, GAIT, AND ENDURANCE: ICD-10-CM

## 2019-04-29 PROCEDURE — 97110 THERAPEUTIC EXERCISES: CPT | Mod: PO

## 2019-04-29 NOTE — PROGRESS NOTES
Physical Therapy Progress Note      Name: Brigid Jernigan  Clinic Number: [id number]  Diagnosis: G71.11 (ICD-10-CM) - Myotonic dystrophy  Physician: Luis Felipe Vo MD  Treatment Orders: PT Eval and Treat          Past Medical History:   Diagnosis Date    Abnormal EKG      Myotonic dystrophy      Pacemaker      PAF (paroxysmal atrial fibrillation)      Sleep apnea           Precautions: standard  auth period: 4/4/19 to 5/9/19  Visit #: 3/10     Time in: 1650  Time out: 1745  Total billable treatment time: 55 min     (visit total 2018= 73)  (Visit total 2017= 56)  Date of Eval: 5/9/2017             New POC: 3/14/2019 to 5/9/2019.      Subjective   Pt reports:  No new complaints. No adverse effects from last session. Denies HEP.   Sister reported: No changes     Pain Scale: denies     Objective   Patient received individual therapy to increase strength, endurance, ROM, flexibility, posture, core stabilization and head control with activities as follows:      Pt participated in therapeutic exercises x 55 minutes to address ROM, strength, balance, and mobility which includes:    Sci-Fit Recumbent stepper at L 13.5 for 10 min             Supine:    inhibitive distraction   PROM cervical extension    PROM cervical rotation 3 x 30 sec   PROM pec stretch with towel roll down spine       Side lying:    open books 10x     Sitting:    Rows yellow Thera-band 2 x 10     Glut dominant mini-squats with UE support 2 x 10   LLE step downs from 2 inch step with A to stabilize LLE x 10    LLE step ups with 4 inch step with A to stabilize LLE x 10      Written Home Exercises: continue with previously issued HEP  Pt demo fair understanding of the education provided. Brigid demonstrated fair return demonstration of activities.      Education provided re: perform HEP for improved carryover and consistency of progress       Pt has no cultural, educational or language barriers to learning provided.     Assessment     Pt tolerated  cervical ROM well but demonstrated significant ROM limitations in extensions and lateral rotation, R>L. Pt could benefit from continued jessica-scapular strengthening and pec stretching in order to promote posture correction and increased B shoulder stability. Pt also performed glut dominant squats and glut dominant step ups and step downs in order to address hip strengthening to improve ambulation. Pt can benefit from continued skilled PT to address impairments listed below to allow pt to achieve the highest functional level possible and deter the worsening of ROM, mobility, and strength impairments due to progressive nature of diagnosis.      Pt prognosis is Good. Pt will continue to benefit from skilled outpatient physical therapy to address the deficits listed in the problem list, provide pt/family education and to maximize pt's level of independence in the home and community environment.      Activity limitations/ Participation Restrictions:   Fall Risk - impaired balance   Weakness  Impaired muscle tone  Poor head control  Decreased ROM  Gait deviations   Decreased ambulation   Decreased activity tolerance   Decreased independence with daily activities   Requires skilled supervision to complete and progress HEP   Requires skilled PT for DME/ orthotic recommendations     Pt's spiritual, cultural and educational needs considered and pt agreeable to plan of care and GOALS as stated below:    Short term goals: 6 weeks, pt agrees to goals set. (As of 4/16/19)  1. Pt will perform chin tucks in supine with supervision to improve independent with HEP. Met 7/5/17  2. Assist pt with obtaining appropriate cervical brace to improve head control. Met 1/9/18- pt has but is not consistently wearing  3. Pt will demonstrate improved knee ext strength on left to 4/5 to improve safety with ambulation and sit<>stand. Met 9/28/17   4. Pt will demonstrate improved cervical rotation PROM to 20 degrees to improve functional head  "control. Met 7/5/17  5. Pt will perform sit<>stand consistently from 22" height surface with supervision to improve independence. Met 7/5/17                                                         Revised 8/31/17:  Pt will perform sit<>stand from 19" height chair to improve independence with sitting in various chairs around her home and decreased                                                                      need for "pop up seat". ~same- 2-3 attempts for sit>stand from 19" height,      Long term goals: 12 weeks, pt agrees to goals set  6. Pt will perform basic HEP for gross strengthening with supervision to deter worsening of functional limitations. Met 8/31/17- pt's sister reports poor current compliance  7. Pt will demonstrate improved bilateral hip strength to 3/5 to improve balance and independence with mobility. Met 7/5/17  8. New 7/5/17: Pt will demonstrate improved bilateral hip strength to 4/5 to improve balance and independence with mobility. ~same/ inconsistent-  Hip abd: WALESKA 4+/5; Hip add: WALESKA 44/5; Hip ext: R=3+/5, L=4-/5  9. Pt will demonstrate improved cervical extension strength to 3+/5 to improve head control. Met 7/31/17  10. Pt will demonstrate improved cervical deep flexor strength to 3+/5 to improve head control. Met 7/31/17   11. Pt will demonstrate improve balance and gait by scoring 14/28 on Tinetti. Met 8/31/17- 17/28                                      Revised 8/31/17: pt will demonstrate improved balance/ gait and decreased fall risk to moderate by scoring 19/28 on Tinetti Assessment. NT today- 16/28 (18/28 (11/19/18))                                         12. Pt will demonstrate improved cervical PROM for rotation to 40 degrees to improve functional head control. improved-  R= 8 deg,                                          13. New 8/1/18- pt will ambulate 50ft with LBQC and supervision over level tile to demonstrate improved household mobility with decreased AD- ~same 90 feet " with LBQC and min A    Plan          continue with cervical stretching and strengthening to decrease forward head posture. Need to address hip strengthening to improve ambulation with large based quad cane        Jesus Acosta, PTA     4/29/2019

## 2019-05-02 ENCOUNTER — CLINICAL SUPPORT (OUTPATIENT)
Dept: REHABILITATION | Facility: HOSPITAL | Age: 54
End: 2019-05-02
Attending: STUDENT IN AN ORGANIZED HEALTH CARE EDUCATION/TRAINING PROGRAM
Payer: COMMERCIAL

## 2019-05-02 DIAGNOSIS — R29.898 DECREASED ROM OF NECK: ICD-10-CM

## 2019-05-02 DIAGNOSIS — Z74.09 IMPAIRED FUNCTIONAL MOBILITY, BALANCE, GAIT, AND ENDURANCE: ICD-10-CM

## 2019-05-02 DIAGNOSIS — R53.1 DECREASED STRENGTH: ICD-10-CM

## 2019-05-02 PROCEDURE — 97110 THERAPEUTIC EXERCISES: CPT | Mod: PO | Performed by: PHYSICAL THERAPIST

## 2019-05-06 ENCOUNTER — DOCUMENTATION ONLY (OUTPATIENT)
Dept: REHABILITATION | Facility: HOSPITAL | Age: 54
End: 2019-05-06

## 2019-05-06 NOTE — PROGRESS NOTES
PT/PTA met face to face to discuss pt's treatment plan and progress towards established goals.  Continue with current PT POC with focus on endurance, strengthening and standing balance.  Patient will be seen by physical therapist at least every sixth treatment or 30 days, whichever occurs first.    Mae Caicedo, PTA  05/06/2019

## 2019-05-09 ENCOUNTER — CLINICAL SUPPORT (OUTPATIENT)
Dept: REHABILITATION | Facility: HOSPITAL | Age: 54
End: 2019-05-09
Attending: STUDENT IN AN ORGANIZED HEALTH CARE EDUCATION/TRAINING PROGRAM
Payer: COMMERCIAL

## 2019-05-09 DIAGNOSIS — Z74.09 IMPAIRED FUNCTIONAL MOBILITY, BALANCE, GAIT, AND ENDURANCE: ICD-10-CM

## 2019-05-09 DIAGNOSIS — R29.898 DECREASED ROM OF NECK: ICD-10-CM

## 2019-05-09 DIAGNOSIS — R53.1 DECREASED STRENGTH: ICD-10-CM

## 2019-05-09 PROCEDURE — 97110 THERAPEUTIC EXERCISES: CPT | Mod: PO | Performed by: PHYSICAL THERAPIST

## 2019-05-09 NOTE — PLAN OF CARE
Physical Therapy Progress Note      Name: Brigid Jernigan  Clinic Number: [id number]  Diagnosis: G71.11 (ICD-10-CM) - Myotonic dystrophy  Physician: Luis Felipe Vo MD  Treatment Orders: PT Eval and Treat          Past Medical History:   Diagnosis Date    Abnormal EKG      Myotonic dystrophy      Pacemaker      PAF (paroxysmal atrial fibrillation)      Sleep apnea           Precautions: standard  auth period: 4/4/19 to 5/9/19  Visit #: 4/10     Time in: 1655  Time out: 1730  Total billable treatment time: 35 min     (visit total 2018= 73)  (Visit total 2017= 56)  Date of Eval: 5/9/2017     POC: 3/14/2019 to 5/9/2019.          New POC: 5/10/2019 to 7/5/2019       Subjective   Pt reports:  No new complaints. No adverse effects from last session. Denies HEP.   Sister reported: No changes     Pain Scale: denies     Objective   Pt arrived to session late, unable to accomodate    Patient received individual therapy to increase strength, endurance, ROM, flexibility, posture, core stabilization and head control with activities as follows:      Pt participated in therapeutic exercises x 35 minutes to address ROM, strength, balance, and mobility which includes:            Supine:    inhibitive distraction   PROM cervical extension    PROM cervical rotation 3 x 30 sec   PROM pec stretch 3 x 30 sec   PROM upper traps 3 x 30 sec      Side lying:    open books 10x     Sitting:    Rows orange Thera-band 2 x 10   BUE ER TYB 10x     ambulation with LBQC, WALESKA AFOs, HHA, minimal assistance 100 ft, mod- max verbal cues for upright head and trunk posture during L stance    Written Home Exercises: continue with previously issued HEP  Pt demo fair understanding of the education provided. Brigid demonstrated fair return demonstration of activities.      Education provided re: perform HEP for improved carryover and consistency of progress       Pt has no cultural, educational or language barriers to learning provided.     Assessment    Phani tolerated treatment well. Today's session focused on activities to decrease rounded shoulders, forward head posture. Pt continues to demonstrate excessive forward trunk lean when in L stance phase. Pt required hand held assist today with gait trial with large based quad cane. decreased trunk stability continues to impair posture and limit advancements in gait.    Pt can benefit from continued skilled PT to address impairments listed below to allow pt to achieve the highest functional level possible and deter the worsening of ROM, mobility, and strength impairments due to progressive nature of diagnosis.      Pt prognosis is Good. Pt will continue to benefit from skilled outpatient physical therapy to address the deficits listed in the problem list, provide pt/family education and to maximize pt's level of independence in the home and community environment.      Activity limitations/ Participation Restrictions:   Fall Risk - impaired balance   Weakness  Impaired muscle tone  Poor head control  Decreased ROM  Gait deviations   Decreased ambulation   Decreased activity tolerance   Decreased independence with daily activities   Requires skilled supervision to complete and progress HEP   Requires skilled PT for DME/ orthotic recommendations     Pt's spiritual, cultural and educational needs considered and pt agreeable to plan of care and GOALS as stated below:    Short term goals: 6 weeks, pt agrees to goals set. (As of 4/16/19)  1. Pt will perform chin tucks in supine with supervision to improve independent with HEP. Met 7/5/17  2. Assist pt with obtaining appropriate cervical brace to improve head control. Met 1/9/18- pt has but is not consistently wearing  3. Pt will demonstrate improved knee ext strength on left to 4/5 to improve safety with ambulation and sit<>stand. Met 9/28/17   4. Pt will demonstrate improved cervical rotation PROM to 20 degrees to improve functional head control. Met 7/5/17  5. Pt will  "perform sit<>stand consistently from 22" height surface with supervision to improve independence. Met 7/5/17                                                         Revised 8/31/17:  Pt will perform sit<>stand from 19" height chair to improve independence with sitting in various chairs around her home and decreased                                                                      need for "pop up seat". ~same- 2-3 attempts for sit>stand from 19" height,      Long term goals: 12 weeks, pt agrees to goals set  6. Pt will perform basic HEP for gross strengthening with supervision to deter worsening of functional limitations. Met 8/31/17- pt's sister reports poor current compliance  7. Pt will demonstrate improved bilateral hip strength to 3/5 to improve balance and independence with mobility. Met 7/5/17  8. New 7/5/17: Pt will demonstrate improved bilateral hip strength to 4/5 to improve balance and independence with mobility. ~same/ inconsistent-  Hip abd: WALESKA 4+/5; Hip add: WALESKA 44/5; Hip ext: R=3+/5, L=4-/5  9. Pt will demonstrate improved cervical extension strength to 3+/5 to improve head control. Met 7/31/17  10. Pt will demonstrate improved cervical deep flexor strength to 3+/5 to improve head control. Met 7/31/17   11. Pt will demonstrate improve balance and gait by scoring 14/28 on Tinetti. Met 8/31/17- 17/28                                      Revised 8/31/17: pt will demonstrate improved balance/ gait and decreased fall risk to moderate by scoring 19/28 on Tinetti Assessment. NT today- 16/28 (18/28 (11/19/18))                                         12. Pt will demonstrate improved cervical PROM for rotation to 40 degrees to improve functional head control. improved-  R= 8 deg,                                          13. New 8/1/18- pt will ambulate 50ft with LBQC and supervision over level tile to demonstrate improved household mobility with decreased AD- ~same 90 feet with LBQC and min A    Plan        "   new POC: 5/10/2019 to 7/5/2019   Start performing more standing activities to improve trunk stability to advance gait.         Yadi Mercado, PT  5/2/2019      I certify the need for these services furnished under this plan of treatment and while under my care.  ____________________________________ Physician/Referring Practitioner   Date of Signature

## 2019-05-11 NOTE — PLAN OF CARE
"        Physical Therapy Progress Note    Name: Brigid Jernigan  Clinic Number: [id number]  Diagnosis: G71.11 (ICD-10-CM) - Myotonic dystrophy  Physician: Luis Felipe Vo MD  Treatment Orders: PT Eval and Treat          Past Medical History:   Diagnosis Date    Abnormal EKG      Myotonic dystrophy      Pacemaker      PAF (paroxysmal atrial fibrillation)      Sleep apnea           Precautions: standard  auth period: 4/4/19 to 5/9/19  Visit #: 5/10     Time in: 1655  Time out: 1735  Total billable treatment time: 40 min     (visit total 2018= 73)  (Visit total 2017= 56)  Date of Eval: 5/9/2017     POC: 3/14/2019 to 5/9/2019.          New POC: 5/10/2019 to 7/5/2019        Subjective   Pt reports:  No new complaints. No adverse effects from last session. Denies HEP. Did not attend last visit due to transportation.  Sister reported: No changes, not performing HEP      Pain Scale: denies     Objective   Pt arrived to session late, unable to accomodate     Patient received individual therapy to increase strength, endurance, ROM, flexibility, posture, core stabilization and head control with activities as follows:      Pt participated in therapeutic exercises x 40 minutes to address ROM, strength, balance, and mobility which includes:    PROM cervical R rotation= 10 deg     Hip strength:      R          L  Hip flexion          5/5        5/5  Hip extension     4+/5     4/5  Hip abduction     4+/5     4/5  Hip adduction      4/5       3+/5                  Supine:               inhibitive distraction              PROM cervical extension              PROM cervical rotation 3 x 30 sec              PROM pec stretch 3 x 30 sec              PROM upper traps 3 x 30 sec                            Side lying:        open books 5x     Standing at ballet bar:   hip abduction OTB 2 x 10    Hip ext OTB 2 x 10     Sit>stand from 19" height- unable with multiple attempts  Stand>sit to 19" height- supervision- mod I    Sit>stand " "from 20" height- min A after multiple attempts    Written Home Exercises: continue with previously issued HEP  Pt demo fair understanding of the education provided. Brigid demonstrated fair return demonstration of activities.     Education provided re: perform HEP for improved carryover and consistency of progress        Pt has no cultural, educational or language barriers to learning provided.     Assessment   Assessment period: 4/29/2019 to 5/9/2019. Pt participated in 3 treatment sessions since last assessment due to weather and transportation. Phani tolerates treatment sessions well and PT continues to address cervical range of Motion, posture, strength, and mobility. Pt does not report good compliance with strengthening exercises at home. Due to lapse in consistent PT treatment, pt demonstrates decreased ability to perform sit>stand from lower heights (20" height or less). strength of bilateral hip abduction and adduction is inconsistent as compared to last assessment. Upon last trial of gait with large based quad cane, pt required hand held assistance for stability. Pt did demonstrate improved cervical rotation range of Motion to R, but this is still significantly limited due to increased muscle tone/shortening of cervical flexors and upper traps and resting position of head (forward head posture). Pt can benefit from continued skilled PT to address posture, improve cervical range of Motion, and prevent further decline of functional mobility.       Pt can benefit from continued skilled PT to address impairments listed below to allow pt to achieve the highest functional level possible and deter the worsening of ROM, mobility, and strength impairments due to progressive nature of diagnosis.      Pt prognosis is Good. Pt will continue to benefit from skilled outpatient physical therapy to address the deficits listed in the problem list, provide pt/family education and to maximize pt's level of independence in " "the home and community environment.      Activity limitations/ Participation Restrictions:   Fall Risk - impaired balance   Weakness  Impaired muscle tone  Poor head control  Decreased ROM  Gait deviations   Decreased ambulation   Decreased activity tolerance   Decreased independence with daily activities   Requires skilled supervision to complete and progress HEP   Requires skilled PT for DME/ orthotic recommendations     Pt's spiritual, cultural and educational needs considered and pt agreeable to plan of care and GOALS as stated below:    Short term goals: 6 weeks, pt agrees to goals set. (As of 5/9/19)  1. Pt will perform chin tucks in supine with supervision to improve independent with HEP. Met 7/5/17  2. Assist pt with obtaining appropriate cervical brace to improve head control. Met 1/9/18- pt has but is not consistently wearing  3. Pt will demonstrate improved knee ext strength on left to 4/5 to improve safety with ambulation and sit<>stand. Met 9/28/17   4. Pt will demonstrate improved cervical rotation PROM to 20 degrees to improve functional head control. Met 7/5/17  5. Pt will perform sit<>stand consistently from 22" height surface with supervision to improve independence. Met 7/5/17                                                         Revised 8/31/17:  Pt will perform sit<>stand from 19" height chair to improve independence with sitting in various chairs around her home and decreased                                                                      need for "pop up seat". inconsistent- unable to stand unassisted from 19" height, min A to stand from 20" height    Long term goals: 12 weeks, pt agrees to goals set  6. Pt will perform basic HEP for gross strengthening with supervision to deter worsening of functional limitations. Met 8/31/17- pt's sister reports poor current compliance  7. Pt will demonstrate improved bilateral hip strength to 3/5 to improve balance and independence with mobility. Met " 7/5/17  8. New 7/5/17: Pt will demonstrate improved bilateral hip strength to 4/5 to improve balance and independence with mobility. ~same/ improved for hip ext only-  see objective info  9. Pt will demonstrate improved cervical extension strength to 3+/5 to improve head control. Met 7/31/17  10. Pt will demonstrate improved cervical deep flexor strength to 3+/5 to improve head control. Met 7/31/17   11. Pt will demonstrate improve balance and gait by scoring 14/28 on Tinetti. Met 8/31/17- 17/28                                      Revised 8/31/17: pt will demonstrate improved balance/ gait and decreased fall risk to moderate by scoring 19/28 on Tinetti Assessment. NT today- 16/28 (18/28 (11/19/18))                                         12. Pt will demonstrate improved cervical PROM for rotation to 40 degrees to improve functional head control. improved-  R= 10 deg,                                          13. New 8/1/18- pt will ambulate 50ft with LBQC and supervision over level tile to demonstrate improved household mobility with decreased AD- ~same 90 feet with LBQC and min A     Plan          continue to address cervical ROM, forward head/ rounded shoulders posture and improve consistency with mobility to maintain pt's independence     Yadi Mercado, PT  5/9/2019

## 2019-05-15 ENCOUNTER — CLINICAL SUPPORT (OUTPATIENT)
Dept: REHABILITATION | Facility: HOSPITAL | Age: 54
End: 2019-05-15
Attending: STUDENT IN AN ORGANIZED HEALTH CARE EDUCATION/TRAINING PROGRAM
Payer: COMMERCIAL

## 2019-05-15 DIAGNOSIS — R29.898 DECREASED ROM OF NECK: ICD-10-CM

## 2019-05-15 DIAGNOSIS — Z74.09 IMPAIRED FUNCTIONAL MOBILITY, BALANCE, GAIT, AND ENDURANCE: ICD-10-CM

## 2019-05-15 DIAGNOSIS — R53.1 DECREASED STRENGTH: ICD-10-CM

## 2019-05-15 PROCEDURE — 97110 THERAPEUTIC EXERCISES: CPT | Mod: PO

## 2019-05-15 PROCEDURE — 97116 GAIT TRAINING THERAPY: CPT | Mod: PO

## 2019-05-15 PROCEDURE — 97140 MANUAL THERAPY 1/> REGIONS: CPT | Mod: PO

## 2019-05-15 NOTE — PROGRESS NOTES
"Physical Therapy Progress Note      Name: Brigid Jernigan  Clinic Number: [id number]  Diagnosis: G71.11 (ICD-10-CM) - Myotonic dystrophy  Physician: Luis Felipe Vo MD  Treatment Orders: PT Eval and Treat          Past Medical History:   Diagnosis Date    Abnormal EKG      Myotonic dystrophy      Pacemaker      PAF (paroxysmal atrial fibrillation)      Sleep apnea           Precautions: standard  auth period: 4/4/19 to 5/9/19  Visit #: 6/10     Time in: 1655  Time out: 1735  Total billable treatment time: 40 min     (visit total 2018= 73)  (Visit total 2017= 56)  Date of Eval: 5/9/2017     POC: 3/14/2019 to 5/9/2019.          New POC: 5/10/2019 to 7/5/2019     Subjective   Pt reports:  " I"m doing pretty good, can we do the stepper today?"   Sister reported: "Do you know if she has gotten approved yet or not?"    Pain Scale: denies     Objective   Patient received individual therapy to increase strength, endurance, ROM, flexibility, posture, core stabilization and head control with activities as follows:      Pt participated in therapeutic exercises x 15 minutes to address ROM, strength, balance, and mobility which includes:    Sci-Fit Recumbent stepper at L 13.5 for 10 min   Leg press:  X 15 reps of squats on leg press with # 140 lbs applied.      Manual x 10 mins          Supine:    inhibitive distraction   PROM cervical extension    PROM cervical rotation 3 x 30 sec   Chin tucks x 10 reps x 3 sec hold      Gait: x 18 min  PT ambulated  ~ 100ft with LBQC and CGA.  VC's to look up often.    Written Home Exercises: continue with previously issued HEP  Pt demo fair understanding of the education provided. Brigid demonstrated fair return demonstration of activities.      Education provided re: perform HEP for improved carryover and consistency of progress       Pt has no cultural, educational or language barriers to learning provided.     Assessment     Pt tolerated tx session fairly well, but had decline from " "last tx session.  Pt requires more VC's and TC's for upright posture and looking down with a forward flexed trunk with gait involving the RW and LBQC.  No other changes noted.   Pt can benefit from continued skilled PT to address impairments listed below to allow pt to achieve the highest functional level possible and deter the worsening of ROM, mobility, and strength impairments due to progressive nature of diagnosis.      Pt prognosis is Good. Pt will continue to benefit from skilled outpatient physical therapy to address the deficits listed in the problem list, provide pt/family education and to maximize pt's level of independence in the home and community environment.      Activity limitations/ Participation Restrictions:   Fall Risk - impaired balance   Weakness  Impaired muscle tone  Poor head control  Decreased ROM  Gait deviations   Decreased ambulation   Decreased activity tolerance   Decreased independence with daily activities   Requires skilled supervision to complete and progress HEP   Requires skilled PT for DME/ orthotic recommendations     Pt's spiritual, cultural and educational needs considered and pt agreeable to plan of care and GOALS as stated below:    Short term goals: 6 weeks, pt agrees to goals set. (As of 5/9/19)  1. Pt will perform chin tucks in supine with supervision to improve independent with HEP. Met 7/5/17  2. Assist pt with obtaining appropriate cervical brace to improve head control. Met 1/9/18- pt has but is not consistently wearing  3. Pt will demonstrate improved knee ext strength on left to 4/5 to improve safety with ambulation and sit<>stand. Met 9/28/17   4. Pt will demonstrate improved cervical rotation PROM to 20 degrees to improve functional head control. Met 7/5/17  5. Pt will perform sit<>stand consistently from 22" height surface with supervision to improve independence. Met 7/5/17                                                         Revised 8/31/17:  Pt will perform " "sit<>stand from 19" height chair to improve independence with sitting in various chairs around her home and decreased                                                                      need for "pop up seat". inconsistent- unable to stand unassisted from 19" height, min A to stand from 20" height     Long term goals: 12 weeks, pt agrees to goals set  6. Pt will perform basic HEP for gross strengthening with supervision to deter worsening of functional limitations. Met 8/31/17- pt's sister reports poor current compliance  7. Pt will demonstrate improved bilateral hip strength to 3/5 to improve balance and independence with mobility. Met 7/5/17  8. New 7/5/17: Pt will demonstrate improved bilateral hip strength to 4/5 to improve balance and independence with mobility. ~same/ improved for hip ext only-  see objective info  9. Pt will demonstrate improved cervical extension strength to 3+/5 to improve head control. Met 7/31/17  10. Pt will demonstrate improved cervical deep flexor strength to 3+/5 to improve head control. Met 7/31/17   11. Pt will demonstrate improve balance and gait by scoring 14/28 on Tinetti. Met 8/31/17- 17/28                                      Revised 8/31/17: pt will demonstrate improved balance/ gait and decreased fall risk to moderate by scoring 19/28 on Tinetti Assessment. NT today- 16/28 (18/28 (11/19/18))                                         12. Pt will demonstrate improved cervical PROM for rotation to 40 degrees to improve functional head control. improved-  R= 10 deg,                                          13. New 8/1/18- pt will ambulate 50ft with LBQC and supervision over level tile to demonstrate improved household mobility with decreased AD- ~same 90 feet with LBQC and min A      Plan          continue with cervical stretching and strengthening to decrease forward head posture. Need to address hip strengthening to improve ambulation with large based quad cane        Mae " Guilherme Caicedo, PTA     5/15/2019

## 2019-05-16 ENCOUNTER — DOCUMENTATION ONLY (OUTPATIENT)
Dept: REHABILITATION | Facility: HOSPITAL | Age: 54
End: 2019-05-16

## 2019-05-30 ENCOUNTER — CLINICAL SUPPORT (OUTPATIENT)
Dept: REHABILITATION | Facility: HOSPITAL | Age: 54
End: 2019-05-30
Attending: STUDENT IN AN ORGANIZED HEALTH CARE EDUCATION/TRAINING PROGRAM
Payer: COMMERCIAL

## 2019-05-30 DIAGNOSIS — Z74.09 IMPAIRED FUNCTIONAL MOBILITY, BALANCE, GAIT, AND ENDURANCE: ICD-10-CM

## 2019-05-30 DIAGNOSIS — R53.1 DECREASED STRENGTH: ICD-10-CM

## 2019-05-30 DIAGNOSIS — R29.898 DECREASED ROM OF NECK: ICD-10-CM

## 2019-05-30 PROCEDURE — 97116 GAIT TRAINING THERAPY: CPT | Mod: PO | Performed by: PHYSICAL THERAPIST

## 2019-05-30 PROCEDURE — 97110 THERAPEUTIC EXERCISES: CPT | Mod: PO | Performed by: PHYSICAL THERAPIST

## 2019-05-30 NOTE — PROGRESS NOTES
Physical Therapy Progress Note      Name: Brigid Jernigan  Clinic Number: [id number]  Diagnosis: G71.11 (ICD-10-CM) - Myotonic dystrophy  Physician: Luis Felipe Vo MD  Treatment Orders: PT Eval and Treat          Past Medical History:   Diagnosis Date    Abnormal EKG      Myotonic dystrophy      Pacemaker      PAF (paroxysmal atrial fibrillation)      Sleep apnea           Precautions: standard  auth period: 5/15/19 to 7/5/19  Visit #: 2/16     Time in: 1645  Time out: 1730  Total billable treatment time: 45 min     (visit total 2018= 73)  (Visit total 2017= 56)  Date of Eval: 5/9/2017     POC: 3/14/2019 to 5/9/2019.          New POC: 5/10/2019 to 7/5/2019     Subjective   Pt reports:  No new complaints, denies HEP  Sister reported: can we schedule more appointments yet?    Pain Scale: denies     Objective   Patient received individual therapy to increase strength, endurance, ROM, flexibility, posture, core stabilization and head control with activities as follows:      Pt participated in therapeutic exercises x 35 minutes to address ROM, strength, balance, and mobility which includes:    Leg press:  X 15 reps of squats on leg press with # 140 lbs applied.             Supine with towel roll under spine:    inhibitive distraction   OA/ AA rotation    PROM cervical rotation 3 x 30 sec   Upper trap stretch 3 x 30 sec   cervial extension Orange thera band   Bridging with [place] thera band 2 x 10      Gait: x 10 min  PT ambulated  ~93ft with LBQC and CGA- min A.  VC's to look up often.    Written Home Exercises: continue with previously issued HEP  Pt demo fair understanding of the education provided. Brigid demonstrated fair return demonstration of activities.      Education provided re: perform HEP for improved carryover and consistency of progress       Pt has no cultural, educational or language barriers to learning provided.     Assessment     Phani tolerated treatment well. PT addressed cervical range of  Motion, postural strengthening, and gait with large based quad cane. Pt continues to demonstrate significant stiffness in suboccipital and cervical regions. Pt denies compliance with HEP. Pt demonstrated decreased balance and ability to maintain an upright standing posture with use of large based quad cane. Pt's sister reports that gait at home with rolling walker is changing with pt demonstrating an increased forward lean of the trunk to elicit weight shifts.    Pt can benefit from continued skilled PT to address impairments listed below to allow pt to achieve the highest functional level possible and deter the worsening of ROM, mobility, and strength impairments due to progressive nature of diagnosis.      Pt prognosis is Good. Pt will continue to benefit from skilled outpatient physical therapy to address the deficits listed in the problem list, provide pt/family education and to maximize pt's level of independence in the home and community environment.      Activity limitations/ Participation Restrictions:   Fall Risk - impaired balance   Weakness  Impaired muscle tone  Poor head control  Decreased ROM  Gait deviations   Decreased ambulation   Decreased activity tolerance   Decreased independence with daily activities   Requires skilled supervision to complete and progress HEP   Requires skilled PT for DME/ orthotic recommendations     Pt's spiritual, cultural and educational needs considered and pt agreeable to plan of care and GOALS as stated below:    Short term goals: 6 weeks, pt agrees to goals set. (As of 5/9/19)  1. Pt will perform chin tucks in supine with supervision to improve independent with HEP. Met 7/5/17  2. Assist pt with obtaining appropriate cervical brace to improve head control. Met 1/9/18- pt has but is not consistently wearing  3. Pt will demonstrate improved knee ext strength on left to 4/5 to improve safety with ambulation and sit<>stand. Met 9/28/17   4. Pt will demonstrate improved  "cervical rotation PROM to 20 degrees to improve functional head control. Met 7/5/17  5. Pt will perform sit<>stand consistently from 22" height surface with supervision to improve independence. Met 7/5/17                                                         Revised 8/31/17:  Pt will perform sit<>stand from 19" height chair to improve independence with sitting in various chairs around her home and decreased                                                                      need for "pop up seat". inconsistent- unable to stand unassisted from 19" height, min A to stand from 20" height     Long term goals: 12 weeks, pt agrees to goals set  6. Pt will perform basic HEP for gross strengthening with supervision to deter worsening of functional limitations. Met 8/31/17- pt's sister reports poor current compliance  7. Pt will demonstrate improved bilateral hip strength to 3/5 to improve balance and independence with mobility. Met 7/5/17  8. New 7/5/17: Pt will demonstrate improved bilateral hip strength to 4/5 to improve balance and independence with mobility. ~same/ improved for hip ext only-  see objective info  9. Pt will demonstrate improved cervical extension strength to 3+/5 to improve head control. Met 7/31/17  10. Pt will demonstrate improved cervical deep flexor strength to 3+/5 to improve head control. Met 7/31/17   11. Pt will demonstrate improve balance and gait by scoring 14/28 on Tinetti. Met 8/31/17- 17/28                                      Revised 8/31/17: pt will demonstrate improved balance/ gait and decreased fall risk to moderate by scoring 19/28 on Tinetti Assessment. NT today- 16/28 (18/28 (11/19/18))                                         12. Pt will demonstrate improved cervical PROM for rotation to 40 degrees to improve functional head control. improved-  R= 10 deg,                                          13. New 8/1/18- pt will ambulate 50ft with LBQC and supervision over level tile to " demonstrate improved household mobility with decreased AD- ~same 90 feet with LBQC and min A      Plan          continue with cervical stretching and strengthening to decrease forward head posture. Need to address core strengthening to improve upright posture when walking with large based quad cane        Yadi Mercado, PT     5/30/2019

## 2019-06-03 ENCOUNTER — DOCUMENTATION ONLY (OUTPATIENT)
Dept: REHABILITATION | Facility: HOSPITAL | Age: 54
End: 2019-06-03

## 2019-06-03 NOTE — PROGRESS NOTES
PT/PTA met face to face to discuss pt's treatment plan and progress towards established goals.  Continue with current PT POC with focus on cervical stretching, standing balance and gait with cane.  Patient will be seen by physical therapist at least every sixth treatment or 30 days, whichever occurs first.    Mae Caicedo, IRIS  06/03/2019      Meeting held on 6/3/2019.   Yadi Mercado,DPT  6/6/2019

## 2019-06-04 ENCOUNTER — CLINICAL SUPPORT (OUTPATIENT)
Dept: REHABILITATION | Facility: HOSPITAL | Age: 54
End: 2019-06-04
Attending: STUDENT IN AN ORGANIZED HEALTH CARE EDUCATION/TRAINING PROGRAM
Payer: COMMERCIAL

## 2019-06-04 DIAGNOSIS — R53.1 DECREASED STRENGTH: ICD-10-CM

## 2019-06-04 DIAGNOSIS — Z74.09 IMPAIRED FUNCTIONAL MOBILITY, BALANCE, GAIT, AND ENDURANCE: ICD-10-CM

## 2019-06-04 DIAGNOSIS — R29.898 DECREASED ROM OF NECK: ICD-10-CM

## 2019-06-04 PROCEDURE — 97110 THERAPEUTIC EXERCISES: CPT | Mod: PO | Performed by: PHYSICAL THERAPIST

## 2019-06-04 NOTE — PROGRESS NOTES
"Physical Therapy Progress Note      Name: Brigid Jernigan  Clinic Number: [id number]  Diagnosis: G71.11 (ICD-10-CM) - Myotonic dystrophy  Physician: Luis Felipe Vo MD  Treatment Orders: PT Eval and Treat          Past Medical History:   Diagnosis Date    Abnormal EKG      Myotonic dystrophy      Pacemaker      PAF (paroxysmal atrial fibrillation)      Sleep apnea           Precautions: standard  auth period: 5/15/19 to 7/5/19  Visit #: 3/16     Time in: 1605  Time out: 1655  Total billable treatment time: 50 min     (visit total 2018= 73)  (Visit total 2017= 56)  Date of Eval: 5/9/2017          POC: 5/10/2019 to 7/5/2019     Subjective   Pt reports:  No new complaints, denies HEP. Reports mild L knee pain laterally and medially, unable to indicate when pain occurs  Sister reported: she is walking funny again with the L leg stiff    Pain Scale: minimal L knee pain- not necessarily associated with weight bearing or movement     Objective   Patient received individual therapy to increase strength, endurance, ROM, flexibility, posture, core stabilization and head control with activities as follows:      Pt participated in therapeutic exercises x 50 minutes to address ROM, strength, balance, and mobility which includes:            Supine with towel roll under spine:    inhibitive distraction   OA/ AA rotation/ side bending    PROM cervical rotation 3 x 30 sec   Upper trap stretch 3 x 30 sec   cervial extension Orange thera band 15x   Bridging with [place] thera band 2 x 15    Sitting: rowing pink cook band 2 x 10, min verbal cues not to lean backwards    Standing with rolling walker: hip abduction [place] thera band 2 x 10   Hip extension Orange thera band 2 x 10    Sit>stand from 22" mat with BUE support- multiple trials for success    Written Home Exercises: continue with previously issued HEP  Pt demo fair understanding of the education provided. Brigid demonstrated fair return demonstration of activities. " "     Education provided re: perform HEP for improved carryover and consistency of progress       Pt has no cultural, educational or language barriers to learning provided.     Assessment     Phani tolerated treatment well. PT addressed cervical range of Motion and postural strengthening. Pt tolerated increased resistance and reps for cervical extension strengthening and increased reps of bridging with hip abduction. Pt demonstrated increased difficulty with sit>stand from 22" height today. This may be due to pt's report of intermittent, inconsistent L knee pain. Pt can benefit from continued skilled PT to address impairments listed below to allow pt to achieve the highest functional level possible and deter the worsening of ROM, mobility, and strength impairments due to progressive nature of diagnosis.      Pt prognosis is Good. Pt will continue to benefit from skilled outpatient physical therapy to address the deficits listed in the problem list, provide pt/family education and to maximize pt's level of independence in the home and community environment.      Activity limitations/ Participation Restrictions:   Fall Risk - impaired balance   Weakness  Impaired muscle tone  Poor head control  Decreased ROM  Gait deviations   Decreased ambulation   Decreased activity tolerance   Decreased independence with daily activities   Requires skilled supervision to complete and progress HEP   Requires skilled PT for DME/ orthotic recommendations     Pt's spiritual, cultural and educational needs considered and pt agreeable to plan of care and GOALS as stated below:    Short term goals: 6 weeks, pt agrees to goals set. (As of 5/9/19)  1. Pt will perform chin tucks in supine with supervision to improve independent with HEP. Met 7/5/17  2. Assist pt with obtaining appropriate cervical brace to improve head control. Met 1/9/18- pt has but is not consistently wearing  3. Pt will demonstrate improved knee ext strength on left to " "4/5 to improve safety with ambulation and sit<>stand. Met 9/28/17   4. Pt will demonstrate improved cervical rotation PROM to 20 degrees to improve functional head control. Met 7/5/17  5. Pt will perform sit<>stand consistently from 22" height surface with supervision to improve independence. Met 7/5/17                                                         Revised 8/31/17:  Pt will perform sit<>stand from 19" height chair to improve independence with sitting in various chairs around her home and decreased                                                                      need for "pop up seat". inconsistent- unable to stand unassisted from 19" height, min A to stand from 20" height     Long term goals: 12 weeks, pt agrees to goals set  6. Pt will perform basic HEP for gross strengthening with supervision to deter worsening of functional limitations. Met 8/31/17- pt's sister reports poor current compliance  7. Pt will demonstrate improved bilateral hip strength to 3/5 to improve balance and independence with mobility. Met 7/5/17  8. New 7/5/17: Pt will demonstrate improved bilateral hip strength to 4/5 to improve balance and independence with mobility. ~same/ improved for hip ext only-  see objective info  9. Pt will demonstrate improved cervical extension strength to 3+/5 to improve head control. Met 7/31/17  10. Pt will demonstrate improved cervical deep flexor strength to 3+/5 to improve head control. Met 7/31/17   11. Pt will demonstrate improve balance and gait by scoring 14/28 on Tinetti. Met 8/31/17- 17/28                                      Revised 8/31/17: pt will demonstrate improved balance/ gait and decreased fall risk to moderate by scoring 19/28 on Tinetti Assessment. NT today- 16/28 (18/28 (11/19/18))                                         12. Pt will demonstrate improved cervical PROM for rotation to 40 degrees to improve functional head control. improved-  R= 10 deg, "                                          13. New 8/1/18- pt will ambulate 50ft with LBQC and supervision over level tile to demonstrate improved household mobility with decreased AD- ~same 90 feet with LBQC and min A      Plan          continue with cervical stretching and strengthening to decrease forward head posture. Address gait with large based quad cane and leg press next session.       Yadi Mercado, PT     6/4/2019

## 2019-06-04 NOTE — PROGRESS NOTES
Physical Therapy Progress Note      Name: Brigid Jernigan  Clinic Number: [id number]  Diagnosis: G71.11 (ICD-10-CM) - Myotonic dystrophy  Physician: Luis Felipe Vo MD  Treatment Orders: PT Eval and Treat          Past Medical History:   Diagnosis Date    Abnormal EKG      Myotonic dystrophy      Pacemaker      PAF (paroxysmal atrial fibrillation)      Sleep apnea           Precautions: standard  auth period: 5/15/19 to 7/5/19  Visit #: 2/16     Time in: ***  Time out: ***  Total billable treatment time: *** min     (visit total 2018= 73)  (Visit total 2017= 56)  Date of Eval: 5/9/2017     POC: 3/14/2019 to 5/9/2019.          New POC: 5/10/2019 to 7/5/2019     Subjective   Pt reports:  No new complaints, denies HEP ***  Sister reported: can we schedule more appointments yet?    Pain Scale: denies     Objective   Patient received individual therapy to increase strength, endurance, ROM, flexibility, posture, core stabilization and head control with activities as follows:      Pt participated in therapeutic exercises x 35 minutes to address ROM, strength, balance, and mobility which includes:    Leg press:  X 15 reps of squats on leg press with # 140 lbs applied.             Supine with towel roll under spine:    inhibitive distraction   OA/ AA rotation    PROM cervical rotation 3 x 30 sec   Upper trap stretch 3 x 30 sec   cervial extension Orange thera band   Bridging with [place] thera band 2 x 10      Gait: x 10 min  PT ambulated  ~93ft with LBQC and CGA- min A.  VC's to look up often.    Written Home Exercises: continue with previously issued HEP  Pt demo fair understanding of the education provided. Brigid demonstrated fair return demonstration of activities.      Education provided re: perform HEP for improved carryover and consistency of progress       Pt has no cultural, educational or language barriers to learning provided.     Assessment     Phani tolerated treatment well. PT addressed cervical range  of Motion, postural strengthening, and gait with large based quad cane. Pt continues to demonstrate significant stiffness in suboccipital and cervical regions. Pt denies compliance with HEP. Pt demonstrated decreased balance and ability to maintain an upright standing posture with use of large based quad cane. Pt's sister reports that gait at home with rolling walker is changing with pt demonstrating an increased forward lean of the trunk to elicit weight shifts.    Pt can benefit from continued skilled PT to address impairments listed below to allow pt to achieve the highest functional level possible and deter the worsening of ROM, mobility, and strength impairments due to progressive nature of diagnosis.      Pt prognosis is Good. Pt will continue to benefit from skilled outpatient physical therapy to address the deficits listed in the problem list, provide pt/family education and to maximize pt's level of independence in the home and community environment.      Activity limitations/ Participation Restrictions:   Fall Risk - impaired balance   Weakness  Impaired muscle tone  Poor head control  Decreased ROM  Gait deviations   Decreased ambulation   Decreased activity tolerance   Decreased independence with daily activities   Requires skilled supervision to complete and progress HEP   Requires skilled PT for DME/ orthotic recommendations     Pt's spiritual, cultural and educational needs considered and pt agreeable to plan of care and GOALS as stated below:    Short term goals: 6 weeks, pt agrees to goals set. (As of 5/9/19)  1. Pt will perform chin tucks in supine with supervision to improve independent with HEP. Met 7/5/17  2. Assist pt with obtaining appropriate cervical brace to improve head control. Met 1/9/18- pt has but is not consistently wearing  3. Pt will demonstrate improved knee ext strength on left to 4/5 to improve safety with ambulation and sit<>stand. Met 9/28/17   4. Pt will demonstrate improved  "cervical rotation PROM to 20 degrees to improve functional head control. Met 7/5/17  5. Pt will perform sit<>stand consistently from 22" height surface with supervision to improve independence. Met 7/5/17                                                         Revised 8/31/17:  Pt will perform sit<>stand from 19" height chair to improve independence with sitting in various chairs around her home and decreased                                                                      need for "pop up seat". inconsistent- unable to stand unassisted from 19" height, min A to stand from 20" height     Long term goals: 12 weeks, pt agrees to goals set  6. Pt will perform basic HEP for gross strengthening with supervision to deter worsening of functional limitations. Met 8/31/17- pt's sister reports poor current compliance  7. Pt will demonstrate improved bilateral hip strength to 3/5 to improve balance and independence with mobility. Met 7/5/17  8. New 7/5/17: Pt will demonstrate improved bilateral hip strength to 4/5 to improve balance and independence with mobility. ~same/ improved for hip ext only-  see objective info  9. Pt will demonstrate improved cervical extension strength to 3+/5 to improve head control. Met 7/31/17  10. Pt will demonstrate improved cervical deep flexor strength to 3+/5 to improve head control. Met 7/31/17   11. Pt will demonstrate improve balance and gait by scoring 14/28 on Tinetti. Met 8/31/17- 17/28                                      Revised 8/31/17: pt will demonstrate improved balance/ gait and decreased fall risk to moderate by scoring 19/28 on Tinetti Assessment. NT today- 16/28 (18/28 (11/19/18))                                         12. Pt will demonstrate improved cervical PROM for rotation to 40 degrees to improve functional head control. improved-  R= 10 deg,                                          13. New 8/1/18- pt will ambulate 50ft with LBQC and supervision over level tile to " demonstrate improved household mobility with decreased AD- ~same 90 feet with LBQC and min A      Plan          continue with cervical stretching and strengthening to decrease forward head posture. Need to address core strengthening to improve upright posture when walking with large based quad cane        Kirby Magdaleno, PT     6/4/2019

## 2019-06-06 ENCOUNTER — DOCUMENTATION ONLY (OUTPATIENT)
Dept: REHABILITATION | Facility: HOSPITAL | Age: 54
End: 2019-06-06

## 2019-06-10 ENCOUNTER — CLINICAL SUPPORT (OUTPATIENT)
Dept: REHABILITATION | Facility: HOSPITAL | Age: 54
End: 2019-06-10
Attending: STUDENT IN AN ORGANIZED HEALTH CARE EDUCATION/TRAINING PROGRAM
Payer: COMMERCIAL

## 2019-06-10 DIAGNOSIS — R29.898 DECREASED ROM OF NECK: ICD-10-CM

## 2019-06-10 DIAGNOSIS — R53.1 DECREASED STRENGTH: ICD-10-CM

## 2019-06-10 DIAGNOSIS — Z74.09 IMPAIRED FUNCTIONAL MOBILITY, BALANCE, GAIT, AND ENDURANCE: ICD-10-CM

## 2019-06-10 PROCEDURE — 97110 THERAPEUTIC EXERCISES: CPT | Mod: PO

## 2019-06-10 NOTE — PROGRESS NOTES
"Physical Therapy Progress Note      Name: Brigid Jernigan  Clinic Number: [id number]  Diagnosis: G71.11 (ICD-10-CM) - Myotonic dystrophy  Physician: Luis Felipe Vo MD  Treatment Orders: PT Eval and Treat          Past Medical History:   Diagnosis Date    Abnormal EKG      Myotonic dystrophy      Pacemaker      PAF (paroxysmal atrial fibrillation)      Sleep apnea           Precautions: standard  auth period: 5/15/19 to 7/5/19  Visit #: 4/16     Time in: 1650  Time out: 1730  Total billable treatment time: 40 min     (visit total 2018= 73)  (Visit total 2017= 56)  Date of Eval: 5/9/2017          POC: 5/10/2019 to 7/5/2019     Subjective   Pt reports:  " I couldn't come last week, because my knees were hurting so bad."  Sister reported: " I had to give her some pain medicine last week, it was so bad."     Pain Scale: minimal L knee pain- not necessarily associated with weight bearing or movement     Objective   Patient received individual therapy to increase strength, endurance, ROM, flexibility, posture, core stabilization and head control with activities as follows:      Pt participated in therapeutic exercises x 40 minutes to address ROM, strength, balance, and mobility which includes:            Supine with towel roll under spine:    Cervical distraction    Chin tucks x 10 reps x 5 sec hold.    PROM cervical rotation 3 x 30 sec   Upper trap stretch 3 x 30 sec   Open book exercises x 10 reps      SAQ over large green bolster x 20 reps with 3 sec hold.  AAROM on LLE.      X 10 min on Sci Fit recumbent stepper.  B UE/B LE on level 12.5     Written Home Exercises: continue with previously issued HEP  Pt demo fair understanding of the education provided. Brigid demonstrated fair return demonstration of activities.      Education provided re: perform HEP for improved carryover and consistency of progress       Pt has no cultural, educational or language barriers to learning provided.     Assessment     Phani " "tolerated treatment well and did not have any pain or problems noted.  Pt was unable to attend tx session due to pain in her knee, but pt was able to complete entire tx session w/o any complaints.  Pt was educated on SAQ to increase quad strength and required AAROM on the left side.  Pt with no other changes noted with exercises to cervical region.       Pt prognosis is Good. Pt will continue to benefit from skilled outpatient physical therapy to address the deficits listed in the problem list, provide pt/family education and to maximize pt's level of independence in the home and community environment.      Activity limitations/ Participation Restrictions:   Fall Risk - impaired balance   Weakness  Impaired muscle tone  Poor head control  Decreased ROM  Gait deviations   Decreased ambulation   Decreased activity tolerance   Decreased independence with daily activities   Requires skilled supervision to complete and progress HEP   Requires skilled PT for DME/ orthotic recommendations     Pt's spiritual, cultural and educational needs considered and pt agreeable to plan of care and GOALS as stated below:    Short term goals: 6 weeks, pt agrees to goals set. (As of 5/9/19)  1. Pt will perform chin tucks in supine with supervision to improve independent with HEP. Met 7/5/17  2. Assist pt with obtaining appropriate cervical brace to improve head control. Met 1/9/18- pt has but is not consistently wearing  3. Pt will demonstrate improved knee ext strength on left to 4/5 to improve safety with ambulation and sit<>stand. Met 9/28/17   4. Pt will demonstrate improved cervical rotation PROM to 20 degrees to improve functional head control. Met 7/5/17  5. Pt will perform sit<>stand consistently from 22" height surface with supervision to improve independence. Met 7/5/17                                                         Revised 8/31/17:  Pt will perform sit<>stand from 19" height chair to improve independence with sitting " "in various chairs around her home and decreased                                                                      need for "pop up seat". inconsistent- unable to stand unassisted from 19" height, min A to stand from 20" height     Long term goals: 12 weeks, pt agrees to goals set  6. Pt will perform basic HEP for gross strengthening with supervision to deter worsening of functional limitations. Met 8/31/17- pt's sister reports poor current compliance  7. Pt will demonstrate improved bilateral hip strength to 3/5 to improve balance and independence with mobility. Met 7/5/17  8. New 7/5/17: Pt will demonstrate improved bilateral hip strength to 4/5 to improve balance and independence with mobility. ~same/ improved for hip ext only-  see objective info  9. Pt will demonstrate improved cervical extension strength to 3+/5 to improve head control. Met 7/31/17  10. Pt will demonstrate improved cervical deep flexor strength to 3+/5 to improve head control. Met 7/31/17   11. Pt will demonstrate improve balance and gait by scoring 14/28 on Tinetti. Met 8/31/17- 17/28                                      Revised 8/31/17: pt will demonstrate improved balance/ gait and decreased fall risk to moderate by scoring 19/28 on Tinetti Assessment. NT today- 16/28 (18/28 (11/19/18))                                         12. Pt will demonstrate improved cervical PROM for rotation to 40 degrees to improve functional head control. improved-  R= 10 deg,                                          13. New 8/1/18- pt will ambulate 50ft with LBQC and supervision over level tile to demonstrate improved household mobility with decreased AD- ~same 90 feet with LBQC and min A      Plan          continue with cervical stretching and strengthening to decrease forward head posture. Address gait with large based quad cane and leg press next session.       Mae Caicedo, PTA     6/10/2019                   "

## 2019-06-14 ENCOUNTER — CLINICAL SUPPORT (OUTPATIENT)
Dept: REHABILITATION | Facility: HOSPITAL | Age: 54
End: 2019-06-14
Attending: STUDENT IN AN ORGANIZED HEALTH CARE EDUCATION/TRAINING PROGRAM
Payer: COMMERCIAL

## 2019-06-14 DIAGNOSIS — Z74.09 IMPAIRED FUNCTIONAL MOBILITY, BALANCE, GAIT, AND ENDURANCE: ICD-10-CM

## 2019-06-14 DIAGNOSIS — R29.898 DECREASED ROM OF NECK: ICD-10-CM

## 2019-06-14 DIAGNOSIS — R53.1 DECREASED STRENGTH: ICD-10-CM

## 2019-06-14 PROCEDURE — 97110 THERAPEUTIC EXERCISES: CPT | Mod: PO | Performed by: PHYSICAL THERAPIST

## 2019-06-14 PROCEDURE — 97530 THERAPEUTIC ACTIVITIES: CPT | Mod: PO | Performed by: PHYSICAL THERAPIST

## 2019-06-17 ENCOUNTER — CLINICAL SUPPORT (OUTPATIENT)
Dept: REHABILITATION | Facility: HOSPITAL | Age: 54
End: 2019-06-17
Attending: STUDENT IN AN ORGANIZED HEALTH CARE EDUCATION/TRAINING PROGRAM
Payer: COMMERCIAL

## 2019-06-17 DIAGNOSIS — R53.1 DECREASED STRENGTH: ICD-10-CM

## 2019-06-17 DIAGNOSIS — Z74.09 IMPAIRED FUNCTIONAL MOBILITY, BALANCE, GAIT, AND ENDURANCE: ICD-10-CM

## 2019-06-17 DIAGNOSIS — R29.898 DECREASED ROM OF NECK: ICD-10-CM

## 2019-06-17 PROCEDURE — 97110 THERAPEUTIC EXERCISES: CPT | Mod: PO | Performed by: PHYSICAL THERAPIST

## 2019-06-17 NOTE — PROGRESS NOTES
"Physical Therapy Progress Note      Name: Brigid Jernigan  Clinic Number: [id number]  Diagnosis: G71.11 (ICD-10-CM) - Myotonic dystrophy  Physician: Luis Felipe Vo MD  Treatment Orders: PT Eval and Treat          Past Medical History:   Diagnosis Date    Abnormal EKG      Myotonic dystrophy      Pacemaker      PAF (paroxysmal atrial fibrillation)      Sleep apnea           Precautions: standard  auth period: 5/15/19 to 7/5/19  Visit #: 6/16     Time in: 1649  Time out: 1535  Total billable treatment time: 41 min     (visit total 2018= 73)  (Visit total 2017= 56)  Date of Eval: 5/9/2017          POC: 5/10/2019 to 7/5/2019     Subjective   Pt reports:  No new complaints. Reports HEP on Saturday  Sister reported: she is bending over a lot and walking with stiff legs again. She has been walking like that since she missed all of those sessions.     Pain Scale: minimal L knee pain- not necessarily associated with weight bearing or movement     Objective   Patient received individual therapy to increase strength, endurance, ROM, flexibility, posture, core stabilization and head control with activities as follows:      Pt participated in therapeutic exercises x 41 minutes to address ROM, strength, balance, and mobility which includes:             Supine:    suboccipital distraction     PROM cervical rotation 3 x 30 sec   Upper trap stretch 3 x 30 sec      Cervical extension Green thera band 2 x 10   straight leg raise 3#R 2 x 10, 0# L 10x (moderate quad lag on L)   Bridging Blue thera band for hip abduction 2 x 10        Leg press: # 15x     Sit>stand from 19" height with multiple attempts for success.     Written Home Exercises: perform long arc quads to address quad strength and knee pain.   Pt demo fair understanding of the education provided. Brigid demonstrated fair return demonstration of activities.      Education provided re: perform HEP for improved carryover, reviewed outcome assessments    Pt has " "no cultural, educational or language barriers to learning provided.     Assessment     Phani tolerated treatment session well. Pt reports minimal L knee pain and continues to demonstrate weakness in L quads. Pt resumed use of leg press with decreased resistance. Pt is demonstrating impaired resting position of head- forward and in slight L rotation. Pt can benefit from continued skilled PT to improve cervical range of Motion, balance, and gait.       Pt prognosis is Good. Pt will continue to benefit from skilled outpatient physical therapy to address the deficits listed in the problem list, provide pt/family education and to maximize pt's level of independence in the home and community environment.      Barriers to progress: transportation, motivation for exercises    Activity limitations/ Participation Restrictions:   Fall Risk - impaired balance   Weakness  Impaired muscle tone  Poor head control  Decreased ROM  Gait deviations   Decreased ambulation   Decreased activity tolerance   Decreased independence with daily activities   Requires skilled supervision to complete and progress HEP   Requires skilled PT for DME     Pt's spiritual, cultural and educational needs considered and pt agreeable to plan of care and GOALS as stated below:    Short term goals: 6 weeks, pt agrees to goals set. (As of 6/14/19)  1. Pt will perform chin tucks in supine with supervision to improve independent with HEP. Met 7/5/17  2. Assist pt with obtaining appropriate cervical brace to improve head control. Met 1/9/18- pt has but is not wearing  3. Pt will demonstrate improved knee ext strength on left to 4/5 to improve safety with ambulation and sit<>stand. Met 9/28/17   4. Pt will demonstrate improved cervical rotation PROM to 20 degrees to improve functional head control. Met 7/5/17  5. Pt will perform sit<>stand consistently from 22" height surface with supervision to improve independence. Met 7/5/17                 " "                                        Revised 8/31/17:  Pt will perform sit<>stand from 19" height chair to improve independence with sitting in various chairs around her home and decreased                                                                      need for "pop up seat". improved as compared to last assessment- unable to stand unassisted from 19" height, supervision to stand from 20" height with ~2 attempts for success.      Long term goals: 12 weeks, pt agrees to goals set  6. Pt will perform basic HEP for gross strengthening with supervision to deter worsening of functional limitations. Met 8/31/17- pt's sister reports poor current compliance  7. Pt will demonstrate improved bilateral hip strength to 3/5 to improve balance and independence with mobility. Met 7/5/17  8. New 7/5/17: Pt will demonstrate improved bilateral hip strength to 4/5 to improve balance and independence with mobility. declined in all areas-  see objective info  9. Pt will demonstrate improved cervical extension strength to 3+/5 to improve head control. Met 7/31/17  10. Pt will demonstrate improved cervical deep flexor strength to 3+/5 to improve head control. Met 7/31/17   11. Pt will demonstrate improve balance and gait by scoring 14/28 on Tinetti. Met 8/31/17- 17/28                                      Revised 8/31/17: pt will demonstrate improved balance/ gait and decreased fall risk to moderate by scoring 19/28 on Tinetti Assessment. declined- 14/28                                          12. Pt will demonstrate improved cervical PROM for rotation to 40 degrees to improve functional head control. Inconsistent on R-  L= 26 deg, R= 5 deg,                                          13. New 8/1/18- pt will ambulate 50ft with LBQC and supervision over level tile to demonstrate improved household mobility with decreased AD- NT due to increased gait impairments with RW and decreased static standing balance     Plan          continue " with cervical stretching and strengthening to decrease forward head posture. No resistance for L straight leg raise until quad lag is corrected. Continue with general strengthening. Use stepper next visit to improve LE activity tolerance.      Yadi Mercado, PT     6/17/2019

## 2019-06-19 ENCOUNTER — CLINICAL SUPPORT (OUTPATIENT)
Dept: REHABILITATION | Facility: HOSPITAL | Age: 54
End: 2019-06-19
Attending: STUDENT IN AN ORGANIZED HEALTH CARE EDUCATION/TRAINING PROGRAM
Payer: COMMERCIAL

## 2019-06-19 DIAGNOSIS — R29.898 DECREASED ROM OF NECK: ICD-10-CM

## 2019-06-19 DIAGNOSIS — Z74.09 IMPAIRED FUNCTIONAL MOBILITY, BALANCE, GAIT, AND ENDURANCE: ICD-10-CM

## 2019-06-19 DIAGNOSIS — R53.1 DECREASED STRENGTH: ICD-10-CM

## 2019-06-19 PROCEDURE — 97110 THERAPEUTIC EXERCISES: CPT | Mod: PO

## 2019-06-19 NOTE — PROGRESS NOTES
"Physical Therapy Progress Note      Name: Brigid Jernigan  Clinic Number: [id number]  Diagnosis: G71.11 (ICD-10-CM) - Myotonic dystrophy  Physician: Luis Felipe Vo MD  Treatment Orders: PT Eval and Treat          Past Medical History:   Diagnosis Date    Abnormal EKG      Myotonic dystrophy      Pacemaker      PAF (paroxysmal atrial fibrillation)      Sleep apnea           Precautions: standard  auth period: 5/15/19 to 7/5/19  Visit #: 7/16     Time in: 1436  Time out: 1515  Total billable treatment time: 39 min     (visit total 2018= 73)  (Visit total 2017= 56)  Date of Eval: 5/9/2017          POC: 5/10/2019 to 7/5/2019     Subjective   Pt reports:  No new complaints. Reports HEP on Saturday  Sister reported: "She did some exercises on Sunday, but nothing since then."    Pain Scale: 0/10 B knee pain     Objective   Patient received individual therapy to increase strength, endurance, ROM, flexibility, posture, core stabilization and head control with activities as follows:      Pt participated in therapeutic exercises x 39 minutes to address ROM, strength, balance, and mobility which includes:   x 10 min on  Sci Fit recumbent stepper.  B UE          Supine:    suboccipital distraction     PROM cervical rotation 3 x 30 sec   Pec stretch 3 x 30 sec         2 x 10 reps of B LE SAQ over big large bolster  2 x 10 reps of B LE SLR, R LE with #4lb cuff weights, LLE     Sidelying:   2 x 10 reps of open books    Written Home Exercises: perform long arc quads to address quad strength and knee pain.   Pt demo fair understanding of the education provided. Brigid demonstrated fair return demonstration of activities.      Education provided re: perform HEP for improved carryover, reviewed outcome assessments    Pt has no cultural, educational or language barriers to learning provided.     Assessment     Phani tolerated treatment session well with no problems.  Pt reports minimal knee pain and cont to focus on cervical " "stretching and strengthening as well as quad strengthening.  Weights were added to R LE for SLR, but not LLE.  NO other changes noted.      Pt prognosis is Good. Pt will continue to benefit from skilled outpatient physical therapy to address the deficits listed in the problem list, provide pt/family education and to maximize pt's level of independence in the home and community environment.      Barriers to progress: transportation, motivation for exercises    Activity limitations/ Participation Restrictions:   Fall Risk - impaired balance   Weakness  Impaired muscle tone  Poor head control  Decreased ROM  Gait deviations   Decreased ambulation   Decreased activity tolerance   Decreased independence with daily activities   Requires skilled supervision to complete and progress HEP   Requires skilled PT for DME     Pt's spiritual, cultural and educational needs considered and pt agreeable to plan of care and GOALS as stated below:    Short term goals: 6 weeks, pt agrees to goals set. (As of 6/14/19)  1. Pt will perform chin tucks in supine with supervision to improve independent with HEP. Met 7/5/17  2. Assist pt with obtaining appropriate cervical brace to improve head control. Met 1/9/18- pt has but is not wearing  3. Pt will demonstrate improved knee ext strength on left to 4/5 to improve safety with ambulation and sit<>stand. Met 9/28/17   4. Pt will demonstrate improved cervical rotation PROM to 20 degrees to improve functional head control. Met 7/5/17  5. Pt will perform sit<>stand consistently from 22" height surface with supervision to improve independence. Met 7/5/17                                                         Revised 8/31/17:  Pt will perform sit<>stand from 19" height chair to improve independence with sitting in various chairs around her home and decreased                                                                      need for "pop up seat". improved as compared to last assessment- unable " "to stand unassisted from 19" height, supervision to stand from 20" height with ~2 attempts for success.      Long term goals: 12 weeks, pt agrees to goals set  6. Pt will perform basic HEP for gross strengthening with supervision to deter worsening of functional limitations. Met 8/31/17- pt's sister reports poor current compliance  7. Pt will demonstrate improved bilateral hip strength to 3/5 to improve balance and independence with mobility. Met 7/5/17  8. New 7/5/17: Pt will demonstrate improved bilateral hip strength to 4/5 to improve balance and independence with mobility. declined in all areas-  see objective info  9. Pt will demonstrate improved cervical extension strength to 3+/5 to improve head control. Met 7/31/17  10. Pt will demonstrate improved cervical deep flexor strength to 3+/5 to improve head control. Met 7/31/17   11. Pt will demonstrate improve balance and gait by scoring 14/28 on Tinetti. Met 8/31/17- 17/28                                      Revised 8/31/17: pt will demonstrate improved balance/ gait and decreased fall risk to moderate by scoring 19/28 on Tinetti Assessment. declined- 14/28                                          12. Pt will demonstrate improved cervical PROM for rotation to 40 degrees to improve functional head control. Inconsistent on R-  L= 26 deg, R= 5 deg,                                          13. New 8/1/18- pt will ambulate 50ft with LBQC and supervision over level tile to demonstrate improved household mobility with decreased AD- NT due to increased gait impairments with RW and decreased static standing balance     Plan          continue with cervical stretching and strengthening to decrease forward head posture. No resistance for L straight leg raise until quad lag is corrected. Continue with general strengthening. Use stepper next visit to improve LE activity tolerance.      Mae Caicedo, PTA     6/19/2019                   "

## 2019-06-19 NOTE — PLAN OF CARE
"Physical Therapy Progress Note      Name: Himanshu Jernigan  Clinic Number: [id number]  Diagnosis: G71.11 (ICD-10-CM) - Myotonic dystrophy  Physician: Luis Felipe Vo MD  Treatment Orders: PT Eval and Treat          Past Medical History:   Diagnosis Date    Abnormal EKG      Myotonic dystrophy      Pacemaker      PAF (paroxysmal atrial fibrillation)      Sleep apnea           Precautions: standard  auth period: 5/15/19 to 7/5/19  Visit #: 5/16     Time in: 1300  Time out: 1347  Total billable treatment time: 47 min     (visit total 2018= 73)  (Visit total 2017= 56)  Date of Eval: 5/9/2017          POC: 5/10/2019 to 7/5/2019     Subjective   Pt reports:  No new complaints. Denies HEP this week   Sister reported: she is bending over a lot and walking with stiff legs again. She has been walking like that since she missed all of those sessions.     Pain Scale: minimal L knee pain- not necessarily associated with weight bearing or movement     Objective   Patient received individual therapy to increase strength, endurance, ROM, flexibility, posture, core stabilization and head control with activities as follows:      Pt participated in therapeutic exercises x 32 minutes to address ROM, strength, balance, and mobility which includes:  Hip strength:      R          L  Hip flexion          4/5        4+/5  Hip extension     4-/5     4-/5  Hip abduction     4-/5     4-/5  Hip adduction      4/5     4-/5                  Supine:    suboccipital distraction     PROM cervical rotation 3 x 30 sec   Upper trap stretch 3 x 30 sec      Cervical extension Green thera band 2 x 10   straight leg raise 3#R 2 x 10, 0# L 10x (significant quad lag on L)   Bridging Blue thera band for hip abduction 2 x 10         HIMANSHU participated in dynamic functional therapeutic activities to improve functional performance for 15  minutes, including:     Sit>stand from 19" height- multiple trials, pt unable to perform unassisted  Sit>stand from " "20" height- supervision, ~2 attempts for success    Tinetti Balance Assessment  Balance:  1. Sitting Balance 1   Leans/ slides in chair= 0   Steady= 1  2. Rises from chair 1   Unable without help= 0   Able, uses arms= 1   Able without use of arms= 2  3. Attempts to rise 1   Unable without help= 0   Able, >1 attmept required-= 1   Able, 1 attempt= 2  4. Immediate standing balance (1st 5 seconds) 1   Unsteady (swaggers, moves feet, trunk sway)= 0   Steady but uses walker or other support= 1   Narrow base of support without walker or support= 2  5. Nudged 0   Begins to fall= 0   Staggers, grabs, catches self= 1   Steady= 2  6. Standing balance 0   Unsteady= 0   Steady but wide LEYDI or uses AD=1   Narrow LEYDI without AD=2  7. Eyes closed 0   Unsteady= 0   Steady= 1  8. Turning 360 degrees 0   Discontinuous steps= 0   Continuous steps= 1   Unsteady= 0   Steady= 1  9. Sitting down 1   Unsafe= 0   Uses arms or not in a smooth motion= 1   Safe, smooth motion= 2  Balance score= 5/16  Gait:  1. Initiation 1   hesitates or multiple attempts to start= 0   No hesitancy= 1  2. Step length 1, L consistently passes   Step to= 0   One foot passes= 1   reciprocal pattern= 2  3. Step height 2    Neither foot clears floor= 0   One foot clears floor= 1   Both feet clear floor= 2  4. Step symmetry 0   Not symmetrical= 0   Appears symmetrical= 1  5. Step continuity 1   Not continuous= 0   Appears continuous= 1  6. Path 0   Marked deviation= 0   Mild/moderate deviation or uses A.D.= 1   Straight without A.D.= 2  7. Trunk 0   Marked sway or uses A.D.= 0   No sway, but flexes knees or back, spread arms out while walking= 1   No sway, no flexion, no use of UE, no use of A.D.= 2  8. Walking stance 1   Heels apart= 0   Heels almost touching while walking= 1  Gait score= 6/12  Total score= 11/28 , high fall risk    0-18= high fall risk  19-23= moderate fall risk  24-28= low fall risk       Written Home Exercises: perform long arc quads to address " "quad strength and knee pain.   Pt demo fair understanding of the education provided. Brigid demonstrated fair return demonstration of activities.      Education provided re: perform HEP for improved carryover, reviewed outcome assessments    Pt has no cultural, educational or language barriers to learning provided.     Assessment     assessment period: 5/30/2019 to 6/14/2019. Phani tolerates treatment sessions well, but demonstrated a decline in BLE strength (especially L quad strength) and ability to rise from normal chair height surfaces (19-20") unassisted. Pt's sister reports that pt is demonstrating increased ambulation impairments. PT also noted an increase in gait impairments with pt demonstrating increased lateral shifting, increased rounded shoulders/ forward head posture, and decreased LLE DF/knee flex/ hip flexion for swing. This all began when pt was not consistently attending PT in 4/2019 due to weather, illness, and transportation thus demonstrating a need for continued skilled PT. Pt demonstrates decreased strength of L quads by demonstrating a significant quad lad during straight leg raise without weight. Previously pt was tolerating at least 3#. Per formal assessment, pt is at high risk for falls. Pt was not able to stand unsupported today (previously able to do so). Pt was not able to rise from an average chair height (19") unassisted and required multiple attempts for success to rise from 20" height. Pt can benefit from continued skilled PT to address decreased strength to improve independence with sit<>stand, address posture to improve balance and deter worsening of trunk alignment, and address gait impairments to decrease fall risk. Poor compliance with HEP is also a reason for functional decline without PT.       Pt prognosis is Good. Pt will continue to benefit from skilled outpatient physical therapy to address the deficits listed in the problem list, provide pt/family education and to " "maximize pt's level of independence in the home and community environment.      Barriers to progress: transportation, motivation for exercises    Activity limitations/ Participation Restrictions:   Fall Risk - impaired balance   Weakness  Impaired muscle tone  Poor head control  Decreased ROM  Gait deviations   Decreased ambulation   Decreased activity tolerance   Decreased independence with daily activities   Requires skilled supervision to complete and progress HEP   Requires skilled PT for DME     Pt's spiritual, cultural and educational needs considered and pt agreeable to plan of care and GOALS as stated below:    Short term goals: 6 weeks, pt agrees to goals set. (As of 6/14/19)  1. Pt will perform chin tucks in supine with supervision to improve independent with HEP. Met 7/5/17  2. Assist pt with obtaining appropriate cervical brace to improve head control. Met 1/9/18- pt has but is not wearing  3. Pt will demonstrate improved knee ext strength on left to 4/5 to improve safety with ambulation and sit<>stand. Met 9/28/17   4. Pt will demonstrate improved cervical rotation PROM to 20 degrees to improve functional head control. Met 7/5/17  5. Pt will perform sit<>stand consistently from 22" height surface with supervision to improve independence. Met 7/5/17                                                         Revised 8/31/17:  Pt will perform sit<>stand from 19" height chair to improve independence with sitting in various chairs around her home and decreased                                                                      need for "pop up seat". improved as compared to last assessment- unable to stand unassisted from 19" height, supervision to stand from 20" height with ~2 attempts for success.      Long term goals: 12 weeks, pt agrees to goals set  6. Pt will perform basic HEP for gross strengthening with supervision to deter worsening of functional limitations. Met 8/31/17- pt's sister reports poor " current compliance  7. Pt will demonstrate improved bilateral hip strength to 3/5 to improve balance and independence with mobility. Met 7/5/17  8. New 7/5/17: Pt will demonstrate improved bilateral hip strength to 4/5 to improve balance and independence with mobility. declined in all areas-  see objective info  9. Pt will demonstrate improved cervical extension strength to 3+/5 to improve head control. Met 7/31/17  10. Pt will demonstrate improved cervical deep flexor strength to 3+/5 to improve head control. Met 7/31/17   11. Pt will demonstrate improve balance and gait by scoring 14/28 on Tinetti. Met 8/31/17- 17/28                                      Revised 8/31/17: pt will demonstrate improved balance/ gait and decreased fall risk to moderate by scoring 19/28 on Tinetti Assessment. declined- 14/28                                          12. Pt will demonstrate improved cervical PROM for rotation to 40 degrees to improve functional head control. Inconsistent on R-  L= 26 deg, R= 5 deg,                                          13. New 8/1/18- pt will ambulate 50ft with LBQC and supervision over level tile to demonstrate improved household mobility with decreased AD- NT due to increased gait impairments with RW and decreased static standing balance      Plan          continue with cervical stretching and strengthening to decrease forward head posture. Perform LE (straight leg raise) and core strengthening. Address exercises to protect/ support knee in sitting and supine     Yadi Mercado, PT     6/14/2019

## 2019-06-24 ENCOUNTER — CLINICAL SUPPORT (OUTPATIENT)
Dept: REHABILITATION | Facility: HOSPITAL | Age: 54
End: 2019-06-24
Attending: STUDENT IN AN ORGANIZED HEALTH CARE EDUCATION/TRAINING PROGRAM
Payer: COMMERCIAL

## 2019-06-24 DIAGNOSIS — R53.1 DECREASED STRENGTH: ICD-10-CM

## 2019-06-24 DIAGNOSIS — R29.898 DECREASED ROM OF NECK: ICD-10-CM

## 2019-06-24 DIAGNOSIS — Z74.09 IMPAIRED FUNCTIONAL MOBILITY, BALANCE, GAIT, AND ENDURANCE: ICD-10-CM

## 2019-06-24 PROCEDURE — 97110 THERAPEUTIC EXERCISES: CPT | Mod: PO

## 2019-06-24 NOTE — PROGRESS NOTES
"Physical Therapy Progress Note      Name: Brigid Jernigan  Clinic Number: [id number]  Diagnosis: G71.11 (ICD-10-CM) - Myotonic dystrophy  Physician: Luis Felipe Vo MD  Treatment Orders: PT Eval and Treat          Past Medical History:   Diagnosis Date    Abnormal EKG      Myotonic dystrophy      Pacemaker      PAF (paroxysmal atrial fibrillation)      Sleep apnea           Precautions: standard  auth period: 5/15/19 to 7/5/19  Visit #: 8/16     Time in: 1650  Time out: 1730  Total billable treatment time: 40 min     (visit total 2018= 73)  (Visit total 2017= 56)  Date of Eval: 5/9/2017          POC: 5/10/2019 to 7/5/2019     Subjective   Pt reports:  " I'm doing pretty good."   Sister reported: "She did some exercises on Sunday, but nothing since then."    Pain Scale: 0/10 B knee pain     Objective   Patient received individual therapy to increase strength, endurance, ROM, flexibility, posture, core stabilization and head control with activities as follows:      Pt participated in therapeutic exercises x 40 minutes to address ROM, strength, balance, and mobility which includes:   x 10 min on  Sci Fit recumbent stepper.  B UE/B LE on level 13.5          Supine:    suboccipital distraction     PROM cervical rotation 3 x 30 sec   Pec stretch 3 x 30 sec   Chin tucks x 10 reps with pillow removed          1 x 5 reps of sit to stands from 20 inch mat  1 x 15 reps of B LE squats on leg press with #125lbs applied.     Written Home Exercises: perform long arc quads to address quad strength and knee pain.   Pt demo fair understanding of the education provided. Brigid demonstrated fair return demonstration of activities.      Education provided re: perform HEP for improved carryover, reviewed outcome assessments    Pt has no cultural, educational or language barriers to learning provided.     Assessment     Phani tolerated treatment session well with no problems.  Pt did not have any reports of pain or problems " "during tx session.  Pt focused on cervical stretching and quad strengthening.  Pt unable to stand from a 19 inch mat today, but was able to stand from a 20 inch mat.  Will attempt to increase resistance on the stepper and weights on leg press.  Cont with plan of care .      Pt prognosis is Good. Pt will continue to benefit from skilled outpatient physical therapy to address the deficits listed in the problem list, provide pt/family education and to maximize pt's level of independence in the home and community environment.      Barriers to progress: transportation, motivation for exercises    Activity limitations/ Participation Restrictions:   Fall Risk - impaired balance   Weakness  Impaired muscle tone  Poor head control  Decreased ROM  Gait deviations   Decreased ambulation   Decreased activity tolerance   Decreased independence with daily activities   Requires skilled supervision to complete and progress HEP   Requires skilled PT for DME     Pt's spiritual, cultural and educational needs considered and pt agreeable to plan of care and GOALS as stated below:    Short term goals: 6 weeks, pt agrees to goals set. (As of 6/14/19)  1. Pt will perform chin tucks in supine with supervision to improve independent with HEP. Met 7/5/17  2. Assist pt with obtaining appropriate cervical brace to improve head control. Met 1/9/18- pt has but is not wearing  3. Pt will demonstrate improved knee ext strength on left to 4/5 to improve safety with ambulation and sit<>stand. Met 9/28/17   4. Pt will demonstrate improved cervical rotation PROM to 20 degrees to improve functional head control. Met 7/5/17  5. Pt will perform sit<>stand consistently from 22" height surface with supervision to improve independence. Met 7/5/17                                                         Revised 8/31/17:  Pt will perform sit<>stand from 19" height chair to improve independence with sitting in various chairs around her home and decreased " "                                                                     need for "pop up seat". improved as compared to last assessment- unable to stand unassisted from 19" height, supervision to stand from 20" height with ~2 attempts for success.      Long term goals: 12 weeks, pt agrees to goals set  6. Pt will perform basic HEP for gross strengthening with supervision to deter worsening of functional limitations. Met 8/31/17- pt's sister reports poor current compliance  7. Pt will demonstrate improved bilateral hip strength to 3/5 to improve balance and independence with mobility. Met 7/5/17  8. New 7/5/17: Pt will demonstrate improved bilateral hip strength to 4/5 to improve balance and independence with mobility. declined in all areas-  see objective info  9. Pt will demonstrate improved cervical extension strength to 3+/5 to improve head control. Met 7/31/17  10. Pt will demonstrate improved cervical deep flexor strength to 3+/5 to improve head control. Met 7/31/17   11. Pt will demonstrate improve balance and gait by scoring 14/28 on Tinetti. Met 8/31/17- 17/28                                      Revised 8/31/17: pt will demonstrate improved balance/ gait and decreased fall risk to moderate by scoring 19/28 on Tinetti Assessment. declined- 14/28                                          12. Pt will demonstrate improved cervical PROM for rotation to 40 degrees to improve functional head control. Inconsistent on R-  L= 26 deg, R= 5 deg,                                          13. New 8/1/18- pt will ambulate 50ft with LBQC and supervision over level tile to demonstrate improved household mobility with decreased AD- NT due to increased gait impairments with RW and decreased static standing balance     Plan          continue with cervical stretching and strengthening to decrease forward head posture. No resistance for L straight leg raise until quad lag is corrected. Continue with general strengthening. Use stepper " next visit to improve LE activity tolerance.      Mae Caicedo, PTA     6/24/2019

## 2019-06-26 ENCOUNTER — CLINICAL SUPPORT (OUTPATIENT)
Dept: REHABILITATION | Facility: HOSPITAL | Age: 54
End: 2019-06-26
Attending: STUDENT IN AN ORGANIZED HEALTH CARE EDUCATION/TRAINING PROGRAM
Payer: COMMERCIAL

## 2019-06-26 DIAGNOSIS — R29.898 DECREASED ROM OF NECK: ICD-10-CM

## 2019-06-26 DIAGNOSIS — Z74.09 IMPAIRED FUNCTIONAL MOBILITY, BALANCE, GAIT, AND ENDURANCE: ICD-10-CM

## 2019-06-26 DIAGNOSIS — R53.1 DECREASED STRENGTH: ICD-10-CM

## 2019-06-26 PROCEDURE — 97530 THERAPEUTIC ACTIVITIES: CPT | Mod: PO

## 2019-06-26 PROCEDURE — 97110 THERAPEUTIC EXERCISES: CPT | Mod: PO

## 2019-06-26 NOTE — PROGRESS NOTES
"Physical Therapy Progress Note      Name: Brigid Jernigan  Clinic Number: [id number]  Diagnosis: G71.11 (ICD-10-CM) - Myotonic dystrophy  Physician: Luis Felipe oV MD  Treatment Orders: PT Eval and Treat          Past Medical History:   Diagnosis Date    Abnormal EKG      Myotonic dystrophy      Pacemaker      PAF (paroxysmal atrial fibrillation)      Sleep apnea           Precautions: standard  auth period: 5/15/19 to 7/5/19  Visit #: 9/16     Time in: 1656  Time out: 1730  Total billable treatment time: 40 min     (visit total 2018= 73)  (Visit total 2017= 56)  Date of Eval: 5/9/2017          POC: 5/10/2019 to 7/5/2019     Subjective   Pt reports:  "I'm doing fine."   Sister reported: "Work her hard."     Pain Scale: 0/10 B knee pain     Objective   Patient received individual therapy to increase strength, endurance, ROM, flexibility, posture, core stabilization and head control with activities as follows:      Pt participated in therapeutic exercises x 22 minutes to address ROM, strength, balance, and mobility which includes:   x 10 min on  Sci Fit recumbent stepper.  B UE/B LE on level 14.0          Supine:    suboccipital distraction     PROM cervical rotation 3 x 30 sec   Pec stretch 3 x 30 sec   Chin tucks x 10 reps with pillow removed    Patient participated in dynamic functional therapeutic activities to improve functional performance for 12 minutes. Including:   Pt sat EOM and used B uE to hit a balloon back and forth to therapist working on sitting balance, leaning forward, and reaching to the each side to return the balloon.  Pt performed x 5 mins of abdominal crunches while hitting a balloon back and forth to therapist     Written Home Exercises: perform long arc quads to address quad strength and knee pain.   Pt demo fair understanding of the education provided. Brigid demonstrated fair return demonstration of activities.      Education provided re: perform HEP for improved carryover, reviewed " "outcome assessments    Pt has no cultural, educational or language barriers to learning provided.     Assessment     Phani tolerated treatment session well with no problems.  Pt not very open to new exercises, but was agreeable to sitting EOM and playing volleyball to help strengthen her core.  Pt with increased resistance on the stepper to level 14 today.    Pt prognosis is Good. Pt will continue to benefit from skilled outpatient physical therapy to address the deficits listed in the problem list, provide pt/family education and to maximize pt's level of independence in the home and community environment.      Barriers to progress: transportation, motivation for exercises    Activity limitations/ Participation Restrictions:   Fall Risk - impaired balance   Weakness  Impaired muscle tone  Poor head control  Decreased ROM  Gait deviations   Decreased ambulation   Decreased activity tolerance   Decreased independence with daily activities   Requires skilled supervision to complete and progress HEP   Requires skilled PT for DME     Pt's spiritual, cultural and educational needs considered and pt agreeable to plan of care and GOALS as stated below:    Short term goals: 6 weeks, pt agrees to goals set. (As of 6/14/19)  1. Pt will perform chin tucks in supine with supervision to improve independent with HEP. Met 7/5/17  2. Assist pt with obtaining appropriate cervical brace to improve head control. Met 1/9/18- pt has but is not wearing  3. Pt will demonstrate improved knee ext strength on left to 4/5 to improve safety with ambulation and sit<>stand. Met 9/28/17   4. Pt will demonstrate improved cervical rotation PROM to 20 degrees to improve functional head control. Met 7/5/17  5. Pt will perform sit<>stand consistently from 22" height surface with supervision to improve independence. Met 7/5/17                                                         Revised 8/31/17:  Pt will perform sit<>stand from 19" height chair to " "improve independence with sitting in various chairs around her home and decreased                                                                      need for "pop up seat". improved as compared to last assessment- unable to stand unassisted from 19" height, supervision to stand from 20" height with ~2 attempts for success.      Long term goals: 12 weeks, pt agrees to goals set  6. Pt will perform basic HEP for gross strengthening with supervision to deter worsening of functional limitations. Met 8/31/17- pt's sister reports poor current compliance  7. Pt will demonstrate improved bilateral hip strength to 3/5 to improve balance and independence with mobility. Met 7/5/17  8. New 7/5/17: Pt will demonstrate improved bilateral hip strength to 4/5 to improve balance and independence with mobility. declined in all areas-  see objective info  9. Pt will demonstrate improved cervical extension strength to 3+/5 to improve head control. Met 7/31/17  10. Pt will demonstrate improved cervical deep flexor strength to 3+/5 to improve head control. Met 7/31/17   11. Pt will demonstrate improve balance and gait by scoring 14/28 on Tinetti. Met 8/31/17- 17/28                                      Revised 8/31/17: pt will demonstrate improved balance/ gait and decreased fall risk to moderate by scoring 19/28 on Tinetti Assessment. declined- 14/28                                          12. Pt will demonstrate improved cervical PROM for rotation to 40 degrees to improve functional head control. Inconsistent on R-  L= 26 deg, R= 5 deg,                                          13. New 8/1/18- pt will ambulate 50ft with LBQC and supervision over level tile to demonstrate improved household mobility with decreased AD- NT due to increased gait impairments with RW and decreased static standing balance     Plan       Cont with stretching, strengthening, standing activities as tolerated.      Mae Caicedo, " PTA     6/26/2019

## 2019-06-27 ENCOUNTER — DOCUMENTATION ONLY (OUTPATIENT)
Dept: REHABILITATION | Facility: HOSPITAL | Age: 54
End: 2019-06-27

## 2019-06-27 NOTE — PROGRESS NOTES
PT/PTA met face to face to discuss pt's treatment plan and progress towards established goals.  Continue with current PT POC with focus on cervical stretching and strengthening, endurance, standing balance.  Patient will be seen by physical therapist at least every sixth treatment or 30 days, whichever occurs first.    Mae Caicedo PTA  06/27/2019    Meeting held on 6/27/2019  Yadi Mercado DPT  7/2/2019

## 2019-07-01 ENCOUNTER — CLINICAL SUPPORT (OUTPATIENT)
Dept: REHABILITATION | Facility: HOSPITAL | Age: 54
End: 2019-07-01
Attending: STUDENT IN AN ORGANIZED HEALTH CARE EDUCATION/TRAINING PROGRAM
Payer: COMMERCIAL

## 2019-07-01 DIAGNOSIS — R29.898 DECREASED ROM OF NECK: ICD-10-CM

## 2019-07-01 DIAGNOSIS — Z74.09 IMPAIRED FUNCTIONAL MOBILITY, BALANCE, GAIT, AND ENDURANCE: ICD-10-CM

## 2019-07-01 DIAGNOSIS — R53.1 DECREASED STRENGTH: ICD-10-CM

## 2019-07-01 PROCEDURE — 97110 THERAPEUTIC EXERCISES: CPT | Mod: PO

## 2019-07-01 NOTE — PROGRESS NOTES
"Physical Therapy Progress Note      Name: Brigid Jernigan  Clinic Number: [id number]  Diagnosis: G71.11 (ICD-10-CM) - Myotonic dystrophy  Physician: Luis Felipe Vo MD  Treatment Orders: PT Eval and Treat          Past Medical History:   Diagnosis Date    Abnormal EKG      Myotonic dystrophy      Pacemaker      PAF (paroxysmal atrial fibrillation)      Sleep apnea           Precautions: standard  auth period: 5/15/19 to 7/5/19  Visit #: 10/16     Time in: 1655  Time out: 1730  Total billable treatment time: 35 min     (visit total 2018= 73)  (Visit total 2017= 56)  Date of Eval: 5/9/2017          POC: 5/10/2019 to 7/5/2019     Subjective   Pt reports:  "I'm doing pretty good."    Sister reported: "We have to leave at 5:30 today."      Pain Scale: 0/10 B knee pain     Objective   Patient received individual therapy to increase strength, endurance, ROM, flexibility, posture, core stabilization and head control with activities as follows:      Pt participated in therapeutic exercises x 25 minutes to address ROM, strength, balance, and mobility which includes:   x 10 min on  Sci Fit recumbent stepper.  B UE/B LE on level 14.0          X 15 reps of B LE squats on leg press with #130 lbs applied  X 20  Reps of B LE SAQ over extra large bolster    Patient participated in dynamic functional therapeutic activities to improve functional performance for 5 minutes. Including:   Static standing balance unsupported x 4 mins total with RW as a support when needed.  Pt required occasional 1 UE support for balance.    Written Home Exercises: perform long arc quads to address quad strength and knee pain.   Pt demo fair understanding of the education provided. Brigid demonstrated fair return demonstration of activities.      Education provided re: perform HEP for improved carryover, reviewed outcome assessments    Pt has no cultural, educational or language barriers to learning provided.     Assessment     Phani tolerated " "treatment session well with no problems.  Pt was able to increase weight on the leg press today.  Pt fatigued at the end of the tx session and unable to stand unsupported as long as previous tx sessions.    Pt prognosis is Good. Pt will continue to benefit from skilled outpatient physical therapy to address the deficits listed in the problem list, provide pt/family education and to maximize pt's level of independence in the home and community environment.      Barriers to progress: transportation, motivation for exercises    Activity limitations/ Participation Restrictions:   Fall Risk - impaired balance   Weakness  Impaired muscle tone  Poor head control  Decreased ROM  Gait deviations   Decreased ambulation   Decreased activity tolerance   Decreased independence with daily activities   Requires skilled supervision to complete and progress HEP   Requires skilled PT for DME     Pt's spiritual, cultural and educational needs considered and pt agreeable to plan of care and GOALS as stated below:    Short term goals: 6 weeks, pt agrees to goals set. (As of 6/14/19)  1. Pt will perform chin tucks in supine with supervision to improve independent with HEP. Met 7/5/17  2. Assist pt with obtaining appropriate cervical brace to improve head control. Met 1/9/18- pt has but is not wearing  3. Pt will demonstrate improved knee ext strength on left to 4/5 to improve safety with ambulation and sit<>stand. Met 9/28/17   4. Pt will demonstrate improved cervical rotation PROM to 20 degrees to improve functional head control. Met 7/5/17  5. Pt will perform sit<>stand consistently from 22" height surface with supervision to improve independence. Met 7/5/17                                                         Revised 8/31/17:  Pt will perform sit<>stand from 19" height chair to improve independence with sitting in various chairs around her home and decreased                                                                      need " "for "pop up seat". improved as compared to last assessment- unable to stand unassisted from 19" height, supervision to stand from 20" height with ~2 attempts for success.      Long term goals: 12 weeks, pt agrees to goals set  6. Pt will perform basic HEP for gross strengthening with supervision to deter worsening of functional limitations. Met 8/31/17- pt's sister reports poor current compliance  7. Pt will demonstrate improved bilateral hip strength to 3/5 to improve balance and independence with mobility. Met 7/5/17  8. New 7/5/17: Pt will demonstrate improved bilateral hip strength to 4/5 to improve balance and independence with mobility. declined in all areas-  see objective info  9. Pt will demonstrate improved cervical extension strength to 3+/5 to improve head control. Met 7/31/17  10. Pt will demonstrate improved cervical deep flexor strength to 3+/5 to improve head control. Met 7/31/17   11. Pt will demonstrate improve balance and gait by scoring 14/28 on Tinetti. Met 8/31/17- 17/28                                      Revised 8/31/17: pt will demonstrate improved balance/ gait and decreased fall risk to moderate by scoring 19/28 on Tinetti Assessment. declined- 14/28                                          12. Pt will demonstrate improved cervical PROM for rotation to 40 degrees to improve functional head control. Inconsistent on R-  L= 26 deg, R= 5 deg,                                          13. New 8/1/18- pt will ambulate 50ft with LBQC and supervision over level tile to demonstrate improved household mobility with decreased AD- NT due to increased gait impairments with RW and decreased static standing balance     Plan       Cont with stretching, strengthening, standing activities as tolerated.      Mae Caicedo, PTA     7/1/2019                   "

## 2019-07-03 ENCOUNTER — CLINICAL SUPPORT (OUTPATIENT)
Dept: REHABILITATION | Facility: HOSPITAL | Age: 54
End: 2019-07-03
Attending: STUDENT IN AN ORGANIZED HEALTH CARE EDUCATION/TRAINING PROGRAM
Payer: COMMERCIAL

## 2019-07-03 DIAGNOSIS — R53.1 DECREASED STRENGTH: ICD-10-CM

## 2019-07-03 DIAGNOSIS — Z74.09 IMPAIRED FUNCTIONAL MOBILITY, BALANCE, GAIT, AND ENDURANCE: ICD-10-CM

## 2019-07-03 DIAGNOSIS — R29.898 DECREASED ROM OF NECK: ICD-10-CM

## 2019-07-03 PROCEDURE — 97110 THERAPEUTIC EXERCISES: CPT | Mod: PO

## 2019-07-03 PROCEDURE — 97116 GAIT TRAINING THERAPY: CPT | Mod: PO

## 2019-07-03 NOTE — PROGRESS NOTES
"Physical Therapy Progress Note      Name: Brigid Jernigan  Clinic Number: [id number]  Diagnosis: G71.11 (ICD-10-CM) - Myotonic dystrophy  Physician: Luis Felipe Vo MD  Treatment Orders: PT Eval and Treat          Past Medical History:   Diagnosis Date    Abnormal EKG      Myotonic dystrophy      Pacemaker      PAF (paroxysmal atrial fibrillation)      Sleep apnea           Precautions: standard  auth period: 5/15/19 to 7/5/19  Visit #: 11/16     Time in: 1645  Time out: 1730  Total billable treatment time: 45 min     (visit total 2018= 73)  (Visit total 2017= 56)  Date of Eval: 5/9/2017          POC: 5/10/2019 to 7/5/2019     Subjective   Pt reports:  "I'm doing fine."  Sister reported: "We have to leave at 5:30 today."      Pain Scale: 0/10 B knee pain     Objective   Patient received individual therapy to increase strength, endurance, ROM, flexibility, posture, core stabilization and head control with activities as follows:      Pt participated in therapeutic exercises x 30 minutes to address ROM, strength, balance, and mobility which includes:   x 10 min on  Sci Fit recumbent stepper.  B UE/B LE on level 14.    Supine:               suboccipital distraction               PROM cervical rotation 3 x 30 sec       Pec stretch 3 x 30 sec  X 20  Reps of B LE SAQ with #1.5 lbs over extra large bolster    Gait x 15 min including:   Pt ambulated ~ 50ft with LBQC and close CGA.  X 2 episodes of slight loss of balance requiring Min A to recover.    Patient participated in dynamic functional therapeutic activities to improve functional performance for 0 minutes. Including:       Written Home Exercises: perform long arc quads to address quad strength and knee pain.   Pt demo fair understanding of the education provided. Brigid demonstrated fair return demonstration of activities.      Education provided re: perform HEP for improved carryover, reviewed outcome assessments    Pt has no cultural, educational or language " "barriers to learning provided.     Assessment     Phani tolerated treatment session well with no problems.  Pt agreeable to gait with the quad cane and was able to walk 50 feet.  Pt requires VC's to increase step length and upright posture with gait.  Progressed to #1.5lb cuff weights on SAQ.  Cont with plan of care.    Pt prognosis is Good. Pt will continue to benefit from skilled outpatient physical therapy to address the deficits listed in the problem list, provide pt/family education and to maximize pt's level of independence in the home and community environment.      Barriers to progress: transportation, motivation for exercises    Activity limitations/ Participation Restrictions:   Fall Risk - impaired balance   Weakness  Impaired muscle tone  Poor head control  Decreased ROM  Gait deviations   Decreased ambulation   Decreased activity tolerance   Decreased independence with daily activities   Requires skilled supervision to complete and progress HEP   Requires skilled PT for DME     Pt's spiritual, cultural and educational needs considered and pt agreeable to plan of care and GOALS as stated below:    Short term goals: 6 weeks, pt agrees to goals set. (As of 6/14/19)  1. Pt will perform chin tucks in supine with supervision to improve independent with HEP. Met 7/5/17  2. Assist pt with obtaining appropriate cervical brace to improve head control. Met 1/9/18- pt has but is not wearing  3. Pt will demonstrate improved knee ext strength on left to 4/5 to improve safety with ambulation and sit<>stand. Met 9/28/17   4. Pt will demonstrate improved cervical rotation PROM to 20 degrees to improve functional head control. Met 7/5/17  5. Pt will perform sit<>stand consistently from 22" height surface with supervision to improve independence. Met 7/5/17                                                         Revised 8/31/17:  Pt will perform sit<>stand from 19" height chair to improve independence with sitting in " "various chairs around her home and decreased                                                                      need for "pop up seat". improved as compared to last assessment- unable to stand unassisted from 19" height, supervision to stand from 20" height with ~2 attempts for success.      Long term goals: 12 weeks, pt agrees to goals set  6. Pt will perform basic HEP for gross strengthening with supervision to deter worsening of functional limitations. Met 8/31/17- pt's sister reports poor current compliance  7. Pt will demonstrate improved bilateral hip strength to 3/5 to improve balance and independence with mobility. Met 7/5/17  8. New 7/5/17: Pt will demonstrate improved bilateral hip strength to 4/5 to improve balance and independence with mobility. declined in all areas-  see objective info  9. Pt will demonstrate improved cervical extension strength to 3+/5 to improve head control. Met 7/31/17  10. Pt will demonstrate improved cervical deep flexor strength to 3+/5 to improve head control. Met 7/31/17   11. Pt will demonstrate improve balance and gait by scoring 14/28 on Tinetti. Met 8/31/17- 17/28                                      Revised 8/31/17: pt will demonstrate improved balance/ gait and decreased fall risk to moderate by scoring 19/28 on Tinetti Assessment. declined- 14/28                                          12. Pt will demonstrate improved cervical PROM for rotation to 40 degrees to improve functional head control. Inconsistent on R-  L= 26 deg, R= 5 deg,                                          13. New 8/1/18- pt will ambulate 50ft with LBQC and supervision over level tile to demonstrate improved household mobility with decreased AD- NT due to increased gait impairments with RW and decreased static standing balance     Plan       Cont with stretching, strengthening, standing activities as tolerated.      Mae Caicedo, PTA     7/3/2019                   "

## 2019-07-08 ENCOUNTER — CLINICAL SUPPORT (OUTPATIENT)
Dept: REHABILITATION | Facility: HOSPITAL | Age: 54
End: 2019-07-08
Payer: COMMERCIAL

## 2019-07-08 DIAGNOSIS — Z74.09 IMPAIRED FUNCTIONAL MOBILITY, BALANCE, GAIT, AND ENDURANCE: ICD-10-CM

## 2019-07-08 DIAGNOSIS — R53.1 DECREASED STRENGTH: ICD-10-CM

## 2019-07-08 DIAGNOSIS — R29.898 DECREASED ROM OF NECK: ICD-10-CM

## 2019-07-08 PROCEDURE — 97110 THERAPEUTIC EXERCISES: CPT | Mod: PO | Performed by: PHYSICAL THERAPIST

## 2019-07-08 PROCEDURE — 97116 GAIT TRAINING THERAPY: CPT | Mod: PO | Performed by: PHYSICAL THERAPIST

## 2019-07-11 NOTE — PLAN OF CARE
Physical Therapy Progress Note      Name: Brigid Jernigan  Clinic Number: [id number]  Diagnosis: G71.11 (ICD-10-CM) - Myotonic dystrophy  Physician: Lloyd Mojica MD  Treatment Orders: PT Eval and Treat          Past Medical History:   Diagnosis Date    Abnormal EKG      Myotonic dystrophy      Pacemaker      PAF (paroxysmal atrial fibrillation)      Sleep apnea           Precautions: standard  auth period: 5/15/19 to 7/5/19  Visit #: 12/16     Time in: 1645  Time out: 1730  Total billable treatment time: 45 min     (visit total 2018= 73)  (Visit total 2017= 56)  Date of Eval: 5/9/2017     POC: 5/10/2019 to 7/5/2019  New POC: 7/8/2019 to 9/2/2019     Subjective   Pt reports:  I didn't do much exercise recently, but I have been trying to walk more frequently at home  Sister reported: agrees with pt statement    Pain Scale: denies     Objective   Patient received individual therapy to increase strength, endurance, ROM, flexibility, posture, core stabilization and head control with activities as follows:      Pt participated in therapeutic exercises x 30 minutes to address ROM, strength, balance, and mobility which includes:    Lower Extremity Strength    RLE eval RLE 5/19 RLE 7/19 LLE eval LLE 5/19 LLE 7/19   Hip Flexion: 3+/5 5/5 NT 3+/5 5/5 NT   Hip Extension:  2/5 4+/5 3+/5 2/5 4/5 3+ to 4-/5   Hip Abduction: NT 4+/5 4/5 NT 4/5 4-/5   Hip Adduction: NT 4/5 4-/5 NT 3+/5 4/5   Knee Extension: 4+/5 NT NT 3+/5 NT NT   Knee Flexion: 4/5 NT NT 4-/5 NT NT   Ankle Dorsiflexion: 0/5 NT NT 0/5 NT NT   Ankle Plantarflexion: 0/5 NT NT 0/5 NT NT   Ankle Inversion: 0/5 NT NT 0/5 NT NT   Ankle Eversion: 0/5 NT NT 0/5 NT NT     Cervical AAROM (supine): L= 25 deg, R= 6 deg,     Supine:               suboccipital distraction               PROM cervical rotation (focus on suboccipital motion) 3 x 30 sec   PROM upper trap stretch 3 x 30 sec       Pec stretch 3 x 1 min    Leg press: 130# BLE 15x   50# RLE 10x   40# LLE  "10x     Sit>stand from 19" high surface with multiple attempts- min A  No A for sit>stand from 20" height    Gait x 15 min including:   Pt ambulated 60 ft and 22 ft with LBQC and close CGA- min A.  X 2 episodes of slight loss of balance requiring Min A to recover.      Tinetti Balance Assessment  Balance:  1. Sitting Balance 1   Leans/ slides in chair= 0   Steady= 1  2. Rises from chair 1   Unable without help= 0   Able, uses arms= 1   Able without use of arms= 2  3. Attempts to rise 1   Unable without help= 0   Able, >1 attmept required-= 1   Able, 1 attempt= 2  4. Immediate standing balance (1st 5 seconds) 1   Unsteady (swaggers, moves feet, trunk sway)= 0   Steady but uses walker or other support= 1   Narrow base of support without walker or support= 2  5. Nudged 2   Begins to fall= 0   Staggers, grabs, catches self= 1   Steady= 2  6. Standing balance 1   Unsteady= 0   Steady but wide LEYDI or uses AD=1   Narrow LEYDI without AD=2  7. Eyes closed 1   Unsteady= 0   Steady= 1  8. Turning 360 degrees 0   Discontinuous steps= 0   Continuous steps= 1   Unsteady= 0   Steady= 1  9. Sitting down 1   Unsafe= 0   Uses arms or not in a smooth motion= 1   Safe, smooth motion= 2  Balance score= 9/16  Gait:  1. Initiation 1   hesitates or multiple attempts to start= 0   No hesitancy= 1  2. Step length 2   Step to= 0   One foot passes= 1   reciprocal pattern= 2  3. Step height 2    Neither foot clears floor= 0   One foot clears floor= 1   Both feet clear floor= 2  4. Step symmetry 1   Not symmetrical= 0   Appears symmetrical= 1  5. Step continuity 1   Not continuous= 0   Appears continuous= 1  6. Path 1   Marked deviation= 0   Mild/moderate deviation or uses A.D.= 1   Straight without A.D.= 2  7. Trunk 0   Marked sway or uses A.D.= 0   No sway, but flexes knees or back, spread arms out while walking= 1   No sway, no flexion, no use of UE, no use of A.D.= 2  8. Walking stance 0   Heels apart= 0   Heels almost touching while walking= " "1  Gait score= 8/12  Total score= 17/28, high fall risk    0-18= high fall risk  19-23= moderate fall risk  24-28= low fall risk        Written Home Exercises: continue with previous POC  Pt demo fair understanding of the education provided. Brigid demonstrated fair return demonstration of activities.      Education provided re: increase participation in structured HEP more frequently to improve quality of progress    Pt has no cultural, educational or language barriers to learning provided.     Assessment     Assessment period: 6/17/2019 to 7/8/2019. Phani tolerates treatments well. Pt reports poor compliance with HEP, but her sister reports that she is attempting to perform more mobility at home to increase physical activity. Pt's cervical active assisted range of motion is ~same. Pt continues to have difficulty with performing sit>stand from a 19" height seat. She is not able to perform unassisted. Decreased strength in [place] hip extension is likely the acuse of decreased independence with sit<>stand. For this assessment pt demonstrates improved static standing balance (unsupported), balance with eyes closed, improved step symmetry, and improved foot clearance. Per Tinetti Assessment, pt is at high risk for falls. Pt resumed gait training with large based quad cane. Pt is requiring more assistance to balance with large based quad cane than in the past due to limited practice. Pt experienced 2 loss of balance while ambulating with large based quad cane and required ~ minimal assistance to recover.  Pt can benefit from continued skilled PT to address impairments of decreased strength, poor cervical range of Motion and impaired balance to improve safe mobility in the home and prevent the worsening of functional limitations due to progressive nature of diagnosis.      Pt prognosis is Good. Pt will continue to benefit from skilled outpatient physical therapy to address the deficits listed in the problem list, provide " "pt/family education and to maximize pt's level of independence in the home and community environment.      Barriers to progress: transportation, motivation for exercises    Activity limitations/ Participation Restrictions:   Fall Risk - impaired balance   Weakness  Impaired muscle tone  Poor head control  Decreased ROM  Gait deviations   Decreased ambulation   Decreased activity tolerance   Decreased independence with daily activities   Requires skilled supervision to complete and progress HEP   Requires skilled PT for DME     Pt's spiritual, cultural and educational needs considered and pt agreeable to plan of care and GOALS as stated below:    Short term goals: 6 weeks, pt agrees to goals set. (As of 7/8/19)  1. Pt will perform chin tucks in supine with supervision to improve independent with HEP. Met 7/5/17  2. Assist pt with obtaining appropriate cervical brace to improve head control. Met 1/9/18- pt has but is not wearing  3. Pt will demonstrate improved knee ext strength on left to 4/5 to improve safety with ambulation and sit<>stand. Met 9/28/17   4. Pt will demonstrate improved cervical rotation PROM to 20 degrees to improve functional head control. Met 7/5/17  5. Pt will perform sit<>stand consistently from 22" height surface with supervision to improve independence. Met 7/5/17                                                         Revised 8/31/17:  Pt will perform sit<>stand from 19" height chair to improve independence with sitting in various chairs around her home and decreased                                                                      need for "pop up seat". ~same- unable to stand unassisted from 19" height, supervision to stand from 20" height.      Long term goals: 12 weeks, pt agrees to goals set  6. Pt will perform basic HEP for gross strengthening with supervision to deter worsening of functional limitations. Met 8/31/17- pt's sister reports poor current compliance  7. Pt will " demonstrate improved bilateral hip strength to 3/5 to improve balance and independence with mobility. Met 7/5/17  8. New 7/5/17: Pt will demonstrate improved bilateral hip strength to 4/5 to improve balance and independence with mobility. declined in all areas-  see objective info  9. Pt will demonstrate improved cervical extension strength to 3+/5 to improve head control. Met 7/31/17  10. Pt will demonstrate improved cervical deep flexor strength to 3+/5 to improve head control. Met 7/31/17   11. Pt will demonstrate improve balance and gait by scoring 14/28 on Tinetti. Met 8/31/17- 17/28                                      Revised 8/31/17: pt will demonstrate improved balance/ gait and decreased fall risk to moderate by scoring 19/28 on Tinetti Assessment. improved as compared to last assessment- 14/28                                          12. Pt will demonstrate improved cervical PROM for rotation to 40 degrees to improve functional head control. same-  L= 25 deg, R= 6 deg,                                          13. New 8/1/18- pt will ambulate 50ft with LBQC and supervision over level tile to demonstrate improved household mobility with decreased AD- ongoing- 60 ft with large based quad cane with Pia- CGA     Plan       New POC: 7/8/2019 to 9/2/2019    continue with cervical and upper thoracic stretching to improve forward head posture, continue with BLE strengthening (perform single leg squats on leg press as tolerated)    Yadi Mercado, PT     7/8/2019           I certify the need for these services furnished under this plan of treatment and while under my care.  ____________________________________ Physician/Referring Practitioner   Date of Signature

## 2019-07-18 ENCOUNTER — CLINICAL SUPPORT (OUTPATIENT)
Dept: REHABILITATION | Facility: HOSPITAL | Age: 54
End: 2019-07-18
Attending: STUDENT IN AN ORGANIZED HEALTH CARE EDUCATION/TRAINING PROGRAM
Payer: COMMERCIAL

## 2019-07-18 DIAGNOSIS — R29.898 DECREASED ROM OF NECK: ICD-10-CM

## 2019-07-18 DIAGNOSIS — R53.1 DECREASED STRENGTH: ICD-10-CM

## 2019-07-18 DIAGNOSIS — Z74.09 IMPAIRED FUNCTIONAL MOBILITY, BALANCE, GAIT, AND ENDURANCE: ICD-10-CM

## 2019-07-18 PROCEDURE — 97110 THERAPEUTIC EXERCISES: CPT | Mod: PO | Performed by: PHYSICAL THERAPIST

## 2019-07-18 NOTE — PROGRESS NOTES
Physical Therapy Progress Note      Name: Brigid Jernigan  Clinic Number: [id number]  Diagnosis: G71.11 (ICD-10-CM) - Myotonic dystrophy  Physician: Lloyd Mojica MD  Treatment Orders: PT Eval and Treat          Past Medical History:   Diagnosis Date    Abnormal EKG      Myotonic dystrophy      Pacemaker      PAF (paroxysmal atrial fibrillation)      Sleep apnea           Precautions: standard  auth period: 5/15/19 to 7/5/19  Visit #: 13/16     Time in: 1350  Time out: 1445  Total billable treatment time: 55 min     (visit total 2018= 73)  (Visit total 2017= 56)  Date of Eval: 5/9/2017         New POC: 7/8/2019 to 9/2/2019     Subjective   Pt reports:  no new complaints. Pt reported decreased tolerance to cervical stretching today, especially upper trap stretch  Sister reported: observed session     Pain Scale: denies     Objective   Bold= progression    Patient received individual therapy to increase strength, endurance, ROM, flexibility, posture, core stabilization and head control with activities as follows:      Pt participated in therapeutic exercises x 55 minutes to address ROM, strength, balance, and mobility which includes:  Recumbent stepper L14 BUE/LE x 10 min     Supine:               suboccipital distraction               PROM cervical rotation (focus on suboccipital motion) 2 x 30 sec              PROM upper trap stretch 2 x 30 sec       Pec stretch 2 x 1 min   Bridging with Maroon thera band 2 x 10    Side lying: open books 10x     Leg press: 130# BLE 15x       50# RLE 10x       45# LLE 10x              Written Home Exercises: continue with previous POC  Pt demo fair understanding of the education provided. Brigid demonstrated fair return demonstration of activities.      Education provided re: increase participation in structured HEP more frequently to improve quality of progress     Pt has no cultural, educational or language barriers to learning provided.     Assessment      Phani  tolerated treatment well. Pt reported decreased tolerance to cervical stretching today, this may have been due to PT's ability to improve cervical alignment with decreased suboccipital hyperextension during range of Motion.  pt was able to tolerate increased resistance (requested an increase in resistance) for LLE single leg press. Pt can benefit from continued skilled PT to address impairments of decreased strength, poor cervical range of Motion and impaired balance to improve safe mobility in the home and prevent the worsening of functional limitations due to progressive nature of diagnosis.       Pt prognosis is Good. Pt will continue to benefit from skilled outpatient physical therapy to address the deficits listed in the problem list, provide pt/family education and to maximize pt's level of independence in the home and community environment.      Barriers to progress: transportation, motivation for exercises     Activity limitations/ Participation Restrictions:   Fall Risk - impaired balance   Weakness  Impaired muscle tone  Poor head control  Decreased ROM  Gait deviations   Decreased ambulation   Decreased activity tolerance   Decreased independence with daily activities   Requires skilled supervision to complete and progress HEP   Requires skilled PT for DME     Pt's spiritual, cultural and educational needs considered and pt agreeable to plan of care and GOALS as stated below:    Short term goals: 6 weeks, pt agrees to goals set. (As of 7/8/19)  1. Pt will perform chin tucks in supine with supervision to improve independent with HEP. Met 7/5/17  2. Assist pt with obtaining appropriate cervical brace to improve head control. Met 1/9/18- pt has but is not wearing  3. Pt will demonstrate improved knee ext strength on left to 4/5 to improve safety with ambulation and sit<>stand. Met 9/28/17   4. Pt will demonstrate improved cervical rotation PROM to 20 degrees to improve functional head control. Met  "7/5/17  5. Pt will perform sit<>stand consistently from 22" height surface with supervision to improve independence. Met 7/5/17                                                         Revised 8/31/17:  Pt will perform sit<>stand from 19" height chair to improve independence with sitting in various chairs around her home and decreased                                                                      need for "pop up seat". ~same- unable to stand unassisted from 19" height, supervision to stand from 20" height.      Long term goals: 12 weeks, pt agrees to goals set  6. Pt will perform basic HEP for gross strengthening with supervision to deter worsening of functional limitations. Met 8/31/17- pt's sister reports poor current compliance  7. Pt will demonstrate improved bilateral hip strength to 3/5 to improve balance and independence with mobility. Met 7/5/17  8. New 7/5/17: Pt will demonstrate improved bilateral hip strength to 4/5 to improve balance and independence with mobility. declined in all areas-  see objective info  9. Pt will demonstrate improved cervical extension strength to 3+/5 to improve head control. Met 7/31/17  10. Pt will demonstrate improved cervical deep flexor strength to 3+/5 to improve head control. Met 7/31/17   11. Pt will demonstrate improve balance and gait by scoring 14/28 on Tinetti. Met 8/31/17- 17/28                                      Revised 8/31/17: pt will demonstrate improved balance/ gait and decreased fall risk to moderate by scoring 19/28 on Tinetti Assessment. improved as compared to last assessment- 14/28                                          12. Pt will demonstrate improved cervical PROM for rotation to 40 degrees to improve functional head control. same-  L= 25 deg, R= 6 deg,                                          13. New 8/1/18- pt will ambulate 50ft with LBQC and supervision over level tile to demonstrate improved household mobility with decreased AD- ongoing- 60 " ft with large based quad cane with Pia- CGA     Plan      continue with cervical and upper thoracic stretching to improve forward head posture, continue with BLE strengthening.   Address gait with large based quad cane next session.      Yadi Mercado, PT  7/18/2019          normal...

## 2019-07-22 ENCOUNTER — CLINICAL SUPPORT (OUTPATIENT)
Dept: REHABILITATION | Facility: HOSPITAL | Age: 54
End: 2019-07-22
Attending: STUDENT IN AN ORGANIZED HEALTH CARE EDUCATION/TRAINING PROGRAM
Payer: COMMERCIAL

## 2019-07-22 DIAGNOSIS — R29.898 DECREASED ROM OF NECK: ICD-10-CM

## 2019-07-22 DIAGNOSIS — Z74.09 IMPAIRED FUNCTIONAL MOBILITY, BALANCE, GAIT, AND ENDURANCE: ICD-10-CM

## 2019-07-22 DIAGNOSIS — R53.1 DECREASED STRENGTH: ICD-10-CM

## 2019-07-22 PROCEDURE — 97110 THERAPEUTIC EXERCISES: CPT | Mod: PO

## 2019-07-22 NOTE — PROGRESS NOTES
"Physical Therapy Progress Note      Name: Brigid Jernigan  Clinic Number: [id number]  Diagnosis: G71.11 (ICD-10-CM) - Myotonic dystrophy  Physician: Lloyd Mojica MD  Treatment Orders: PT Eval and Treat          Past Medical History:   Diagnosis Date    Abnormal EKG      Myotonic dystrophy      Pacemaker      PAF (paroxysmal atrial fibrillation)      Sleep apnea           Precautions: standard  auth period: 5/15/19 to 7/5/19  Visit #: 14/16     Time in: 1700   Time out: 1738  Total billable treatment time:  min     (visit total 2018= 73)  (Visit total 2017= 56)  Date of Eval: 5/9/2017         New POC: 7/8/2019 to 9/2/2019     Subjective   Pt reports:  " I'm tired today."      Pain Scale: denies     Objective       Patient received individual therapy to increase strength, endurance, ROM, flexibility, posture, core stabilization and head control with activities as follows:      Pt participated in therapeutic exercises x 45 minutes to address ROM, strength, balance, and mobility which includes:  Recumbent stepper L14 BUE/LE x 10 min     Supine:               suboccipital distraction               PROM cervical rotation (focus on suboccipital motion) 2 x 30 sec              PROM upper trap stretch 2 x 30 sec       Pec stretch 4 x 30 sec    SAQ over large bolster 2 x 10 reps with #1lb cuff weights     Leg press: 130# BLE 15x       50# RLE 10x       45# LLE 10x            Written Home Exercises: continue with previous POC  Pt demo fair understanding of the education provided. Brigid demonstrated fair return demonstration of activities.      Education provided re: increase participation in structured HEP more frequently to improve quality of progress     Pt has no cultural, educational or language barriers to learning provided.     Assessment      Phani tolerated treatment well with no problems noted.  Pt cont with endurance on the stepper and B LE strengthening on the leg press.  No significant changes " "noted.      Pt prognosis is Good. Pt will continue to benefit from skilled outpatient physical therapy to address the deficits listed in the problem list, provide pt/family education and to maximize pt's level of independence in the home and community environment.      Barriers to progress: transportation, motivation for exercises     Activity limitations/ Participation Restrictions:   Fall Risk - impaired balance   Weakness  Impaired muscle tone  Poor head control  Decreased ROM  Gait deviations   Decreased ambulation   Decreased activity tolerance   Decreased independence with daily activities   Requires skilled supervision to complete and progress HEP   Requires skilled PT for DME     Pt's spiritual, cultural and educational needs considered and pt agreeable to plan of care and GOALS as stated below:    Short term goals: 6 weeks, pt agrees to goals set. (As of 7/8/19)  1. Pt will perform chin tucks in supine with supervision to improve independent with HEP. Met 7/5/17  2. Assist pt with obtaining appropriate cervical brace to improve head control. Met 1/9/18- pt has but is not wearing  3. Pt will demonstrate improved knee ext strength on left to 4/5 to improve safety with ambulation and sit<>stand. Met 9/28/17   4. Pt will demonstrate improved cervical rotation PROM to 20 degrees to improve functional head control. Met 7/5/17  5. Pt will perform sit<>stand consistently from 22" height surface with supervision to improve independence. Met 7/5/17                                                         Revised 8/31/17:  Pt will perform sit<>stand from 19" height chair to improve independence with sitting in various chairs around her home and decreased                                                                      need for "pop up seat". ~same- unable to stand unassisted from 19" height, supervision to stand from 20" height.      Long term goals: 12 weeks, pt agrees to goals set  6. Pt will perform basic HEP " for gross strengthening with supervision to deter worsening of functional limitations. Met 8/31/17- pt's sister reports poor current compliance  7. Pt will demonstrate improved bilateral hip strength to 3/5 to improve balance and independence with mobility. Met 7/5/17  8. New 7/5/17: Pt will demonstrate improved bilateral hip strength to 4/5 to improve balance and independence with mobility. declined in all areas-  see objective info  9. Pt will demonstrate improved cervical extension strength to 3+/5 to improve head control. Met 7/31/17  10. Pt will demonstrate improved cervical deep flexor strength to 3+/5 to improve head control. Met 7/31/17   11. Pt will demonstrate improve balance and gait by scoring 14/28 on Tinetti. Met 8/31/17- 17/28                                      Revised 8/31/17: pt will demonstrate improved balance/ gait and decreased fall risk to moderate by scoring 19/28 on Tinetti Assessment. improved as compared to last assessment- 14/28                                          12. Pt will demonstrate improved cervical PROM for rotation to 40 degrees to improve functional head control. same-  L= 25 deg, R= 6 deg,                                          13. New 8/1/18- pt will ambulate 50ft with LBQC and supervision over level tile to demonstrate improved household mobility with decreased AD- ongoing- 60 ft with large based quad cane with Pia- CGA     Plan      continue with cervical and upper thoracic stretching to improve forward head posture, continue with BLE strengthening.   Address gait with large based quad cane next session.      Mae Caicedo, PTA    7/22/2019

## 2019-07-24 ENCOUNTER — CLINICAL SUPPORT (OUTPATIENT)
Dept: REHABILITATION | Facility: HOSPITAL | Age: 54
End: 2019-07-24
Attending: STUDENT IN AN ORGANIZED HEALTH CARE EDUCATION/TRAINING PROGRAM
Payer: COMMERCIAL

## 2019-07-24 DIAGNOSIS — Z74.09 IMPAIRED FUNCTIONAL MOBILITY, BALANCE, GAIT, AND ENDURANCE: ICD-10-CM

## 2019-07-24 DIAGNOSIS — R53.1 DECREASED STRENGTH: ICD-10-CM

## 2019-07-24 DIAGNOSIS — R29.898 DECREASED ROM OF NECK: ICD-10-CM

## 2019-07-24 PROCEDURE — 97116 GAIT TRAINING THERAPY: CPT | Mod: PO

## 2019-07-24 PROCEDURE — 97110 THERAPEUTIC EXERCISES: CPT | Mod: PO

## 2019-07-24 PROCEDURE — 97530 THERAPEUTIC ACTIVITIES: CPT | Mod: PO

## 2019-07-24 NOTE — PROGRESS NOTES
"Physical Therapy Progress Note      Name: Brigid Jernigan  Clinic Number: [id number]  Diagnosis: G71.11 (ICD-10-CM) - Myotonic dystrophy  Physician: Lloyd Mojica MD  Treatment Orders: PT Eval and Treat          Past Medical History:   Diagnosis Date    Abnormal EKG      Myotonic dystrophy      Pacemaker      PAF (paroxysmal atrial fibrillation)      Sleep apnea           Precautions: standard  auth period: 5/15/19 to 7/5/19  Visit #: 15/16     Time in: 1605  Time out: 1645  Total billable treatment time:  40 min     (visit total 2018= 73)  (Visit total 2017= 56)  Date of Eval: 5/9/2017         New POC: 7/8/2019 to 9/2/2019     Subjective   Pt reports:  " I'm ok."     Pain Scale: denies     Objective       Patient received individual therapy to increase strength, endurance, ROM, flexibility, posture, core stabilization and head control with activities as follows:      Pt participated in therapeutic exercises x 15 minutes to address ROM, strength, balance, and mobility which includes:  Recumbent stepper L14 BUE/LE x 8 min      Leg press: 130# BLE 15x       50# RLE 10x       50# LLE 10x         Patient participated in dynamic functional therapeutic activities to improve functional performance for 10 minutes. Including:  Static standing balance practice:    Static standing balance x 2 mins, 1 min, 2 mins and 1 min    Dynamic stitting on EOM x 5 mins while using B UE to hit a balloon back and forth to therapist with Supervision.  VC's for upright posture.      Gait x 18 min total:   Pt ambulated  ~95ft and 40 ft with LBQC and close CGA.  No loss of balance noted, but required VC's for upright posture.     Written Home Exercises: continue with previous POC  Pt demo fair understanding of the education provided. Brigid demonstrated fair return demonstration of activities.      Education provided re: increase participation in structured HEP more frequently to improve quality of progress     Pt has no cultural, " "educational or language barriers to learning provided.     Assessment      Phani tolerated treatment well with no problems noted.  Pt was able to increase gait distance, but cont to require VC's for upright posture with gait.  Pt unable to statically stand as long today w/o UE support.       Pt prognosis is Good. Pt will continue to benefit from skilled outpatient physical therapy to address the deficits listed in the problem list, provide pt/family education and to maximize pt's level of independence in the home and community environment.      Barriers to progress: transportation, motivation for exercises     Activity limitations/ Participation Restrictions:   Fall Risk - impaired balance   Weakness  Impaired muscle tone  Poor head control  Decreased ROM  Gait deviations   Decreased ambulation   Decreased activity tolerance   Decreased independence with daily activities   Requires skilled supervision to complete and progress HEP   Requires skilled PT for DME     Pt's spiritual, cultural and educational needs considered and pt agreeable to plan of care and GOALS as stated below:    Short term goals: 6 weeks, pt agrees to goals set. (As of 7/8/19)  1. Pt will perform chin tucks in supine with supervision to improve independent with HEP. Met 7/5/17  2. Assist pt with obtaining appropriate cervical brace to improve head control. Met 1/9/18- pt has but is not wearing  3. Pt will demonstrate improved knee ext strength on left to 4/5 to improve safety with ambulation and sit<>stand. Met 9/28/17   4. Pt will demonstrate improved cervical rotation PROM to 20 degrees to improve functional head control. Met 7/5/17  5. Pt will perform sit<>stand consistently from 22" height surface with supervision to improve independence. Met 7/5/17                                                         Revised 8/31/17:  Pt will perform sit<>stand from 19" height chair to improve independence with sitting in various chairs around her home " "and decreased                                                                      need for "pop up seat". ~same- unable to stand unassisted from 19" height, supervision to stand from 20" height.      Long term goals: 12 weeks, pt agrees to goals set  6. Pt will perform basic HEP for gross strengthening with supervision to deter worsening of functional limitations. Met 8/31/17- pt's sister reports poor current compliance  7. Pt will demonstrate improved bilateral hip strength to 3/5 to improve balance and independence with mobility. Met 7/5/17  8. New 7/5/17: Pt will demonstrate improved bilateral hip strength to 4/5 to improve balance and independence with mobility. declined in all areas-  see objective info  9. Pt will demonstrate improved cervical extension strength to 3+/5 to improve head control. Met 7/31/17  10. Pt will demonstrate improved cervical deep flexor strength to 3+/5 to improve head control. Met 7/31/17   11. Pt will demonstrate improve balance and gait by scoring 14/28 on Tinetti. Met 8/31/17- 17/28                                      Revised 8/31/17: pt will demonstrate improved balance/ gait and decreased fall risk to moderate by scoring 19/28 on Tinetti Assessment. improved as compared to last assessment- 14/28                                          12. Pt will demonstrate improved cervical PROM for rotation to 40 degrees to improve functional head control. same-  L= 25 deg, R= 6 deg,                                          13. New 8/1/18- pt will ambulate 50ft with LBQC and supervision over level tile to demonstrate improved household mobility with decreased AD- ongoing- 60 ft with large based quad cane with Pia- CGA     Plan      continue with cervical and upper thoracic stretching to improve forward head posture, continue with BLE strengthening.   Address gait with large based quad cane next session.      Mae Caicedo, PTA    7/24/2019         "

## 2019-07-29 ENCOUNTER — CLINICAL SUPPORT (OUTPATIENT)
Dept: REHABILITATION | Facility: HOSPITAL | Age: 54
End: 2019-07-29
Attending: STUDENT IN AN ORGANIZED HEALTH CARE EDUCATION/TRAINING PROGRAM
Payer: COMMERCIAL

## 2019-07-29 DIAGNOSIS — R29.898 DECREASED ROM OF NECK: ICD-10-CM

## 2019-07-29 DIAGNOSIS — Z74.09 IMPAIRED FUNCTIONAL MOBILITY, BALANCE, GAIT, AND ENDURANCE: ICD-10-CM

## 2019-07-29 DIAGNOSIS — R53.1 DECREASED STRENGTH: ICD-10-CM

## 2019-07-29 PROCEDURE — 97110 THERAPEUTIC EXERCISES: CPT | Mod: PO

## 2019-07-29 PROCEDURE — 97116 GAIT TRAINING THERAPY: CPT | Mod: PO

## 2019-07-29 NOTE — PROGRESS NOTES
"Physical Therapy Progress Note      Name: Brigid Jernigan  Clinic Number: [id number]  Diagnosis: G71.11 (ICD-10-CM) - Myotonic dystrophy  Physician: Lloyd Mojica MD  Treatment Orders: PT Eval and Treat          Past Medical History:   Diagnosis Date    Abnormal EKG      Myotonic dystrophy      Pacemaker      PAF (paroxysmal atrial fibrillation)      Sleep apnea           Precautions: standard  auth period: 5/15/19 to 7/5/19  Visit #: 16/16     Time in: 1600  Time out: 1645  Total billable treatment time:  45 min     (visit total 2018= 73)  (Visit total 2017= 56)  Date of Eval: 5/9/2017         New POC: 7/8/2019 to 9/2/2019     Subjective   Pt reports:  " I'm ok."     Pain Scale: denies     Objective       Patient received individual therapy to increase strength, endurance, ROM, flexibility, posture, core stabilization and head control with activities as follows:      Pt participated in therapeutic exercises x 30 minutes to address ROM, strength, balance, and mobility which includes:  Recumbent stepper L14.5 BUE/LE x 10 min     Supine:    suboccipital distraction               PROM cervical rotation 3 x 30 sec       Pec stretch 3 x 30 sec               X 20 reps of B UE open books    X 15 reps of B LE SAQ over extra large bolster and #1.5lb cuff weights donned.       Gait x 15 min total:   Pt ambulated  ~65ft with LBQC and close CGA.  No loss of balance noted, but required VC's for upright posture.            Patient participated in dynamic functional therapeutic activities to improve functional performance for 0 minutes. Including:        Written Home Exercises: continue with previous POC  Pt demo fair understanding of the education provided. Brigid demonstrated fair return demonstration of activities.      Education provided re: increase participation in structured HEP more frequently to improve quality of progress     Pt has no cultural, educational or language barriers to learning " "provided.     Assessment      Phani tolerated treatment well with no problems noted.  Pt was able to increase time and resistance on the stepper.  Unable to ambulate as far today.  No other significant changes noted.  Pt prognosis is Good. Pt will continue to benefit from skilled outpatient physical therapy to address the deficits listed in the problem list, provide pt/family education and to maximize pt's level of independence in the home and community environment.      Barriers to progress: transportation, motivation for exercises     Activity limitations/ Participation Restrictions:   Fall Risk - impaired balance   Weakness  Impaired muscle tone  Poor head control  Decreased ROM  Gait deviations   Decreased ambulation   Decreased activity tolerance   Decreased independence with daily activities   Requires skilled supervision to complete and progress HEP   Requires skilled PT for DME     Pt's spiritual, cultural and educational needs considered and pt agreeable to plan of care and GOALS as stated below:    Short term goals: 6 weeks, pt agrees to goals set. (As of 7/8/19)  1. Pt will perform chin tucks in supine with supervision to improve independent with HEP. Met 7/5/17  2. Assist pt with obtaining appropriate cervical brace to improve head control. Met 1/9/18- pt has but is not wearing  3. Pt will demonstrate improved knee ext strength on left to 4/5 to improve safety with ambulation and sit<>stand. Met 9/28/17   4. Pt will demonstrate improved cervical rotation PROM to 20 degrees to improve functional head control. Met 7/5/17  5. Pt will perform sit<>stand consistently from 22" height surface with supervision to improve independence. Met 7/5/17                                                         Revised 8/31/17:  Pt will perform sit<>stand from 19" height chair to improve independence with sitting in various chairs around her home and decreased " "                                                                     need for "pop up seat". ~same- unable to stand unassisted from 19" height, supervision to stand from 20" height.      Long term goals: 12 weeks, pt agrees to goals set  6. Pt will perform basic HEP for gross strengthening with supervision to deter worsening of functional limitations. Met 8/31/17- pt's sister reports poor current compliance  7. Pt will demonstrate improved bilateral hip strength to 3/5 to improve balance and independence with mobility. Met 7/5/17  8. New 7/5/17: Pt will demonstrate improved bilateral hip strength to 4/5 to improve balance and independence with mobility. declined in all areas-  see objective info  9. Pt will demonstrate improved cervical extension strength to 3+/5 to improve head control. Met 7/31/17  10. Pt will demonstrate improved cervical deep flexor strength to 3+/5 to improve head control. Met 7/31/17   11. Pt will demonstrate improve balance and gait by scoring 14/28 on Tinetti. Met 8/31/17- 17/28                                      Revised 8/31/17: pt will demonstrate improved balance/ gait and decreased fall risk to moderate by scoring 19/28 on Tinetti Assessment. improved as compared to last assessment- 14/28                                          12. Pt will demonstrate improved cervical PROM for rotation to 40 degrees to improve functional head control. same-  L= 25 deg, R= 6 deg,                                          13. New 8/1/18- pt will ambulate 50ft with LBQC and supervision over level tile to demonstrate improved household mobility with decreased AD- ongoing- 60 ft with large based quad cane with Pia- CGA     Plan      continue with cervical and upper thoracic stretching to improve forward head posture, continue with BLE strengthening.   Address gait with large based quad cane next session.      Mae Caicedo, PTA    7/29/2019         "

## 2019-08-01 ENCOUNTER — CLINICAL SUPPORT (OUTPATIENT)
Dept: REHABILITATION | Facility: HOSPITAL | Age: 54
End: 2019-08-01
Attending: STUDENT IN AN ORGANIZED HEALTH CARE EDUCATION/TRAINING PROGRAM
Payer: COMMERCIAL

## 2019-08-01 DIAGNOSIS — R53.1 DECREASED STRENGTH: ICD-10-CM

## 2019-08-01 DIAGNOSIS — Z74.09 IMPAIRED FUNCTIONAL MOBILITY, BALANCE, GAIT, AND ENDURANCE: ICD-10-CM

## 2019-08-01 DIAGNOSIS — R29.898 DECREASED ROM OF NECK: ICD-10-CM

## 2019-08-01 PROCEDURE — 97110 THERAPEUTIC EXERCISES: CPT | Mod: PO | Performed by: PHYSICAL THERAPIST

## 2019-08-01 PROCEDURE — 97116 GAIT TRAINING THERAPY: CPT | Mod: PO | Performed by: PHYSICAL THERAPIST

## 2019-08-01 NOTE — PROGRESS NOTES
"Physical Therapy Progress Note      Name: Brigid Jernigan  Clinic Number: [id number]  Diagnosis: G71.11 (ICD-10-CM) - Myotonic dystrophy  Physician: Lloyd Mojica MD  Treatment Orders: PT Eval and Treat          Past Medical History:   Diagnosis Date    Abnormal EKG      Myotonic dystrophy      Pacemaker      PAF (paroxysmal atrial fibrillation)      Sleep apnea           Precautions: standard  auth period: 7/1/19 to 12/31/19  Visit #: 6/20     Time in: 1605  Time out: 1650  Total billable treatment time:  40 min     (visit total 2018= 73)  (Visit total 2017= 56)  Date of Eval: 5/9/2017         New POC: 7/8/2019 to 9/2/2019     Subjective   Pt reports:  I don't understand why my "broken" (L) leg is still so weak. Denies HEP     Pain Scale: denies     Objective       Patient received individual therapy to increase strength, endurance, ROM, flexibility, posture, core stabilization and head control with activities as follows:      Pt participated in therapeutic exercises x 25 minutes to address ROM, strength, balance, and mobility which includes:  Recumbent stepper L14.5 BUE/LE x 10 min     Supine:   Bridging with Maroon thera band for hip ER/ abduction 3 x 10   short arc quads R 2#, L 1.5# 2 x 15     sitting:        Pec stretch with PT on theraball 3 x 30 sec    Leg press: WALESKA squats 140# 15x    Single leg squats 60# 10x          Gait x 15 min total:   Pt ambulated  103ft with LBQC and min A.  No loss of balance noted, but required moderate VC's for upright posture and step through gait pattern.              Written Home Exercises: continue with previous POC  Pt demo fair understanding of the education provided. Brigid demonstrated fair return demonstration of activities.      Education provided re: increase participation in structured HEP more frequently to improve quality of progress     Pt has no cultural, educational or language barriers to learning provided.     Assessment     Phani tolerated " "treatment well. She was able to demonstrate increased gait distance with a slight increase in assist provided. Pt required at least moderate verbal cues to improve upright posture and perform step through gait. Pt is only able to perform a partial step through gait with use of large based quad cane. Pt reported the sitting pectoral stretch felt really good. PT will continue with this type of stretching to decrease rounded shoulders/ forward head posture.     Pt prognosis is Good. Pt will continue to benefit from skilled outpatient physical therapy to address the deficits listed in the problem list, provide pt/family education and to maximize pt's level of independence in the home and community environment.      Barriers to progress: transportation, motivation for exercises     Activity limitations/ Participation Restrictions:   Fall Risk - impaired balance   Weakness  Impaired muscle tone  Poor head control  Decreased ROM  Gait deviations   Decreased ambulation   Decreased activity tolerance   Decreased independence with daily activities   Requires skilled supervision to complete and progress HEP   Requires skilled PT for DME     Pt's spiritual, cultural and educational needs considered and pt agreeable to plan of care and GOALS as stated below:    Short term goals: 6 weeks, pt agrees to goals set. (As of 7/8/19)  1. Pt will perform chin tucks in supine with supervision to improve independent with HEP. Met 7/5/17  2. Assist pt with obtaining appropriate cervical brace to improve head control. Met 1/9/18- pt has but is not wearing  3. Pt will demonstrate improved knee ext strength on left to 4/5 to improve safety with ambulation and sit<>stand. Met 9/28/17   4. Pt will demonstrate improved cervical rotation PROM to 20 degrees to improve functional head control. Met 7/5/17  5. Pt will perform sit<>stand consistently from 22" height surface with supervision to improve independence. Met 7/5/17                 " "                                        Revised 8/31/17:  Pt will perform sit<>stand from 19" height chair to improve independence with sitting in various chairs around her home and decreased                                                                      need for "pop up seat". ~same- unable to stand unassisted from 19" height, supervision to stand from 20" height.      Long term goals: 12 weeks, pt agrees to goals set  6. Pt will perform basic HEP for gross strengthening with supervision to deter worsening of functional limitations. Met 8/31/17- pt's sister reports poor current compliance  7. Pt will demonstrate improved bilateral hip strength to 3/5 to improve balance and independence with mobility. Met 7/5/17  8. New 7/5/17: Pt will demonstrate improved bilateral hip strength to 4/5 to improve balance and independence with mobility. declined in all areas-  see objective info  9. Pt will demonstrate improved cervical extension strength to 3+/5 to improve head control. Met 7/31/17  10. Pt will demonstrate improved cervical deep flexor strength to 3+/5 to improve head control. Met 7/31/17   11. Pt will demonstrate improve balance and gait by scoring 14/28 on Tinetti. Met 8/31/17- 17/28                                      Revised 8/31/17: pt will demonstrate improved balance/ gait and decreased fall risk to moderate by scoring 19/28 on Tinetti Assessment. improved as compared to last assessment- 14/28                                          12. Pt will demonstrate improved cervical PROM for rotation to 40 degrees to improve functional head control. same-  L= 25 deg, R= 6 deg,                                          13. New 8/1/18- pt will ambulate 50ft with LBQC and supervision over level tile to demonstrate improved household mobility with decreased AD- ongoing- 60 ft with large based quad cane with Pia- CGA     Plan      continue with cervical and upper thoracic stretching to improve forward head posture- " perform in sitting with thera ball, continue with BLE strengthening and standing balance       Yadi Mercado, PT    8/1/2019

## 2019-08-05 ENCOUNTER — DOCUMENTATION ONLY (OUTPATIENT)
Dept: REHABILITATION | Facility: HOSPITAL | Age: 54
End: 2019-08-05

## 2019-08-05 ENCOUNTER — CLINICAL SUPPORT (OUTPATIENT)
Dept: REHABILITATION | Facility: HOSPITAL | Age: 54
End: 2019-08-05
Attending: STUDENT IN AN ORGANIZED HEALTH CARE EDUCATION/TRAINING PROGRAM
Payer: COMMERCIAL

## 2019-08-05 DIAGNOSIS — Z74.09 IMPAIRED FUNCTIONAL MOBILITY, BALANCE, GAIT, AND ENDURANCE: ICD-10-CM

## 2019-08-05 DIAGNOSIS — R53.1 DECREASED STRENGTH: ICD-10-CM

## 2019-08-05 DIAGNOSIS — R29.898 DECREASED ROM OF NECK: ICD-10-CM

## 2019-08-05 PROCEDURE — 97110 THERAPEUTIC EXERCISES: CPT | Mod: PO | Performed by: PHYSICAL THERAPIST

## 2019-08-05 NOTE — PROGRESS NOTES
Physical Therapy Progress Note      Name: Brigid Jernigan  Clinic Number: [id number]  Diagnosis: G71.11 (ICD-10-CM) - Myotonic dystrophy  Physician: Lloyd Mojica MD  Treatment Orders: PT Eval and Treat          Past Medical History:   Diagnosis Date    Abnormal EKG      Myotonic dystrophy      Pacemaker      PAF (paroxysmal atrial fibrillation)      Sleep apnea           Precautions: standard  auth period: 7/1/19 to 12/31/19  Visit #: 7/20     Time in: 1655  Time out: 1735  Total billable treatment time:  40 min     (visit total 2018= 73)  (Visit total 2017= 56)  Date of Eval: 5/9/2017         New POC: 7/8/2019 to 9/2/2019     Subjective   Pt reports:  no new complaints.  Denies HEP  Pt's sister: she was able to get up from a regular chair at home unassisted.      Pain Scale: denies     Objective       Patient received individual therapy to increase strength, endurance, ROM, flexibility, posture, core stabilization and head control with activities as follows:      Pt participated in therapeutic exercises x 40 minutes to address ROM, strength, balance, and mobility which includes:  Recumbent stepper L14.5 BUE/LE x 8 min     Supine:   straight leg raise R 2#, L 0# 2 x 15, 3 sec hold   Bridging with Maroon thera band for hip ER/ abduction 3 x 10   short arc quads R 2#, L 1.5# 2 x 15        sitting:        Pec stretch with PT on theraball 3 x 30 sec               Written Home Exercises: continue with previous POC  Pt demo fair understanding of the education provided. Brigid demonstrated fair return demonstration of activities.      Education provided re: increase participation in structured HEP more frequently to improve quality of progress     Pt has no cultural, educational or language barriers to learning provided.     Assessment     Phani tolerated treatment well. PT focused on LE strengthening to improve gait quality. Pt continues to demonstrate poor strength of L vastus medialis oblique. Pt required  "assistance during short arc quads for attaining full extension, the pt worked on eccentric control. Pt demonstrates LLE external rotation with increased duration of straight leg raise. Pt can benefit from continued general strengthening to improve balance and mobility.     Pt prognosis is Good. Pt will continue to benefit from skilled outpatient physical therapy to address the deficits listed in the problem list, provide pt/family education and to maximize pt's level of independence in the home and community environment.      Barriers to progress: transportation, motivation for exercises     Activity limitations/ Participation Restrictions:   Fall Risk - impaired balance   Weakness  Impaired muscle tone  Poor head control  Decreased ROM  Gait deviations   Decreased ambulation   Decreased activity tolerance   Decreased independence with daily activities   Requires skilled supervision to complete and progress HEP   Requires skilled PT for DME     Pt's spiritual, cultural and educational needs considered and pt agreeable to plan of care and GOALS as stated below:    Short term goals: 6 weeks, pt agrees to goals set. (As of 7/8/19)  1. Pt will perform chin tucks in supine with supervision to improve independent with HEP. Met 7/5/17  2. Assist pt with obtaining appropriate cervical brace to improve head control. Met 1/9/18- pt has but is not wearing  3. Pt will demonstrate improved knee ext strength on left to 4/5 to improve safety with ambulation and sit<>stand. Met 9/28/17   4. Pt will demonstrate improved cervical rotation PROM to 20 degrees to improve functional head control. Met 7/5/17  5. Pt will perform sit<>stand consistently from 22" height surface with supervision to improve independence. Met 7/5/17                                                         Revised 8/31/17:  Pt will perform sit<>stand from 19" height chair to improve independence with sitting in various chairs around her home and decreased " "                                                                     need for "pop up seat". ~same- unable to stand unassisted from 19" height, supervision to stand from 20" height.      Long term goals: 12 weeks, pt agrees to goals set  6. Pt will perform basic HEP for gross strengthening with supervision to deter worsening of functional limitations. Met 8/31/17- pt's sister reports poor current compliance  7. Pt will demonstrate improved bilateral hip strength to 3/5 to improve balance and independence with mobility. Met 7/5/17  8. New 7/5/17: Pt will demonstrate improved bilateral hip strength to 4/5 to improve balance and independence with mobility. declined in all areas-  see objective info  9. Pt will demonstrate improved cervical extension strength to 3+/5 to improve head control. Met 7/31/17  10. Pt will demonstrate improved cervical deep flexor strength to 3+/5 to improve head control. Met 7/31/17   11. Pt will demonstrate improve balance and gait by scoring 14/28 on Tinetti. Met 8/31/17- 17/28                                      Revised 8/31/17: pt will demonstrate improved balance/ gait and decreased fall risk to moderate by scoring 19/28 on Tinetti Assessment. improved as compared to last assessment- 14/28                                          12. Pt will demonstrate improved cervical PROM for rotation to 40 degrees to improve functional head control. same-  L= 25 deg, R= 6 deg,                                          13. New 8/1/18- pt will ambulate 50ft with LBQC and supervision over level tile to demonstrate improved household mobility with decreased AD- ongoing- 60 ft with large based quad cane with Pia- CGA     Plan      continue with cervical and upper thoracic stretching to improve forward head posture- perform in sitting with thera ball, continue with BLE strengthening (focus on hips and VMO strengthening) and standing balance       Yadi Mercado, PT    8/5/2019         "

## 2019-08-05 NOTE — PROGRESS NOTES
PT/PTA met face to face to discuss pt's treatment plan and progress towards established goals.  Continue with current PT POC with focus on cervical stretching, postural exercises, core and B LE strengthening and gait with the LBQC.  Patient will be seen by physical therapist at least every sixth treatment or 30 days, whichever occurs first.    Mae Caicedo PTA  08/05/2019     Meeting held on 8/5/2019  Yadi Mercado DPT  8/8/2019

## 2019-08-07 ENCOUNTER — CLINICAL SUPPORT (OUTPATIENT)
Dept: REHABILITATION | Facility: HOSPITAL | Age: 54
End: 2019-08-07
Attending: STUDENT IN AN ORGANIZED HEALTH CARE EDUCATION/TRAINING PROGRAM
Payer: COMMERCIAL

## 2019-08-07 DIAGNOSIS — R53.1 DECREASED STRENGTH: ICD-10-CM

## 2019-08-07 DIAGNOSIS — Z74.09 IMPAIRED FUNCTIONAL MOBILITY, BALANCE, GAIT, AND ENDURANCE: ICD-10-CM

## 2019-08-07 DIAGNOSIS — R29.898 DECREASED ROM OF NECK: ICD-10-CM

## 2019-08-07 PROCEDURE — 97116 GAIT TRAINING THERAPY: CPT | Mod: PO

## 2019-08-07 PROCEDURE — 97110 THERAPEUTIC EXERCISES: CPT | Mod: PO

## 2019-08-07 NOTE — PROGRESS NOTES
Physical Therapy Progress Note      Name: Brigid Jernigan  Clinic Number: [id number]  Diagnosis: G71.11 (ICD-10-CM) - Myotonic dystrophy  Physician: Lloyd Mojica MD  Treatment Orders: PT Eval and Treat          Past Medical History:   Diagnosis Date    Abnormal EKG      Myotonic dystrophy      Pacemaker      PAF (paroxysmal atrial fibrillation)      Sleep apnea           Precautions: standard  auth period: 7/1/19 to 12/31/19  Visit #: 8/20     Time in: 1700  Time out: 1735   Total billable treatment time:  35 min     (visit total 2018= 73)  (Visit total 2017= 56)  Date of Eval: 5/9/2017         New POC: 7/8/2019 to 9/2/2019     Subjective   Pt reports:  no new complaints.  Denies HEP  Pt's sister: she was able to get up from a regular chair at home unassisted.      Pain Scale: denies     Objective       Patient received individual therapy to increase strength, endurance, ROM, flexibility, posture, core stabilization and head control with activities as follows:      Pt participated in therapeutic exercises x 20 minutes to address ROM, strength, balance, and mobility which includes:    Supine:              suboccipital distraction               PROM cervical rotation 3 x 30 sec       Pec stretch 3 x 30 sec  Open book stretches x 20 reps         Gait x 15 mins:  Pt ambulated ~ 114ft with LBQC and CGA.  PT with a step to gait pattern most of tx session.       Written Home Exercises: continue with previous POC  Pt demo fair understanding of the education provided. Brigid demonstrated fair return demonstration of activities.      Education provided re: increase participation in structured HEP more frequently to improve quality of progress     Pt has no cultural, educational or language barriers to learning provided.     Assessment     Phani tolerated treatment well with no major complaints.  Pt with slight increase of gait distance noted today.  No other changes noted.     Pt prognosis is Good. Pt will  "continue to benefit from skilled outpatient physical therapy to address the deficits listed in the problem list, provide pt/family education and to maximize pt's level of independence in the home and community environment.      Barriers to progress: transportation, motivation for exercises     Activity limitations/ Participation Restrictions:   Fall Risk - impaired balance   Weakness  Impaired muscle tone  Poor head control  Decreased ROM  Gait deviations   Decreased ambulation   Decreased activity tolerance   Decreased independence with daily activities   Requires skilled supervision to complete and progress HEP   Requires skilled PT for DME     Pt's spiritual, cultural and educational needs considered and pt agreeable to plan of care and GOALS as stated below:    Short term goals: 6 weeks, pt agrees to goals set. (As of 7/8/19)  1. Pt will perform chin tucks in supine with supervision to improve independent with HEP. Met 7/5/17  2. Assist pt with obtaining appropriate cervical brace to improve head control. Met 1/9/18- pt has but is not wearing  3. Pt will demonstrate improved knee ext strength on left to 4/5 to improve safety with ambulation and sit<>stand. Met 9/28/17   4. Pt will demonstrate improved cervical rotation PROM to 20 degrees to improve functional head control. Met 7/5/17  5. Pt will perform sit<>stand consistently from 22" height surface with supervision to improve independence. Met 7/5/17                                                         Revised 8/31/17:  Pt will perform sit<>stand from 19" height chair to improve independence with sitting in various chairs around her home and decreased                                                                      need for "pop up seat". ~same- unable to stand unassisted from 19" height, supervision to stand from 20" height.      Long term goals: 12 weeks, pt agrees to goals set  6. Pt will perform basic HEP for gross strengthening with supervision to " deter worsening of functional limitations. Met 8/31/17- pt's sister reports poor current compliance  7. Pt will demonstrate improved bilateral hip strength to 3/5 to improve balance and independence with mobility. Met 7/5/17  8. New 7/5/17: Pt will demonstrate improved bilateral hip strength to 4/5 to improve balance and independence with mobility. declined in all areas-  see objective info  9. Pt will demonstrate improved cervical extension strength to 3+/5 to improve head control. Met 7/31/17  10. Pt will demonstrate improved cervical deep flexor strength to 3+/5 to improve head control. Met 7/31/17   11. Pt will demonstrate improve balance and gait by scoring 14/28 on Tinetti. Met 8/31/17- 17/28                                      Revised 8/31/17: pt will demonstrate improved balance/ gait and decreased fall risk to moderate by scoring 19/28 on Tinetti Assessment. improved as compared to last assessment- 14/28                                          12. Pt will demonstrate improved cervical PROM for rotation to 40 degrees to improve functional head control. same-  L= 25 deg, R= 6 deg,                                          13. New 8/1/18- pt will ambulate 50ft with LBQC and supervision over level tile to demonstrate improved household mobility with decreased AD- ongoing- 60 ft with large based quad cane with Pia- CGA     Plan      continue with cervical and upper thoracic stretching to improve forward head posture- perform in sitting with thera ball, continue with BLE strengthening (focus on hips and VMO strengthening) and standing balance       Mae Caicedo, PTA    8/7/2019

## 2019-08-12 ENCOUNTER — CLINICAL SUPPORT (OUTPATIENT)
Dept: REHABILITATION | Facility: HOSPITAL | Age: 54
End: 2019-08-12
Attending: STUDENT IN AN ORGANIZED HEALTH CARE EDUCATION/TRAINING PROGRAM
Payer: COMMERCIAL

## 2019-08-12 DIAGNOSIS — Z74.09 IMPAIRED FUNCTIONAL MOBILITY, BALANCE, GAIT, AND ENDURANCE: ICD-10-CM

## 2019-08-12 DIAGNOSIS — R29.898 DECREASED ROM OF NECK: ICD-10-CM

## 2019-08-12 DIAGNOSIS — R53.1 DECREASED STRENGTH: ICD-10-CM

## 2019-08-12 PROCEDURE — 97110 THERAPEUTIC EXERCISES: CPT | Mod: PO | Performed by: PHYSICAL THERAPIST

## 2019-08-14 NOTE — PLAN OF CARE
"Physical Therapy Progress Note      Name: Brigid Jernigan  Clinic Number: [id number]  Diagnosis: G71.11 (ICD-10-CM) - Myotonic dystrophy  Physician: Lloyd Mojica MD  Treatment Orders: PT Eval and Treat          Past Medical History:   Diagnosis Date    Abnormal EKG      Myotonic dystrophy      Pacemaker      PAF (paroxysmal atrial fibrillation)      Sleep apnea           Precautions: standard  auth period: 7/1/19 to 12/31/19  Visit #: 9/20     Time in: 1705  Time out: 1745  Total billable treatment time:  40 min     (visit total 2018= 73)  (Visit total 2017= 56)  Date of Eval: 5/9/2017         New POC: 7/8/2019 to 9/2/2019     Subjective   Pt reports:  no new complaints.  Denies HEP  Pt's sister: observed session, reported pt is not complaint with exercises at home. She does indicate that pt ambulates better and with increased ease after PT sessions     Pain Scale: denies     Objective       Patient received individual therapy to increase strength, endurance, ROM, flexibility, posture, core stabilization and head control with activities as follows:      Pt participated in therapeutic exercises x 40 minutes to address ROM, strength, balance, and mobility which includes:  Lower Extremity Strength    RLE eval RLE 5/19 RLE 7/19 RLE 8/19 LLE eval LLE 5/19 LLE 7/19 LLE 8/19   Hip Flexion: 3+/5 5/5 NT NT 3+/5 5/5 NT NT   Hip Extension:  2/5 4+/5 3+/5 3+/5 2/5 4/5 3+ to 4-/5 4/5   Hip Abduction: NT 4+/5 4/5 4/5 NT 4/5 4-/5 4+/5   Hip Adduction: NT 4/5 4-/5 4-/5 NT 3+/5 4/5 4-/5   Knee Extension: 4+/5 NT NT 4+/5 3+/5 NT NT 3+/5   Knee Flexion: 4/5 NT NT 4-/5 4-/5 NT NT 3+/5      Cervical AAROM (supine): L= 27 deg, R= 4 deg,     Supine with towel roll along spine:        PROM R cervical rotation 3 x 30 sec      Pec stretch 3 x 30 sec   Open book stretches x 15 reps   Bridging with [place] thera band for hip abduction 2 x 10    Leg press: double leg press 140# 15x   Single leg press 60# 10x        Sit>stand from 19" " height mat    Tinetti Balance Assessment  Balance:  1. Sitting Balance 1   Leans/ slides in chair= 0   Steady= 1  2. Rises from chair 1   Unable without help= 0   Able, uses arms= 1   Able without use of arms= 2  3. Attempts to rise 2   Unable without help= 0   Able, >1 attmept required-= 1   Able, 1 attempt= 2  4. Immediate standing balance (1st 5 seconds) 1   Unsteady (swaggers, moves feet, trunk sway)= 0   Steady but uses walker or other support= 1   Narrow base of support without walker or support= 2  5. Nudged 1   Begins to fall= 0   Staggers, grabs, catches self= 1   Steady= 2  6. Standing balance 1   Unsteady= 0   Steady but wide LEYDI or uses AD=1   Narrow LEYDI without AD=2  7. Eyes closed 0   Unsteady= 0   Steady= 1  8. Turning 360 degrees 0   Discontinuous steps= 0   Continuous steps= 1   Unsteady= 0   Steady= 1  9. Sitting down 1   Unsafe= 0   Uses arms or not in a smooth motion= 1   Safe, smooth motion= 2  Balance score= 8/16  Gait:  1. Initiation 1   hesitates or multiple attempts to start= 0   No hesitancy= 1  2. Step length 2   Step to= 0   One foot passes= 1   reciprocal pattern= 2  3. Step height 2    Neither foot clears floor= 0   One foot clears floor= 1   Both feet clear floor= 2  4. Step symmetry 1   Not symmetrical= 0   Appears symmetrical= 1  5. Step continuity 1   Not continuous= 0   Appears continuous= 1  6. Path 1   Marked deviation= 0   Mild/moderate deviation or uses A.D.= 1   Straight without A.D.= 2  7. Trunk 0   Marked sway or uses A.D.= 0   No sway, but flexes knees or back, spread arms out while walking= 1   No sway, no flexion, no use of UE, no use of A.D.= 2  8. Walking stance 0   Heels apart= 0   Heels almost touching while walking= 1  Gait score= 8/12  Total score= 16/28 , high fall risk    0-18= high fall risk  19-23= moderate fall risk  24-28= low fall risk           Written Home Exercises: continue with previous POC  Pt demo fair understanding of the education provided. Brigid  "demonstrated fair return demonstration of activities.      Education provided re: increase participation in structured HEP more frequently to improve quality of progress     Pt has no cultural, educational or language barriers to learning provided.     Assessment   Assessment period: 7/18/2019 to 8/12/2019. Pt tolerates PT sessions well, but family continues to report poor compliance with HEP. Pt is not wearing cervical collar and cervical range of Motion for rotation is ~same, pt continues to present with a significant forward head/ rounded shoulders posture. Pt is demonstrating improved consistency with performing sit<>stand from a 19" height surface (average chair height). Pt also demonstrated improved balance per Tinetti Assessment as compared to last reassessment. Pt demonstrated improved ability to perform sit>stand with only 1 attempt but demonstrated inconsistency with static standing balance without UE support and standing with eyes closed. A good improvement is noted in strength of L hip, but L knee strength remains only fair. This affects pt's ability to equally bear weight through BLE during standing and causes an increased lateral lean when in L stance during gait. Pt is again able to tolerate gait training with large based quad cane but continues to require at least CGA for balance due to decreased strength. Per formal assessment, pt is at high risk for falls. Pt is not able to rise unassisted in the case of a fall. Pt's progress is limited by poor compliance with HEP and progressive nature of diagnosis. Pt can benefit from continued skilled PT to address LE/ core strength, cervical range of Motion and balance to improve gait and maintain independence with mobility to deter progression of functional limitations/ mobility.       Pt prognosis is Good. Pt will continue to benefit from skilled outpatient physical therapy to address the deficits listed in the problem list, provide pt/family education and to " "maximize pt's level of independence in the home and community environment.      Barriers to progress: transportation, motivation for exercises     Activity limitations/ Participation Restrictions:   Fall Risk - impaired balance   Weakness  Impaired muscle tone  Poor head control  Decreased ROM  Gait deviations   Decreased ambulation   Decreased activity tolerance   Decreased independence with daily activities   Requires skilled supervision to complete and progress HEP   Requires skilled PT for DME     Pt's spiritual, cultural and educational needs considered and pt agreeable to plan of care and GOALS as stated below:    Short term goals: 6 weeks, pt agrees to goals set. (As of 7/8/19)  1. Pt will perform chin tucks in supine with supervision to improve independent with HEP. Met 7/5/17  2. Assist pt with obtaining appropriate cervical brace to improve head control. Met 1/9/18- pt has but is not wearing  3. Pt will demonstrate improved knee ext strength on left to 4/5 to improve safety with ambulation and sit<>stand. Met 9/28/17   4. Pt will demonstrate improved cervical rotation PROM to 20 degrees to improve functional head control. Met 7/5/17  5. Pt will perform sit<>stand consistently from 22" height surface with supervision to improve independence. Met 7/5/17                                                         Revised 8/31/17:  Pt will perform sit<>stand from 19" height chair to improve independence with sitting in various chairs around her home and decreased                                                                      need for "pop up seat". Met 8/12/19- pt able to perform from 19" height with supervision     Long term goals: 12 weeks, pt agrees to goals set  6. Pt will perform basic HEP for gross strengthening with supervision to deter worsening of functional limitations. Met 8/31/17- pt's sister reports poor current compliance  7. Pt will demonstrate improved bilateral hip strength to 3/5 to improve " balance and independence with mobility. Met 7/5/17  8. New 7/5/17: Pt will demonstrate improved bilateral hip strength to 4/5 to improve balance and independence with mobility. improved on L-  see objective info  9. Pt will demonstrate improved cervical extension strength to 3+/5 to improve head control. Met 7/31/17  10. Pt will demonstrate improved cervical deep flexor strength to 3+/5 to improve head control. Met 7/31/17   11. Pt will demonstrate improve balance and gait by scoring 14/28 on Tinetti. Met 8/31/17- 17/28                                      Revised 8/31/17: pt will demonstrate improved balance/ gait and decreased fall risk to moderate by scoring 19/28 on Tinetti Assessment. improved as compared to last assessment- 16/28                                          12. Pt will demonstrate improved cervical PROM for rotation to 40 degrees to improve functional head control. same-  L= 27 deg, R= 4 deg,                                          13. New 8/1/18- pt will ambulate 50ft with LBQC and supervision over level tile to demonstrate improved household mobility with decreased AD- ongoing- 114 ft with large based quad cane with CGA     Plan      continue with cervical and upper thoracic stretching to improve forward head posture- perform in sitting with thera ball, focus on hips and VMO strengthening and standing balance       Yadi Mercado, PT    8/12/2019

## 2019-08-29 ENCOUNTER — CLINICAL SUPPORT (OUTPATIENT)
Dept: REHABILITATION | Facility: HOSPITAL | Age: 54
End: 2019-08-29
Attending: STUDENT IN AN ORGANIZED HEALTH CARE EDUCATION/TRAINING PROGRAM
Payer: COMMERCIAL

## 2019-08-29 DIAGNOSIS — R29.898 DECREASED ROM OF NECK: ICD-10-CM

## 2019-08-29 DIAGNOSIS — Z74.09 IMPAIRED FUNCTIONAL MOBILITY, BALANCE, GAIT, AND ENDURANCE: ICD-10-CM

## 2019-08-29 DIAGNOSIS — R53.1 DECREASED STRENGTH: ICD-10-CM

## 2019-08-29 PROCEDURE — 97110 THERAPEUTIC EXERCISES: CPT | Mod: PO | Performed by: PHYSICAL THERAPIST

## 2019-08-29 NOTE — PROGRESS NOTES
Physical Therapy Progress Note      Name: Brigid Jernigan  Clinic Number: [id number]  Diagnosis: G71.11 (ICD-10-CM) - Myotonic dystrophy  Physician: Lloyd Mojica MD  Treatment Orders: PT Eval and Treat          Past Medical History:   Diagnosis Date    Abnormal EKG      Myotonic dystrophy      Pacemaker      PAF (paroxysmal atrial fibrillation)      Sleep apnea           Precautions: standard  auth period: 7/1/19 to 12/31/19  Visit #: 10/20     Time in: 1703  Time out: 1745  Total billable treatment time:  42 min     (visit total 2018= 73)  (Visit total 2017= 56)  Date of Eval: 5/9/2017         POC: 7/8/2019 to 9/2/2019          New POC: 9/3/2019 to 11/1/2019     Subjective   Pt reports:  pt fell ~2 weeks ago when attempting to walk with a closed walker (had a loss of balance). Cervical pain has improved since fall.   Pt's sister: she hurt her neck when she fell and has been using the neck brace off and on to address pain. No medical intervention sought.      Pain Scale: denies due to arriving with [place] J collar on   Cervical pain at end of session after supine>sit= 5/10, achiness reported in [place]AL[place] levator scapulae regions     Objective         Patient received individual therapy to increase strength, endurance, ROM, flexibility, posture, core stabilization and head control with activities as follows:      Pt participated in therapeutic exercises x 40 minutes to address ROM, strength, balance, and mobility which includes:    recumbent stepper BUE/LE L 14 x 10 min     Supine:      Pec stretch x 1 min   Levator scapulae stretch with scapualr depression 3 x 30 sec   Upper trap passive range of motion 2 x 30 sec   WALESKA shoulder flexion with 3# bar 10x      straight leg raise R 2#, L 0# 2 x 15, 3 sec hold        Written Home Exercises: continue with previous HEP  Pt demo fair understanding of the education provided. Brigid demonstrated fair return demonstration of activities.      Education provided re: use  heating pad or BenGay for pain relief- do not use together. Seek medical attention for any radicular symptoms or worsening of pain.     Pt has no cultural, educational or language barriers to learning provided.     Assessment   Phani tolerated treatment fair to good. Pt was not able to participate in bridging activities due to increased pressure and pain in cervical region. Pt demonstrated decreased tolerance to upper trap stretching due to cervical pain. Quad lag still evident with straight leg raise on L demonstrating decreased strength of vastus medialis oblique.  Pt can benefit from continued skilled PT to address LE/ core strength, cervical range of Motion and balance to improve gait and maintain independence with mobility to deter progression of functional limitations/ mobility.        Pt prognosis is Good. Pt will continue to benefit from skilled outpatient physical therapy to address the deficits listed in the problem list, provide pt/family education and to maximize pt's level of independence in the home and community environment.      Barriers to progress: transportation, motivation for exercises     Activity limitations/ Participation Restrictions:   Fall Risk - impaired balance   Weakness  Impaired muscle tone  Poor head control  Decreased ROM  Gait deviations   Decreased ambulation   Decreased activity tolerance   Decreased independence with daily activities   Requires skilled supervision to complete and progress HEP   Requires skilled PT for DME     Pt's spiritual, cultural and educational needs considered and pt agreeable to plan of care and GOALS as stated below:    Short term goals: 6 weeks, pt agrees to goals set. (As of 7/8/19)  1. Pt will perform chin tucks in supine with supervision to improve independent with HEP. Met 7/5/17  2. Assist pt with obtaining appropriate cervical brace to improve head control. Met 1/9/18- pt has but is not wearing  3. Pt will demonstrate improved knee ext strength  "on left to 4/5 to improve safety with ambulation and sit<>stand. Met 9/28/17   4. Pt will demonstrate improved cervical rotation PROM to 20 degrees to improve functional head control. Met 7/5/17  5. Pt will perform sit<>stand consistently from 22" height surface with supervision to improve independence. Met 7/5/17                                                         Revised 8/31/17:  Pt will perform sit<>stand from 19" height chair to improve independence with sitting in various chairs around her home and decreased                                                                      need for "pop up seat". Met 8/12/19- pt able to perform from 19" height with supervision     Long term goals: 12 weeks, pt agrees to goals set  6. Pt will perform basic HEP for gross strengthening with supervision to deter worsening of functional limitations. Met 8/31/17- pt's sister reports poor current compliance  7. Pt will demonstrate improved bilateral hip strength to 3/5 to improve balance and independence with mobility. Met 7/5/17  8. New 7/5/17: Pt will demonstrate improved bilateral hip strength to 4/5 to improve balance and independence with mobility. improved on L-  see objective info  9. Pt will demonstrate improved cervical extension strength to 3+/5 to improve head control. Met 7/31/17  10. Pt will demonstrate improved cervical deep flexor strength to 3+/5 to improve head control. Met 7/31/17   11. Pt will demonstrate improve balance and gait by scoring 14/28 on Tinetti. Met 8/31/17- 17/28                                      Revised 8/31/17: pt will demonstrate improved balance/ gait and decreased fall risk to moderate by scoring 19/28 on Tinetti Assessment. improved as compared to last assessment- 16/28                                          12. Pt will demonstrate improved cervical PROM for rotation to 40 degrees to improve functional head control. same-  L= 27 deg, R= 4 deg, "                                          13. New 8/1/18- pt will ambulate 50ft with LBQC and supervision over level tile to demonstrate improved household mobility with decreased AD- ongoing- 114 ft with large based quad cane with CGA     Plan      New POC: 9/3/2019 to 11/1/2019    Address upper trap passive range of motion as able and initiate chin tucks to address cervical pain. Also perform activities to improve scapular depression.  focus on hip and VMO strengthening and standing balance.         Yadi Mercado, PT  8/29/2019

## 2019-09-05 ENCOUNTER — CLINICAL SUPPORT (OUTPATIENT)
Dept: REHABILITATION | Facility: HOSPITAL | Age: 54
End: 2019-09-05
Attending: STUDENT IN AN ORGANIZED HEALTH CARE EDUCATION/TRAINING PROGRAM
Payer: COMMERCIAL

## 2019-09-05 DIAGNOSIS — Z74.09 IMPAIRED FUNCTIONAL MOBILITY, BALANCE, GAIT, AND ENDURANCE: ICD-10-CM

## 2019-09-05 DIAGNOSIS — R29.898 DECREASED ROM OF NECK: ICD-10-CM

## 2019-09-05 DIAGNOSIS — R53.1 DECREASED STRENGTH: ICD-10-CM

## 2019-09-05 PROCEDURE — 97110 THERAPEUTIC EXERCISES: CPT | Mod: PO | Performed by: PHYSICAL THERAPIST

## 2019-09-05 NOTE — PROGRESS NOTES
Physical Therapy Progress Note      Name: Brigid Jernigan  Clinic Number: [id number]  Diagnosis: G71.11 (ICD-10-CM) - Myotonic dystrophy  Physician: Lloyd Mojica MD  Treatment Orders: PT Eval and Treat          Past Medical History:   Diagnosis Date    Abnormal EKG      Myotonic dystrophy      Pacemaker      PAF (paroxysmal atrial fibrillation)      Sleep apnea           Precautions: standard  auth period: 7/1/19 to 12/31/19  Visit #: 11/20     Time in: 1655  Time out: 1730  Total billable treatment time:  35 min     (visit total 2018= 73)  (Visit total 2017= 56)  Date of Eval: 5/9/2019          New POC: 9/3/2019 to 11/1/2019     Subjective   Pt reports: no new complaints  Pt's sister: denies participation in HEP      Pain Scale: basia, applied [place] J collar at beginning of session     Objective         Patient received individual therapy to increase strength, endurance, ROM, flexibility, posture, core stabilization and head control with activities as follows:      Pt participated in therapeutic exercises x 35 minutes to address ROM, strength, balance, and mobility which includes:    recumbent stepper BUE/LE L 14 x 9 min     Supine:       WALESKA shoulder flexion with 3# bar 10x      straight leg raise R 2#, L 0# 2 x 15, 3 sec hold   short arc quads R 2#, L 0# 2 x 15    Leg press: double leg press 140# 15x       Single leg press 60# 10x        Static standing balance without UE support x 30 sec= fair balance  Static standing balance without UE support with slight trunk rotation x 30 sec= fair balance    Written Home Exercises: continue with previous HEP  Pt demo fair understanding of the education provided. Brigid demonstrated fair return demonstration of activities.      Education provided re: use heating pad or BenGay for pain relief- do not use together. Seek medical attention for any radicular symptoms or worsening of pain.     Pt has no cultural, educational or language barriers to learning  provided.     Assessment   Phani tolerated treatment well. [place] J collar donned during session.  Phani was able to tolerate all exercises today with minimal complaints of cervical pain. pt demonstrated fair balance on firm surface without UE support for 30 sec. Pt continues to express fear of falling when standing unsupported. Pt can benefit from continued skilled PT to address LE/ core strength, cervical range of Motion and balance to improve gait and maintain independence with mobility to deter progression of functional limitations/ mobility.        Pt prognosis is Good. Pt will continue to benefit from skilled outpatient physical therapy to address the deficits listed in the problem list, provide pt/family education and to maximize pt's level of independence in the home and community environment.      Barriers to progress: transportation, motivation for exercises     Activity limitations/ Participation Restrictions:   Fall Risk - impaired balance   Weakness  Impaired muscle tone  Poor head control  Decreased ROM  Gait deviations   Decreased ambulation   Decreased activity tolerance   Decreased independence with daily activities   Requires skilled supervision to complete and progress HEP   Requires skilled PT for DME     Pt's spiritual, cultural and educational needs considered and pt agreeable to plan of care and GOALS as stated below:    Short term goals: 6 weeks, pt agrees to goals set. (As of 7/8/19)  1. Pt will perform chin tucks in supine with supervision to improve independent with HEP. Met 7/5/17  2. Assist pt with obtaining appropriate cervical brace to improve head control. Met 1/9/18- pt has but is not wearing  3. Pt will demonstrate improved knee ext strength on left to 4/5 to improve safety with ambulation and sit<>stand. Met 9/28/17   4. Pt will demonstrate improved cervical rotation PROM to 20 degrees to improve functional head control. Met 7/5/17  5. Pt will perform sit<>stand consistently  "from 22" height surface with supervision to improve independence. Met 7/5/17                                                         Revised 8/31/17:  Pt will perform sit<>stand from 19" height chair to improve independence with sitting in various chairs around her home and decreased                                                                      need for "pop up seat". Met 8/12/19- pt able to perform from 19" height with supervision     Long term goals: 12 weeks, pt agrees to goals set  6. Pt will perform basic HEP for gross strengthening with supervision to deter worsening of functional limitations. Met 8/31/17- pt's sister reports poor current compliance  7. Pt will demonstrate improved bilateral hip strength to 3/5 to improve balance and independence with mobility. Met 7/5/17  8. New 7/5/17: Pt will demonstrate improved bilateral hip strength to 4/5 to improve balance and independence with mobility. improved on L-  see objective info  9. Pt will demonstrate improved cervical extension strength to 3+/5 to improve head control. Met 7/31/17  10. Pt will demonstrate improved cervical deep flexor strength to 3+/5 to improve head control. Met 7/31/17   11. Pt will demonstrate improve balance and gait by scoring 14/28 on Tinetti. Met 8/31/17- 17/28                                      Revised 8/31/17: pt will demonstrate improved balance/ gait and decreased fall risk to moderate by scoring 19/28 on Tinetti Assessment. improved as compared to last assessment- 16/28                                          12. Pt will demonstrate improved cervical PROM for rotation to 40 degrees to improve functional head control. same-  L= 27 deg, R= 4 deg,                                          13. New 8/1/18- pt will ambulate 50ft with LBQC and supervision over level tile to demonstrate improved household mobility with decreased AD- ongoing- 114 ft with large based quad cane with CGA     Plan        Address upper trap passive " range of motion and initiate chin tucks to address cervical pain.  focus on hip and VMO strengthening and unsupported standing balance.         Yadi Mercado, PT  9/5/2019

## 2019-09-09 ENCOUNTER — CLINICAL SUPPORT (OUTPATIENT)
Dept: REHABILITATION | Facility: HOSPITAL | Age: 54
End: 2019-09-09
Attending: STUDENT IN AN ORGANIZED HEALTH CARE EDUCATION/TRAINING PROGRAM
Payer: COMMERCIAL

## 2019-09-09 DIAGNOSIS — Z74.09 IMPAIRED FUNCTIONAL MOBILITY, BALANCE, GAIT, AND ENDURANCE: ICD-10-CM

## 2019-09-09 DIAGNOSIS — R29.898 DECREASED ROM OF NECK: ICD-10-CM

## 2019-09-09 DIAGNOSIS — R53.1 DECREASED STRENGTH: ICD-10-CM

## 2019-09-09 PROCEDURE — 97110 THERAPEUTIC EXERCISES: CPT | Mod: PO | Performed by: PHYSICAL THERAPIST

## 2019-09-09 NOTE — PROGRESS NOTES
Physical Therapy Progress Note      Name: Brigid Jernigan  Clinic Number: [id number]  Diagnosis: G71.11 (ICD-10-CM) - Myotonic dystrophy  Physician: Lloyd Mojica MD  Treatment Orders: PT Eval and Treat          Past Medical History:   Diagnosis Date    Abnormal EKG      Myotonic dystrophy      Pacemaker      PAF (paroxysmal atrial fibrillation)      Sleep apnea           Precautions: standard  auth period: 7/1/19 to 12/31/19  Visit #: 12/20     Time in: 1655  Time out: 1730  Total billable treatment time:  35 min     (visit total 2018= 73)  (Visit total 2017= 56)  Date of Eval: 5/9/2019          New POC: 9/3/2019 to 11/1/2019     Subjective   Pt reports: no new complaints  Pt's sister: denies participation in HEP      Pain Scale: basia, applied [place] J collar at beginning of session     Objective         Patient received individual therapy to increase strength, endurance, ROM, flexibility, posture, core stabilization and head control with activities as follows:      Pt participated in therapeutic exercises x 35 minutes to address ROM, strength, balance, and mobility which includes:  Cervical rotation to R 4 deg, L= 45 deg    Supine: inhibitive distraction/ cervical traction   passive range of motion cervical R rotation 2 x 30 sec   Gentle PA glides of cervical spine- grade 2-3   Gentle suboccipital rotation       Lower Extremity Strength    RLE eval RLE 5/19 RLE 7/19 RLE 8/19 RLE 9/19 LLE eval LLE 5/19 LLE 7/19 LLE 8/19 LLE 9/19   Hip Flexion: 3+/5 5/5 NT NT NT 3+/5 5/5 NT NT NT   Hip Extension:  2/5 4+/5 3+/5 3+/5 3+/5 2/5 4/5 3+ to 4-/5 4/5 4+/5   Hip Abduction: NT 4+/5 4/5 4/5 4+/5 NT 4/5 4-/5 4+/5 4+/5   Hip Adduction: NT 4/5 4-/5 4-/5 4-/5 NT 3+/5 4/5 4-/5 4-/5   Knee Extension: 4+/5 NT NT 4+/5 5/5 3+/5 NT NT 3+/5 3+ to 4-/5   Knee Flexion: 4/5 NT NT 4-/5 4+/5 4-/5 NT NT 3+/5 3+/5      Tinetti Balance Assessment  Balance:  1. Sitting Balance 1   Leans/ slides in chair= 0   Steady= 1  2. Rises from  chair 1   Unable without help= 0   Able, uses arms= 1   Able without use of arms= 2  3. Attempts to rise 1   Unable without help= 0   Able, >1 attmept required-= 1   Able, 1 attempt= 2  4. Immediate standing balance (1st 5 seconds) 1   Unsteady (swaggers, moves feet, trunk sway)= 0   Steady but uses walker or other support= 1   Narrow base of support without walker or support= 2  5. Nudged 1   Begins to fall= 0   Staggers, grabs, catches self= 1   Steady= 2  6. Standing balance 1   Unsteady= 0   Steady but wide LEYDI or uses AD=1   Narrow LEYDI without AD=2  7. Eyes closed 0   Unsteady= 0   Steady= 1  8. Turning 360 degrees 0   Discontinuous steps= 0   Continuous steps= 1   Unsteady= 0   Steady= 1  9. Sitting down 1   Unsafe= 0   Uses arms or not in a smooth motion= 1   Safe, smooth motion= 2  Balance score= 7/16  Gait:  1. Initiation 1   hesitates or multiple attempts to start= 0   No hesitancy= 1  2. Step length 1   Step to= 0   One foot passes= 1   reciprocal pattern= 2  3. Step height 2    Neither foot clears floor= 0   One foot clears floor= 1   Both feet clear floor= 2  4. Step symmetry 0   Not symmetrical= 0   Appears symmetrical= 1  5. Step continuity 1   Not continuous= 0   Appears continuous= 1  6. Path 1   Marked deviation= 0   Mild/moderate deviation or uses A.D.= 1   Straight without A.D.= 2  7. Trunk 0   Marked sway or uses A.D.= 0   No sway, but flexes knees or back, spread arms out while walking= 1   No sway, no flexion, no use of UE, no use of A.D.= 2  8. Walking stance 1   Heels apart= 0   Heels almost touching while walking= 1  Gait score= 7/12  Total score= 14/28 , high fall risk    0-18= high fall risk  19-23= moderate fall risk  24-28= low fall risk       Leg press: double leg press 140# 15x       Single leg press 60# 10x    Written Home Exercises: continue with previous HEP  Pt demo fair understanding of the education provided. Brigid demonstrated fair return demonstration of activities.       Education provided re: perform chin tucks, cervical flex/ext, and cervical rotation when [place] J collar is off to maintain cervical strength     Pt has no cultural, educational or language barriers to learning provided.     Assessment   Assessment period: 8/29/2019 to 9/9/2019. Pt attended 2 visits since last session (had to cancel due to fall).  Phani tolerates treatments fair to good. Pt experienced a fall during this assessment period in which her sister reports likely soft tissue cervical injury. Pt has been using [place] J collar during sessions, to alleviate cervical pain. Pt is demonstrating improvements in LE strength. Improvements noted in R hip abduction, R knee flexion/ extension, L hip extension, and L knee extension. Pt continues to demonstrate weakness in L vastus medialis oblique, but can attain (just not maintain) terminal knee extension. Weakness in knee extension noted when ambulating with large based quad cane. Pt demonstrated minimal knee buckling and required PT assistance to regain balance. Pt's standing balance continues to vary. Pt has difficulty maintaining standing without UE support. Cervical active range of motion continues to be significantly limited due to muscle shortening. Per formal assessment, pt is at high risk for falls.   Pt can benefit from continued skilled PT to address LE/ core strength, cervical range of Motion and balance to improve gait and maintain independence with mobility to deter progression of functional limitations/ mobility.        Pt prognosis is Good. Pt will continue to benefit from skilled outpatient physical therapy to address the deficits listed in the problem list, provide pt/family education and to maximize pt's level of independence in the home and community environment.      Barriers to progress: transportation, motivation for exercises     Activity limitations/ Participation Restrictions:   Fall Risk - impaired balance   Weakness  Impaired muscle  "tone  Poor head control  Decreased ROM  Gait deviations   Decreased ambulation   Decreased activity tolerance   Decreased independence with daily activities   Requires skilled supervision to complete and progress HEP   Requires skilled PT for DME     Pt's spiritual, cultural and educational needs considered and pt agreeable to plan of care and GOALS as stated below:    Short term goals: 6 weeks, pt agrees to goals set. (As of 9/9/19)  1. Pt will perform chin tucks in supine with supervision to improve independent with HEP. Met 7/5/17  2. Assist pt with obtaining appropriate cervical brace to improve head control. Met 1/9/18- pt has but is not wearing  3. Pt will demonstrate improved knee ext strength on left to 4/5 to improve safety with ambulation and sit<>stand. Met 9/28/17   4. Pt will demonstrate improved cervical rotation PROM to 20 degrees to improve functional head control. Met 7/5/17  5. Pt will perform sit<>stand consistently from 22" height surface with supervision to improve independence. Met 7/5/17                                                         Revised 8/31/17:  Pt will perform sit<>stand from 19" height chair to improve independence with sitting in various chairs around her home and decreased                                                                      need for "pop up seat". Met 8/12/19- pt able to perform from 19" height with supervision     Long term goals: 12 weeks, pt agrees to goals set  6. Pt will perform basic HEP for gross strengthening with supervision to deter worsening of functional limitations. Met 8/31/17- pt's sister reports poor current compliance  7. Pt will demonstrate improved bilateral hip strength to 3/5 to improve balance and independence with mobility. Met 7/5/17  8. New 7/5/17: Pt will demonstrate improved bilateral hip strength to 4/5 to improve balance and independence with mobility. nearly met-  see objective info  9. Pt will demonstrate improved cervical " extension strength to 3+/5 to improve head control. Met 7/31/17  10. Pt will demonstrate improved cervical deep flexor strength to 3+/5 to improve head control. Met 7/31/17   11. Pt will demonstrate improve balance and gait by scoring 14/28 on Tinetti. Met 8/31/17- 17/28                                      Revised 8/31/17: pt will demonstrate improved balance/ gait and decreased fall risk to moderate by scoring 19/28 on Tinetti Assessment. inconsistent- 14/28                                          12. Pt will demonstrate improved cervical PROM for rotation to 40 degrees to improve functional head control. Partially met-  L= 45 deg, R= 4 deg,                                          13. New 8/1/18- pt will ambulate 50ft with LBQC and supervision over level tile to demonstrate improved household mobility with decreased AD- ongoing- 45 ft with large based quad cane with CGA- min A (1 loss of balance)     Plan        Address upper trap passive range of motion and initiate chin tucks to address cervical pain.  focus on hip and VMO strengthening and unsupported standing balance.         Yadi Mercado, PT  9/9/2019

## 2019-09-10 ENCOUNTER — DOCUMENTATION ONLY (OUTPATIENT)
Dept: REHABILITATION | Facility: HOSPITAL | Age: 54
End: 2019-09-10

## 2019-09-10 NOTE — PROGRESS NOTES
PT/PTA met face to face to discuss pt's treatment plan and progress towards established goals.  Continue with current PT POC with focus on endurance, strengthening, cervical exercises and standing balance.  Patient will be seen by physical therapist at least every sixth treatment or 30 days, whichever occurs first.    Face to face performed on 9/9/19    Mae Caicedo PTA  09/10/2019    Meeting held on 9/9/2019  Yadi Mercado DPT  9/13/2019

## 2019-09-11 ENCOUNTER — CLINICAL SUPPORT (OUTPATIENT)
Dept: REHABILITATION | Facility: HOSPITAL | Age: 54
End: 2019-09-11
Attending: STUDENT IN AN ORGANIZED HEALTH CARE EDUCATION/TRAINING PROGRAM
Payer: COMMERCIAL

## 2019-09-11 DIAGNOSIS — R29.898 DECREASED ROM OF NECK: ICD-10-CM

## 2019-09-11 DIAGNOSIS — R53.1 DECREASED STRENGTH: ICD-10-CM

## 2019-09-11 DIAGNOSIS — Z74.09 IMPAIRED FUNCTIONAL MOBILITY, BALANCE, GAIT, AND ENDURANCE: ICD-10-CM

## 2019-09-11 PROCEDURE — 97110 THERAPEUTIC EXERCISES: CPT | Mod: PO

## 2019-09-11 NOTE — PROGRESS NOTES
"Physical Therapy Progress Note      Name: Brigid Jernigan  Clinic Number: [id number]  Diagnosis: G71.11 (ICD-10-CM) - Myotonic dystrophy  Physician: Lloyd Mojica MD  Treatment Orders: PT Eval and Treat          Past Medical History:   Diagnosis Date    Abnormal EKG      Myotonic dystrophy      Pacemaker      PAF (paroxysmal atrial fibrillation)      Sleep apnea           Precautions: standard  auth period: 7/1/19 to 12/31/19  Visit #: 13/20     Time in: 1705  Time out: 1735  Total billable treatment time:  30 min     (visit total 2018= 73)  (Visit total 2017= 56)  Date of Eval: 5/9/2019          New POC: 9/3/2019 to 11/1/2019     Subjective   Pt reports: " I've been trying to do my neck exercises in the shower with the hot water."   Pt's sister: " She has been trying to wear the collar more at home.  She fell you know."     Pain Scale:  Soreness in cervical region, unrated  Pt arrived with [place] J collar donned  Objective         Patient received individual therapy to increase strength, endurance, ROM, flexibility, posture, core stabilization and head control with activities as follows:      Pt participated in therapeutic exercises x 30 minutes to address ROM, strength, balance, and mobility which includes:    Supine:    passive range of motion cervical R rotation 2 x 30 sec   Gentle suboccipital rotation   AROM cervical rotation x 10 reps in each direction   X 10 reps of chin tucks with no pain     LAQ over extra large bolster x 20 reps each leg.  #3lbs on R LE and 0 on LLE    Static standing with RW x 1 min 30 sec, 2 mins and 2 min and 30 sec with touch down support in between each trial.        Written Home Exercises: continue with previous HEP  Pt demo fair understanding of the education provided. Brigid demonstrated fair return demonstration of activities.      Education provided re: perform chin tucks, cervical flex/ext, and cervical rotation when [place] J collar is off to maintain cervical " "strength     Pt has no cultural, educational or language barriers to learning provided.     Assessment   Brigid tolerated tx session fairly well.  Pt limited in progression 2* time constraint, pt arrived late and had to leave early.  Pt cont with cervical stretching and strengthening and was able to tolerate standing unsupported.  Pt with decreased unsupported standing time today.       Pt prognosis is Good. Pt will continue to benefit from skilled outpatient physical therapy to address the deficits listed in the problem list, provide pt/family education and to maximize pt's level of independence in the home and community environment.      Barriers to progress: transportation, motivation for exercises     Activity limitations/ Participation Restrictions:   Fall Risk - impaired balance   Weakness  Impaired muscle tone  Poor head control  Decreased ROM  Gait deviations   Decreased ambulation   Decreased activity tolerance   Decreased independence with daily activities   Requires skilled supervision to complete and progress HEP   Requires skilled PT for DME     Pt's spiritual, cultural and educational needs considered and pt agreeable to plan of care and GOALS as stated below:    Short term goals: 6 weeks, pt agrees to goals set. (As of 9/9/19)  1. Pt will perform chin tucks in supine with supervision to improve independent with HEP. Met 7/5/17  2. Assist pt with obtaining appropriate cervical brace to improve head control. Met 1/9/18- pt has but is not wearing  3. Pt will demonstrate improved knee ext strength on left to 4/5 to improve safety with ambulation and sit<>stand. Met 9/28/17   4. Pt will demonstrate improved cervical rotation PROM to 20 degrees to improve functional head control. Met 7/5/17  5. Pt will perform sit<>stand consistently from 22" height surface with supervision to improve independence. Met 7/5/17                                                         Revised 8/31/17:  Pt will perform " "sit<>stand from 19" height chair to improve independence with sitting in various chairs around her home and decreased                                                                      need for "pop up seat". Met 8/12/19- pt able to perform from 19" height with supervision     Long term goals: 12 weeks, pt agrees to goals set  6. Pt will perform basic HEP for gross strengthening with supervision to deter worsening of functional limitations. Met 8/31/17- pt's sister reports poor current compliance  7. Pt will demonstrate improved bilateral hip strength to 3/5 to improve balance and independence with mobility. Met 7/5/17  8. New 7/5/17: Pt will demonstrate improved bilateral hip strength to 4/5 to improve balance and independence with mobility. nearly met-  see objective info  9. Pt will demonstrate improved cervical extension strength to 3+/5 to improve head control. Met 7/31/17  10. Pt will demonstrate improved cervical deep flexor strength to 3+/5 to improve head control. Met 7/31/17   11. Pt will demonstrate improve balance and gait by scoring 14/28 on Tinetti. Met 8/31/17- 17/28                                      Revised 8/31/17: pt will demonstrate improved balance/ gait and decreased fall risk to moderate by scoring 19/28 on Tinetti Assessment. inconsistent- 14/28                                          12. Pt will demonstrate improved cervical PROM for rotation to 40 degrees to improve functional head control. Partially met-  L= 45 deg, R= 4 deg,                                          13. New 8/1/18- pt will ambulate 50ft with LBQC and supervision over level tile to demonstrate improved household mobility with decreased AD- ongoing- 45 ft with large based quad cane with CGA- min A (1 loss of balance)     Plan        Address upper trap passive range of motion and initiate chin tucks to address cervical pain.  focus on hip and VMO strengthening and unsupported standing balance.         Mae Vanegas " Marifer, PTA    9/11/2019

## 2019-09-16 ENCOUNTER — CLINICAL SUPPORT (OUTPATIENT)
Dept: REHABILITATION | Facility: HOSPITAL | Age: 54
End: 2019-09-16
Attending: STUDENT IN AN ORGANIZED HEALTH CARE EDUCATION/TRAINING PROGRAM
Payer: COMMERCIAL

## 2019-09-16 DIAGNOSIS — Z74.09 IMPAIRED FUNCTIONAL MOBILITY, BALANCE, GAIT, AND ENDURANCE: ICD-10-CM

## 2019-09-16 DIAGNOSIS — R53.1 DECREASED STRENGTH: ICD-10-CM

## 2019-09-16 DIAGNOSIS — R29.898 DECREASED ROM OF NECK: ICD-10-CM

## 2019-09-16 PROCEDURE — 97110 THERAPEUTIC EXERCISES: CPT | Mod: PO | Performed by: PHYSICAL THERAPIST

## 2019-09-16 NOTE — PROGRESS NOTES
"Physical Therapy Progress Note      Name: Brigid Jernigan  Clinic Number: [id number]  Diagnosis: G71.11 (ICD-10-CM) - Myotonic dystrophy  Physician: Lloyd Mojica MD  Treatment Orders: PT Eval and Treat          Past Medical History:   Diagnosis Date    Abnormal EKG      Myotonic dystrophy      Pacemaker      PAF (paroxysmal atrial fibrillation)      Sleep apnea           Precautions: standard  auth period: 7/1/19 to 12/31/19  Visit #: 14/20     Time in: 1706  Time out: 1738  Total billable treatment time:  32 min     (visit total 2018= 73)  (Visit total 2017= 56)  Date of Eval: 5/9/2019          New POC: 9/3/2019 to 11/1/2019     Subjective   Pt reports: " I've been practicing turning my head to the R"  Pt's sister: no reports  Pt denies participation in structured HEP     Pain Scale:  Soreness in cervical region, unrated  Pt arrived with [place] J collar donned  Objective         Patient received individual therapy to increase strength, endurance, ROM, flexibility, posture, core stabilization and head control with activities as follows:      Pt participated in therapeutic exercises x 32 minutes to address ROM, strength, balance, and mobility which includes:    Supine with towel roll under spine :    passive range of motion pectoral stretch 3 x 30 sec   Gentle suboccipital distraction   X 10 reps of chin tucks       [place] shoulder flexion with 4# bar 15x      straight leg raise R 2#, L 0# 2 x 15, 3 sec hold         Side lying: clamshells [place] thera band 20x       Written Home Exercises: continue with previous HEP  Pt demo fair understanding of the education provided. Brigid demonstrated fair return demonstration of activities.      Education provided re: perform Long arc quads frequently at home to address L knee extension weakness     Pt has no cultural, educational or language barriers to learning provided.     Assessment   Brigid tolerated tx session well.  Visit was limited due to pt arriving >15 min " "late. Pt reported increased cervical pain with PT attempts at passive range of motion of rotation in supine. Pain decreased with cervical distraction. Pt tolerated increased resistance for shoulder flexion to address scapular stability to improve pt's head and shoulder posture. Quad lag on LLE continues demonstrating decreased vastus medialis oblique  Strength. Pt continues to be appropriate for PT to improve posture, balance, and gait.      Pt prognosis is Good. Pt will continue to benefit from skilled outpatient physical therapy to address the deficits listed in the problem list, provide pt/family education and to maximize pt's level of independence in the home and community environment.      Barriers to progress: transportation, motivation for exercises     Activity limitations/ Participation Restrictions:   Fall Risk - impaired balance   Weakness  Impaired muscle tone  Poor head control  Decreased ROM  Gait deviations   Decreased ambulation   Decreased activity tolerance   Decreased independence with daily activities   Requires skilled supervision to complete and progress HEP   Requires skilled PT for DME     Pt's spiritual, cultural and educational needs considered and pt agreeable to plan of care and GOALS as stated below:    Short term goals: 6 weeks, pt agrees to goals set. (As of 9/9/19)  1. Pt will perform chin tucks in supine with supervision to improve independent with HEP. Met 7/5/17  2. Assist pt with obtaining appropriate cervical brace to improve head control. Met 1/9/18- pt has but is not wearing  3. Pt will demonstrate improved knee ext strength on left to 4/5 to improve safety with ambulation and sit<>stand. Met 9/28/17   4. Pt will demonstrate improved cervical rotation PROM to 20 degrees to improve functional head control. Met 7/5/17  5. Pt will perform sit<>stand consistently from 22" height surface with supervision to improve independence. Met 7/5/17                 " "                                        Revised 8/31/17:  Pt will perform sit<>stand from 19" height chair to improve independence with sitting in various chairs around her home and decreased                                                                      need for "pop up seat". Met 8/12/19- pt able to perform from 19" height with supervision     Long term goals: 12 weeks, pt agrees to goals set  6. Pt will perform basic HEP for gross strengthening with supervision to deter worsening of functional limitations. Met 8/31/17- pt's sister reports poor current compliance  7. Pt will demonstrate improved bilateral hip strength to 3/5 to improve balance and independence with mobility. Met 7/5/17  8. New 7/5/17: Pt will demonstrate improved bilateral hip strength to 4/5 to improve balance and independence with mobility. nearly met-  see objective info  9. Pt will demonstrate improved cervical extension strength to 3+/5 to improve head control. Met 7/31/17  10. Pt will demonstrate improved cervical deep flexor strength to 3+/5 to improve head control. Met 7/31/17   11. Pt will demonstrate improve balance and gait by scoring 14/28 on Tinetti. Met 8/31/17- 17/28                                      Revised 8/31/17: pt will demonstrate improved balance/ gait and decreased fall risk to moderate by scoring 19/28 on Tinetti Assessment. inconsistent- 14/28                                          12. Pt will demonstrate improved cervical PROM for rotation to 40 degrees to improve functional head control. Partially met-  L= 45 deg, R= 4 deg,                                          13. New 8/1/18- pt will ambulate 50ft with LBQC and supervision over level tile to demonstrate improved household mobility with decreased AD- ongoing- 45 ft with large based quad cane with CGA- min A (1 loss of balance)     Plan   focus on hip and VMO strengthening and unsupported standing balance. Continue to address cervical ROM as tolerated. "         Yadi Mercado, PT    9/16/2019

## 2019-09-18 ENCOUNTER — CLINICAL SUPPORT (OUTPATIENT)
Dept: REHABILITATION | Facility: HOSPITAL | Age: 54
End: 2019-09-18
Attending: STUDENT IN AN ORGANIZED HEALTH CARE EDUCATION/TRAINING PROGRAM
Payer: COMMERCIAL

## 2019-09-18 DIAGNOSIS — Z74.09 IMPAIRED FUNCTIONAL MOBILITY, BALANCE, GAIT, AND ENDURANCE: ICD-10-CM

## 2019-09-18 DIAGNOSIS — R53.1 DECREASED STRENGTH: ICD-10-CM

## 2019-09-18 DIAGNOSIS — R29.898 DECREASED ROM OF NECK: ICD-10-CM

## 2019-09-18 PROCEDURE — 97110 THERAPEUTIC EXERCISES: CPT | Mod: PO

## 2019-09-18 NOTE — PROGRESS NOTES
"Physical Therapy Progress Note      Name: Brigid Jernigan  Clinic Number: [id number]  Diagnosis: G71.11 (ICD-10-CM) - Myotonic dystrophy  Physician: Lloyd Mojica MD  Treatment Orders: PT Eval and Treat          Past Medical History:   Diagnosis Date    Abnormal EKG      Myotonic dystrophy      Pacemaker      PAF (paroxysmal atrial fibrillation)      Sleep apnea           Precautions: standard  auth period: 7/1/19 to 12/31/19  Visit #: 15/20     Time in: 1705  Time out: 1735  Total billable treatment time:  30 min     (visit total 2018= 73)  (Visit total 2017= 56)  Date of Eval: 5/9/2019          New POC: 9/3/2019 to 11/1/2019     Subjective   Pt reports: " I've been practicing turning my head to the R"  Pt's sister: no reports  Pt denies participation in structured HEP     Pain Scale:  Soreness in cervical region, unrated  Pt arrived with [place] J collar donned  Objective         Patient received individual therapy to increase strength, endurance, ROM, flexibility, posture, core stabilization and head control with activities as follows:      Pt participated in therapeutic exercises x 30 minutes to address ROM, strength, balance, and mobility which includes:    Supine with towel roll under spine :    passive range of motion pectoral stretch 3 x 30 sec   Gentle suboccipital distraction   X 10 reps of chin tucks, 5 sec hold   Open books x 10 reps each side                 Static standing trials with RW and SBA, 3 mins 30 sec total,    30 sec    2 mins    45 sec  1 UE support required in between each trial    Sit to stand x 5 trials from low lying mat.       Written Home Exercises: continue with previous HEP  Pt demo fair understanding of the education provided. Brigid demonstrated fair return demonstration of activities.      Education provided re: perform Long arc quads frequently at home to address L knee extension weakness     Pt has no cultural, educational or language barriers to learning " "provided.     Assessment   Brigid tolerated tx session well and did not have any problems.  Pt cont with cervical stretching and strengthening.  Limited in time constraint.   Cont with POC..      Pt prognosis is Good. Pt will continue to benefit from skilled outpatient physical therapy to address the deficits listed in the problem list, provide pt/family education and to maximize pt's level of independence in the home and community environment.      Barriers to progress: transportation, motivation for exercises     Activity limitations/ Participation Restrictions:   Fall Risk - impaired balance   Weakness  Impaired muscle tone  Poor head control  Decreased ROM  Gait deviations   Decreased ambulation   Decreased activity tolerance   Decreased independence with daily activities   Requires skilled supervision to complete and progress HEP   Requires skilled PT for DME     Pt's spiritual, cultural and educational needs considered and pt agreeable to plan of care and GOALS as stated below:    Short term goals: 6 weeks, pt agrees to goals set. (As of 9/9/19)  1. Pt will perform chin tucks in supine with supervision to improve independent with HEP. Met 7/5/17  2. Assist pt with obtaining appropriate cervical brace to improve head control. Met 1/9/18- pt has but is not wearing  3. Pt will demonstrate improved knee ext strength on left to 4/5 to improve safety with ambulation and sit<>stand. Met 9/28/17   4. Pt will demonstrate improved cervical rotation PROM to 20 degrees to improve functional head control. Met 7/5/17  5. Pt will perform sit<>stand consistently from 22" height surface with supervision to improve independence. Met 7/5/17                                                         Revised 8/31/17:  Pt will perform sit<>stand from 19" height chair to improve independence with sitting in various chairs around her home and decreased                                                                      need for "pop " "up seat". Met 8/12/19- pt able to perform from 19" height with supervision     Long term goals: 12 weeks, pt agrees to goals set  6. Pt will perform basic HEP for gross strengthening with supervision to deter worsening of functional limitations. Met 8/31/17- pt's sister reports poor current compliance  7. Pt will demonstrate improved bilateral hip strength to 3/5 to improve balance and independence with mobility. Met 7/5/17  8. New 7/5/17: Pt will demonstrate improved bilateral hip strength to 4/5 to improve balance and independence with mobility. nearly met-  see objective info  9. Pt will demonstrate improved cervical extension strength to 3+/5 to improve head control. Met 7/31/17  10. Pt will demonstrate improved cervical deep flexor strength to 3+/5 to improve head control. Met 7/31/17   11. Pt will demonstrate improve balance and gait by scoring 14/28 on Tinetti. Met 8/31/17- 17/28                                      Revised 8/31/17: pt will demonstrate improved balance/ gait and decreased fall risk to moderate by scoring 19/28 on Tinetti Assessment. inconsistent- 14/28                                          12. Pt will demonstrate improved cervical PROM for rotation to 40 degrees to improve functional head control. Partially met-  L= 45 deg, R= 4 deg,                                          13. New 8/1/18- pt will ambulate 50ft with LBQC and supervision over level tile to demonstrate improved household mobility with decreased AD- ongoing- 45 ft with large based quad cane with CGA- min A (1 loss of balance)     Plan   focus on hip and VMO strengthening and unsupported standing balance. Continue to address cervical ROM as tolerated.         Mae Caicedo, PTA    9/18/2019       "

## 2019-09-23 ENCOUNTER — CLINICAL SUPPORT (OUTPATIENT)
Dept: REHABILITATION | Facility: HOSPITAL | Age: 54
End: 2019-09-23
Attending: STUDENT IN AN ORGANIZED HEALTH CARE EDUCATION/TRAINING PROGRAM
Payer: COMMERCIAL

## 2019-09-23 DIAGNOSIS — Z74.09 IMPAIRED FUNCTIONAL MOBILITY, BALANCE, GAIT, AND ENDURANCE: ICD-10-CM

## 2019-09-23 DIAGNOSIS — R53.1 DECREASED STRENGTH: ICD-10-CM

## 2019-09-23 DIAGNOSIS — R29.898 DECREASED ROM OF NECK: ICD-10-CM

## 2019-09-23 PROCEDURE — 97110 THERAPEUTIC EXERCISES: CPT | Mod: PO | Performed by: PHYSICAL THERAPIST

## 2019-09-23 NOTE — PLAN OF CARE
Physical Therapy Progress Note      Name: Brigid Jernigan  Clinic Number: [id number]  Diagnosis: G71.11 (ICD-10-CM) - Myotonic dystrophy  Physician: Lloyd Mojica MD  Treatment Orders: PT Eval and Treat          Past Medical History:   Diagnosis Date    Abnormal EKG      Myotonic dystrophy      Pacemaker      PAF (paroxysmal atrial fibrillation)      Sleep apnea           Precautions: standard  auth period: 7/1/19 to 12/31/19  Visit #: 16/20     Time in: 1649  Time out: 1730  Total billable treatment time:  39 min     (visit total 2018= 73)  (Visit total 2017= 56)  Date of Eval: 5/9/2019          New POC: 9/3/2019 to 11/1/2019     Subjective   Pt reports: no new complaints, pt reports MD recommended cervical collar at all times  Pt's sister: no reports  Pt denies participation in structured HEP     Pain Scale:  Soreness in cervical region with extension unrated  Pt arrived with [place] J collar donned  Objective         Patient received individual therapy to increase strength, endurance, ROM, flexibility, posture, core stabilization and head control with activities as follows:      Pt participated in therapeutic exercises x 39 minutes to address ROM, strength, balance, and mobility which includes:    Lower Extremity Strength    RLE eval RLE 5/19 RLE 7/19 RLE 8/19 RLE 9/19 RLE 9/23/19 LLE eval LLE 5/19 LLE 7/19 LLE 8/19 LLE 9/19 LLE 9/23/19   Hip Flexion: 3+/5 5/5 NT NT NT 4-/5 3+/5 5/5 NT NT NT 4-/5   Hip Extension:  2/5 4+/5 3+/5 3+/5 3+/5 3+/5 2/5 4/5 3+ to 4-/5 4/5 4+/5 4/5   Hip Abduction: NT 4+/5 4/5 4/5 4+/5 4/5 NT 4/5 4-/5 4+/5 4+/5 5/5   Hip Adduction: NT 4/5 4-/5 4-/5 4-/5 4/5 NT 3+/5 4/5 4-/5 4-/5 4/5   Knee Extension: 4+/5 NT NT 4+/5 5/5 NT 3+/5 NT NT 3+/5 3+ to 4-/5 NT   Knee Flexion: 4/5 NT NT 4-/5 4+/5 4/5 4-/5 NT NT 3+/5 3+/5 3+/5       Supine:    passive range of motion pectoral stretch 3 x 30 sec   Gentle suboccipital distraction   2 X 10 reps of chin tucks, 5 sec hold   Open books x 10  reps each side   straight leg raise R 5#, L 0# 2 x 10          ambulation with WALESKA AFOs and large based quad cane with hand held assist (min A) 50'    Tinetti Balance Assessment  Balance:  1. Sitting Balance 1   Leans/ slides in chair= 0   Steady= 1  2. Rises from chair 1   Unable without help= 0   Able, uses arms= 1   Able without use of arms= 2  3. Attempts to rise 1   Unable without help= 0   Able, >1 attmept required-= 1   Able, 1 attempt= 2  4. Immediate standing balance (1st 5 seconds) 1   Unsteady (swaggers, moves feet, trunk sway)= 0   Steady but uses walker or other support= 1   Narrow base of support without walker or support= 2  5. Nudged 0   Begins to fall= 0   Staggers, grabs, catches self= 1   Steady= 2  6. Standing balance 1   Unsteady= 0   Steady but wide LEYDI or uses AD=1   Narrow LEYDI without AD=2  7. Eyes closed 0   Unsteady= 0   Steady= 1  8. Turning 360 degrees 0   Discontinuous steps= 0   Continuous steps= 1   Unsteady= 0   Steady= 1  9. Sitting down 1   Unsafe= 0   Uses arms or not in a smooth motion= 1   Safe, smooth motion= 2  Balance score= 6/16  Gait:  1. Initiation 1   hesitates or multiple attempts to start= 0   No hesitancy= 1  2. Step length 2   Step to= 0   One foot passes= 1   reciprocal pattern= 2  3. Step height 2    Neither foot clears floor= 0   One foot clears floor= 1   Both feet clear floor= 2  4. Step symmetry 1   Not symmetrical= 0   Appears symmetrical= 1  5. Step continuity 1   Not continuous= 0   Appears continuous= 1  6. Path 1   Marked deviation= 0   Mild/moderate deviation or uses A.D.= 1   Straight without A.D.= 2  7. Trunk 0   Marked sway or uses A.D.= 0   No sway, but flexes knees or back, spread arms out while walking= 1   No sway, no flexion, no use of UE, no use of A.D.= 2  8. Walking stance 1   Heels apart= 0   Heels almost touching while walking= 1  Gait score= 8/12  Total score= 14/28 , high fall risk    0-18= high fall risk  19-23= moderate fall risk  24-28=  "low fall risk          Written Home Exercises: continue with previous HEP, encouraged participation in at least 10 cervical rotations/day  Pt demo fair understanding of the education provided. Brigid demonstrated fair return demonstration of activities.      Education provided re: see above     Pt has no cultural, educational or language barriers to learning provided.     Assessment   Assessment period: 9/11/2019 to 9/23/2019. Pt only treated 3-4 PT sessions since last assessment due to time frame. Pt is demonstrating slight improvements in cervical range of Motion and LE strength. It is noted that if PT does not address every area of LE with strengthening exercises, then a decline is noted. Poor reported compliance with HEP is another reason for varying LE strength. Hip flexion strength has declined. WALESKA hip adduction strength has improved. Pt demonstrated improved consistency with performing sit<>stand from 19" height surface (average seating height). Pt continues to require multiple attempts for success. Today's attempts ranged from 3 times to 1 time. Pt is unable to maintain a static standing posture with both feet even under hips. Pt balances better in a staggered stance with L foot forward. This is improved from last assessment when pt had LLE lateral to body to improve stability.  Pt continues to demonstrate decreased L vastus medialis oblique  Strength by demonstrating a consistent minimal quad lag during straight leg raise. Pt requires moderate verbal cues to partially correct. Pt's unsupported standing balance has declined and this will be the focus of future sessions to decrease risk of falls. Pt can benefit from continued skilled PT to address impairments listed to decrease fall risk and deter the worsening of current impairments.      Pt prognosis is Good. Pt will continue to benefit from skilled outpatient physical therapy to address the deficits listed in the problem list, provide pt/family education " "and to maximize pt's level of independence in the home and community environment.      Barriers to progress: transportation, motivation for exercises     Activity limitations/ Participation Restrictions:   Fall Risk - impaired balance   Weakness  Impaired muscle tone  Poor head control  Decreased ROM  Gait deviations   Decreased ambulation   Decreased activity tolerance   Decreased independence with daily activities   Requires skilled supervision to complete and progress HEP   Requires skilled PT for DME     Pt's spiritual, cultural and educational needs considered and pt agreeable to plan of care and GOALS as stated below:    Short term goals: 6 weeks, pt agrees to goals set. (As of 9/9/19)  1. Pt will perform chin tucks in supine with supervision to improve independent with HEP. Met 7/5/17  2. Assist pt with obtaining appropriate cervical brace to improve head control. Met 7/23/2019- pt is consistently wearing since last fall  3. Pt will demonstrate improved knee ext strength on left to 4/5 to improve safety with ambulation and sit<>stand. Met 9/28/17   4. Pt will demonstrate improved cervical rotation PROM to 20 degrees to improve functional head control. Met 7/5/17  5. Pt will perform sit<>stand consistently from 22" height surface with supervision to improve independence. Met 7/5/17                                                         Revised 8/31/17:  Pt will perform sit<>stand from 19" height chair to improve independence with sitting in various chairs around her home and decreased                                                                      need for "pop up seat". Met 8/12/19- pt able to perform from 19" height with supervision     Long term goals: 12 weeks, pt agrees to goals set  6. Pt will perform basic HEP for gross strengthening with supervision to deter worsening of functional limitations. Met 8/31/17- pt's sister reports poor current compliance  7. Pt will demonstrate improved bilateral hip " strength to 3/5 to improve balance and independence with mobility. Met 7/5/17  8. New 7/5/17: Pt will demonstrate improved bilateral hip strength to 4/5 to improve balance and independence with mobility. nearly met-  see objective info  9. Pt will demonstrate improved cervical extension strength to 3+/5 to improve head control. Met 7/31/17  10. Pt will demonstrate improved cervical deep flexor strength to 3+/5 to improve head control. Met 7/31/17   11. Pt will demonstrate improve balance and gait by scoring 14/28 on Tinetti. Met 8/31/17- 17/28                                      Revised 8/31/17: pt will demonstrate improved balance/ gait and decreased fall risk to moderate by scoring 19/28 on Tinetti Assessment. same- 14/28                                          12. Pt will demonstrate improved cervical PROM for rotation to 40 degrees to improve functional head control. Slight improvement-  L= 48 deg, R= 7 deg,                                          13. New 8/1/18- pt will ambulate 50ft with LBQC and supervision over level tile to demonstrate improved household mobility with decreased AD- ongoing- 50 ft with large based quad cane with min A (hand held assist)     Plan   focus on hip and VMO strengthening. Practice more unsupported standing. Continue to address cervical ROM as tolerated.         Yadi Mercado, PT    9/23/2019

## 2019-09-25 ENCOUNTER — CLINICAL SUPPORT (OUTPATIENT)
Dept: REHABILITATION | Facility: HOSPITAL | Age: 54
End: 2019-09-25
Attending: STUDENT IN AN ORGANIZED HEALTH CARE EDUCATION/TRAINING PROGRAM
Payer: COMMERCIAL

## 2019-09-25 DIAGNOSIS — R29.898 DECREASED ROM OF NECK: ICD-10-CM

## 2019-09-25 DIAGNOSIS — Z74.09 IMPAIRED FUNCTIONAL MOBILITY, BALANCE, GAIT, AND ENDURANCE: ICD-10-CM

## 2019-09-25 DIAGNOSIS — R53.1 DECREASED STRENGTH: ICD-10-CM

## 2019-09-25 PROCEDURE — 97110 THERAPEUTIC EXERCISES: CPT | Mod: PO

## 2019-09-25 NOTE — PROGRESS NOTES
Physical Therapy Progress Note      Name: Brigid Jernigan  Clinic Number: [id number]  Diagnosis: G71.11 (ICD-10-CM) - Myotonic dystrophy  Physician: Lloyd Mojica MD  Treatment Orders: PT Eval and Treat          Past Medical History:   Diagnosis Date    Abnormal EKG      Myotonic dystrophy      Pacemaker      PAF (paroxysmal atrial fibrillation)      Sleep apnea           Precautions: standard  auth period: 7/1/19 to 12/31/19  Visit #: 17/20     Time in: 1653  Time out: 1730  Total billable treatment time: 37 minutes     (visit total 2018= 73)  (Visit total 2017= 56)  Date of Eval: 5/9/2019          New POC: 9/3/2019 to 11/1/2019     Subjective   Pt reports: no complaints on this date.  Pt's sister: no reports  Pt denies participation in structured HEP     Pain Scale:  0/10  Pt arrived with [place] J collar donned  Objective      Pt arrived 8 minutes late~ PT willing to accommodate but pt's sister stated they must leave at 1730.     Patient received individual therapy to increase strength, endurance, ROM, flexibility, posture, core stabilization and head control with activities as follows:      Pt participated in therapeutic exercises x 37 minutes to address ROM, strength, balance, and mobility which includes:    SCI-FIT recumbent stepper BUE/LE L 14.5 x 3 minutes( pt stopped here due to fatigue) and at L 14.0 x 6 minutes for improved strength, ROM and improved activity tolerance    Leg press: double leg press 140 # 15x       Single leg press to each LE 60# 10x      Supine with towel roll under spine and cervical collar removed :    B pectoral stretch 3 x 30 sec   Gentle suboccipital distraction   2 X 10 reps of chin tucks, 5 sec hold   1 x 10 active cervical rotations to L and R with 5 second hold( pt extremely limited in R rotation)     Sit<> supine on mat with mod I for increased time to complete.                 Sit to stand x 1 trial from high/low mat at low setting, SBA with 2 attempts needed to come  to standing.       Written Home Exercises: continue with previous HEP  Pt demo fair understanding of the education provided. Brigid demonstrated fair return demonstration of activities.      Education provided re: perform Long arc quads frequently at home to address L knee extension weakness     Pt has no cultural, educational or language barriers to learning provided.     Assessment   Brigid tolerated today's session well but demonstrated fatigue on the recumbent stepper at level 14.5, only completing 3 minutes here. PT then reset to level 14.0 and pt was able to perform for 6 consecutive minutes. Pt also struggled a bit to complete 10 reps of L single leg press with 60 #. Pt tolerated pectoral stretching well and performed chin tucks with minimal cues to prevent head from lifting off pillow. Pt's active ROM for cervical rotation remains limited, considerably so on the R. Pt's overall treatment time limited by late arrival and pt unable to stay beyond 1730.  Pt will benefit from continued exercise for LE strengthening and cervical ROM.  Cont with current POC.      Pt prognosis is Good. Pt will continue to benefit from skilled outpatient physical therapy to address the deficits listed in the problem list, provide pt/family education and to maximize pt's level of independence in the home and community environment.      Barriers to progress: transportation, motivation for exercises     Activity limitations/ Participation Restrictions:   Fall Risk - impaired balance   Weakness  Impaired muscle tone  Poor head control  Decreased ROM  Gait deviations   Decreased ambulation   Decreased activity tolerance   Decreased independence with daily activities   Requires skilled supervision to complete and progress HEP   Requires skilled PT for DME        Pt's spiritual, cultural and educational needs considered and pt agreeable to plan of care and GOALS as stated below:    Short term goals: 6 weeks, pt agrees to goals set. (As  "of 9/9/19)  1. Pt will perform chin tucks in supine with supervision to improve independent with HEP. Met 7/5/17  2. Assist pt with obtaining appropriate cervical brace to improve head control. Met 7/23/2019- pt is consistently wearing since last fall  3. Pt will demonstrate improved knee ext strength on left to 4/5 to improve safety with ambulation and sit<>stand. Met 9/28/17   4. Pt will demonstrate improved cervical rotation PROM to 20 degrees to improve functional head control. Met 7/5/17  5. Pt will perform sit<>stand consistently from 22" height surface with supervision to improve independence. Met 7/5/17                                                         Revised 8/31/17:  Pt will perform sit<>stand from 19" height chair to improve independence with sitting in various chairs around her home and decreased                                                                      need for "pop up seat". Met 8/12/19- pt able to perform from 19" height with supervision     Long term goals: 12 weeks, pt agrees to goals set  6. Pt will perform basic HEP for gross strengthening with supervision to deter worsening of functional limitations. Met 8/31/17- pt's sister reports poor current compliance  7. Pt will demonstrate improved bilateral hip strength to 3/5 to improve balance and independence with mobility. Met 7/5/17  8. New 7/5/17: Pt will demonstrate improved bilateral hip strength to 4/5 to improve balance and independence with mobility. nearly met-  see objective info  9. Pt will demonstrate improved cervical extension strength to 3+/5 to improve head control. Met 7/31/17  10. Pt will demonstrate improved cervical deep flexor strength to 3+/5 to improve head control. Met 7/31/17   11. Pt will demonstrate improve balance and gait by scoring 14/28 on Tinetti. Met 8/31/17- 17/28                                      Revised 8/31/17: pt will demonstrate improved balance/ gait and decreased fall risk to moderate by " scoring 19/28 on Tinetti Assessment. same- 14/28                                          12. Pt will demonstrate improved cervical PROM for rotation to 40 degrees to improve functional head control. Slight improvement-  L= 48 deg, R= 7 deg,                                          13. New 8/1/18- pt will ambulate 50ft with LBQC and supervision over level tile to demonstrate improved household mobility with decreased AD- ongoing- 50 ft with large based quad cane with min A (hand held assist)  Plan   focus on hip and VMO strengthening and unsupported standing balance. Continue to address cervical ROM as tolerated.         Polo Perez, PT    9/25/2019

## 2019-09-30 ENCOUNTER — CLINICAL SUPPORT (OUTPATIENT)
Dept: REHABILITATION | Facility: HOSPITAL | Age: 54
End: 2019-09-30
Attending: STUDENT IN AN ORGANIZED HEALTH CARE EDUCATION/TRAINING PROGRAM
Payer: COMMERCIAL

## 2019-09-30 DIAGNOSIS — R53.1 DECREASED STRENGTH: ICD-10-CM

## 2019-09-30 DIAGNOSIS — Z74.09 IMPAIRED FUNCTIONAL MOBILITY, BALANCE, GAIT, AND ENDURANCE: ICD-10-CM

## 2019-09-30 DIAGNOSIS — R29.898 DECREASED ROM OF NECK: ICD-10-CM

## 2019-09-30 PROCEDURE — 97110 THERAPEUTIC EXERCISES: CPT | Mod: PO

## 2019-09-30 PROCEDURE — 97140 MANUAL THERAPY 1/> REGIONS: CPT | Mod: PO

## 2019-09-30 NOTE — PROGRESS NOTES
"Physical Therapy Progress Note      Name: Brigid Jernigan  Clinic Number: [id number]  Diagnosis: G71.11 (ICD-10-CM) - Myotonic dystrophy  Physician: Lloyd Mojica MD  Treatment Orders: PT Eval and Treat          Past Medical History:   Diagnosis Date    Abnormal EKG      Myotonic dystrophy      Pacemaker      PAF (paroxysmal atrial fibrillation)      Sleep apnea           Precautions: standard  auth period: 7/1/19 to 12/31/19  Visit #: 18/20     Time in: 1652  Time out: 1730  Total billable treatment time: 38 minutes     (visit total 2018= 73)  (Visit total 2017= 56)  Date of Eval: 5/9/2019          New POC: 9/3/2019 to 11/1/2019     Subjective   Pt reports: " Do I have you today or Yadi."   Pt's sister: no reports  Pt denies participation in structured HEP     Pain Scale:  0/10  Pt arrived with [place] J collar donned  Objective         Patient received individual therapy to increase strength, endurance, ROM, flexibility, posture, core stabilization and head control with activities as follows:     Pt participated in therapeutic exercises x 38 minutes to address ROM, strength, balance, and mobility which includes:    SCI-FIT recumbent stepper BUE/LE L 14.5 x 10 minutes for improved strength, ROM and improved activity tolerance    Supine with towel roll under spine and cervical collar removed :    B pectoral stretch 3 x 30 sec   Gentle suboccipital distraction   1 X 10 reps of chin tucks, 5 sec hold         LAQ over extra large bolster x 20 reps each leg.  #3lbs on R LE and 0 on LLE    Sit<> supine on mat with mod I for increased time to complete.                 Static Standing:   X 4 mins and 1 min with no UE support and CGA/SBA       Written Home Exercises: continue with previous HEP  Pt demo fair understanding of the education provided. Brigid demonstrated fair return demonstration of activities.      Education provided re: perform Long arc quads frequently at home to address L knee extension " "weakness     Pt has no cultural, educational or language barriers to learning provided.     Assessment   Brigid tolerated tx session well and did not have any problems.  Pt was able to remain on the Sci Fit recumbent stepper for 10 mins with full resistance the entire time and was able to statically stand longer unsupported with out any loss of balance.    Cont with current POC.      Pt prognosis is Good. Pt will continue to benefit from skilled outpatient physical therapy to address the deficits listed in the problem list, provide pt/family education and to maximize pt's level of independence in the home and community environment.      Barriers to progress: transportation, motivation for exercises     Activity limitations/ Participation Restrictions:   Fall Risk - impaired balance   Weakness  Impaired muscle tone  Poor head control  Decreased ROM  Gait deviations   Decreased ambulation   Decreased activity tolerance   Decreased independence with daily activities   Requires skilled supervision to complete and progress HEP   Requires skilled PT for DME        Pt's spiritual, cultural and educational needs considered and pt agreeable to plan of care and GOALS as stated below:    Short term goals: 6 weeks, pt agrees to goals set. (As of 9/9/19)  1. Pt will perform chin tucks in supine with supervision to improve independent with HEP. Met 7/5/17  2. Assist pt with obtaining appropriate cervical brace to improve head control. Met 7/23/2019- pt is consistently wearing since last fall  3. Pt will demonstrate improved knee ext strength on left to 4/5 to improve safety with ambulation and sit<>stand. Met 9/28/17   4. Pt will demonstrate improved cervical rotation PROM to 20 degrees to improve functional head control. Met 7/5/17  5. Pt will perform sit<>stand consistently from 22" height surface with supervision to improve independence. Met 7/5/17                                                         Revised 8/31/17:  Pt " "will perform sit<>stand from 19" height chair to improve independence with sitting in various chairs around her home and decreased                                                                      need for "pop up seat". Met 8/12/19- pt able to perform from 19" height with supervision     Long term goals: 12 weeks, pt agrees to goals set  6. Pt will perform basic HEP for gross strengthening with supervision to deter worsening of functional limitations. Met 8/31/17- pt's sister reports poor current compliance  7. Pt will demonstrate improved bilateral hip strength to 3/5 to improve balance and independence with mobility. Met 7/5/17  8. New 7/5/17: Pt will demonstrate improved bilateral hip strength to 4/5 to improve balance and independence with mobility. nearly met-  see objective info  9. Pt will demonstrate improved cervical extension strength to 3+/5 to improve head control. Met 7/31/17  10. Pt will demonstrate improved cervical deep flexor strength to 3+/5 to improve head control. Met 7/31/17   11. Pt will demonstrate improve balance and gait by scoring 14/28 on Tinetti. Met 8/31/17- 17/28                                      Revised 8/31/17: pt will demonstrate improved balance/ gait and decreased fall risk to moderate by scoring 19/28 on Tinetti Assessment. same- 14/28                                          12. Pt will demonstrate improved cervical PROM for rotation to 40 degrees to improve functional head control. Slight improvement-  L= 48 deg, R= 7 deg,                                          13. New 8/1/18- pt will ambulate 50ft with LBQC and supervision over level tile to demonstrate improved household mobility with decreased AD- ongoing- 50 ft with large based quad cane with min A (hand held assist)  Plan   focus on hip and VMO strengthening and unsupported standing balance. Continue to address cervical ROM as tolerated.         Mae Caicedo, PTA    9/30/2019       "

## 2019-10-02 ENCOUNTER — CLINICAL SUPPORT (OUTPATIENT)
Dept: REHABILITATION | Facility: HOSPITAL | Age: 54
End: 2019-10-02
Attending: STUDENT IN AN ORGANIZED HEALTH CARE EDUCATION/TRAINING PROGRAM
Payer: COMMERCIAL

## 2019-10-02 DIAGNOSIS — Z74.09 IMPAIRED FUNCTIONAL MOBILITY, BALANCE, GAIT, AND ENDURANCE: ICD-10-CM

## 2019-10-02 DIAGNOSIS — R53.1 DECREASED STRENGTH: ICD-10-CM

## 2019-10-02 DIAGNOSIS — R29.898 DECREASED ROM OF NECK: ICD-10-CM

## 2019-10-02 PROCEDURE — 97116 GAIT TRAINING THERAPY: CPT | Mod: PO

## 2019-10-02 PROCEDURE — 97140 MANUAL THERAPY 1/> REGIONS: CPT | Mod: PO

## 2019-10-03 NOTE — PROGRESS NOTES
"Physical Therapy Progress Note      Name: Brigid Jernigan  Clinic Number: [id number]  Diagnosis: G71.11 (ICD-10-CM) - Myotonic dystrophy  Physician: Lloyd Mojica MD  Treatment Orders: PT Eval and Treat          Past Medical History:   Diagnosis Date    Abnormal EKG      Myotonic dystrophy      Pacemaker      PAF (paroxysmal atrial fibrillation)      Sleep apnea           Precautions: standard  auth period: 7/1/19 to 12/31/19  Visit #: 19/20     Time in: 1700  Time out: 1730  Total billable treatment time: 30 minutes     (visit total 2018= 73)  (Visit total 2017= 56)  Date of Eval: 5/9/2019          New POC: 9/3/2019 to 11/1/2019     Subjective   Pt reports: " How are you?"  Pt's sister: "Did you find out about the insurance?"  Pt denies participation in structured HEP     Pain Scale:  0/10  Pt arrived with [place] J collar donned  Objective         Patient received individual therapy to increase strength, endurance, ROM, flexibility, posture, core stabilization and head control with activities as follows:     Pt participated in therapeutic exercises x 20 minutes to address ROM, strength, balance, and mobility which includes:    SCI-FIT recumbent stepper BUE/LE L 14.5 x 10 minutes for improved strength, ROM and improved activity tolerance    Supine with towel roll under spine and cervical collar removed :    B pectoral stretch 3 x 30 sec   Gentle suboccipital distraction   1 X 10 reps of chin tucks, 5 sec hold        Gait: x 10 min  Pt ambulated ~ 56ft with LBQC and Close CGA.  Pt required 1 HHA occasionally for additional support.       Written Home Exercises: continue with previous HEP  Pt demo fair understanding of the education provided. Brigid demonstrated fair return demonstration of activities.      Education provided re: perform Long arc quads frequently at home to address L knee extension weakness     Pt has no cultural, educational or language barriers to learning provided.     Assessment " "  Brigid tolerated tx session well and did not have any problems.  Pt agreeable to gait today with the LBQC.  Pt required VC's to increase upright posture and look up during gait.  Pt cont to report noncompliance with HEP and reports she has no time.  Pt educated on the importance of HEP again.  No significant changes noted.      Pt prognosis is Good. Pt will continue to benefit from skilled outpatient physical therapy to address the deficits listed in the problem list, provide pt/family education and to maximize pt's level of independence in the home and community environment.      Barriers to progress: transportation, motivation for exercises     Activity limitations/ Participation Restrictions:   Fall Risk - impaired balance   Weakness  Impaired muscle tone  Poor head control  Decreased ROM  Gait deviations   Decreased ambulation   Decreased activity tolerance   Decreased independence with daily activities   Requires skilled supervision to complete and progress HEP   Requires skilled PT for DME        Pt's spiritual, cultural and educational needs considered and pt agreeable to plan of care and GOALS as stated below:    Short term goals: 6 weeks, pt agrees to goals set. (As of 9/9/19)  1. Pt will perform chin tucks in supine with supervision to improve independent with HEP. Met 7/5/17  2. Assist pt with obtaining appropriate cervical brace to improve head control. Met 7/23/2019- pt is consistently wearing since last fall  3. Pt will demonstrate improved knee ext strength on left to 4/5 to improve safety with ambulation and sit<>stand. Met 9/28/17   4. Pt will demonstrate improved cervical rotation PROM to 20 degrees to improve functional head control. Met 7/5/17  5. Pt will perform sit<>stand consistently from 22" height surface with supervision to improve independence. Met 7/5/17                                                         Revised 8/31/17:  Pt will perform sit<>stand from 19" height chair to improve " "independence with sitting in various chairs around her home and decreased                                                                      need for "pop up seat". Met 8/12/19- pt able to perform from 19" height with supervision     Long term goals: 12 weeks, pt agrees to goals set  6. Pt will perform basic HEP for gross strengthening with supervision to deter worsening of functional limitations. Met 8/31/17- pt's sister reports poor current compliance  7. Pt will demonstrate improved bilateral hip strength to 3/5 to improve balance and independence with mobility. Met 7/5/17  8. New 7/5/17: Pt will demonstrate improved bilateral hip strength to 4/5 to improve balance and independence with mobility. nearly met-  see objective info  9. Pt will demonstrate improved cervical extension strength to 3+/5 to improve head control. Met 7/31/17  10. Pt will demonstrate improved cervical deep flexor strength to 3+/5 to improve head control. Met 7/31/17   11. Pt will demonstrate improve balance and gait by scoring 14/28 on Tinetti. Met 8/31/17- 17/28                                      Revised 8/31/17: pt will demonstrate improved balance/ gait and decreased fall risk to moderate by scoring 19/28 on Tinetti Assessment. same- 14/28                                          12. Pt will demonstrate improved cervical PROM for rotation to 40 degrees to improve functional head control. Slight improvement-  L= 48 deg, R= 7 deg,                                          13. New 8/1/18- pt will ambulate 50ft with LBQC and supervision over level tile to demonstrate improved household mobility with decreased AD- ongoing- 50 ft with large based quad cane with min A (hand held assist)  Plan   focus on hip and VMO strengthening and unsupported standing balance. Continue to address cervical ROM as tolerated.         Mae Caicedo, PTA    10/2/2019       "

## 2019-10-07 ENCOUNTER — DOCUMENTATION ONLY (OUTPATIENT)
Dept: REHABILITATION | Facility: HOSPITAL | Age: 54
End: 2019-10-07

## 2019-10-07 NOTE — PROGRESS NOTES
PT/PTA met face to face to discuss pt's treatment plan and progress towards established goals.  Continue with current PT POC with focus on standing balance, endurance, strengthening and cervical strengthening and stretching.  Patient will be seen by physical therapist at least every sixth treatment or 30 days, whichever occurs first.    Mae Caicedo, IRIS  10/07/2019    Meeting performed on 10/7/2019  JORDY CuiT  10/11/2019

## 2019-10-09 ENCOUNTER — CLINICAL SUPPORT (OUTPATIENT)
Dept: REHABILITATION | Facility: HOSPITAL | Age: 54
End: 2019-10-09
Attending: STUDENT IN AN ORGANIZED HEALTH CARE EDUCATION/TRAINING PROGRAM
Payer: COMMERCIAL

## 2019-10-09 DIAGNOSIS — Z74.09 IMPAIRED FUNCTIONAL MOBILITY, BALANCE, GAIT, AND ENDURANCE: ICD-10-CM

## 2019-10-09 DIAGNOSIS — R53.1 DECREASED STRENGTH: ICD-10-CM

## 2019-10-09 DIAGNOSIS — R29.898 DECREASED ROM OF NECK: ICD-10-CM

## 2019-10-09 PROCEDURE — 97110 THERAPEUTIC EXERCISES: CPT | Mod: PO

## 2019-10-09 PROCEDURE — 97140 MANUAL THERAPY 1/> REGIONS: CPT | Mod: PO

## 2019-10-09 NOTE — PROGRESS NOTES
"Physical Therapy Progress Note      Name: Brigid Jernigan  Clinic Number: [id number]  Diagnosis: G71.11 (ICD-10-CM) - Myotonic dystrophy  Physician: Lloyd Mojica MD  Treatment Orders: PT Eval and Treat          Past Medical History:   Diagnosis Date    Abnormal EKG      Myotonic dystrophy      Pacemaker      PAF (paroxysmal atrial fibrillation)      Sleep apnea           Precautions: standard  auth period: 7/1/19 to 12/31/19  Visit #: 20/20     Time in: 1655  Time out: 1735  Total billable treatment time: 40 minutes     (visit total 2018= 73)  (Visit total 2017= 56)  Date of Eval: 5/9/2019          New POC: 9/3/2019 to 11/1/2019     Subjective   Pt reports: " I'm pretty good, I haven't worn the brace today as much as I usually do."   Pt's sister: " Can I make more appointments yet?  Do you know?"   Pt denies participation in structured HEP     Pain Scale:  0/10  Pt arrived with out [place] J collar donned  Objective         Patient received individual therapy to increase strength, endurance, ROM, flexibility, posture, core stabilization and head control with activities as follows:     Pt participated in therapeutic exercises x 39 minutes to address ROM, strength, balance, and mobility which includes:    Nu- Step recumbent stepper BUE/LE L 14.5 x 10 minutes for improved strength, ROM and improved activity tolerance    Supine with towel roll under spine and cervical collar removed :    B pectoral stretch 3 x 30 sec   Gentle suboccipital distraction   2 x 10 reps of B LE SAQ over extra large bolster, 3 lbs on R LE, 0 on LLE.     Static standing: with no UE support x 2 trials  X 4 min 35 sec,  And 3 min 51 sec         Written Home Exercises: continue with previous HEP  Pt demo fair understanding of the education provided. Brigid demonstrated fair return demonstration of activities.      Education provided re: perform Long arc quads frequently at home to address L knee extension weakness     Pt has no " "cultural, educational or language barriers to learning provided.     Assessment   Brigid tolerated tx session well and did not have any problems. Pt was able to statically stand longer today and more trials then last tx session.  Pt re-educated on the importance of HEP and moving around more at home.   Pt will continue to benefit from skilled outpatient physical therapy to address the deficits listed in the problem list, provide pt/family education and to maximize pt's level of independence in the home and community environment.      Barriers to progress: transportation, motivation for exercises     Activity limitations/ Participation Restrictions:   Fall Risk - impaired balance   Weakness  Impaired muscle tone  Poor head control  Decreased ROM  Gait deviations   Decreased ambulation   Decreased activity tolerance   Decreased independence with daily activities   Requires skilled supervision to complete and progress HEP   Requires skilled PT for DME        Pt's spiritual, cultural and educational needs considered and pt agreeable to plan of care and GOALS as stated below:    Short term goals: 6 weeks, pt agrees to goals set. (As of 9/9/19)  1. Pt will perform chin tucks in supine with supervision to improve independent with HEP. Met 7/5/17  2. Assist pt with obtaining appropriate cervical brace to improve head control. Met 7/23/2019- pt is consistently wearing since last fall  3. Pt will demonstrate improved knee ext strength on left to 4/5 to improve safety with ambulation and sit<>stand. Met 9/28/17   4. Pt will demonstrate improved cervical rotation PROM to 20 degrees to improve functional head control. Met 7/5/17  5. Pt will perform sit<>stand consistently from 22" height surface with supervision to improve independence. Met 7/5/17                                                         Revised 8/31/17:  Pt will perform sit<>stand from 19" height chair to improve independence with sitting in various chairs " "around her home and decreased                                                                      need for "pop up seat". Met 8/12/19- pt able to perform from 19" height with supervision     Long term goals: 12 weeks, pt agrees to goals set  6. Pt will perform basic HEP for gross strengthening with supervision to deter worsening of functional limitations. Met 8/31/17- pt's sister reports poor current compliance  7. Pt will demonstrate improved bilateral hip strength to 3/5 to improve balance and independence with mobility. Met 7/5/17  8. New 7/5/17: Pt will demonstrate improved bilateral hip strength to 4/5 to improve balance and independence with mobility. nearly met-  see objective info  9. Pt will demonstrate improved cervical extension strength to 3+/5 to improve head control. Met 7/31/17  10. Pt will demonstrate improved cervical deep flexor strength to 3+/5 to improve head control. Met 7/31/17   11. Pt will demonstrate improve balance and gait by scoring 14/28 on Tinetti. Met 8/31/17- 17/28                                      Revised 8/31/17: pt will demonstrate improved balance/ gait and decreased fall risk to moderate by scoring 19/28 on Tinetti Assessment. same- 14/28                                          12. Pt will demonstrate improved cervical PROM for rotation to 40 degrees to improve functional head control. Slight improvement-  L= 48 deg, R= 7 deg,                                          13. New 8/1/18- pt will ambulate 50ft with LBQC and supervision over level tile to demonstrate improved household mobility with decreased AD- ongoing- 50 ft with large based quad cane with min A (hand held assist)  Plan   focus on hip and VMO strengthening and unsupported standing balance. Continue to address cervical ROM as tolerated.         Mae Caicedo, PTA    10/9/2019       "

## 2019-10-10 ENCOUNTER — DOCUMENTATION ONLY (OUTPATIENT)
Dept: REHABILITATION | Facility: HOSPITAL | Age: 54
End: 2019-10-10

## 2019-10-10 NOTE — PROGRESS NOTES
PT/PTA met face to face to discuss pt's treatment plan and progress towards established goals. Continue with current PT POC, including: standing balance, endurance, strengthening and cervical strengthening and stretching . Pt will be seen by physical therapist at least every 6th treatment day or every 30 days, whichever occurs first.      Jesus Acosta PTA  10/07/19    Meeting performed on 10/7/2019  Yadi Mercado DPT  10/11/2019

## 2019-10-14 ENCOUNTER — CLINICAL SUPPORT (OUTPATIENT)
Dept: REHABILITATION | Facility: HOSPITAL | Age: 54
End: 2019-10-14
Attending: STUDENT IN AN ORGANIZED HEALTH CARE EDUCATION/TRAINING PROGRAM
Payer: COMMERCIAL

## 2019-10-14 DIAGNOSIS — R53.1 DECREASED STRENGTH: ICD-10-CM

## 2019-10-14 DIAGNOSIS — Z74.09 IMPAIRED FUNCTIONAL MOBILITY, BALANCE, GAIT, AND ENDURANCE: ICD-10-CM

## 2019-10-14 DIAGNOSIS — R29.898 DECREASED ROM OF NECK: ICD-10-CM

## 2019-10-14 PROCEDURE — 97110 THERAPEUTIC EXERCISES: CPT | Mod: PO | Performed by: PHYSICAL THERAPIST

## 2019-10-24 ENCOUNTER — CLINICAL SUPPORT (OUTPATIENT)
Dept: REHABILITATION | Facility: HOSPITAL | Age: 54
End: 2019-10-24
Attending: STUDENT IN AN ORGANIZED HEALTH CARE EDUCATION/TRAINING PROGRAM
Payer: COMMERCIAL

## 2019-10-24 DIAGNOSIS — Z74.09 IMPAIRED FUNCTIONAL MOBILITY, BALANCE, GAIT, AND ENDURANCE: ICD-10-CM

## 2019-10-24 DIAGNOSIS — R29.898 DECREASED ROM OF NECK: ICD-10-CM

## 2019-10-24 DIAGNOSIS — R53.1 DECREASED STRENGTH: ICD-10-CM

## 2019-10-24 PROCEDURE — 97110 THERAPEUTIC EXERCISES: CPT | Mod: PO | Performed by: PHYSICAL THERAPIST

## 2019-10-24 NOTE — PROGRESS NOTES
Physical Therapy Progress Note      Name: Brigid Jernigan  Clinic Number: [id number]  Diagnosis: G71.11 (ICD-10-CM) - Myotonic dystrophy  Physician: Lloyd Mojica MD  Treatment Orders: PT Eval and Treat          Past Medical History:   Diagnosis Date    Abnormal EKG      Myotonic dystrophy      Pacemaker      PAF (paroxysmal atrial fibrillation)      Sleep apnea           Precautions: standard  auth period: 7/1/19 to 12/31/19  Visit #: 2/20     Time in: 1706  Time out: 1730  Total billable treatment time: 24 minutes  Total treatment time: 24 minutes     (visit total 2018= 73)  (Visit total 2017= 56)  Date of Eval: 5/9/2019          New POC: 9/3/2019 to 11/1/2019     Subjective   Pt reports: no new complaints, pt reports she is doing good today  Pt's sister: can only stay until 5:30 today  Pt reports compliance with cervical rotation and Long arc quads      Pain Scale:  0/10  Pt arrived with out [place] J collar in hand  Objective         Patient received individual therapy to increase strength, endurance, ROM, flexibility, posture, core stabilization and head control with activities as follows:      Pt participated in therapeutic exercises x 24 minutes to address ROM, strength, balance, and mobility which includes:     Sitting: LAQ 3 sec hold 2 x 10     Supine:       Gentle suboccipital distraction       Cervical rotation AAROM 2 x 30 sec       Upper trap stretch 2 x 30 sec   Bridging with Maroon thera band for hip abduction, Green thera band for UE pull downs 15x 3 sec    Gait with large based quad cane + L hand held assist (min A) 92 ft ([place] J collar on)  Gait deviations: poor step through with RLE, increased trunk flexion during initial to mid stance, decreased L knee flexion during swing, non continous steps. Pt able to correct trunk posture <50% of the time with maximal verbal cues.      Written Home Exercises: continue with previous HEP  Pt demo fair understanding of the education provided. Brigid  demonstrated fair return demonstration of activities.      Education provided re: perform Long arc quads frequently at home to address L knee extension weakness     Pt has no cultural, educational or language barriers to learning provided.     Assessment       Brigid tolerated treatment session well. PT advanced bridging to include lat pull downs to improve core stability in standing. Pt demonstrates continued weakness of L vastus medialis oblique, but decreased quad lag is noted during Long arc quads. PT needs to focus on more core strengthening as pt is demonstrating increased trunk flexion to advance weight during gait. Pt will continue to benefit from skilled outpatient physical therapy to address the deficits listed in the problem list, provide pt/family education and to maximize pt's level of independence in the home and community environment.      Barriers to progress: transportation, motivation for exercises     Activity limitations/ Participation Restrictions:   Fall Risk - impaired balance   Weakness  Impaired muscle tone  Poor head control  Decreased ROM  Gait deviations   Decreased ambulation   Decreased activity tolerance   Decreased independence with daily activities   Requires skilled supervision to complete and progress HEP   Requires skilled PT for DME         Pt's spiritual, cultural and educational needs considered and pt agreeable to plan of care and GOALS as stated below:    Short term goals: 6 weeks, pt agrees to goals set. (as of 10/14/2019)  1. Pt will perform chin tucks in supine with supervision to improve independent with HEP. Met 7/5/17  2. Assist pt with obtaining appropriate cervical brace to improve head control. Met 7/23/2019-   3. Pt will demonstrate improved knee ext strength on left to 4/5 to improve safety with ambulation and sit<>stand. Met 9/28/17   4. Pt will demonstrate improved cervical rotation PROM to 20 degrees to improve functional head control. Met 7/5/17  5. Pt will  "perform sit<>stand consistently from 22" height surface with supervision to improve independence. Met 7/5/17                                                         Revised 8/31/17:  Pt will perform sit<>stand from 19" height chair to improve independence with sitting in various chairs around her home and decreased                                                                      need for "pop up seat". Met 8/12/19- pt able to perform from 19" height with supervision     Long term goals: 12 weeks, pt agrees to goals set  6. Pt will perform basic HEP for gross strengthening with supervision to deter worsening of functional limitations. Met 8/31/17- pt's sister reports poor current compliance  7. Pt will demonstrate improved bilateral hip strength to 3/5 to improve balance and independence with mobility. Met 7/5/17  8. New 7/5/17: Pt will demonstrate improved bilateral hip strength to 4/5 to improve balance and independence with mobility. ~same  9. Pt will demonstrate improved cervical extension strength to 3+/5 to improve head control. Met 7/31/17  10. Pt will demonstrate improved cervical deep flexor strength to 3+/5 to improve head control. Met 7/31/17   11. Pt will demonstrate improve balance and gait by scoring 14/28 on Tinetti. Met 8/31/17- 17/28                                      Revised 8/31/17: pt will demonstrate improved balance/ gait and decreased fall risk to moderate by scoring 19/28 on Tinetti Assessment. improved- 17/28                                          12. Pt will demonstrate improved cervical PROM for rotation to 40 degrees to improve functional head control. ~same-  L= 40 deg, R= 9 deg,                                          13. New 8/1/18- pt will ambulate 50ft with LBQC and supervision over level tile to demonstrate improved household mobility with decreased AD- improved- 50 ft with large based quad cane with CGA (intermittent hand held assist)     Plan   focus on hip and VMO " strengthening. More focus on core strengthening.  Continue to address cervical ROM as tolerated.         Yadi Mercado, PT  10/24/2019

## 2019-10-24 NOTE — PLAN OF CARE
Physical Therapy Progress Note      Name: Brigid Jernigan  Clinic Number: [id number]  Diagnosis: G71.11 (ICD-10-CM) - Myotonic dystrophy  Physician: Lloyd Mojica MD  Treatment Orders: PT Eval and Treat          Past Medical History:   Diagnosis Date    Abnormal EKG      Myotonic dystrophy      Pacemaker      PAF (paroxysmal atrial fibrillation)      Sleep apnea           Precautions: standard  auth period: 7/1/19 to 12/31/19  Visit #: 1/20     Time in: 1615  Time out: 1658  Total billable treatment time: 32 minutes  Total treatment time: 42     (visit total 2018= 73)  (Visit total 2017= 56)  Date of Eval: 5/9/2019          New POC: 9/3/2019 to 11/1/2019     Subjective   Pt reports: no new complaints, pt reports she is doing good today  Pt's sister: wants to correct scheduling for this week and next week (today is only PT appointment until 10/24/19)  Pt denies participation in structured HEP     Pain Scale:  0/10  Pt arrived with out [place] J collar in hand  Objective         Patient received individual therapy to increase strength, endurance, ROM, flexibility, posture, core stabilization and head control with activities as follows:     Pt participated in therapeutic exercises x 32 minutes to address ROM, strength, balance, and mobility which includes:  Lower Extremity Strength    RLE eval RLE 5/19 RLE 7/19 RLE 8/19 RLE 9/19 RLE 9/23/19 RLE 10/19 LLE eval LLE 5/19 LLE 7/19 LLE 8/19 LLE 9/19 LLE 9/23/19 LLE 10/19   Hip Flexion: 3+/5 5/5 NT NT NT 4-/5 4/5 3+/5 5/5 NT NT NT 4-/5 4-/5   Hip Extension:  2/5 4+/5 3+/5 3+/5 3+/5 3+/5 3+/5 2/5 4/5 3+ to 4-/5 4/5 4+/5 4/5 4-/5   Hip Abduction: NT 4+/5 4/5 4/5 4+/5 4/5 4/5 NT 4/5 4-/5 4+/5 4+/5 5/5 4+/5   Hip Adduction: NT 4/5 4-/5 4-/5 4-/5 4/5 4/5 NT 3+/5 4/5 4-/5 4-/5 4/5 4/5   Knee Extension: 4+/5 NT NT 4+/5 5/5 NT 5/5 3+/5 NT NT 3+/5 3+ to 4-/5 NT 3+/5   Knee Flexion: 4/5 NT NT 4-/5 4+/5 4/5 4-/5 4-/5 NT NT 3+/5 3+/5 3+/5 4-/5       cervical rotation AAROM  (supine) L= 40 deg, R= 9 deg    Sitting: LAQ on L with AAROM to improve terminal knee extension    Supine:    B pectoral stretch 3 x 30 sec   Gentle suboccipital distraction   Cervical rotation AAROM   Upper trap stretch 3 x 30 sec    Leg press 140# 15x, BLE   Single leg 60# 10x    Tinetti Balance Assessment  Balance:  1. Sitting Balance 1   Leans/ slides in chair= 0   Steady= 1  2. Rises from chair 1   Unable without help= 0   Able, uses arms= 1   Able without use of arms= 2  3. Attempts to rise 1   Unable without help= 0   Able, >1 attmept required-= 1   Able, 1 attempt= 2  4. Immediate standing balance (1st 5 seconds) 1   Unsteady (swaggers, moves feet, trunk sway)= 0   Steady but uses walker or other support= 1   Narrow base of support without walker or support= 2  5. Nudged 2   Begins to fall= 0   Staggers, grabs, catches self= 1   Steady= 2  6. Standing balance 1   Unsteady= 0   Steady but wide LEYDI or uses AD=1   Narrow LEYDI without AD=2  7. Eyes closed 0   Unsteady= 0   Steady= 1  8. Turning 360 degrees 0   Discontinuous steps= 0   Continuous steps= 1   Unsteady= 0   Steady= 1  9. Sitting down 1   Unsafe= 0   Uses arms or not in a smooth motion= 1   Safe, smooth motion= 2  Balance score= 9/16  Gait:  1. Initiation 1   hesitates or multiple attempts to start= 0   No hesitancy= 1  2. Step length 2   Step to= 0   One foot passes= 1   reciprocal pattern= 2  3. Step height 2    Neither foot clears floor= 0   One foot clears floor= 1   Both feet clear floor= 2  4. Step symmetry 0   Not symmetrical= 0   Appears symmetrical= 1  5. Step continuity 1   Not continuous= 0   Appears continuous= 1  6. Path 1   Marked deviation= 0   Mild/moderate deviation or uses A.D.= 1   Straight without A.D.= 2  7. Trunk 0   Marked sway or uses A.D.= 0   No sway, but flexes knees or back, spread arms out while walking= 1   No sway, no flexion, no use of UE, no use of A.D.= 2  8. Walking stance 1   Heels apart= 0   Heels almost touching  while walking= 1  Gait score= 8/12  Total score= 17/28 , high fall risk    0-18= high fall risk  19-23= moderate fall risk  24-28= low fall risk         Written Home Exercises: continue with previous HEP  Pt demo fair understanding of the education provided. Brigid demonstrated fair return demonstration of activities.      Education provided re: perform Long arc quads frequently at home to address L knee extension weakness     Pt has no cultural, educational or language barriers to learning provided.     Assessment   Assessment period: 9/25/2019 to 10/14/2019. Pt only participated in 4 follow up PT sessions since last assessment due to a shorter length of time (~3 weeks) and scheduling availability.    Brigid tolerates treatment sessions well. Pt reports inconsistent use of cervical collar as she has not had as much pain in cervical region. Pt conitnues to demonstrate a forward head posture and poor cervical active range of motion. Pt is currently maintaining cervical range of Motion but continues to demonstrate a resting position in slight L rotation. LE strength is ~same with pt demonstrating deficits in L knee extension and [place] hip flexion/ extension weakness. Weakness in BLE continues to limit progress with advancing gait using a less restrictive assistive device. Pt demonstrates improved static standing balance without UE support with external perterbations. Pt also demonstrates improved static standing balance without UE support and can balance for at least 3 minutes.  Pt requires skilled PT to prevent a decline in cervical range of Motion and mobility (due to progressive nature of diagnosis).  Pt will continue to benefit from skilled outpatient physical therapy to address the deficits listed in the problem list, provide pt/family education and to maximize pt's level of independence in the home and community environment.      Barriers to progress: transportation, motivation for exercises     Activity  "limitations/ Participation Restrictions:   Fall Risk - impaired balance   Weakness  Impaired muscle tone  Poor head control  Decreased ROM  Gait deviations   Decreased ambulation   Decreased activity tolerance   Decreased independence with daily activities   Requires skilled supervision to complete and progress HEP   Requires skilled PT for DME        Pt's spiritual, cultural and educational needs considered and pt agreeable to plan of care and GOALS as stated below:    Short term goals: 6 weeks, pt agrees to goals set. (as of 10/14/2019)  1. Pt will perform chin tucks in supine with supervision to improve independent with HEP. Met 7/5/17  2. Assist pt with obtaining appropriate cervical brace to improve head control. Met 7/23/2019-   3. Pt will demonstrate improved knee ext strength on left to 4/5 to improve safety with ambulation and sit<>stand. Met 9/28/17   4. Pt will demonstrate improved cervical rotation PROM to 20 degrees to improve functional head control. Met 7/5/17  5. Pt will perform sit<>stand consistently from 22" height surface with supervision to improve independence. Met 7/5/17                                                         Revised 8/31/17:  Pt will perform sit<>stand from 19" height chair to improve independence with sitting in various chairs around her home and decreased                                                                      need for "pop up seat". Met 8/12/19- pt able to perform from 19" height with supervision     Long term goals: 12 weeks, pt agrees to goals set  6. Pt will perform basic HEP for gross strengthening with supervision to deter worsening of functional limitations. Met 8/31/17- pt's sister reports poor current compliance  7. Pt will demonstrate improved bilateral hip strength to 3/5 to improve balance and independence with mobility. Met 7/5/17  8. New 7/5/17: Pt will demonstrate improved bilateral hip strength to 4/5 to improve balance and independence with " mobility. ~same  9. Pt will demonstrate improved cervical extension strength to 3+/5 to improve head control. Met 7/31/17  10. Pt will demonstrate improved cervical deep flexor strength to 3+/5 to improve head control. Met 7/31/17   11. Pt will demonstrate improve balance and gait by scoring 14/28 on Tinetti. Met 8/31/17- 17/28                                      Revised 8/31/17: pt will demonstrate improved balance/ gait and decreased fall risk to moderate by scoring 19/28 on Tinetti Assessment. improved- 17/28                                          12. Pt will demonstrate improved cervical PROM for rotation to 40 degrees to improve functional head control. ~same-  L= 40 deg, R= 9 deg,                                          13. New 8/1/18- pt will ambulate 50ft with LBQC and supervision over level tile to demonstrate improved household mobility with decreased AD- improved- 50 ft with large based quad cane with CGA (intermittent hand held assist)    Plan   focus on hip and VMO, hip, and core strengthening and unsupported standing balance. Continue to address cervical ROM as tolerated.         Yadi Mercado, PT    10/14/2019

## 2019-10-30 ENCOUNTER — CLINICAL SUPPORT (OUTPATIENT)
Dept: REHABILITATION | Facility: HOSPITAL | Age: 54
End: 2019-10-30
Attending: STUDENT IN AN ORGANIZED HEALTH CARE EDUCATION/TRAINING PROGRAM
Payer: COMMERCIAL

## 2019-10-30 DIAGNOSIS — Z74.09 IMPAIRED FUNCTIONAL MOBILITY, BALANCE, GAIT, AND ENDURANCE: ICD-10-CM

## 2019-10-30 DIAGNOSIS — R53.1 DECREASED STRENGTH: ICD-10-CM

## 2019-10-30 DIAGNOSIS — R29.898 DECREASED ROM OF NECK: ICD-10-CM

## 2019-10-30 PROCEDURE — 97530 THERAPEUTIC ACTIVITIES: CPT | Mod: PO

## 2019-10-30 PROCEDURE — 97116 GAIT TRAINING THERAPY: CPT | Mod: PO,59

## 2019-10-30 PROCEDURE — 97110 THERAPEUTIC EXERCISES: CPT | Mod: PO

## 2019-10-31 NOTE — PROGRESS NOTES
"Physical Therapy Progress Note      Name: Brigid Jernigan  Clinic Number: [id number]  Diagnosis: G71.11 (ICD-10-CM) - Myotonic dystrophy  Physician: Lloyd Mojica MD  Treatment Orders: PT Eval and Treat          Past Medical History:   Diagnosis Date    Abnormal EKG      Myotonic dystrophy      Pacemaker      PAF (paroxysmal atrial fibrillation)      Sleep apnea           Precautions: standard  auth period: 7/1/19 to 12/31/19  Visit #: 3/20     Time in: 1650  Time out: 1730  Total billable treatment time: 40 minutes     (visit total 2018= 73)  (Visit total 2017= 56)  Date of Eval: 5/9/2019          New POC: 9/3/2019 to 11/1/2019     Subjective   Pt reports: " I made it here right before the downpour."  Pt's sister: no significant comment  Pt reports some compliance with HEP     Pain Scale:  0/10  Pt arrived with out [place] J collar in hand  Objective         Patient received individual therapy to increase strength, endurance, ROM, flexibility, posture, core stabilization and head control with activities as follows:      Pt participated in therapeutic exercises x 22 minutes to address ROM, strength, balance, and mobility which includes:      Supine:       Gentle suboccipital distraction       Cervical rotation AAROM 2 x 30 sec       Upper trap stretch 2 x 30 sec   2 x 10 reps of B LE SAQ over xlarge bolster.  3lbs on R LE and 0 on LLE     Patient participated in dynamic functional therapeutic activities to improve functional performance for 8 minutes. Including:   Pt performed static standing x 6 mins total unsupported and SBA.  Pt required occasional 1 finger touchdown for safety.         Gait x 15 min including:   Gait with large based quad cane + L hand held assist (min A) 65 ft ([place] J collar on)  Gait deviations: poor step through with RLE, increased trunk flexion during initial to mid stance, decreased L knee flexion during swing, non continous steps. Pt able to correct trunk posture <50% of the time " "with maximal verbal cues.      Written Home Exercises: continue with previous HEP  Pt demo fair understanding of the education provided. Brigid demonstrated fair return demonstration of activities.      Education provided re: perform Long arc quads frequently at home to address L knee extension weakness     Pt has no cultural, educational or language barriers to learning provided.     Assessment       Brigid tolerated treatment session well and did not have any problems.  Pt cont with cervical stretching and strengthening.  Unable to stand unsupported as long and had decreased gait distance with QC.  No changes noted.  Cont with plan of care.       Barriers to progress: transportation, motivation for exercises     Activity limitations/ Participation Restrictions:   Fall Risk - impaired balance   Weakness  Impaired muscle tone  Poor head control  Decreased ROM  Gait deviations   Decreased ambulation   Decreased activity tolerance   Decreased independence with daily activities   Requires skilled supervision to complete and progress HEP   Requires skilled PT for DME         Pt's spiritual, cultural and educational needs considered and pt agreeable to plan of care and GOALS as stated below:    Short term goals: 6 weeks, pt agrees to goals set. (as of 10/14/2019)  1. Pt will perform chin tucks in supine with supervision to improve independent with HEP. Met 7/5/17  2. Assist pt with obtaining appropriate cervical brace to improve head control. Met 7/23/2019-   3. Pt will demonstrate improved knee ext strength on left to 4/5 to improve safety with ambulation and sit<>stand. Met 9/28/17   4. Pt will demonstrate improved cervical rotation PROM to 20 degrees to improve functional head control. Met 7/5/17  5. Pt will perform sit<>stand consistently from 22" height surface with supervision to improve independence. Met 7/5/17                                                         Revised 8/31/17:  Pt will perform sit<>stand " "from 19" height chair to improve independence with sitting in various chairs around her home and decreased                                                                      need for "pop up seat". Met 8/12/19- pt able to perform from 19" height with supervision     Long term goals: 12 weeks, pt agrees to goals set  6. Pt will perform basic HEP for gross strengthening with supervision to deter worsening of functional limitations. Met 8/31/17- pt's sister reports poor current compliance  7. Pt will demonstrate improved bilateral hip strength to 3/5 to improve balance and independence with mobility. Met 7/5/17  8. New 7/5/17: Pt will demonstrate improved bilateral hip strength to 4/5 to improve balance and independence with mobility. ~same  9. Pt will demonstrate improved cervical extension strength to 3+/5 to improve head control. Met 7/31/17  10. Pt will demonstrate improved cervical deep flexor strength to 3+/5 to improve head control. Met 7/31/17   11. Pt will demonstrate improve balance and gait by scoring 14/28 on Tinetti. Met 8/31/17- 17/28                                      Revised 8/31/17: pt will demonstrate improved balance/ gait and decreased fall risk to moderate by scoring 19/28 on Tinetti Assessment. improved- 17/28                                          12. Pt will demonstrate improved cervical PROM for rotation to 40 degrees to improve functional head control. ~same-  L= 40 deg, R= 9 deg,                                          13. New 8/1/18- pt will ambulate 50ft with LBQC and supervision over level tile to demonstrate improved household mobility with decreased AD- improved- 50 ft with large based quad cane with CGA (intermittent hand held assist)     Plan   focus on hip and VMO strengthening. More focus on core strengthening.  Continue to address cervical ROM as tolerated.   Mae Caicedo, PTA    10/30/2019     "

## 2019-11-05 ENCOUNTER — DOCUMENTATION ONLY (OUTPATIENT)
Dept: REHABILITATION | Facility: HOSPITAL | Age: 54
End: 2019-11-05

## 2019-11-05 NOTE — PROGRESS NOTES
PT/PTA met face to face to discuss pt's treatment plan and progress towards established goals.  Continue with current PT POC with focus on stretching and strengthening of the cervical region, standing balance, gait with the quad cane.  Patient will be seen by physical therapist at least every sixth treatment or 30 days, whichever occurs first.    Face to Face performed on 11/4/19    Mae Caicedo PTA  11/05/2019    Meeting held on 11/4/2019  Yadi Mercado DPT  11/8/2019

## 2019-11-07 ENCOUNTER — DOCUMENTATION ONLY (OUTPATIENT)
Dept: REHABILITATION | Facility: HOSPITAL | Age: 54
End: 2019-11-07

## 2019-11-07 ENCOUNTER — CLINICAL SUPPORT (OUTPATIENT)
Dept: REHABILITATION | Facility: HOSPITAL | Age: 54
End: 2019-11-07
Attending: STUDENT IN AN ORGANIZED HEALTH CARE EDUCATION/TRAINING PROGRAM
Payer: COMMERCIAL

## 2019-11-07 DIAGNOSIS — R53.1 DECREASED STRENGTH: ICD-10-CM

## 2019-11-07 DIAGNOSIS — Z74.09 IMPAIRED FUNCTIONAL MOBILITY, BALANCE, GAIT, AND ENDURANCE: ICD-10-CM

## 2019-11-07 DIAGNOSIS — R29.898 DECREASED ROM OF NECK: ICD-10-CM

## 2019-11-07 PROCEDURE — 97110 THERAPEUTIC EXERCISES: CPT | Mod: PO | Performed by: PHYSICAL THERAPIST

## 2019-11-07 NOTE — PROGRESS NOTES
PT/PTA met face to face to discuss pt's treatment plan and progress towards established goals. Continue with current PT POC, including: stretching and strengthening of the cervical region, standing balance, gait with the quad cane. Pt will be seen by physical therapist at least every 6th treatment day or every 30 days, whichever occurs first.      Jesus Acosta PTA  11/04/19      Meeting held on 11/4/2019  Yadi Mercado DPT  11/8/2019

## 2019-11-07 NOTE — PROGRESS NOTES
Physical Therapy Progress Note      Name: Brigid Jernigan  Clinic Number: [id number]  Diagnosis: G71.11 (ICD-10-CM) - Myotonic dystrophy  Physician: Lloyd Mojica MD  Treatment Orders: PT Eval and Treat          Past Medical History:   Diagnosis Date    Abnormal EKG      Myotonic dystrophy      Pacemaker      PAF (paroxysmal atrial fibrillation)      Sleep apnea           Precautions: standard  auth period: 7/1/19 to 12/31/19  Visit #: 4/20     Time in: 1702  Time out: 1730  Total billable treatment time: 28 minutes     (visit total 2018= 73)  (Visit total 2017= 56)  Date of Eval: 5/9/2019          POC: 9/3/2019 to 11/1/2019  New POC: 11/2/2019 to 12/28/2019       Subjective   Pt reports: How long will my neck continue to hurt?  Pt's sister: no significant comment  Pt reports some compliance with HEP     Pain Scale:  0/10  Pt arrived with out [place] J collar in hand  Objective         Patient received individual therapy to increase strength, endurance, ROM, flexibility, posture, core stabilization and head control with activities as follows:      Pt participated in therapeutic exercises x 28 minutes to address ROM, strength, balance, and mobility which includes:      Supine:       suboccipital distraction       Cervical rotation AAROM 3 x 30 sec       Upper trap stretch 3 x 30 sec   chin tucks 10x   3 x 10 reps of B LE SAQ over xlarge bolster.  3lbs on R LE and 0 on LLE     Pt performed static standing without UE support=  Fair (not timed)   Standing with horizontal head turns, intermittent UE support- 10x, min A   Static standing on foam with LUE support 3 x 30 sec with fair balance (mod verbal cues for improved L knee extension)         Written Home Exercises: continue with previous HEP  Pt demo fair understanding of the education provided. Brigid demonstrated fair return demonstration of activities.      Education provided re: perform Long arc quads frequently at home to address L knee extension  weakness     Pt has no cultural, educational or language barriers to learning provided.     Assessment       Brigid tolerated treatment session well. Pt demonstrated improved cervical passive range of motion for side bending (not measured) but continues to demonstrate significant impairment in rotation due to forward head posture. L vastus medialis oblique strength was improved during short arc quads, but pt was not able to maintain good quality for all 3 sets. PT advanced standing balance challenges to include head turns and standing on foam. Pt experienced at least 3 loss of balance during 10 head turns with external support to correct. Pt was not able to balance on foam without UE support. Pt can benefit from continued skilled PT to address cervical range of Motion and postural strength to improve balance and gait ability and deter the worsening of impairments.      Pt is progressing towards goals.   Prognosis is fair to good.     Barriers to progress: transportation, motivation for exercises     Activity limitations/ Participation Restrictions:   Fall Risk - impaired balance   Weakness  Impaired muscle tone  Poor head control  Decreased ROM  Gait deviations   Decreased ambulation   Decreased activity tolerance   Decreased independence with daily activities   Requires skilled supervision to complete and progress HEP   Requires skilled PT for DME         Pt's spiritual, cultural and educational needs considered and pt agreeable to plan of care and GOALS as stated below:    Short term goals: 6 weeks, pt agrees to goals set. (as of 10/14/2019)  1. Pt will perform chin tucks in supine with supervision to improve independent with HEP. Met 7/5/17  2. Assist pt with obtaining appropriate cervical brace to improve head control. Met 7/23/2019-   3. Pt will demonstrate improved knee ext strength on left to 4/5 to improve safety with ambulation and sit<>stand. Met 9/28/17   4. Pt will demonstrate improved cervical rotation  "PROM to 20 degrees to improve functional head control. Met 7/5/17  5. Pt will perform sit<>stand consistently from 22" height surface with supervision to improve independence. Met 7/5/17                                                         Revised 8/31/17:  Pt will perform sit<>stand from 19" height chair to improve independence with sitting in various chairs around her home and decreased                                                                      need for "pop up seat". Met 8/12/19- pt able to perform from 19" height with supervision     Long term goals: 12 weeks, pt agrees to goals set  6. Pt will perform basic HEP for gross strengthening with supervision to deter worsening of functional limitations. Met 8/31/17- pt's sister reports poor current compliance  7. Pt will demonstrate improved bilateral hip strength to 3/5 to improve balance and independence with mobility. Met 7/5/17  8. New 7/5/17: Pt will demonstrate improved bilateral hip strength to 4/5 to improve balance and independence with mobility. ~same  9. Pt will demonstrate improved cervical extension strength to 3+/5 to improve head control. Met 7/31/17  10. Pt will demonstrate improved cervical deep flexor strength to 3+/5 to improve head control. Met 7/31/17   11. Pt will demonstrate improve balance and gait by scoring 14/28 on Tinetti. Met 8/31/17- 17/28                                      Revised 8/31/17: pt will demonstrate improved balance/ gait and decreased fall risk to moderate by scoring 19/28 on Tinetti Assessment. improved- 17/28                                          12. Pt will demonstrate improved cervical PROM for rotation to 40 degrees to improve functional head control. ~same-  L= 40 deg, R= 9 deg,                                          13. New 8/1/18- pt will ambulate 50ft with LBQC and supervision over level tile to demonstrate improved household mobility with decreased AD- improved- 50 ft with large based quad " cane with CGA (intermittent hand held assist)     Plan   New POC: 11/2/2019 to 12/28/2019    focus on VMO strengthening, More focus on core strengthening, Continue to address cervical ROM as tolerated. Address standing balance with head turns and on foam    Yadi Mercado, PT    11/7/2019

## 2019-11-11 ENCOUNTER — CLINICAL SUPPORT (OUTPATIENT)
Dept: REHABILITATION | Facility: HOSPITAL | Age: 54
End: 2019-11-11
Attending: STUDENT IN AN ORGANIZED HEALTH CARE EDUCATION/TRAINING PROGRAM
Payer: COMMERCIAL

## 2019-11-11 DIAGNOSIS — Z74.09 IMPAIRED FUNCTIONAL MOBILITY, BALANCE, GAIT, AND ENDURANCE: ICD-10-CM

## 2019-11-11 DIAGNOSIS — R53.1 DECREASED STRENGTH: ICD-10-CM

## 2019-11-11 DIAGNOSIS — R29.898 DECREASED ROM OF NECK: ICD-10-CM

## 2019-11-11 PROCEDURE — 97110 THERAPEUTIC EXERCISES: CPT | Mod: PO

## 2019-11-11 PROCEDURE — 97530 THERAPEUTIC ACTIVITIES: CPT | Mod: PO

## 2019-11-12 NOTE — PROGRESS NOTES
"Physical Therapy Progress Note      Name: Brigid Jernigan  Clinic Number: [id number]  Diagnosis: G71.11 (ICD-10-CM) - Myotonic dystrophy  Physician: Lloyd Mojica MD  Treatment Orders: PT Eval and Treat          Past Medical History:   Diagnosis Date    Abnormal EKG      Myotonic dystrophy      Pacemaker      PAF (paroxysmal atrial fibrillation)      Sleep apnea           Precautions: standard  auth period: 7/1/19 to 12/31/19  Visit #: 5/20     Time in: 1700  Time out: 1730  Total billable treatment time: 30 minutes     (visit total 2018= 73)  (Visit total 2017= 56)  Date of Eval: 5/9/2019          POC: 9/3/2019 to 11/1/2019  New POC: 11/2/2019 to 12/28/2019       Subjective   Pt reports: " I'm doing ok."   Pt's sister: " WE have to leave at 5:30."  Pt reports some compliance with HEP     Pain Scale:  0/10  Pt arrived with [place] J collar in hand, donned on while on machine  Objective         Patient received individual therapy to increase strength, endurance, ROM, flexibility, posture, core stabilization and head control with activities as follows:      Pt participated in therapeutic exercises x 22 minutes to address ROM, strength, balance, and mobility which includes:  X 10 min on [employer] Fit recumbent stepper.  B UE/B LE on level 14.5      Supine:       suboccipital distraction       Cervical rotation AAROM 3 x 30 sec       Upper trap stretch 3 x 30 sec   chin tucks 10x     Patient participated in dynamic functional therapeutic activities to improve functional performance for 8 minutes. Including:   Stand to sit with Supervision  Sit to supine with Mod I  Supine to sit with CGA to elevate trunk.  Sit to stand from EOM ---> RW with SBA   Static standing on foam with LUE support 3 x 60 sec with fair balance (mod verbal cues for improved L knee extension)         Written Home Exercises: continue with previous HEP  Pt demo fair understanding of the education provided. Brigid demonstrated fair return " "demonstration of activities.      Education provided re: perform Long arc quads frequently at home to address L knee extension weakness     Pt has no cultural, educational or language barriers to learning provided.     Assessment       Brigid tolerated treatment session fairly well, but limited 2 * time constraint.  Pt unable to statically stand as long today and required occasional 1 UE support for safety.  Pt can benefit from continued skilled PT to address cervical range of Motion and postural strength to improve balance and gait ability and deter the worsening of impairments.      Pt is progressing towards goals.   Prognosis is fair to good.     Barriers to progress: transportation, motivation for exercises     Activity limitations/ Participation Restrictions:   Fall Risk - impaired balance   Weakness  Impaired muscle tone  Poor head control  Decreased ROM  Gait deviations   Decreased ambulation   Decreased activity tolerance   Decreased independence with daily activities   Requires skilled supervision to complete and progress HEP   Requires skilled PT for DME         Pt's spiritual, cultural and educational needs considered and pt agreeable to plan of care and GOALS as stated below:    Short term goals: 6 weeks, pt agrees to goals set. (as of 10/14/2019)  1. Pt will perform chin tucks in supine with supervision to improve independent with HEP. Met 7/5/17  2. Assist pt with obtaining appropriate cervical brace to improve head control. Met 7/23/2019-   3. Pt will demonstrate improved knee ext strength on left to 4/5 to improve safety with ambulation and sit<>stand. Met 9/28/17   4. Pt will demonstrate improved cervical rotation PROM to 20 degrees to improve functional head control. Met 7/5/17  5. Pt will perform sit<>stand consistently from 22" height surface with supervision to improve independence. Met 7/5/17                                                         Revised 8/31/17:  Pt will perform sit<>stand " "from 19" height chair to improve independence with sitting in various chairs around her home and decreased                                                                      need for "pop up seat". Met 8/12/19- pt able to perform from 19" height with supervision     Long term goals: 12 weeks, pt agrees to goals set  6. Pt will perform basic HEP for gross strengthening with supervision to deter worsening of functional limitations. Met 8/31/17- pt's sister reports poor current compliance  7. Pt will demonstrate improved bilateral hip strength to 3/5 to improve balance and independence with mobility. Met 7/5/17  8. New 7/5/17: Pt will demonstrate improved bilateral hip strength to 4/5 to improve balance and independence with mobility. ~same  9. Pt will demonstrate improved cervical extension strength to 3+/5 to improve head control. Met 7/31/17  10. Pt will demonstrate improved cervical deep flexor strength to 3+/5 to improve head control. Met 7/31/17   11. Pt will demonstrate improve balance and gait by scoring 14/28 on Tinetti. Met 8/31/17- 17/28                                      Revised 8/31/17: pt will demonstrate improved balance/ gait and decreased fall risk to moderate by scoring 19/28 on Tinetti Assessment. improved- 17/28                                          12. Pt will demonstrate improved cervical PROM for rotation to 40 degrees to improve functional head control. ~same-  L= 40 deg, R= 9 deg,                                          13. New 8/1/18- pt will ambulate 50ft with LBQC and supervision over level tile to demonstrate improved household mobility with decreased AD- improved- 50 ft with large based quad cane with CGA (intermittent hand held assist)     Plan       focus on VMO strengthening, More focus on core strengthening, Continue to address cervical ROM as tolerated. Address standing balance with head turns and on foam    Mae Caicedo, PTA    11/11/2019     "

## 2019-11-13 ENCOUNTER — CLINICAL SUPPORT (OUTPATIENT)
Dept: REHABILITATION | Facility: HOSPITAL | Age: 54
End: 2019-11-13
Attending: STUDENT IN AN ORGANIZED HEALTH CARE EDUCATION/TRAINING PROGRAM
Payer: COMMERCIAL

## 2019-11-13 DIAGNOSIS — R53.1 DECREASED STRENGTH: ICD-10-CM

## 2019-11-13 DIAGNOSIS — R29.898 DECREASED ROM OF NECK: ICD-10-CM

## 2019-11-13 DIAGNOSIS — Z74.09 IMPAIRED FUNCTIONAL MOBILITY, BALANCE, GAIT, AND ENDURANCE: ICD-10-CM

## 2019-11-13 PROCEDURE — 97110 THERAPEUTIC EXERCISES: CPT | Mod: PO

## 2019-11-13 NOTE — PROGRESS NOTES
"Physical Therapy Progress Note      Name: Brigid Jernigan  Clinic Number: [id number]  Diagnosis: G71.11 (ICD-10-CM) - Myotonic dystrophy  Physician: Lloyd Mojica MD  Treatment Orders: PT Eval and Treat          Past Medical History:   Diagnosis Date    Abnormal EKG      Myotonic dystrophy      Pacemaker      PAF (paroxysmal atrial fibrillation)      Sleep apnea           Precautions: standard  auth period: 7/1/19 to 12/31/19  Visit #: 6/20     Time in: 1655  Time out: 1730  Total billable treatment time: 35 minutes     (visit total 2018= 73)  (Visit total 2017= 56)  Date of Eval: 5/9/2019          POC: 9/3/2019 to 11/1/2019  New POC: 11/2/2019 to 12/28/2019       Subjective   Pt reports: " I'm doing ok."   Pt's sister: " WE have to leave at 5:30."  Pt reports some compliance with HEP     Pain Scale:  0/10  Pt arrived with [place] J collar in hand, donned on while on machine  Objective         Patient received individual therapy to increase strength, endurance, ROM, flexibility, posture, core stabilization and head control with activities as follows:      Pt participated in therapeutic exercises x 30 minutes to address ROM, strength, balance, and mobility which includes:     Supine:       suboccipital distraction       Cervical rotation AAROM 3 x 30 sec       Upper trap stretch 3 x 30 sec   B LE SAQ over extra large bolster with #6 on R LE and 0 on LLE     Seated EOM:   X 2 min of LTR with yellow weighted ball   X 15 reps posterior lean backs with yellow weighted ball   X 20 reps of LLE LAQ     Leg press:    x 10 reps of B LE Leg press 140# 15x, BLE       Single leg 60# 10x  On R LE   Single leg 50# 10 x on LLE      Patient participated in dynamic functional therapeutic activities to improve functional performance for 5 minutes. Including:   Stand to sit with Supervision  Sit to supine with Mod I  Supine to sit with CGA to elevate trunk.  Sit to stand from EOM ---> RW with SBA          Written Home " "Exercises: continue with previous HEP  Pt demo fair understanding of the education provided. Brigid demonstrated fair return demonstration of activities.      Education provided re: perform Long arc quads frequently at home to address L knee extension weakness     Pt has no cultural, educational or language barriers to learning provided.     Assessment       Brigid tolerated treatment well and did not have any problems noted.  Pt focused on core strengthening exercises and quad strengthening exercises on the L  Side mainly.  Pt was unable to perform single leg squat on the leg press with 60 lbs on the L side and had to decrease the weight to 50 lbs.  No other significant changes noted.  Cont with plan of care.      Pt is progressing towards goals.   Prognosis is fair to good.     Barriers to progress: transportation, motivation for exercises     Activity limitations/ Participation Restrictions:   Fall Risk - impaired balance   Weakness  Impaired muscle tone  Poor head control  Decreased ROM  Gait deviations   Decreased ambulation   Decreased activity tolerance   Decreased independence with daily activities   Requires skilled supervision to complete and progress HEP   Requires skilled PT for DME         Pt's spiritual, cultural and educational needs considered and pt agreeable to plan of care and GOALS as stated below:    Short term goals: 6 weeks, pt agrees to goals set. (as of 10/14/2019)  1. Pt will perform chin tucks in supine with supervision to improve independent with HEP. Met 7/5/17  2. Assist pt with obtaining appropriate cervical brace to improve head control. Met 7/23/2019-   3. Pt will demonstrate improved knee ext strength on left to 4/5 to improve safety with ambulation and sit<>stand. Met 9/28/17   4. Pt will demonstrate improved cervical rotation PROM to 20 degrees to improve functional head control. Met 7/5/17  5. Pt will perform sit<>stand consistently from 22" height surface with supervision to " "improve independence. Met 7/5/17                                                         Revised 8/31/17:  Pt will perform sit<>stand from 19" height chair to improve independence with sitting in various chairs around her home and decreased                                                                      need for "pop up seat". Met 8/12/19- pt able to perform from 19" height with supervision     Long term goals: 12 weeks, pt agrees to goals set  6. Pt will perform basic HEP for gross strengthening with supervision to deter worsening of functional limitations. Met 8/31/17- pt's sister reports poor current compliance  7. Pt will demonstrate improved bilateral hip strength to 3/5 to improve balance and independence with mobility. Met 7/5/17  8. New 7/5/17: Pt will demonstrate improved bilateral hip strength to 4/5 to improve balance and independence with mobility. ~same  9. Pt will demonstrate improved cervical extension strength to 3+/5 to improve head control. Met 7/31/17  10. Pt will demonstrate improved cervical deep flexor strength to 3+/5 to improve head control. Met 7/31/17   11. Pt will demonstrate improve balance and gait by scoring 14/28 on Tinetti. Met 8/31/17- 17/28                                      Revised 8/31/17: pt will demonstrate improved balance/ gait and decreased fall risk to moderate by scoring 19/28 on Tinetti Assessment. improved- 17/28                                          12. Pt will demonstrate improved cervical PROM for rotation to 40 degrees to improve functional head control. ~same-  L= 40 deg, R= 9 deg,                                          13. New 8/1/18- pt will ambulate 50ft with LBQC and supervision over level tile to demonstrate improved household mobility with decreased AD- improved- 50 ft with large based quad cane with CGA (intermittent hand held assist)     Plan       focus on VMO strengthening, More focus on core strengthening, Continue to address cervical ROM as " tolerated. Address standing balance with head turns and on foam    Mae Caicedo, PTA    11/13/2019

## 2019-11-18 ENCOUNTER — DOCUMENTATION ONLY (OUTPATIENT)
Dept: REHABILITATION | Facility: HOSPITAL | Age: 54
End: 2019-11-18

## 2019-11-21 ENCOUNTER — CLINICAL SUPPORT (OUTPATIENT)
Dept: REHABILITATION | Facility: HOSPITAL | Age: 54
End: 2019-11-21
Attending: STUDENT IN AN ORGANIZED HEALTH CARE EDUCATION/TRAINING PROGRAM
Payer: COMMERCIAL

## 2019-11-21 DIAGNOSIS — R29.898 DECREASED ROM OF NECK: ICD-10-CM

## 2019-11-21 DIAGNOSIS — Z74.09 IMPAIRED FUNCTIONAL MOBILITY, BALANCE, GAIT, AND ENDURANCE: ICD-10-CM

## 2019-11-21 DIAGNOSIS — R53.1 DECREASED STRENGTH: ICD-10-CM

## 2019-11-21 PROCEDURE — 97116 GAIT TRAINING THERAPY: CPT | Mod: PO | Performed by: PHYSICAL THERAPIST

## 2019-11-21 PROCEDURE — 97110 THERAPEUTIC EXERCISES: CPT | Mod: PO | Performed by: PHYSICAL THERAPIST

## 2019-11-25 ENCOUNTER — DOCUMENTATION ONLY (OUTPATIENT)
Dept: REHABILITATION | Facility: HOSPITAL | Age: 54
End: 2019-11-25

## 2019-11-25 NOTE — PLAN OF CARE
"Physical Therapy Progress Note      Name: Himanshu Jernigan  Clinic Number: [id number]  Diagnosis: G71.11 (ICD-10-CM) - Myotonic dystrophy  Physician: Lloyd Mojica MD  Treatment Orders: PT Eval and Treat          Past Medical History:   Diagnosis Date    Abnormal EKG      Myotonic dystrophy      Pacemaker      PAF (paroxysmal atrial fibrillation)      Sleep apnea         (visit total 2018= 73)  (Visit total 2017= 56)  Date of Eval: 5/9/2019    Precautions: standard  auth period: 7/1/19 to 12/31/19          New POC: 11/2/2019 to 12/28/2019  Visit #: 7/20     Time in: 1701  Time out: 1732  Total billable treatment time: 31 minutes              Subjective   Pt reports: no new complaints  Pt's sister: no new complaints  Pt reports some compliance with HEP- performed Long arc quads "some"     Pain Scale:  0/10  Pt arrived with [place] J collar donned   Objective         Patient received individual therapy to increase strength, endurance, ROM, flexibility, posture, core stabilization and head control with activities as follows:      Pt participated in therapeutic exercises x 21 minutes to address ROM, strength, balance, and mobility which includes:  Lower Extremity Strength    RLE eval RLE 5/19 RLE 7/19 RLE 8/19 RLE 9/19 RLE 9/23/19 RLE 10/19 RLE 11/19 LLE eval LLE 5/19 LLE 7/19 LLE 8/19 LLE 9/19 LLE 9/23/19 LLE 10/19 LLE 11/19   Hip Flexion: 3+/5 5/5 NT NT NT 4-/5 4/5 4-/5 3+/5 5/5 NT NT NT 4-/5 4-/5 4-/5   Hip Extension:  2/5 4+/5 3+/5 3+/5 3+/5 3+/5 3+/5 4-/5 2/5 4/5 3+ to 4-/5 4/5 4+/5 4/5 4-/5 4/5   Hip Abduction: NT 4+/5 4/5 4/5 4+/5 4/5 4/5 4+/5 NT 4/5 4-/5 4+/5 4+/5 5/5 4+/5 4+/5   Hip Adduction: NT 4/5 4-/5 4-/5 4-/5 4/5 4/5 4/5 NT 3+/5 4/5 4-/5 4-/5 4/5 4/5 4+/5   Knee Extension: 4+/5 NT NT 4+/5 5/5 NT 5/5 5/5 3+/5 NT NT 3+/5 3+ to 4-/5 NT 3+/5 3-/5   Knee Flexion: 4/5 NT NT 4-/5 4+/5 4/5 4-/5 4/5 4-/5 NT NT 3+/5 3+/5 3+/5 4-/5 4-/5       cervical rotation AAROM (supine) L= 35 deg, R= 6 deg     HIMANSHU " participated in gait training to improve functional mobility and safety for 10  minutes, including:     ambulation with large based quad cane 35 ft with CGA- min A, pt stopped due to a loss of balance   - pt demonstrates a step to gait and required BUE to advance cane due to bariatric cane in use    Tinetti Balance Assessment  Balance:  1. Sitting Balance 1   Leans/ slides in chair= 0   Steady= 1  2. Rises from chair 1   Unable without help= 0   Able, uses arms= 1   Able without use of arms= 2  3. Attempts to rise 1   Unable without help= 0   Able, >1 attmept required-= 1   Able, 1 attempt= 2  4. Immediate standing balance (1st 5 seconds) 1   Unsteady (swaggers, moves feet, trunk sway)= 0   Steady but uses walker or other support= 1   Narrow base of support without walker or support= 2  5. Nudged 1   Begins to fall= 0   Staggers, grabs, catches self= 1   Steady= 2  6. Standing balance 1   Unsteady= 0   Steady but wide LEYDI or uses AD=1   Narrow LEYDI without AD=2  7. Eyes closed 0, 8 sec   Unsteady= 0   Steady= 1  8. Turning 360 degrees 1, steady with RW   Discontinuous steps= 0   Continuous steps= 1   Unsteady= 0   Steady= 1  9. Sitting down 1   Unsafe= 0   Uses arms or not in a smooth motion= 1   Safe, smooth motion= 2  Balance score= 8/16  Gait:  1. Initiation 1   hesitates or multiple attempts to start= 0   No hesitancy= 1  2. Step length 2   Step to= 0   One foot passes= 1   reciprocal pattern= 2  3. Step height 2    Neither foot clears floor= 0   One foot clears floor= 1   Both feet clear floor= 2  4. Step symmetry 1   Not symmetrical= 0   Appears symmetrical= 1  5. Step continuity 1   Not continuous= 0   Appears continuous= 1  6. Path 1   Marked deviation= 0   Mild/moderate deviation or uses A.D.= 1   Straight without A.D.= 2  7. Trunk 0   Marked sway or uses A.D.= 0   No sway, but flexes knees or back, spread arms out while walking= 1   No sway, no flexion, no use of UE, no use of A.D.= 2  8. Walking stance  0   Heels apart= 0   Heels almost touching while walking= 1  Gait score= 8/12  Total score= 16/28 , high fall risk    0-18= high fall risk  19-23= moderate fall risk  24-28= low fall risk           Written Home Exercises: continue with previous HEP  Pt demo fair understanding of the education provided. Brigid demonstrated fair return demonstration of activities.      Education provided re:   - increase participation in cervical ROM at home due to a 5 deg decline since last assessment.      Pt has no cultural, educational or language barriers to learning provided.     Assessment   Assessment period: 10/24/2019 to 11/21/2019   Phani tolerates PT sessions well, but demonstrates slow progress towards goals. Pt demonstrates improved strength in the following areas: WALESKA hip flexion, R hip abduction, L hip adduction, and R knee flexion. Pt continues to demonstrate weakness in WALESKA hip flexion and R hip extension. This impairs pt's standing balance and gait ability with a less restrictive assistive device (I.e. large based quad cane). Score on Tinetti has not changed and pt is still considered a high fall risk per assessment. Pt demonstrates impaired balance without visual support and with narrow Base of support. Pt demonstrates fair to good static standing balance with single UE support, but a decline in gait quality is noted when compared to gait with RW. Pt has difficulty performing a reciprocal gait pattern with use of large based quad cane but can perform with intermittent verbal cues when using RW. This is due to impaired balance and LE/ core strength. Pt continues to demonstrate significant weakness of L vastus medialis oblique  (in supine and standing), decreasing L knee stability. Pt's cervical active range of motion for rotation varies, but continues to be significantly limited due to abnormal forward head posture (spine alignment) and increased tone/ rigidity of cervical musculature. Poor participation in HEP  "affects speed and consistency of progress made towards goals. Pt can benefit from continued skilled PT to address remaining impairments to improve safe mobility, decrease fall risk, and prevent worsening of cervical range of Motion and gross muscle weakness.     Pt is progressing towards goals.   Prognosis is fair to good.     Barriers to progress: transportation, motivation for exercises     Activity limitations/ Participation Restrictions:   Fall Risk - impaired balance   Weakness  Impaired muscle tone  Poor head control  Decreased ROM  Gait deviations   Decreased ambulation   Decreased activity tolerance   Decreased independence with daily activities   Requires skilled supervision to complete and progress HEP   Requires skilled PT for DME         Pt's spiritual, cultural and educational needs considered and pt agreeable to plan of care and GOALS as stated below:    Short term goals: 6 weeks, pt agrees to goals set. (as of 11/21/2019)  1. Pt will perform chin tucks in supine with supervision to improve independent with HEP. Met 7/5/17  2. Assist pt with obtaining appropriate cervical brace to improve head control. Met 7/23/2019-   3. Pt will demonstrate improved knee ext strength on left to 4/5 to improve safety with ambulation and sit<>stand. Met 9/28/17   4. Pt will demonstrate improved cervical rotation PROM to 20 degrees to improve functional head control. Met 7/5/17  5. Pt will perform sit<>stand consistently from 22" height surface with supervision to improve independence. Met 7/5/17                                                         Revised 8/31/17:  Pt will perform sit<>stand from 19" height chair to improve independence with sitting in various chairs around her home and decreased                                                                      need for "pop up seat". Met 8/12/19- pt able to perform from 19" height with supervision     Long term goals: 12 weeks, pt agrees to goals set  6. Pt will " perform basic HEP for gross strengthening with supervision to deter worsening of functional limitations. Met 8/31/17- pt's sister reports poor current compliance  7. Pt will demonstrate improved bilateral hip strength to 3/5 to improve balance and independence with mobility. Met 7/5/17  8. New 7/5/17: Pt will demonstrate improved bilateral hip strength to 4/5 to improve balance and independence with mobility. improved  9. Pt will demonstrate improved cervical extension strength to 3+/5 to improve head control. Met 7/31/17  10. Pt will demonstrate improved cervical deep flexor strength to 3+/5 to improve head control. Met 7/31/17   11. Pt will demonstrate improve balance and gait by scoring 14/28 on Tinetti. Met 8/31/17- 17/28                                      Revised 8/31/17: pt will demonstrate improved balance/ gait and decreased fall risk to moderate by scoring 19/28 on Tinetti Assessment. inconsistent- 16/28                                          12. Pt will demonstrate improved cervical PROM for rotation to 40 degrees to improve functional head control. inconsistent-  L= 35 deg, R= 6 deg,                                          13. New 8/1/18- pt will ambulate 50ft with LBQC and supervision over level tile to demonstrate improved household mobility with decreased AD- same- 50 ft with large based quad cane with CGA- min (intermittent hand held assist)     Plan     Continue with VMO and WALESKA hip strengthening, Continue to address cervical ROM as tolerated. Address standing balance with head turns and on foam    Yadi Mercado, PT    11/21/2019

## 2019-12-02 ENCOUNTER — CLINICAL SUPPORT (OUTPATIENT)
Dept: REHABILITATION | Facility: HOSPITAL | Age: 54
End: 2019-12-02
Attending: STUDENT IN AN ORGANIZED HEALTH CARE EDUCATION/TRAINING PROGRAM
Payer: COMMERCIAL

## 2019-12-02 DIAGNOSIS — Z74.09 IMPAIRED FUNCTIONAL MOBILITY, BALANCE, GAIT, AND ENDURANCE: Primary | ICD-10-CM

## 2019-12-02 DIAGNOSIS — R53.1 DECREASED STRENGTH: ICD-10-CM

## 2019-12-02 DIAGNOSIS — R29.898 DECREASED ROM OF NECK: ICD-10-CM

## 2019-12-02 PROCEDURE — 97110 THERAPEUTIC EXERCISES: CPT | Mod: PO

## 2019-12-02 NOTE — PROGRESS NOTES
"Physical Therapy Progress Note      Name: Brigid Jernigan  Clinic Number: [id number]  Diagnosis: G71.11 (ICD-10-CM) - Myotonic dystrophy  Physician: Lloyd Mojica MD  Treatment Orders: PT Eval and Treat          Past Medical History:   Diagnosis Date    Abnormal EKG      Myotonic dystrophy      Pacemaker      PAF (paroxysmal atrial fibrillation)      Sleep apnea           Precautions: standard  auth period: 7/1/19 to 12/31/19  Visit #: 7/20     Time in: 1705 (pt arrives late for apt, unable to accommodate)  Time out: 1730  Total billable treatment time: 25 minutes     (visit total 2018= 73)  (Visit total 2017= 56)  Date of Eval: 5/9/2017          POC: 9/3/2019 to 11/1/2019  New POC: 11/2/2019 to 12/28/2019         Subjective   Pt reports: " I'm doing ok."   Pt's sister: " We have to leave at 5:30."  Pt reports some compliance with HEP     Pain Scale:  0/10    Objective      Patient received individual therapy to increase strength, endurance, ROM, flexibility, posture, core stabilization and head control with activities as follows:      Pt participated in therapeutic exercises x 25 minutes to address ROM, strength, balance, and mobility which includes:     Supine:       suboccipital distraction       Cervical rotation AAROM 3 x 30 sec       Upper trap stretch 3 x 30 sec  Chin tucks x15   B LE SAQ over extra large bolster with #6 on R LE and 0 on LLE     Seated EOM:   X 2 min of LTR with yellow weighted ball   X 10 reps posterior lean backs with yellow weighted ball   X 15 reps of LLE LAQ     Leg press:       Single leg 60# 10x  On R LE   Single leg 50# 10 x on LLE      Patient participated in dynamic functional therapeutic activities to improve functional performance for 5 minutes. Including:   Stand to sit with Supervision  Sit to supine with Mod I  Supine to sit with CGA to elevate trunk.  Sit to stand from EOM ---> RW with SBA          Written Home Exercises: continue with previous HEP  Pt demo fair " "understanding of the education provided. Brigid demonstrated fair return demonstration of activities.      Education provided re: perform Long arc quads frequently at home to address L knee extension weakness     Pt has no cultural, educational or language barriers to learning provided.     Assessment       Phani tolerated session fairly today. She continues to demonstrate severe forward head posture, poor L knee extension, and overall poor posture. PT notes increased muscular tightness on L cervical region compared to R but both demonstrate tightness. She is appropriate for skilled PT services.         Pt is progressing towards goals.   Prognosis is fair to good.     Barriers to progress: transportation, motivation for exercises     Activity limitations/ Participation Restrictions:   Fall Risk - impaired balance   Weakness  Impaired muscle tone  Poor head control  Decreased ROM  Gait deviations   Decreased ambulation   Decreased activity tolerance   Decreased independence with daily activities   Requires skilled supervision to complete and progress HEP   Requires skilled PT for DME         Pt's spiritual, cultural and educational needs considered and pt agreeable to plan of care and GOALS as stated below:    Short term goals: 6 weeks, pt agrees to goals set. (as of 11/21/2019)  1. Pt will perform chin tucks in supine with supervision to improve independent with HEP. Met 7/5/17  2. Assist pt with obtaining appropriate cervical brace to improve head control. Met 7/23/2019-   3. Pt will demonstrate improved knee ext strength on left to 4/5 to improve safety with ambulation and sit<>stand. Met 9/28/17   4. Pt will demonstrate improved cervical rotation PROM to 20 degrees to improve functional head control. Met 7/5/17  5. Pt will perform sit<>stand consistently from 22" height surface with supervision to improve independence. Met 7/5/17                                                         Revised 8/31/17:  Pt will " "perform sit<>stand from 19" height chair to improve independence with sitting in various chairs around her home and decreased                                                                      need for "pop up seat". Met 8/12/19- pt able to perform from 19" height with supervision     Long term goals: 12 weeks, pt agrees to goals set  6. Pt will perform basic HEP for gross strengthening with supervision to deter worsening of functional limitations. Met 8/31/17- pt's sister reports poor current compliance  7. Pt will demonstrate improved bilateral hip strength to 3/5 to improve balance and independence with mobility. Met 7/5/17  8. New 7/5/17: Pt will demonstrate improved bilateral hip strength to 4/5 to improve balance and independence with mobility. improved  9. Pt will demonstrate improved cervical extension strength to 3+/5 to improve head control. Met 7/31/17  10. Pt will demonstrate improved cervical deep flexor strength to 3+/5 to improve head control. Met 7/31/17   11. Pt will demonstrate improve balance and gait by scoring 14/28 on Tinetti. Met 8/31/17- 17/28                                      Revised 8/31/17: pt will demonstrate improved balance/ gait and decreased fall risk to moderate by scoring 19/28 on Tinetti Assessment. inconsistent- 16/28                                          12. Pt will demonstrate improved cervical PROM for rotation to 40 degrees to improve functional head control. inconsistent-  L= 35 deg, R= 6 deg,                                          13. New 8/1/18- pt will ambulate 50ft with LBQC and supervision over level tile to demonstrate improved household mobility with decreased AD- same- 50 ft with large based quad cane with CGA- min (intermittent hand held assist)     Plan      Continue with VMO and WALESKA hip strengthening, Continue to address cervical ROM as tolerated. Address standing balance with head turns and on foam    Kirby Magdaleno, PT    12/2/2019     "

## 2019-12-04 ENCOUNTER — CLINICAL SUPPORT (OUTPATIENT)
Dept: REHABILITATION | Facility: HOSPITAL | Age: 54
End: 2019-12-04
Attending: STUDENT IN AN ORGANIZED HEALTH CARE EDUCATION/TRAINING PROGRAM
Payer: COMMERCIAL

## 2019-12-04 DIAGNOSIS — R29.898 DECREASED ROM OF NECK: ICD-10-CM

## 2019-12-04 DIAGNOSIS — R53.1 DECREASED STRENGTH: ICD-10-CM

## 2019-12-04 DIAGNOSIS — Z74.09 IMPAIRED FUNCTIONAL MOBILITY, BALANCE, GAIT, AND ENDURANCE: Primary | ICD-10-CM

## 2019-12-04 PROCEDURE — 97110 THERAPEUTIC EXERCISES: CPT | Mod: PO

## 2019-12-04 NOTE — PROGRESS NOTES
"Physical Therapy Progress Note      Name: Brigid Jernigan  Clinic Number: [id number]  Diagnosis: G71.11 (ICD-10-CM) - Myotonic dystrophy  Physician: Lloyd Mojica MD  Treatment Orders: PT Eval and Treat          Past Medical History:   Diagnosis Date    Abnormal EKG      Myotonic dystrophy      Pacemaker      PAF (paroxysmal atrial fibrillation)      Sleep apnea           Precautions: standard  auth period: 7/1/19 to 12/31/19  Visit #: 7/20     Time in: 1704 (pt arrives late for apt, unable to accommodate)  Time out: 1731  Total billable treatment time: 27 minutes     (visit total 2018= 73)  (Visit total 2017= 56)  Date of Eval: 5/9/2017          POC: 9/3/2019 to 11/1/2019  New POC: 11/2/2019 to 12/28/2019         Subjective   Pt reports: " I'm doing ok."   Pt's sister: " We have to leave at 5:30."  Pt reports some compliance with HEP     Pain Scale:  0/10    Objective      Patient received individual therapy to increase strength, endurance, ROM, flexibility, posture, core stabilization and head control with activities as follows:      Pt participated in therapeutic exercises x 27 minutes to address ROM, strength, balance, and mobility which includes:     Seated EOM:   X 20 reps of trunk rotation (Russian Twist) with yellow weighted ball   X 10 reps posterior lean backs with yellow weighted ball   X 5 reps ea direction of throwing basketball fwd, L, R    Leg press:       Single leg 45# 10x on B LE   B # 2 x 10 reps    x5 sit<>Stand from EOM to RW     Patient participated in dynamic functional therapeutic activities to improve functional performance for 0 minutes. Including:   Stand to sit with Supervision  Sit to stand from EOM ---> RW with SBA      Written Home Exercises: continue with previous HEP  Pt demo fair understanding of the education provided. Brigid demonstrated fair return demonstration of activities.      Education provided re: perform Long arc quads frequently at home to address L " "knee extension weakness     Pt has no cultural, educational or language barriers to learning provided.     Assessment       Phani tolerated session fairly today. She continues to demonstrate severe forward head posture, poor L knee extension, and overall poor posture. She struggles to stay upright during basketball toss activity though she was able to tolerate this activity with increased time for completion. PT attempts to have pt stand without UE support, but she is only able to tolerate approximately 15 seconds before fatigue. She is appropriate for skilled PT services.         Pt is progressing towards goals.   Prognosis is fair to good.     Barriers to progress: transportation, motivation for exercises     Activity limitations/ Participation Restrictions:   Fall Risk - impaired balance   Weakness  Impaired muscle tone  Poor head control  Decreased ROM  Gait deviations   Decreased ambulation   Decreased activity tolerance   Decreased independence with daily activities   Requires skilled supervision to complete and progress HEP   Requires skilled PT for DME         Pt's spiritual, cultural and educational needs considered and pt agreeable to plan of care and GOALS as stated below:    Short term goals: 6 weeks, pt agrees to goals set. (as of 11/21/2019)  1. Pt will perform chin tucks in supine with supervision to improve independent with HEP. Met 7/5/17  2. Assist pt with obtaining appropriate cervical brace to improve head control. Met 7/23/2019-   3. Pt will demonstrate improved knee ext strength on left to 4/5 to improve safety with ambulation and sit<>stand. Met 9/28/17   4. Pt will demonstrate improved cervical rotation PROM to 20 degrees to improve functional head control. Met 7/5/17  5. Pt will perform sit<>stand consistently from 22" height surface with supervision to improve independence. Met 7/5/17                                                         Revised 8/31/17:  Pt will perform sit<>stand from " "19" height chair to improve independence with sitting in various chairs around her home and decreased                                                                      need for "pop up seat". Met 8/12/19- pt able to perform from 19" height with supervision     Long term goals: 12 weeks, pt agrees to goals set  6. Pt will perform basic HEP for gross strengthening with supervision to deter worsening of functional limitations. Met 8/31/17- pt's sister reports poor current compliance  7. Pt will demonstrate improved bilateral hip strength to 3/5 to improve balance and independence with mobility. Met 7/5/17  8. New 7/5/17: Pt will demonstrate improved bilateral hip strength to 4/5 to improve balance and independence with mobility. improved  9. Pt will demonstrate improved cervical extension strength to 3+/5 to improve head control. Met 7/31/17  10. Pt will demonstrate improved cervical deep flexor strength to 3+/5 to improve head control. Met 7/31/17   11. Pt will demonstrate improve balance and gait by scoring 14/28 on Tinetti. Met 8/31/17- 17/28                                      Revised 8/31/17: pt will demonstrate improved balance/ gait and decreased fall risk to moderate by scoring 19/28 on Tinetti Assessment. inconsistent- 16/28                                          12. Pt will demonstrate improved cervical PROM for rotation to 40 degrees to improve functional head control. inconsistent-  L= 35 deg, R= 6 deg,                                          13. New 8/1/18- pt will ambulate 50ft with LBQC and supervision over level tile to demonstrate improved household mobility with decreased AD- same- 50 ft with large based quad cane with CGA- min (intermittent hand held assist)     Plan      Continue with VMO and WALESKA hip strengthening, Continue to address cervical ROM as tolerated. Address standing balance with head turns and on foam    Kirby Magdaleno, PT    12/4/2019     "

## 2019-12-09 ENCOUNTER — CLINICAL SUPPORT (OUTPATIENT)
Dept: REHABILITATION | Facility: HOSPITAL | Age: 54
End: 2019-12-09
Attending: STUDENT IN AN ORGANIZED HEALTH CARE EDUCATION/TRAINING PROGRAM
Payer: COMMERCIAL

## 2019-12-09 DIAGNOSIS — Z74.09 IMPAIRED FUNCTIONAL MOBILITY, BALANCE, GAIT, AND ENDURANCE: Primary | ICD-10-CM

## 2019-12-09 DIAGNOSIS — R29.898 DECREASED ROM OF NECK: ICD-10-CM

## 2019-12-09 DIAGNOSIS — R53.1 DECREASED STRENGTH: ICD-10-CM

## 2019-12-09 PROCEDURE — 97110 THERAPEUTIC EXERCISES: CPT | Mod: PO

## 2019-12-09 PROCEDURE — 97530 THERAPEUTIC ACTIVITIES: CPT | Mod: PO

## 2019-12-09 NOTE — PROGRESS NOTES
"Physical Therapy Progress Note      Name: Brigid Jernigan  Clinic Number: [id number]  Diagnosis: G71.11 (ICD-10-CM) - Myotonic dystrophy  Physician: Lloyd Mojica MD  Treatment Orders: PT Eval and Treat          Past Medical History:   Diagnosis Date    Abnormal EKG      Myotonic dystrophy      Pacemaker      PAF (paroxysmal atrial fibrillation)      Sleep apnea           Precautions: standard  auth period: 7/1/19 to 12/31/19  Visit #: 8/20     Time in: 1649 (pt  arrives late for apt, unable to accommodate)  Time out: 1731  Total billable treatment time: 42 minutes     (visit total 2018= 73)  (Visit total 2017= 56)  Date of Eval: 5/9/2017          POC: 9/3/2019 to 11/1/2019  New POC: 11/2/2019 to 12/28/2019         Subjective   Pt reports: That she put her cervical collar on just before coming. She did some of her cervical exercises this weekend.    Pt's sister: " We have to leave at 5:30."  Pt reports some compliance with HEP     Pain Scale:  0/10    Objective      Patient received individual therapy to increase strength, endurance, ROM, flexibility, posture, core stabilization and head control with activities as follows:      Pt participated in therapeutic exercises x 37 minutes to address ROM, strength, balance, and mobility which includes:     10 minutes SciFit recumbent stepper, level 12    Seated EOM:   X 20 reps of trunk rotation (Russian Twist) with yellow weighted ball   X 10 reps posterior lean backs with yellow weighted ball   2 X 5 reps ea direction of throwing basketball fwd, L, R    Leg press:       Single leg 45# 10x on B LE   B # 2 x 10 reps    x10 sit<>Stand from EOM to RW    Supine:   2 x 20 second upper trap passive stretch  Suboccipital release    Patient participated in dynamic functional therapeutic activities to improve functional performance for 5 minutes. Including:   Stand to sit with Supervision  Sit to stand from EOM ---> RW with SBA  Sit<>supine with mod I      Written " "Home Exercises: continue with previous HEP  Pt demo fair understanding of the education provided. Brigid demonstrated fair return demonstration of activities.      Education provided re: perform Long arc quads frequently at home to address L knee extension weakness     Pt has no cultural, educational or language barriers to learning provided.     Assessment       Phani tolerated session fairly well today. Pt able to get through much more today as pt and her sister report to session nearly on time. She continues to demonstrate forward head posture with poor ability to correct. She does however demonstrate improving endurance and strength via sit<>stand practice today and use of stepper. She is appropriate for skilled PT services.         Pt is progressing towards goals.   Prognosis is fair to good.     Barriers to progress: transportation, motivation for exercises     Activity limitations/ Participation Restrictions:   Fall Risk - impaired balance   Weakness  Impaired muscle tone  Poor head control  Decreased ROM  Gait deviations   Decreased ambulation   Decreased activity tolerance   Decreased independence with daily activities   Requires skilled supervision to complete and progress HEP   Requires skilled PT for DME         Pt's spiritual, cultural and educational needs considered and pt agreeable to plan of care and GOALS as stated below:    Short term goals: 6 weeks, pt agrees to goals set. (as of 11/21/2019)  1. Pt will perform chin tucks in supine with supervision to improve independent with HEP. Met 7/5/17  2. Assist pt with obtaining appropriate cervical brace to improve head control. Met 7/23/2019-   3. Pt will demonstrate improved knee ext strength on left to 4/5 to improve safety with ambulation and sit<>stand. Met 9/28/17   4. Pt will demonstrate improved cervical rotation PROM to 20 degrees to improve functional head control. Met 7/5/17  5. Pt will perform sit<>stand consistently from 22" height " "surface with supervision to improve independence. Met 7/5/17                                                         Revised 8/31/17:  Pt will perform sit<>stand from 19" height chair to improve independence with sitting in various chairs around her home and decreased                                                                      need for "pop up seat". Met 8/12/19- pt able to perform from 19" height with supervision     Long term goals: 12 weeks, pt agrees to goals set  6. Pt will perform basic HEP for gross strengthening with supervision to deter worsening of functional limitations. Met 8/31/17- pt's sister reports poor current compliance  7. Pt will demonstrate improved bilateral hip strength to 3/5 to improve balance and independence with mobility. Met 7/5/17  8. New 7/5/17: Pt will demonstrate improved bilateral hip strength to 4/5 to improve balance and independence with mobility. improved  9. Pt will demonstrate improved cervical extension strength to 3+/5 to improve head control. Met 7/31/17  10. Pt will demonstrate improved cervical deep flexor strength to 3+/5 to improve head control. Met 7/31/17   11. Pt will demonstrate improve balance and gait by scoring 14/28 on Tinetti. Met 8/31/17- 17/28                                      Revised 8/31/17: pt will demonstrate improved balance/ gait and decreased fall risk to moderate by scoring 19/28 on Tinetti Assessment. inconsistent- 16/28                                          12. Pt will demonstrate improved cervical PROM for rotation to 40 degrees to improve functional head control. inconsistent-  L= 35 deg, R= 6 deg,                                          13. New 8/1/18- pt will ambulate 50ft with LBQC and supervision over level tile to demonstrate improved household mobility with decreased AD- same- 50 ft with large based quad cane with CGA- min (intermittent hand held assist)     Plan      Continue with VMO and WALESKA hip strengthening, Continue to " address cervical ROM as tolerated. Address standing balance with head turns and on foam    Kirby Magdaleno, PT    12/9/2019

## 2019-12-10 NOTE — PROGRESS NOTES
Physical Therapy Progress Note      Name: Brigid Jernigan  Clinic Number: [id number]  Diagnosis: G71.11 (ICD-10-CM) - Myotonic dystrophy  Physician: Lloyd Mojica MD  Treatment Orders: PT Eval and Treat          Past Medical History:   Diagnosis Date    Abnormal EKG      Myotonic dystrophy      Pacemaker      PAF (paroxysmal atrial fibrillation)      Sleep apnea           Precautions: standard  auth period: 7/1/19 to 12/31/19  Visit #: 8/20     Time in: 1657 (pt arrives late, PT unable to accommodate)   Time out: 1730  Total billable treatment time: 33 minutes     (visit total 2018= 73)  (Visit total 2017= 56)  Date of Eval: 5/9/2017          POC: 9/3/2019 to 11/1/2019  New POC: 11/2/2019 to 12/28/2019         Subjective   Pt reports: Doing well today, no new complaints.   Pt's sister: nothing to report  Pt reports some compliance with HEP     Pain Scale:  0/10    Objective      Patient received individual therapy to increase strength, endurance, ROM, flexibility, posture, core stabilization and head control with activities as follows:      Pt participated in therapeutic exercises x 28 minutes to address ROM, strength, balance, and mobility which includes:     10 minutes [employer] recumbent stepper, level 12    Seated EOM:   2 X 20 reps of trunk rotation (Russian Twist) with yellow weighted ball   X 10 reps posterior lean backs with yellow weighted ball   2 X 5 reps ea direction of throwing basketball fwd, L, R    Standing EOM pt attempts to standing without UE support, 2 trials of approximately 15 seconds each    x10 sit<>Stand from EOM to RW, pt struggles with this task today due to lower mat      Patient participated in dynamic functional therapeutic activities to improve functional performance for 5 minutes. Including:   Stand to sit with Supervision  Sit to stand from EOM ---> RW with SBA  Sit<>supine with mod I      Written Home Exercises: continue with previous HEP  Pt demo fair understanding of the  "education provided. Brigid demonstrated fair return demonstration of activities.      Education provided re: perform Long arc quads frequently at home to address L knee extension weakness     Pt has no cultural, educational or language barriers to learning provided.     Assessment       Phani tolerated session fairly well today. She struggled more with sit<>stand transfers today due to PT slightly lowering mat, but she was able to complete them without assist with increased time. Pt wears cervical collar throughout session today and when prompted pt reports she wears it because her sister said she has to. She remains appropriate for skilled PT services.       Pt is progressing towards goals.   Prognosis is fair to good.     Barriers to progress: transportation, motivation for exercises     Activity limitations/ Participation Restrictions:   Fall Risk - impaired balance   Weakness  Impaired muscle tone  Poor head control  Decreased ROM  Gait deviations   Decreased ambulation   Decreased activity tolerance   Decreased independence with daily activities   Requires skilled supervision to complete and progress HEP   Requires skilled PT for DME         Pt's spiritual, cultural and educational needs considered and pt agreeable to plan of care and GOALS as stated below:    Short term goals: 6 weeks, pt agrees to goals set. (as of 11/21/2019)  1. Pt will perform chin tucks in supine with supervision to improve independent with HEP. Met 7/5/17  2. Assist pt with obtaining appropriate cervical brace to improve head control. Met 7/23/2019-   3. Pt will demonstrate improved knee ext strength on left to 4/5 to improve safety with ambulation and sit<>stand. Met 9/28/17   4. Pt will demonstrate improved cervical rotation PROM to 20 degrees to improve functional head control. Met 7/5/17  5. Pt will perform sit<>stand consistently from 22" height surface with supervision to improve independence. Met 7/5/17                 " "                                        Revised 8/31/17:  Pt will perform sit<>stand from 19" height chair to improve independence with sitting in various chairs around her home and decreased                                                                      need for "pop up seat". Met 8/12/19- pt able to perform from 19" height with supervision     Long term goals: 12 weeks, pt agrees to goals set  6. Pt will perform basic HEP for gross strengthening with supervision to deter worsening of functional limitations. Met 8/31/17- pt's sister reports poor current compliance  7. Pt will demonstrate improved bilateral hip strength to 3/5 to improve balance and independence with mobility. Met 7/5/17  8. New 7/5/17: Pt will demonstrate improved bilateral hip strength to 4/5 to improve balance and independence with mobility. improved  9. Pt will demonstrate improved cervical extension strength to 3+/5 to improve head control. Met 7/31/17  10. Pt will demonstrate improved cervical deep flexor strength to 3+/5 to improve head control. Met 7/31/17   11. Pt will demonstrate improve balance and gait by scoring 14/28 on Tinetti. Met 8/31/17- 17/28                                      Revised 8/31/17: pt will demonstrate improved balance/ gait and decreased fall risk to moderate by scoring 19/28 on Tinetti Assessment. inconsistent- 16/28                                          12. Pt will demonstrate improved cervical PROM for rotation to 40 degrees to improve functional head control. inconsistent-  L= 35 deg, R= 6 deg,                                          13. New 8/1/18- pt will ambulate 50ft with LBQC and supervision over level tile to demonstrate improved household mobility with decreased AD- same- 50 ft with large based quad cane with CGA- min (intermittent hand held assist)     Plan      Continue with VMO and WALESKA hip strengthening, Continue to address cervical ROM as tolerated. Address standing balance with head turns " and on foam    Kirby Magdaleno, PT    12/11/2019

## 2019-12-11 ENCOUNTER — CLINICAL SUPPORT (OUTPATIENT)
Dept: REHABILITATION | Facility: HOSPITAL | Age: 54
End: 2019-12-11
Attending: STUDENT IN AN ORGANIZED HEALTH CARE EDUCATION/TRAINING PROGRAM
Payer: COMMERCIAL

## 2019-12-11 DIAGNOSIS — Z74.09 IMPAIRED FUNCTIONAL MOBILITY, BALANCE, GAIT, AND ENDURANCE: Primary | ICD-10-CM

## 2019-12-11 DIAGNOSIS — R29.898 DECREASED ROM OF NECK: ICD-10-CM

## 2019-12-11 DIAGNOSIS — R53.1 DECREASED STRENGTH: ICD-10-CM

## 2019-12-11 PROCEDURE — 97110 THERAPEUTIC EXERCISES: CPT | Mod: PO

## 2019-12-16 ENCOUNTER — CLINICAL SUPPORT (OUTPATIENT)
Dept: REHABILITATION | Facility: HOSPITAL | Age: 54
End: 2019-12-16
Attending: STUDENT IN AN ORGANIZED HEALTH CARE EDUCATION/TRAINING PROGRAM
Payer: COMMERCIAL

## 2019-12-16 DIAGNOSIS — R29.898 DECREASED ROM OF NECK: ICD-10-CM

## 2019-12-16 DIAGNOSIS — R53.1 DECREASED STRENGTH: ICD-10-CM

## 2019-12-16 DIAGNOSIS — Z74.09 IMPAIRED FUNCTIONAL MOBILITY, BALANCE, GAIT, AND ENDURANCE: ICD-10-CM

## 2019-12-16 PROCEDURE — 97110 THERAPEUTIC EXERCISES: CPT | Mod: PO

## 2019-12-16 PROCEDURE — 97530 THERAPEUTIC ACTIVITIES: CPT | Mod: PO

## 2019-12-16 NOTE — PROGRESS NOTES
Physical Therapy Progress Note      Name: Brigid Jernigan  Clinic Number: [id number]  Diagnosis: G71.11 (ICD-10-CM) - Myotonic dystrophy  Physician: Lloyd Mojica MD  Treatment Orders: PT Eval and Treat          Past Medical History:   Diagnosis Date    Abnormal EKG      Myotonic dystrophy      Pacemaker      PAF (paroxysmal atrial fibrillation)      Sleep apnea           Precautions: standard  auth period: 7/1/19 to 12/31/19  Visit #: 12/20     Time in: 1652  Time out: 1730  Total billable treatment time: 38 minutes     (visit total 2018= 73)  (Visit total 2017= 56)  Date of Eval: 5/9/2017          POC: 9/3/2019 to 11/1/2019  New POC: 11/2/2019 to 12/28/2019         Subjective   Pt reports: Doing well today, no new complaints.   Pt's sister: nothing to report  Pt reports some compliance with HEP     Pain Scale:  0/10    Objective        Pt arrived 7 minutes late to session. Pt ambulates into gym with RW and is not wearing her cervical collar. Pt does not don her collar at all during the therapy session.    Patient received individual therapy to increase strength, endurance, ROM, flexibility, posture, core stabilization and head control with activities as follows:      Pt participated in therapeutic exercises x 24 minutes to address ROM, strength, balance, and mobility which includes:    Horizontal Leg press:  1 x 15 B LE with 135 # applied  1 x 15 B LE with 130 # applied  1 x 10 unilateral with 60 # applied     10 minutes SciFit recumbent stepper, level 12        Patient participated in dynamic functional therapeutic activities to improve functional performance for 14 minutes. Including:     Seated EOM:   1 X 10 reps of trunk rotation (Russian Twist) with 4.4 # yellow weighted ball   1 X 10 reps posterior lean backs and forward abdominal crunches with yellow weighted ball, CGA/SBA   2 X 10 reps of tossing basketball to PT after PT bounces ball to pt      X 10 trials sit<>stand from EOM to RW with mat  "height set at 21 ", pt requires cues for proper form and technique and PT provides S for safety; upon each standing attempt, pt tries to maintain balance without UE support for 5-10 seconds            Written Home Exercises: continue with previous HEP  Pt demo fair understanding of the education provided. Brigid demonstrated fair return demonstration of activities.      Education provided re: perform Long arc quads frequently at home to address L knee extension weakness     Pt has no cultural, educational or language barriers to learning provided.     Assessment       Phani tolerated session well today, performing on the horizontal leg press and the recumbent stepper. Pt also performed some of the therapy tasks she completed the previous session, including seated ball toss, posterior lean backs and trunk rotations with 4.4 # weighted ball. Her sit<> stand transfers were performed relatively well from 21 " mat height, though she does need some cues to prevent using her posterior leg as a pivot point to achieve standing. PT was pleased to see pt work the entire session without her cervical collar.  Pt remains appropriate for skilled PT services.       Pt is progressing towards goals.   Prognosis is fair to good.     Barriers to progress: transportation, motivation for exercises     Activity limitations/ Participation Restrictions:   Fall Risk - impaired balance   Weakness  Impaired muscle tone  Poor head control  Decreased ROM  Gait deviations   Decreased ambulation   Decreased activity tolerance   Decreased independence with daily activities   Requires skilled supervision to complete and progress HEP   Requires skilled PT for DME         Pt's spiritual, cultural and educational needs considered and pt agreeable to plan of care and GOALS as stated below:    Short term goals: 6 weeks, pt agrees to goals set. (as of 11/21/2019)  1. Pt will perform chin tucks in supine with supervision to improve independent with HEP. " "Met 7/5/17  2. Assist pt with obtaining appropriate cervical brace to improve head control. Met 7/23/2019-   3. Pt will demonstrate improved knee ext strength on left to 4/5 to improve safety with ambulation and sit<>stand. Met 9/28/17   4. Pt will demonstrate improved cervical rotation PROM to 20 degrees to improve functional head control. Met 7/5/17  5. Pt will perform sit<>stand consistently from 22" height surface with supervision to improve independence. Met 7/5/17                                                         Revised 8/31/17:  Pt will perform sit<>stand from 19" height chair to improve independence with sitting in various chairs around her home and decreased                                                                      need for "pop up seat". Met 8/12/19- pt able to perform from 19" height with supervision     Long term goals: 12 weeks, pt agrees to goals set  6. Pt will perform basic HEP for gross strengthening with supervision to deter worsening of functional limitations. Met 8/31/17- pt's sister reports poor current compliance  7. Pt will demonstrate improved bilateral hip strength to 3/5 to improve balance and independence with mobility. Met 7/5/17  8. New 7/5/17: Pt will demonstrate improved bilateral hip strength to 4/5 to improve balance and independence with mobility. improved  9. Pt will demonstrate improved cervical extension strength to 3+/5 to improve head control. Met 7/31/17  10. Pt will demonstrate improved cervical deep flexor strength to 3+/5 to improve head control. Met 7/31/17   11. Pt will demonstrate improve balance and gait by scoring 14/28 on Tinetti. Met 8/31/17- 17/28                                      Revised 8/31/17: pt will demonstrate improved balance/ gait and decreased fall risk to moderate by scoring 19/28 on Tinetti Assessment. inconsistent- 16/28                                          12. Pt will demonstrate improved cervical PROM for rotation to 40 " degrees to improve functional head control. inconsistent-  L= 35 deg, R= 6 deg,                                          13. New 8/1/18- pt will ambulate 50ft with LBQC and supervision over level tile to demonstrate improved household mobility with decreased AD- same- 50 ft with large based quad cane with CGA- min (intermittent hand held assist)     Plan      Continue with VMO and WALESKA hip strengthening, Continue to address cervical ROM as tolerated. Address standing balance with head turns and on foam    Polo Perez, PT    12/16/2019

## 2019-12-20 NOTE — PROGRESS NOTES
"Physical Therapy Progress Note      Name: Brigid Jernigan  Clinic Number: [id number]  Diagnosis: G71.11 (ICD-10-CM) - Myotonic dystrophy  Physician: Lloyd Mojica MD  Treatment Orders: PT Eval and Treat          Past Medical History:   Diagnosis Date    Abnormal EKG      Myotonic dystrophy      Pacemaker      PAF (paroxysmal atrial fibrillation)      Sleep apnea           Precautions: standard  auth period: 7/1/19 to 12/31/19  Visit #: 13/20     Time in: 1653  Time out: 1730  Total billable treatment time: 37 minutes     (visit total 2018= 73)  (Visit total 2017= 56)  Date of Eval: 5/9/2017          POC: 9/3/2019 to 11/1/2019  New POC: 11/2/2019 to 12/28/2019  Extend POC through 1/31/2019         Subjective   Pt reports: Doing well today, no new complaints. Pt wants to walk with cane more. Steps are hard, she can't walk outside.   Pt's sister: "Phani needs to come twice a week because almost immediately after my other sister stopped coming she passed away."    Pt reports some compliance with HEP     Pain Scale:  0/10    Objective        Pt arrived 8 minutes late to session. Pt ambulates into gym with RW and is not wearing her cervical collar. Pt does not don her collar at all during the therapy session.    Patient received individual therapy to increase strength, endurance, ROM, flexibility, posture, core stabilization and head control with activities as follows:      Pt participated in therapeutic exercises x 37 minutes to address ROM, strength, balance, and mobility which includes:    Lower Extremity Strength (tested in seated)    RLE eval RLE 5/19 RLE 7/19 RLE 8/19 RLE 9/19 RLE 9/23/19 RLE 10/19 RLE 11/19 RLE  12/19 LLE eval LLE 5/19 LLE 7/19 LLE 8/19 LLE 9/19 LLE 9/23/19 LLE 10/19 LLE 11/19 LLE  12/19   Hip Flexion: 3+/5 5/5 NT NT NT 4-/5 4/5 4-/5 4/5 3+/5 5/5 NT NT NT 4-/5 4-/5 4-/5 4/5   Hip Extension:  2/5 4+/5 3+/5 3+/5 3+/5 3+/5 3+/5 4-/5 4/5 2/5 4/5 3+ to 4-/5 4/5 4+/5 4/5 4-/5 4/5 4/5   Hip " Abduction: NT 4+/5 4/5 4/5 4+/5 4/5 4/5 4+/5 5/5 NT 4/5 4-/5 4+/5 4+/5 5/5 4+/5 4+/5 5/5   Hip Adduction: NT 4/5 4-/5 4-/5 4-/5 4/5 4/5 4/5 5/5 NT 3+/5 4/5 4-/5 4-/5 4/5 4/5 4+/5 5/5   Knee Extension: 4+/5 NT NT 4+/5 5/5 NT 5/5 5/5 5/5 3+/5 NT NT 3+/5 3+ to 4-/5 NT 3+/5 3-/5 3-/5   Knee Flexion: 4/5 NT NT 4-/5 4+/5 4/5 4-/5 4/5 4-/5 4-/5 NT NT 3+/5 3+/5 3+/5 4-/5 4-/5 4-/5       cervical rotation AROM (seated) L = 20, R = 17 deg    X 10 trials sit<>stand from gold chair to RW     Ambulation with LBQC 62', CGA, uneven step lengths, pt often grabs cane with B UE and requires external cues to stand taller, no LOB during trial    TUG with RW 26.6 sec  TUG with LBQC 1 minute 11 seconds  SSWS with RW: 6m/15.2sec = .4 m/s    Tinetti Balance Assessment  Balance:  1. Sitting Balance 1   Leans/ slides in chair= 0   Steady= 1  2. Rises from chair 0 (standard chair)   Unable without help= 0   Able, uses arms= 1   Able without use of arms= 2  3. Attempts to rise 1   Unable without help= 0   Able, >1 attmept required-= 1   Able, 1 attempt= 2  4. Immediate standing balance (1st 5 seconds) 1   Unsteady (swaggers, moves feet, trunk sway)= 0   Steady but uses walker or other support= 1   Narrow base of support without walker or support= 2  5. Nudged 0   Begins to fall= 0   Staggers, grabs, catches self= 1   Steady= 2  6. Standing balance 1   Unsteady= 0   Steady but wide LEYDI or uses AD=1   Narrow LEYDI without AD=2  7. Eyes closed 1 (pt only able to hold for ~3-5 sec)   Unsteady= 0   Steady= 1  8. Turning 360 degrees 1   Discontinuous steps= 0   Continuous steps= 1   Unsteady= 0   Steady= 1  9. Sitting down 1   Unsafe= 0   Uses arms or not in a smooth motion= 1   Safe, smooth motion= 2  Balance score= 7  Gait:  1. Initiation 1   hesitates or multiple attempts to start= 0   No hesitancy= 1  2. Step length 2   Step to= 0   One foot passes= 1   reciprocal pattern= 2  3. Step height 2    Neither foot clears floor= 0   One foot clears  floor= 1   Both feet clear floor= 2  4. Step symmetry 1   Not symmetrical= 0   Appears symmetrical= 1  5. Step continuity 1   Not continuous= 0   Appears continuous= 1  6. Path 1   Marked deviation= 0   Mild/moderate deviation or uses A.D.= 1   Straight without A.D.= 2  7. Trunk 0   Marked sway or uses A.D.= 0   No sway, but flexes knees or back, spread arms out while walking= 1   No sway, no flexion, no use of UE, no use of A.D.= 2  8. Walking stance 0   Heels apart= 0   Heels almost touching while walking= 1  Gait score= 8  Total score= 15 , high fall risk    0-18= high fall risk  19-23= moderate fall risk  24-28= low fall risk          Written Home Exercises: continue with previous HEP  Pt demo fair understanding of the education provided. Brigid demonstrated fair return demonstration of activities.      Education provided re: perform Long arc quads frequently at home to address L knee extension weakness     Pt has no cultural, educational or language barriers to learning provided.     Assessment       Assessment period 11/21/19-12/23/2019: Phani tolerated reassessment well today. PT and pt discuss where POC is headed and what goals pt continues to strive for in PT. PT establishes new goals focused on ambulation with LBQC and improving standing balance as per patient this is what she wants to improve upon most. Pt continues to demonstrate poor ability to balance unsupported and demonstrate terrible posture with extreme forward head posture. Pt's goals have been updated and remain appropriate for the next month of care. PT sessions to focus on standing, ambulatory, and balance tasks only to achieve remaining goals and work toward discharge.     Pt is progressing towards goals.   Prognosis is fair to good.     Barriers to progress: transportation, motivation for exercises     Activity limitations/ Participation Restrictions:   Fall Risk - impaired balance   Weakness  Impaired muscle tone  Poor head  "control  Decreased ROM  Gait deviations   Decreased ambulation   Decreased activity tolerance   Decreased independence with daily activities   Requires skilled supervision to complete and progress HEP   Requires skilled PT for DME         Pt's spiritual, cultural and educational needs considered and pt agreeable to plan of care and GOALS as stated below:    Short term goals: 6 weeks, pt agrees to goals set. (as of 12/23/2019)  1. Pt will perform chin tucks in supine with supervision to improve independent with HEP. Met 7/5/17  2. Assist pt with obtaining appropriate cervical brace to improve head control. Met 7/23/2019-   3. Pt will demonstrate improved knee ext strength on left to 4/5 to improve safety with ambulation and sit<>stand. Met 9/28/17   4. Pt will demonstrate improved cervical rotation PROM to 20 degrees to improve functional head control. Met 7/5/17  5. Pt will perform sit<>stand consistently from 22" height surface with supervision to improve independence. Met 7/5/17                               Revised 8/31/17:  Pt will perform sit<>stand from 19" height chair to improve independence with sitting in various chairs around her home and decreased need for "pop up seat". Met 8/12/19- pt able to perform from 19" height with supervision     Long term goals: 12 weeks, pt agrees to goals set  6. Pt will perform basic HEP for gross strengthening with supervision to deter worsening of functional limitations. Met 8/31/17- pt's sister reports poor current compliance  7. Pt will demonstrate improved bilateral hip strength to 3/5 to improve balance and independence with mobility. Met 7/5/17  8. New 7/5/17: Pt will demonstrate improved bilateral hip strength to 4/5 to improve balance and independence with mobility. improved  9. Pt will demonstrate improved cervical extension strength to 3+/5 to improve head control. Met 7/31/17  10. Pt will demonstrate improved cervical deep flexor strength to 3+/5 to improve head " control. Met 7/31/17   11. Pt will demonstrate improve balance and gait by scoring 14/28 on Tinetti. Met 8/31/17- 17/28                                      Revised 8/31/17: pt will demonstrate improved balance/ gait and decreased fall risk to moderate by scoring 19/28 on Tinetti Assessment. inconsistent- 15/28                          12. Pt will demonstrate improved cervical PROM for rotation to 40 degrees to improve functional head control. inconsistent-  L= 35 deg, R= 6 deg,                          13. New 8/1/18- pt will ambulate 50ft with LBQC and supervision over level tile to demonstrate improved household mobility with decreased AD- improving, 62' with CGA and LBQC    14. New 12/23/2019: Pt will decrease score TUG to at least 45 seconds with use of LBQC to demonstrate improved mobility with cane.     15. New 12/23/2019: Pt will improve SSWS to at least .7 m/s in order to improve community mobility with RW.         Plan    Extend POC through 1/31/2019  Address more standing balance and ambulation with LBQC as this is pt's main goal at this time. Strengthen as warranted.     Kirby Magdaleno, PT  12/23/2019

## 2019-12-23 ENCOUNTER — CLINICAL SUPPORT (OUTPATIENT)
Dept: REHABILITATION | Facility: HOSPITAL | Age: 54
End: 2019-12-23
Attending: STUDENT IN AN ORGANIZED HEALTH CARE EDUCATION/TRAINING PROGRAM
Payer: COMMERCIAL

## 2019-12-23 DIAGNOSIS — Z74.09 IMPAIRED FUNCTIONAL MOBILITY, BALANCE, GAIT, AND ENDURANCE: Primary | ICD-10-CM

## 2019-12-23 DIAGNOSIS — R53.1 DECREASED STRENGTH: ICD-10-CM

## 2019-12-23 DIAGNOSIS — R29.898 DECREASED ROM OF NECK: ICD-10-CM

## 2019-12-23 PROCEDURE — 97110 THERAPEUTIC EXERCISES: CPT | Mod: PO

## 2019-12-24 NOTE — PLAN OF CARE
"Physical Therapy Progress Note      Name: Brigid Jernigan  Clinic Number: [id number]  Diagnosis: G71.11 (ICD-10-CM) - Myotonic dystrophy  Physician: Lloyd Mojica MD  Treatment Orders: PT Eval and Treat          Past Medical History:   Diagnosis Date    Abnormal EKG      Myotonic dystrophy      Pacemaker      PAF (paroxysmal atrial fibrillation)      Sleep apnea           Precautions: standard  auth period: 7/1/19 to 12/31/19  Visit #: 13/20     Time in: 1653  Time out: 1730  Total billable treatment time: 37 minutes     (visit total 2018= 73)  (Visit total 2017= 56)  Date of Eval: 5/9/2017          POC: 9/3/2019 to 11/1/2019  New POC: 11/2/2019 to 12/28/2019  Extend POC through 1/31/2019         Subjective   Pt reports: Doing well today, no new complaints. Pt wants to walk with cane more. Steps are hard, she can't walk outside.   Pt's sister: "Phani needs to come twice a week because almost immediately after my other sister stopped coming she passed away."    Pt reports some compliance with HEP     Pain Scale:  0/10    Objective        Pt arrived 8 minutes late to session. Pt ambulates into gym with RW and is not wearing her cervical collar. Pt does not don her collar at all during the therapy session.    Patient received individual therapy to increase strength, endurance, ROM, flexibility, posture, core stabilization and head control with activities as follows:      Pt participated in therapeutic exercises x 37 minutes to address ROM, strength, balance, and mobility which includes:    Lower Extremity Strength (tested in seated)    RLE eval RLE 5/19 RLE 7/19 RLE 8/19 RLE 9/19 RLE 9/23/19 RLE 10/19 RLE 11/19 RLE  12/19 LLE eval LLE 5/19 LLE 7/19 LLE 8/19 LLE 9/19 LLE 9/23/19 LLE 10/19 LLE 11/19 LLE  12/19   Hip Flexion: 3+/5 5/5 NT NT NT 4-/5 4/5 4-/5 4/5 3+/5 5/5 NT NT NT 4-/5 4-/5 4-/5 4/5   Hip Extension:  2/5 4+/5 3+/5 3+/5 3+/5 3+/5 3+/5 4-/5 4/5 2/5 4/5 3+ to 4-/5 4/5 4+/5 4/5 4-/5 4/5 4/5   Hip " Abduction: NT 4+/5 4/5 4/5 4+/5 4/5 4/5 4+/5 5/5 NT 4/5 4-/5 4+/5 4+/5 5/5 4+/5 4+/5 5/5   Hip Adduction: NT 4/5 4-/5 4-/5 4-/5 4/5 4/5 4/5 5/5 NT 3+/5 4/5 4-/5 4-/5 4/5 4/5 4+/5 5/5   Knee Extension: 4+/5 NT NT 4+/5 5/5 NT 5/5 5/5 5/5 3+/5 NT NT 3+/5 3+ to 4-/5 NT 3+/5 3-/5 3-/5   Knee Flexion: 4/5 NT NT 4-/5 4+/5 4/5 4-/5 4/5 4-/5 4-/5 NT NT 3+/5 3+/5 3+/5 4-/5 4-/5 4-/5       cervical rotation AROM (seated) L = 20, R = 17 deg    X 10 trials sit<>stand from gold chair to RW     Ambulation with LBQC 62', CGA, uneven step lengths, pt often grabs cane with B UE and requires external cues to stand taller, no LOB during trial    TUG with RW 26.6 sec  TUG with LBQC 1 minute 11 seconds  SSWS with RW: 6m/15.2sec = .4 m/s    Tinetti Balance Assessment  Balance:  1. Sitting Balance 1   Leans/ slides in chair= 0   Steady= 1  2. Rises from chair 0 (standard chair)   Unable without help= 0   Able, uses arms= 1   Able without use of arms= 2  3. Attempts to rise 1   Unable without help= 0   Able, >1 attmept required-= 1   Able, 1 attempt= 2  4. Immediate standing balance (1st 5 seconds) 1   Unsteady (swaggers, moves feet, trunk sway)= 0   Steady but uses walker or other support= 1   Narrow base of support without walker or support= 2  5. Nudged 0   Begins to fall= 0   Staggers, grabs, catches self= 1   Steady= 2  6. Standing balance 1   Unsteady= 0   Steady but wide LEYDI or uses AD=1   Narrow LEYDI without AD=2  7. Eyes closed 1 (pt only able to hold for ~3-5 sec)   Unsteady= 0   Steady= 1  8. Turning 360 degrees 1   Discontinuous steps= 0   Continuous steps= 1   Unsteady= 0   Steady= 1  9. Sitting down 1   Unsafe= 0   Uses arms or not in a smooth motion= 1   Safe, smooth motion= 2  Balance score= 7  Gait:  1. Initiation 1   hesitates or multiple attempts to start= 0   No hesitancy= 1  2. Step length 2   Step to= 0   One foot passes= 1   reciprocal pattern= 2  3. Step height 2    Neither foot clears floor= 0   One foot clears  floor= 1   Both feet clear floor= 2  4. Step symmetry 1   Not symmetrical= 0   Appears symmetrical= 1  5. Step continuity 1   Not continuous= 0   Appears continuous= 1  6. Path 1   Marked deviation= 0   Mild/moderate deviation or uses A.D.= 1   Straight without A.D.= 2  7. Trunk 0   Marked sway or uses A.D.= 0   No sway, but flexes knees or back, spread arms out while walking= 1   No sway, no flexion, no use of UE, no use of A.D.= 2  8. Walking stance 0   Heels apart= 0   Heels almost touching while walking= 1  Gait score= 8  Total score= 15 , high fall risk    0-18= high fall risk  19-23= moderate fall risk  24-28= low fall risk          Written Home Exercises: continue with previous HEP  Pt demo fair understanding of the education provided. Brigid demonstrated fair return demonstration of activities.      Education provided re: perform Long arc quads frequently at home to address L knee extension weakness     Pt has no cultural, educational or language barriers to learning provided.     Assessment       Assessment period 11/21/19-12/23/2019: Phani tolerated reassessment well today. PT and pt discuss where POC is headed and what goals pt continues to strive for in PT. PT establishes new goals focused on ambulation with LBQC and improving standing balance as per patient this is what she wants to improve upon most. Pt continues to demonstrate poor ability to balance unsupported and demonstrate terrible posture with extreme forward head posture. Pt's goals have been updated and remain appropriate for the next month of care. PT sessions to focus on standing, ambulatory, and balance tasks only to achieve remaining goals and work toward discharge.     Pt is progressing towards goals.   Prognosis is fair to good.     Barriers to progress: transportation, motivation for exercises     Activity limitations/ Participation Restrictions:   Fall Risk - impaired balance   Weakness  Impaired muscle tone  Poor head  "control  Decreased ROM  Gait deviations   Decreased ambulation   Decreased activity tolerance   Decreased independence with daily activities   Requires skilled supervision to complete and progress HEP   Requires skilled PT for DME         Pt's spiritual, cultural and educational needs considered and pt agreeable to plan of care and GOALS as stated below:    Short term goals: 6 weeks, pt agrees to goals set. (as of 12/23/2019)  1. Pt will perform chin tucks in supine with supervision to improve independent with HEP. Met 7/5/17  2. Assist pt with obtaining appropriate cervical brace to improve head control. Met 7/23/2019-   3. Pt will demonstrate improved knee ext strength on left to 4/5 to improve safety with ambulation and sit<>stand. Met 9/28/17   4. Pt will demonstrate improved cervical rotation PROM to 20 degrees to improve functional head control. Met 7/5/17  5. Pt will perform sit<>stand consistently from 22" height surface with supervision to improve independence. Met 7/5/17                               Revised 8/31/17:  Pt will perform sit<>stand from 19" height chair to improve independence with sitting in various chairs around her home and decreased need for "pop up seat". Met 8/12/19- pt able to perform from 19" height with supervision     Long term goals: 12 weeks, pt agrees to goals set  6. Pt will perform basic HEP for gross strengthening with supervision to deter worsening of functional limitations. Met 8/31/17- pt's sister reports poor current compliance  7. Pt will demonstrate improved bilateral hip strength to 3/5 to improve balance and independence with mobility. Met 7/5/17  8. New 7/5/17: Pt will demonstrate improved bilateral hip strength to 4/5 to improve balance and independence with mobility. improved  9. Pt will demonstrate improved cervical extension strength to 3+/5 to improve head control. Met 7/31/17  10. Pt will demonstrate improved cervical deep flexor strength to 3+/5 to improve head " control. Met 7/31/17   11. Pt will demonstrate improve balance and gait by scoring 14/28 on Tinetti. Met 8/31/17- 17/28                                      Revised 8/31/17: pt will demonstrate improved balance/ gait and decreased fall risk to moderate by scoring 19/28 on Tinetti Assessment. inconsistent- 15/28                          12. Pt will demonstrate improved cervical PROM for rotation to 40 degrees to improve functional head control. inconsistent-  L= 35 deg, R= 6 deg,                          13. New 8/1/18- pt will ambulate 50ft with LBQC and supervision over level tile to demonstrate improved household mobility with decreased AD- improving, 62' with CGA and LBQC    14. New 12/23/2019: Pt will decrease score TUG to at least 45 seconds with use of LBQC to demonstrate improved mobility with cane.     15. New 12/23/2019: Pt will improve SSWS to at least .7 m/s in order to improve community mobility with RW.         Plan    Extend POC through 1/31/2019  Address more standing balance and ambulation with LBQC as this is pt's main goal at this time. Strengthen as warranted.     Kirby Magdaleno, PT  12/23/2019

## 2019-12-26 NOTE — PROGRESS NOTES
"Physical Therapy Progress Note      Name: Brigid Jernigan  Clinic Number: [id number]  Diagnosis: G71.11 (ICD-10-CM) - Myotonic dystrophy  Physician: Lloyd Mojica MD  Treatment Orders: PT Eval and Treat          Past Medical History:   Diagnosis Date    Abnormal EKG      Myotonic dystrophy      Pacemaker      PAF (paroxysmal atrial fibrillation)      Sleep apnea           Precautions: standard  auth period: 7/1/19 to 12/31/19  Visit #: 14/20     Time in: 1601  Time out: 1645  Total billable treatment time: 44 minutes     (visit total 2018= 73)  (Visit total 2017= 56)  Date of Eval: 5/9/2017          POC: 9/3/2019 to 11/1/2019  New POC: 11/2/2019 to 12/28/2019  Extend POC through 1/31/2019         Subjective   Pt reports: "I'm tired because I don't do much at home. Just getting up to come here is tiring."   Pt's sister: "I have been fussing her all day about picking up her head."    Pt reports some compliance with HEP     Pain Scale:  0/10    Objective     Pt arrived 1 minute late to session. Pt ambulates into gym with RW and is not wearing her cervical collar. Pt does not don her collar at all during the therapy session.    Patient received individual therapy to increase strength, endurance, ROM, flexibility, posture, core stabilization and head control with activities as follows:      Pt participated in therapeutic exercises x 44 minutes to address ROM, strength, balance, and mobility which includes:  X 10 trials sit<>stand from gold chair to RW     Ambulation with LBQC 71', CGA, uneven step lengths, pt able to hold cane with 1 UE only though continues to require cues to stand taller, no LOB during trial though pt requires HHA from PT once due to feeling off balance, toward start of trial    2 x 10 reps of standing hip abduction  2 x 10 reps of standing hip flexion (marching)  3 laps at ballet bar side stepping with 1 UE support  x10 step up onto airex foam with 1 UE support  x8 side step (4 ea " "direction) onto 4" step with B UE support  2 x 10 seated rows with peach theraband      Written Home Exercises: continue with previous HEP  Pt demo fair understanding of the education provided. Brigid demonstrated fair return demonstration of activities.      Education provided re: perform Long arc quads frequently at home to address L knee extension weakness     Pt has no cultural, educational or language barriers to learning provided.     Assessment       Pt tolerated session well today. She tolerates increase in standing activity though requires seated rest following each activity. Pt requires CGA-Pia for all activities and again increases distance with LBQC. Pt continues to be a better ambulator with RW despite continued practice with cane. She remains appropriate for skilled PT services.      Pt is progressing towards goals.   Prognosis is fair to good.     Barriers to progress: transportation, motivation for exercises     Activity limitations/ Participation Restrictions:   Fall Risk - impaired balance   Weakness  Impaired muscle tone  Poor head control  Decreased ROM  Gait deviations   Decreased ambulation   Decreased activity tolerance   Decreased independence with daily activities   Requires skilled supervision to complete and progress HEP   Requires skilled PT for DME         Pt's spiritual, cultural and educational needs considered and pt agreeable to plan of care and GOALS as stated below:    Short term goals: 6 weeks, pt agrees to goals set. (as of 12/23/2019)  1. Pt will perform chin tucks in supine with supervision to improve independent with HEP. Met 7/5/17  2. Assist pt with obtaining appropriate cervical brace to improve head control. Met 7/23/2019-   3. Pt will demonstrate improved knee ext strength on left to 4/5 to improve safety with ambulation and sit<>stand. Met 9/28/17   4. Pt will demonstrate improved cervical rotation PROM to 20 degrees to improve functional head control. Met " "7/5/17  5. Pt will perform sit<>stand consistently from 22" height surface with supervision to improve independence. Met 7/5/17                               Revised 8/31/17:  Pt will perform sit<>stand from 19" height chair to improve independence with sitting in various chairs around her home and decreased need for "pop up seat". Met 8/12/19- pt able to perform from 19" height with supervision     Long term goals: 12 weeks, pt agrees to goals set  6. Pt will perform basic HEP for gross strengthening with supervision to deter worsening of functional limitations. Met 8/31/17- pt's sister reports poor current compliance  7. Pt will demonstrate improved bilateral hip strength to 3/5 to improve balance and independence with mobility. Met 7/5/17  8. New 7/5/17: Pt will demonstrate improved bilateral hip strength to 4/5 to improve balance and independence with mobility. improved  9. Pt will demonstrate improved cervical extension strength to 3+/5 to improve head control. Met 7/31/17  10. Pt will demonstrate improved cervical deep flexor strength to 3+/5 to improve head control. Met 7/31/17   11. Pt will demonstrate improve balance and gait by scoring 14/28 on Tinetti. Met 8/31/17- 17/28                                      Revised 8/31/17: pt will demonstrate improved balance/ gait and decreased fall risk to moderate by scoring 19/28 on Tinetti Assessment. inconsistent- 15/28                          12. Pt will demonstrate improved cervical PROM for rotation to 40 degrees to improve functional head control. inconsistent-  L= 35 deg, R= 6 deg,                          13. New 8/1/18- pt will ambulate 50ft with LBQC and supervision over level tile to demonstrate improved household mobility with decreased AD- improving, 62' with CGA and LBQC    14. New 12/23/2019: Pt will decrease score TUG to at least 45 seconds with use of LBQC to demonstrate improved mobility with cane.     15. New 12/23/2019: Pt will improve SSWS to at " least .7 m/s in order to improve community mobility with RW.         Plan    Extend POC through 1/31/2019  Address more standing balance and ambulation with LBQC as this is pt's main goal at this time. Strengthen as warranted.     Kirby Magdaleno, PT  12/27/2019

## 2019-12-27 ENCOUNTER — CLINICAL SUPPORT (OUTPATIENT)
Dept: REHABILITATION | Facility: HOSPITAL | Age: 54
End: 2019-12-27
Attending: STUDENT IN AN ORGANIZED HEALTH CARE EDUCATION/TRAINING PROGRAM
Payer: COMMERCIAL

## 2019-12-27 DIAGNOSIS — R29.898 DECREASED ROM OF NECK: ICD-10-CM

## 2019-12-27 DIAGNOSIS — R53.1 DECREASED STRENGTH: ICD-10-CM

## 2019-12-27 DIAGNOSIS — Z74.09 IMPAIRED FUNCTIONAL MOBILITY, BALANCE, GAIT, AND ENDURANCE: Primary | ICD-10-CM

## 2019-12-27 PROCEDURE — 97110 THERAPEUTIC EXERCISES: CPT | Mod: PO

## 2019-12-30 ENCOUNTER — CLINICAL SUPPORT (OUTPATIENT)
Dept: REHABILITATION | Facility: HOSPITAL | Age: 54
End: 2019-12-30
Attending: STUDENT IN AN ORGANIZED HEALTH CARE EDUCATION/TRAINING PROGRAM
Payer: COMMERCIAL

## 2019-12-30 DIAGNOSIS — R53.1 DECREASED STRENGTH: ICD-10-CM

## 2019-12-30 DIAGNOSIS — R29.898 DECREASED ROM OF NECK: ICD-10-CM

## 2019-12-30 DIAGNOSIS — Z74.09 IMPAIRED FUNCTIONAL MOBILITY, BALANCE, GAIT, AND ENDURANCE: Primary | ICD-10-CM

## 2019-12-30 PROCEDURE — 97110 THERAPEUTIC EXERCISES: CPT | Mod: PO

## 2019-12-30 NOTE — PROGRESS NOTES
"Physical Therapy Progress Note      Name: Brigid Jernigan  Clinic Number: [id number]  Diagnosis: G71.11 (ICD-10-CM) - Myotonic dystrophy  Physician: Lloyd Mojica MD  Treatment Orders: PT Eval and Treat          Past Medical History:   Diagnosis Date    Abnormal EKG      Myotonic dystrophy      Pacemaker      PAF (paroxysmal atrial fibrillation)      Sleep apnea           Precautions: standard  auth period: 7/1/19 to 12/31/19  Visit #: 14/20     Time in: 1657 (pt arrives late, PT unable to accommodate)  Time out: 1730  Total billable treatment time: 33 minutes     (visit total 2018= 73)  (Visit total 2017= 56)  Date of Eval: 5/9/2017          POC: 9/3/2019 to 11/1/2019  New POC: 11/2/2019 to 12/28/2019  Extend POC through 1/31/2019         Subjective   Pt reports: "I'm tired because I don't do much at home. Just getting up to come here is tiring."   Pt's sister: "I have been fussing her all day about picking up her head."    Pt reports some compliance with HEP     Pain Scale:  0/10    Objective     Pt arrived 12 minute late to session. Pt ambulates into gym with RW and is not wearing her cervical collar. Pt does not don her collar at all during the therapy session.    Patient received individual therapy to increase strength, endurance, ROM, flexibility, posture, core stabilization and head control with activities as follows:      Pt participated in therapeutic exercises x 33 minutes to address ROM, strength, balance, and mobility which includes:  X 10 trials sit<>stand from gold chair to RW     Ambulation with LBQC 72', CGA, uneven step lengths, pt able to hold cane with 1 UE only though continues to require cues to stand taller, no LOB during trial though pt requires HHA from PT once due to feeling off balance, toward start of trial    2 x 10 reps of standing hip abduction  2 x 10 reps of standing hip flexion (marching)  3 laps at ballet bar side stepping with 1 UE support      Written Home " "Exercises: continue with previous HEP  Pt demo fair understanding of the education provided. Brigid demonstrated fair return demonstration of activities.      Education provided re: perform Long arc quads frequently at home to address L knee extension weakness     Pt has no cultural, educational or language barriers to learning provided.     Assessment       Pt tolerated session well today. Sessions continue to be limited by pt tardiness and poor endurance. Pt continues to do well with LBQC ambulation though requires Pia once during this trial today. Pt continues to demonstrate poor posture in standing. She remains appropriate for skilled PT services.      Pt is progressing towards goals.   Prognosis is fair to good.     Barriers to progress: transportation, motivation for exercises     Activity limitations/ Participation Restrictions:   Fall Risk - impaired balance   Weakness  Impaired muscle tone  Poor head control  Decreased ROM  Gait deviations   Decreased ambulation   Decreased activity tolerance   Decreased independence with daily activities   Requires skilled supervision to complete and progress HEP   Requires skilled PT for DME         Pt's spiritual, cultural and educational needs considered and pt agreeable to plan of care and GOALS as stated below:    Short term goals: 6 weeks, pt agrees to goals set. (as of 12/23/2019)  1. Pt will perform chin tucks in supine with supervision to improve independent with HEP. Met 7/5/17  2. Assist pt with obtaining appropriate cervical brace to improve head control. Met 7/23/2019-   3. Pt will demonstrate improved knee ext strength on left to 4/5 to improve safety with ambulation and sit<>stand. Met 9/28/17   4. Pt will demonstrate improved cervical rotation PROM to 20 degrees to improve functional head control. Met 7/5/17  5. Pt will perform sit<>stand consistently from 22" height surface with supervision to improve independence. Met 7/5/17                 " "              Revised 8/31/17:  Pt will perform sit<>stand from 19" height chair to improve independence with sitting in various chairs around her home and decreased need for "pop up seat". Met 8/12/19- pt able to perform from 19" height with supervision     Long term goals: 12 weeks, pt agrees to goals set  6. Pt will perform basic HEP for gross strengthening with supervision to deter worsening of functional limitations. Met 8/31/17- pt's sister reports poor current compliance  7. Pt will demonstrate improved bilateral hip strength to 3/5 to improve balance and independence with mobility. Met 7/5/17  8. New 7/5/17: Pt will demonstrate improved bilateral hip strength to 4/5 to improve balance and independence with mobility. improved  9. Pt will demonstrate improved cervical extension strength to 3+/5 to improve head control. Met 7/31/17  10. Pt will demonstrate improved cervical deep flexor strength to 3+/5 to improve head control. Met 7/31/17   11. Pt will demonstrate improve balance and gait by scoring 14/28 on Tinetti. Met 8/31/17- 17/28                                      Revised 8/31/17: pt will demonstrate improved balance/ gait and decreased fall risk to moderate by scoring 19/28 on Tinetti Assessment. inconsistent- 15/28                          12. Pt will demonstrate improved cervical PROM for rotation to 40 degrees to improve functional head control. inconsistent-  L= 35 deg, R= 6 deg,                          13. New 8/1/18- pt will ambulate 50ft with LBQC and supervision over level tile to demonstrate improved household mobility with decreased AD- improving, 62' with CGA and LBQC    14. New 12/23/2019: Pt will decrease score TUG to at least 45 seconds with use of LBQC to demonstrate improved mobility with cane.     15. New 12/23/2019: Pt will improve SSWS to at least .7 m/s in order to improve community mobility with RW.         Plan    Extend POC through 1/31/2019  Address more standing balance and " ambulation with LBQC as this is pt's main goal at this time. Strengthen as warranted.     Kirby Magdaleno, PT  12/30/2019

## 2020-01-06 ENCOUNTER — DOCUMENTATION ONLY (OUTPATIENT)
Dept: REHABILITATION | Facility: HOSPITAL | Age: 55
End: 2020-01-06

## 2020-01-06 NOTE — PROGRESS NOTES
PT/PTA met face to face to discuss pt's treatment plan and progress towards established goals.  Continue with current PT POC with focus on gait with quad cane, endurance, standing tolerance and balance.  Patient will be seen by physical therapist at least every sixth treatment or 30 days, whichever occurs first.    Mae Caicedo, PTA  01/06/2020    Face to face meeting held on 1/6/2020.  Kirby Magdaleno, PT, DPT  1/7/2020

## 2020-01-08 ENCOUNTER — CLINICAL SUPPORT (OUTPATIENT)
Dept: REHABILITATION | Facility: HOSPITAL | Age: 55
End: 2020-01-08
Attending: STUDENT IN AN ORGANIZED HEALTH CARE EDUCATION/TRAINING PROGRAM
Payer: COMMERCIAL

## 2020-01-08 DIAGNOSIS — Z74.09 IMPAIRED FUNCTIONAL MOBILITY, BALANCE, GAIT, AND ENDURANCE: Primary | ICD-10-CM

## 2020-01-08 DIAGNOSIS — R53.1 DECREASED STRENGTH: ICD-10-CM

## 2020-01-08 DIAGNOSIS — R29.898 DECREASED ROM OF NECK: ICD-10-CM

## 2020-01-08 PROCEDURE — 97110 THERAPEUTIC EXERCISES: CPT | Mod: PO

## 2020-01-08 NOTE — PROGRESS NOTES
"Physical Therapy Progress Note      Name: Brigid Jernigan  Clinic Number: [id number]  Diagnosis: G71.11 (ICD-10-CM) - Myotonic dystrophy  Physician: Lloyd Mojica MD  Treatment Orders: PT Eval and Treat          Past Medical History:   Diagnosis Date    Abnormal EKG      Myotonic dystrophy      Pacemaker      PAF (paroxysmal atrial fibrillation)      Sleep apnea           Precautions: standard  auth period: 7/1/19 to 12/31/19  Visit #: 14/20     Time in: 1707 (pt arrives late, PT unable to accommodate)  Time out: 1745  Total billable treatment time: 38 minutes     (visit total 2018= 73)  (Visit total 2017= 56)  Date of Eval: 5/9/2017          POC: 9/3/2019 to 11/1/2019  New POC: 11/2/2019 to 12/28/2019  Extend POC through 1/31/2019         Subjective   Pt reports: "I'm okay."   Pt's sister: "I don't understand why our visits haven't been approved yet."    Pt reports some compliance with HEP     Pain Scale:  0/10    Objective     Pt arrived 7 minutes late to session. Pt ambulates into gym with RW and is not wearing her cervical collar. Pt does not don her collar at all during the therapy session.    Patient received individual therapy to increase strength, endurance, ROM, flexibility, posture, core stabilization and head control with activities as follows:      Pt participated in therapeutic exercises x 38 minutes to address ROM, strength, balance, and mobility which includes:    Ambulation with LBQC 49' then 68', CGA-Pia, uneven step lengths, pt occaisionally holds cane with 2 UE though continues to require cues to stand taller, no LOB during trial though pt requires Pia from PT a few times due to feeling off balance    2 x 10 reps of standing hip abduction  2 x 10 reps of standing hip flexion (marching)  2 x 10 reps of standing hip extension  x3 side step up onto 4" step, min-modA  3 laps at ballet bar side stepping with 1 UE support      Written Home Exercises: continue with previous HEP  Pt demo fair " "understanding of the education provided. Brigid demonstrated fair return demonstration of activities.      Education provided re: perform Long arc quads frequently at home to address L knee extension weakness     Pt has no cultural, educational or language barriers to learning provided.     Assessment       Pt tolerated session well today. Pt's sister upset about the lack of apts scheduled and PT explained that without insurance approval there is not much that I can do. She tolerated increased standing today and demonstrated improved distance with two trials of ambulation. She remains appropriate for skilled PT services.      Pt is progressing towards goals.   Prognosis is fair to good.     Barriers to progress: transportation, motivation for exercises     Activity limitations/ Participation Restrictions:   Fall Risk - impaired balance   Weakness  Impaired muscle tone  Poor head control  Decreased ROM  Gait deviations   Decreased ambulation   Decreased activity tolerance   Decreased independence with daily activities   Requires skilled supervision to complete and progress HEP   Requires skilled PT for DME         Pt's spiritual, cultural and educational needs considered and pt agreeable to plan of care and GOALS as stated below:    Short term goals: 6 weeks, pt agrees to goals set. (as of 12/23/2019)  1. Pt will perform chin tucks in supine with supervision to improve independent with HEP. Met 7/5/17  2. Assist pt with obtaining appropriate cervical brace to improve head control. Met 7/23/2019-   3. Pt will demonstrate improved knee ext strength on left to 4/5 to improve safety with ambulation and sit<>stand. Met 9/28/17   4. Pt will demonstrate improved cervical rotation PROM to 20 degrees to improve functional head control. Met 7/5/17  5. Pt will perform sit<>stand consistently from 22" height surface with supervision to improve independence. Met 7/5/17                               Revised 8/31/17:  Pt will " "perform sit<>stand from 19" height chair to improve independence with sitting in various chairs around her home and decreased need for "pop up seat". Met 8/12/19- pt able to perform from 19" height with supervision     Long term goals: 12 weeks, pt agrees to goals set  6. Pt will perform basic HEP for gross strengthening with supervision to deter worsening of functional limitations. Met 8/31/17- pt's sister reports poor current compliance  7. Pt will demonstrate improved bilateral hip strength to 3/5 to improve balance and independence with mobility. Met 7/5/17  8. New 7/5/17: Pt will demonstrate improved bilateral hip strength to 4/5 to improve balance and independence with mobility. improved  9. Pt will demonstrate improved cervical extension strength to 3+/5 to improve head control. Met 7/31/17  10. Pt will demonstrate improved cervical deep flexor strength to 3+/5 to improve head control. Met 7/31/17   11. Pt will demonstrate improve balance and gait by scoring 14/28 on Tinetti. Met 8/31/17- 17/28                                      Revised 8/31/17: pt will demonstrate improved balance/ gait and decreased fall risk to moderate by scoring 19/28 on Tinetti Assessment. inconsistent- 15/28                          12. Pt will demonstrate improved cervical PROM for rotation to 40 degrees to improve functional head control. inconsistent-  L= 35 deg, R= 6 deg,                          13. New 8/1/18- pt will ambulate 50ft with LBQC and supervision over level tile to demonstrate improved household mobility with decreased AD- improving, 62' with CGA and LBQC    14. New 12/23/2019: Pt will decrease score TUG to at least 45 seconds with use of LBQC to demonstrate improved mobility with cane.     15. New 12/23/2019: Pt will improve SSWS to at least .7 m/s in order to improve community mobility with RW.         Plan   Extend POC through 1/31/2019.  Address more standing balance and ambulation with LBQC as this is pt's main " goal at this time. Strengthen as warranted.     Kirby Magdaleno, PT  1/8/2020

## 2020-01-14 NOTE — PROGRESS NOTES
"Physical Therapy Progress Note      Name: Brigid Jernigan  Clinic Number: [id number]  Diagnosis: G71.11 (ICD-10-CM) - Myotonic dystrophy  Physician: Lloyd Mojica MD  Treatment Orders: PT Eval and Treat          Past Medical History:   Diagnosis Date    Abnormal EKG      Myotonic dystrophy      Pacemaker      PAF (paroxysmal atrial fibrillation)      Sleep apnea           Precautions: standard  auth period: 7/1/19 to 12/31/19  Visit #: 15/20     Time in: 1712 (pt arrives late, PT unable to accommodate)  Time out: 1745  Total billable treatment time: 33 minutes     (visit total 2018= 73)  (Visit total 2017= 56)  Date of Eval: 5/9/2017          POC: 9/3/2019 to 11/1/2019  New POC: 11/2/2019 to 12/28/2019  Extend POC through 1/31/2019    Subjective   Pt reports: "I'm okay."   Pt's sister: "I'm really frustrated that our visits haven't been approved yet."    Pt reports some compliance with HEP     Pain Scale:  0/10    Objective     Pt arrived 12 minutes late to session. Pt ambulates into gym with RW and is not wearing her cervical collar. Pt does not don her collar at all during the therapy session.    Patient received individual therapy to increase strength, endurance, ROM, flexibility, posture, core stabilization and head control with activities as follows:      Pt participated in therapeutic exercises x 33 minutes to address ROM, strength, balance, and mobility which includes:    Ambulation with SBQC 96', supervision only, uneven step lengths, pt occaisionally holds cane with 2 UE though continues to require cues to stand taller, no LOB during trial today, able to maintain balance despite a few incidences of feeling off balance    x 10 reps of standing hip abduction  2 x 10 reps of standing hip flexion (marching)  x4 side step up onto 4" step, min-modA  3 laps at ballet bar side stepping with 1 UE support  4 pt fitter attempted balance training, pt initially unwilling to attempt, only able to hold without " UE support for 5 seconds then 15 seconds         Written Home Exercises: continue with previous HEP  Pt demo fair understanding of the education provided. Brigid demonstrated fair return demonstration of activities.      Education provided re: perform Long arc quads frequently at home to address L knee extension weakness     Pt has no cultural, educational or language barriers to learning provided.     Assessment       Pt tolerated session well today. PT attempts to have pt participate in more balance training today with 4pt fitter, but pt very nervous and gives a lot of push back to this. Pt willing to participate in all other aspects of the treatment. L LE weakness possibly from fracture years ago continues to limit her ability to perform step negotiation. She remains appropriate for PT services.      Pt is progressing towards goals.   Prognosis is fair to good.     Barriers to progress: transportation, motivation for exercises     Activity limitations/ Participation Restrictions:   Fall Risk - impaired balance   Weakness  Impaired muscle tone  Poor head control  Decreased ROM  Gait deviations   Decreased ambulation   Decreased activity tolerance   Decreased independence with daily activities   Requires skilled supervision to complete and progress HEP   Requires skilled PT for DME         Pt's spiritual, cultural and educational needs considered and pt agreeable to plan of care and GOALS as stated below:    Short term goals: 6 weeks, pt agrees to goals set. (as of 12/23/2019)  1. Pt will perform chin tucks in supine with supervision to improve independent with HEP. Met 7/5/17  2. Assist pt with obtaining appropriate cervical brace to improve head control. Met 7/23/2019-   3. Pt will demonstrate improved knee ext strength on left to 4/5 to improve safety with ambulation and sit<>stand. Met 9/28/17   4. Pt will demonstrate improved cervical rotation PROM to 20 degrees to improve functional head control. Met  "7/5/17  5. Pt will perform sit<>stand consistently from 22" height surface with supervision to improve independence. Met 7/5/17                               Revised 8/31/17:  Pt will perform sit<>stand from 19" height chair to improve independence with sitting in various chairs around her home and decreased need for "pop up seat". Met 8/12/19- pt able to perform from 19" height with supervision     Long term goals: 12 weeks, pt agrees to goals set  6. Pt will perform basic HEP for gross strengthening with supervision to deter worsening of functional limitations. Met 8/31/17- pt's sister reports poor current compliance  7. Pt will demonstrate improved bilateral hip strength to 3/5 to improve balance and independence with mobility. Met 7/5/17  8. New 7/5/17: Pt will demonstrate improved bilateral hip strength to 4/5 to improve balance and independence with mobility. improved  9. Pt will demonstrate improved cervical extension strength to 3+/5 to improve head control. Met 7/31/17  10. Pt will demonstrate improved cervical deep flexor strength to 3+/5 to improve head control. Met 7/31/17   11. Pt will demonstrate improve balance and gait by scoring 14/28 on Tinetti. Met 8/31/17- 17/28                                      Revised 8/31/17: pt will demonstrate improved balance/ gait and decreased fall risk to moderate by scoring 19/28 on Tinetti Assessment. inconsistent- 15/28                          12. Pt will demonstrate improved cervical PROM for rotation to 40 degrees to improve functional head control. inconsistent-  L= 35 deg, R= 6 deg,                          13. New 8/1/18- pt will ambulate 50ft with LBQC and supervision over level tile to demonstrate improved household mobility with decreased AD- improving, 62' with CGA and LBQC    14. New 12/23/2019: Pt will decrease score TUG to at least 45 seconds with use of LBQC to demonstrate improved mobility with cane.     15. New 12/23/2019: Pt will improve SSWS to at " least .7 m/s in order to improve community mobility with RW.         Plan   Extend POC through 1/31/2019.  Address more standing balance and ambulation with LBQC as this is pt's main goal at this time. Strengthen as warranted.     Kirby Magdaleno, PT  1/15/2020

## 2020-01-15 ENCOUNTER — CLINICAL SUPPORT (OUTPATIENT)
Dept: REHABILITATION | Facility: HOSPITAL | Age: 55
End: 2020-01-15
Attending: STUDENT IN AN ORGANIZED HEALTH CARE EDUCATION/TRAINING PROGRAM
Payer: COMMERCIAL

## 2020-01-15 DIAGNOSIS — R29.898 DECREASED ROM OF NECK: ICD-10-CM

## 2020-01-15 DIAGNOSIS — Z74.09 IMPAIRED FUNCTIONAL MOBILITY, BALANCE, GAIT, AND ENDURANCE: Primary | ICD-10-CM

## 2020-01-15 DIAGNOSIS — R53.1 DECREASED STRENGTH: ICD-10-CM

## 2020-01-15 PROCEDURE — 97110 THERAPEUTIC EXERCISES: CPT | Mod: PO

## 2020-01-21 NOTE — PROGRESS NOTES
"Physical Therapy Progress Note      Name: Brigid Jernigan  Clinic Number: [id number]  Diagnosis: G71.11 (ICD-10-CM) - Myotonic dystrophy  Physician: Lloyd Mojica MD  Treatment Orders: PT Eval and Treat          Past Medical History:   Diagnosis Date    Abnormal EKG      Myotonic dystrophy      Pacemaker      PAF (paroxysmal atrial fibrillation)      Sleep apnea           Precautions: standard  auth period: 7/1/19 to 12/31/19  Visit #: 2/19     Time in: 1700   Time out: 1745  Total billable treatment time: 45 minutes     (visit total 2018= 73)  (Visit total 2017= 56)  Date of Eval: 5/9/2017          POC: 9/3/2019 to 11/1/2019  New POC: 11/2/2019 to 12/28/2019  Extend POC through 1/31/2020  Extend through 3/31/2020    Subjective   Pt reports: "I'm okay."   Pt's sister: "I'm no professional, but I don't know if once a week is a good idea."    Pt reports no compliance with HEP     Pain Scale:  0/10    Objective     Pt ambulates into gym with RW and is not wearing her cervical collar. Pt does not don her collar at all during the therapy session.    Patient received individual therapy to increase strength, endurance, ROM, flexibility, posture, core stabilization and head control with activities as follows:      Pt participated in therapeutic exercises x 45 minutes to address ROM, strength, balance, and mobility which includes:      Lower Extremity Strength (tested in seated)    RLE eval RLE  12/19 RLE 1/2020       LLE eval LLE 12/19 LLE 1/2020         Hip Flexion: 3+/5 4/5 4/5       3+/5 4/5 4+/5         Hip Extension:  2/5 4/5 4+/5       2/5 4/5 4+/5         Hip Abduction: NT 5/5 5/5       NT 5/5 5/5         Hip Adduction: NT 5/5 5/5       NT 5/5 5/5         Knee Extension: 4+/5 5/5 5/5       3+/5 3-/5 3-/5         Knee Flexion: 4/5 4-/5 4-/5       4-/5 4-/5 4-/5             cervical rotation AROM (seated) L = 20, R = 17 deg     12/24/19 1/22/2020   TUG w RW 26.6 s 29.2 s   TUG w SBQC 1:11 minutes 1:29 " minutes   SSWS with RW .4 m/s .43 m/s (6m/14.1s)   Tinetti 15/28 18/28       Tinetti Balance Assessment  Balance:  1. Sitting Balance 1   Leans/ slides in chair= 0   Steady= 1  2. Rises from chair 1   Unable without help= 0   Able, uses arms= 1   Able without use of arms= 2  3. Attempts to rise 2   Unable without help= 0   Able, >1 attmept required-= 1   Able, 1 attempt= 2  4. Immediate standing balance (1st 5 seconds) 1   Unsteady (swaggers, moves feet, trunk sway)= 0   Steady but uses walker or other support= 1   Narrow base of support without walker or support= 2  5. Nudged 0   Begins to fall= 0   Staggers, grabs, catches self= 1   Steady= 2  6. Standing balance 1   Unsteady= 0   Steady but wide LEYDI or uses AD=1   Narrow LEYDI without AD=2  7. Eyes closed 1   Unsteady= 0   Steady= 1  8. Turning 360 degrees 1   Discontinuous steps= 0   Continuous steps= 1   Unsteady= 0   Steady= 1  9. Sitting down 1   Unsafe= 0   Uses arms or not in a smooth motion= 1   Safe, smooth motion= 2  Balance score= 9  Gait:  1. Initiation 1   hesitates or multiple attempts to start= 0   No hesitancy= 1  2. Step length 2   Step to= 0   One foot passes= 1   reciprocal pattern= 2  3. Step height 2    Neither foot clears floor= 0   One foot clears floor= 1   Both feet clear floor= 2  4. Step symmetry 1   Not symmetrical= 0   Appears symmetrical= 1  5. Step continuity 1   Not continuous= 0   Appears continuous= 1  6. Path 1   Marked deviation= 0   Mild/moderate deviation or uses A.D.= 1   Straight without A.D.= 2  7. Trunk 0   Marked sway or uses A.D.= 0   No sway, but flexes knees or back, spread arms out while walking= 1   No sway, no flexion, no use of UE, no use of A.D.= 2  8. Walking stance 0   Heels apart= 0   Heels almost touching while walking= 1  Gait score= 8  Total score= 17 , high fall risk    0-18= high fall risk  19-23= moderate fall risk  24-28= low fall risk    Ambulation with SBQC 91', CGA to supervision only, uneven step  lengths, pt occaisionally holds cane with 2 UE though continues to require cues to stand taller, no LOB during trial today, able to maintain balance despite a few incidences of feeling off balance    x 10 reps of standing hip abduction  2 x 10 reps of standing hip flexion (marching)  3 laps at ballet bar side stepping with 1 UE support          Written Home Exercises: continue with previous HEP  Pt demo fair understanding of the education provided. Brigid demonstrated fair return demonstration of activities.      Education provided re: perform Long arc quads frequently at home to address L knee extension weakness     Pt has no cultural, educational or language barriers to learning provided.     Assessment       Phani tolerated reassessment well today. Despite no change in score on TUG and SSWS today, pt demonstrates improving balance and strength. Pt is now able to ambulate longer distances with SBQC and mostly supervision, though PT does provide CGA a few times. This being said, PT frequency was dropped to 1x/week during the last assessment period, and pt was able to maintain scores on functional outcome measures, so PT to continue with 1x/wk for an additional month. Pt's progress greatly limited by the progressive nature of her diagnosis as well as poor home compliance with program. Pt reports that she does not do anything aside from watch TV outside of therapy. Pt's sister very hesitant about PT formally reducing frequency of therapy but accepting of this change. Pt's goals have been addressed and remain appropriate for the next month of care.    Pt is progressing towards goals.   Prognosis is fair to good.     Barriers to progress: transportation, motivation for exercises     Activity limitations/ Participation Restrictions:   Fall Risk - impaired balance   Weakness  Impaired muscle tone  Poor head control  Decreased ROM  Gait deviations   Decreased ambulation   Decreased activity tolerance   Decreased  "independence with daily activities   Requires skilled supervision to complete and progress HEP   Requires skilled PT for DME         Pt's spiritual, cultural and educational needs considered and pt agreeable to plan of care and GOALS as stated below:    Short term goals: 6 weeks, pt agrees to goals set. (as of 1/22/2020)  1. Pt will perform chin tucks in supine with supervision to improve independent with HEP. Met 7/5/17  2. Assist pt with obtaining appropriate cervical brace to improve head control. Met 7/23/2019-   3. Pt will demonstrate improved knee ext strength on left to 4/5 to improve safety with ambulation and sit<>stand. Met 9/28/17   4. Pt will demonstrate improved cervical rotation PROM to 20 degrees to improve functional head control. Met 7/5/17  5. Pt will perform sit<>stand consistently from 22" height surface with supervision to improve independence. Met 7/5/17                               Revised 8/31/17:  Pt will perform sit<>stand from 19" height chair to improve independence with sitting in various chairs around her home and decreased need for "pop up seat". Met 8/12/19- pt able to perform from 19" height with supervision     Long term goals: 12 weeks, pt agrees to goals set  6. Pt will perform basic HEP for gross strengthening with supervision to deter worsening of functional limitations. Met 8/31/17, inconsistent  7. Pt will demonstrate improved bilateral hip strength to 3/5 to improve balance and independence with mobility. Met 7/5/17  8. New 7/5/17: Pt will demonstrate improved bilateral hip strength to 4/5 to improve balance and independence with mobility. MET 1/22/2020  9. Pt will demonstrate improved cervical extension strength to 3+/5 to improve head control. Met 7/31/17  10. Pt will demonstrate improved cervical deep flexor strength to 3+/5 to improve head control. Met 7/31/17   11. Pt will demonstrate improve balance and gait by scoring 14/28 on Tinetti. Met 8/31/17- " 17/28                                      Revised 8/31/17: pt will demonstrate improved balance/ gait and decreased fall risk to moderate by scoring 19/28 on Tinetti Assessment. inconsistent- 17/28 today                          12. Pt will demonstrate improved cervical PROM for rotation to 40 degrees to improve functional head control. inconsistent-  L= 34 deg, R= 20 deg,                          13. New 8/1/18- pt will ambulate 50ft with LBQC and supervision over level tile to demonstrate improved household mobility with decreased AD- improving, 62' with CGA and LBQC    14. New 12/23/2019: Pt will decrease score on TUG to at least 45 seconds with use of LBQC to demonstrate improved mobility with cane. ongoing    15. New 12/23/2019: Pt will improve SSWS to at least .7 m/s in order to improve community mobility with RW. ongoing        Plan     Extend POC through 3/31/2020  Continue to work in standing during sessions, address balance as able, ambulation with SBQC and stair negotiation.    Kirby Magdaleno, PT  1/22/2020

## 2020-01-22 ENCOUNTER — CLINICAL SUPPORT (OUTPATIENT)
Dept: REHABILITATION | Facility: HOSPITAL | Age: 55
End: 2020-01-22
Attending: STUDENT IN AN ORGANIZED HEALTH CARE EDUCATION/TRAINING PROGRAM
Payer: COMMERCIAL

## 2020-01-22 DIAGNOSIS — R53.1 DECREASED STRENGTH: ICD-10-CM

## 2020-01-22 DIAGNOSIS — R29.898 DECREASED ROM OF NECK: ICD-10-CM

## 2020-01-22 DIAGNOSIS — Z74.09 IMPAIRED FUNCTIONAL MOBILITY, BALANCE, GAIT, AND ENDURANCE: Primary | ICD-10-CM

## 2020-01-22 PROCEDURE — 97110 THERAPEUTIC EXERCISES: CPT | Mod: PO

## 2020-01-23 NOTE — PLAN OF CARE
"Physical Therapy Progress Note      Name: Brigid Jernigan  Clinic Number: [id number]  Diagnosis: G71.11 (ICD-10-CM) - Myotonic dystrophy  Physician: Lloyd Mojica MD  Treatment Orders: PT Eval and Treat          Past Medical History:   Diagnosis Date    Abnormal EKG      Myotonic dystrophy      Pacemaker      PAF (paroxysmal atrial fibrillation)      Sleep apnea           Precautions: standard  auth period: 7/1/19 to 12/31/19  Visit #: 2/19     Time in: 1700   Time out: 1745  Total billable treatment time: 45 minutes     (visit total 2018= 73)  (Visit total 2017= 56)  Date of Eval: 5/9/2017          POC: 9/3/2019 to 11/1/2019  New POC: 11/2/2019 to 12/28/2019  Extend POC through 1/31/2020  Extend through 3/31/2020    Subjective   Pt reports: "I'm okay."   Pt's sister: "I'm no professional, but I don't know if once a week is a good idea."    Pt reports no compliance with HEP     Pain Scale:  0/10    Objective     Pt ambulates into gym with RW and is not wearing her cervical collar. Pt does not don her collar at all during the therapy session.    Patient received individual therapy to increase strength, endurance, ROM, flexibility, posture, core stabilization and head control with activities as follows:      Pt participated in therapeutic exercises x 45 minutes to address ROM, strength, balance, and mobility which includes:      Lower Extremity Strength (tested in seated)    RLE eval RLE  12/19 RLE 1/2020       LLE eval LLE 12/19 LLE 1/2020         Hip Flexion: 3+/5 4/5 4/5       3+/5 4/5 4+/5         Hip Extension:  2/5 4/5 4+/5       2/5 4/5 4+/5         Hip Abduction: NT 5/5 5/5       NT 5/5 5/5         Hip Adduction: NT 5/5 5/5       NT 5/5 5/5         Knee Extension: 4+/5 5/5 5/5       3+/5 3-/5 3-/5         Knee Flexion: 4/5 4-/5 4-/5       4-/5 4-/5 4-/5             cervical rotation AROM (seated) L = 20, R = 17 deg     12/24/19 1/22/2020   TUG w RW 26.6 s 29.2 s   TUG w SBQC 1:11 minutes 1:29 " minutes   SSWS with RW .4 m/s .43 m/s (6m/14.1s)   Tinetti 15/28 18/28       Tinetti Balance Assessment  Balance:  1. Sitting Balance 1   Leans/ slides in chair= 0   Steady= 1  2. Rises from chair 1   Unable without help= 0   Able, uses arms= 1   Able without use of arms= 2  3. Attempts to rise 2   Unable without help= 0   Able, >1 attmept required-= 1   Able, 1 attempt= 2  4. Immediate standing balance (1st 5 seconds) 1   Unsteady (swaggers, moves feet, trunk sway)= 0   Steady but uses walker or other support= 1   Narrow base of support without walker or support= 2  5. Nudged 0   Begins to fall= 0   Staggers, grabs, catches self= 1   Steady= 2  6. Standing balance 1   Unsteady= 0   Steady but wide LEYDI or uses AD=1   Narrow LEYDI without AD=2  7. Eyes closed 1   Unsteady= 0   Steady= 1  8. Turning 360 degrees 1   Discontinuous steps= 0   Continuous steps= 1   Unsteady= 0   Steady= 1  9. Sitting down 1   Unsafe= 0   Uses arms or not in a smooth motion= 1   Safe, smooth motion= 2  Balance score= 9  Gait:  1. Initiation 1   hesitates or multiple attempts to start= 0   No hesitancy= 1  2. Step length 2   Step to= 0   One foot passes= 1   reciprocal pattern= 2  3. Step height 2    Neither foot clears floor= 0   One foot clears floor= 1   Both feet clear floor= 2  4. Step symmetry 1   Not symmetrical= 0   Appears symmetrical= 1  5. Step continuity 1   Not continuous= 0   Appears continuous= 1  6. Path 1   Marked deviation= 0   Mild/moderate deviation or uses A.D.= 1   Straight without A.D.= 2  7. Trunk 0   Marked sway or uses A.D.= 0   No sway, but flexes knees or back, spread arms out while walking= 1   No sway, no flexion, no use of UE, no use of A.D.= 2  8. Walking stance 0   Heels apart= 0   Heels almost touching while walking= 1  Gait score= 8  Total score= 17 , high fall risk    0-18= high fall risk  19-23= moderate fall risk  24-28= low fall risk    Ambulation with SBQC 91', CGA to supervision only, uneven step  lengths, pt occaisionally holds cane with 2 UE though continues to require cues to stand taller, no LOB during trial today, able to maintain balance despite a few incidences of feeling off balance    x 10 reps of standing hip abduction  2 x 10 reps of standing hip flexion (marching)  3 laps at ballet bar side stepping with 1 UE support          Written Home Exercises: continue with previous HEP  Pt demo fair understanding of the education provided. Brigid demonstrated fair return demonstration of activities.      Education provided re: perform Long arc quads frequently at home to address L knee extension weakness     Pt has no cultural, educational or language barriers to learning provided.     Assessment       Phani tolerated reassessment well today. Despite no change in score on TUG and SSWS today, pt demonstrates improving balance and strength. Pt is now able to ambulate longer distances with SBQC and mostly supervision, though PT does provide CGA a few times. This being said, PT frequency was dropped to 1x/week during the last assessment period, and pt was able to maintain scores on functional outcome measures, so PT to continue with 1x/wk for an additional month. Pt's progress greatly limited by the progressive nature of her diagnosis as well as poor home compliance with program. Pt reports that she does not do anything aside from watch TV outside of therapy. Pt's sister very hesitant about PT formally reducing frequency of therapy but accepting of this change. Pt's goals have been addressed and remain appropriate for the next month of care.    Pt is progressing towards goals.   Prognosis is fair to good.     Barriers to progress: transportation, motivation for exercises     Activity limitations/ Participation Restrictions:   Fall Risk - impaired balance   Weakness  Impaired muscle tone  Poor head control  Decreased ROM  Gait deviations   Decreased ambulation   Decreased activity tolerance   Decreased  "independence with daily activities   Requires skilled supervision to complete and progress HEP   Requires skilled PT for DME         Pt's spiritual, cultural and educational needs considered and pt agreeable to plan of care and GOALS as stated below:    Short term goals: 6 weeks, pt agrees to goals set. (as of 1/22/2020)  1. Pt will perform chin tucks in supine with supervision to improve independent with HEP. Met 7/5/17  2. Assist pt with obtaining appropriate cervical brace to improve head control. Met 7/23/2019-   3. Pt will demonstrate improved knee ext strength on left to 4/5 to improve safety with ambulation and sit<>stand. Met 9/28/17   4. Pt will demonstrate improved cervical rotation PROM to 20 degrees to improve functional head control. Met 7/5/17  5. Pt will perform sit<>stand consistently from 22" height surface with supervision to improve independence. Met 7/5/17                               Revised 8/31/17:  Pt will perform sit<>stand from 19" height chair to improve independence with sitting in various chairs around her home and decreased need for "pop up seat". Met 8/12/19- pt able to perform from 19" height with supervision     Long term goals: 12 weeks, pt agrees to goals set  6. Pt will perform basic HEP for gross strengthening with supervision to deter worsening of functional limitations. Met 8/31/17, inconsistent  7. Pt will demonstrate improved bilateral hip strength to 3/5 to improve balance and independence with mobility. Met 7/5/17  8. New 7/5/17: Pt will demonstrate improved bilateral hip strength to 4/5 to improve balance and independence with mobility. MET 1/22/2020  9. Pt will demonstrate improved cervical extension strength to 3+/5 to improve head control. Met 7/31/17  10. Pt will demonstrate improved cervical deep flexor strength to 3+/5 to improve head control. Met 7/31/17   11. Pt will demonstrate improve balance and gait by scoring 14/28 on Tinetti. Met 8/31/17- " 17/28                                      Revised 8/31/17: pt will demonstrate improved balance/ gait and decreased fall risk to moderate by scoring 19/28 on Tinetti Assessment. inconsistent- 17/28 today                          12. Pt will demonstrate improved cervical PROM for rotation to 40 degrees to improve functional head control. inconsistent-  L= 34 deg, R= 20 deg,                          13. New 8/1/18- pt will ambulate 50ft with LBQC and supervision over level tile to demonstrate improved household mobility with decreased AD- improving, 62' with CGA and LBQC    14. New 12/23/2019: Pt will decrease score on TUG to at least 45 seconds with use of LBQC to demonstrate improved mobility with cane. ongoing    15. New 12/23/2019: Pt will improve SSWS to at least .7 m/s in order to improve community mobility with RW. ongoing        Plan     Extend POC through 3/31/2020  Continue to work in standing during sessions, address balance as able, ambulation with SBQC and stair negotiation.    Kirby Magdaleno, PT  1/22/2020

## 2020-01-30 ENCOUNTER — CLINICAL SUPPORT (OUTPATIENT)
Dept: REHABILITATION | Facility: HOSPITAL | Age: 55
End: 2020-01-30
Attending: STUDENT IN AN ORGANIZED HEALTH CARE EDUCATION/TRAINING PROGRAM
Payer: COMMERCIAL

## 2020-01-30 DIAGNOSIS — R29.898 DECREASED ROM OF NECK: ICD-10-CM

## 2020-01-30 DIAGNOSIS — Z74.09 IMPAIRED FUNCTIONAL MOBILITY, BALANCE, GAIT, AND ENDURANCE: Primary | ICD-10-CM

## 2020-01-30 DIAGNOSIS — R53.1 DECREASED STRENGTH: ICD-10-CM

## 2020-01-30 PROCEDURE — 97110 THERAPEUTIC EXERCISES: CPT | Mod: PO

## 2020-01-30 NOTE — PROGRESS NOTES
"Physical Therapy Progress Note      Name: Brigid Jernigan  Clinic Number: [id number]  Diagnosis: G71.11 (ICD-10-CM) - Myotonic dystrophy  Physician: Lloyd Mojica MD  Treatment Orders: PT Eval and Treat          Past Medical History:   Diagnosis Date    Abnormal EKG      Myotonic dystrophy      Pacemaker      PAF (paroxysmal atrial fibrillation)      Sleep apnea           Precautions: standard  auth period: 7/1/19 to 12/31/19  Visit #: 3/19     Time in: 1700  Time out: 1730  Total billable treatment time: 30 minutes     (visit total 2018= 73)  (Visit total 2017= 56)  Date of Eval: 5/9/2017          POC: 9/3/2019 to 11/1/2019  New POC: 11/2/2019 to 12/28/2019  Extend POC through 1/31/2020  Extend through 3/31/2020    Subjective   Pt reports: "I'm okay."   Pt's sister: "We have to leave at 5:30 today because her c-pap keeps making her face break out."    Pt reports some compliance with HEP. (3x within the last week)     Pain Scale:  0/10    Objective     Pt ambulates into gym with RW and is not wearing her cervical collar. Pt does not don her collar at all during the therapy session. Pt arrives on time to session today, but requires early termination of session.    Patient received individual therapy to increase strength, endurance, ROM, flexibility, posture, core stabilization and head control with activities as follows:      Pt participated in therapeutic exercises x 30 minutes to address ROM, strength, balance, and mobility which includes:    Ambulation with SBQC 100', supervision only, uneven step lengths and variety of step to and step through gait, pt occaisionally holds cane with 2 UE though continues to require cues to stand taller, no LOB during trial today, able to maintain balance despite a few incidences of feeling off balance    x5 step onto 4pt fitter with B UE suport, then 10 sec standing on fitter with no UE support   2 x 10 reps of alternating med cone taps with 1 UE support, CGA  3 laps of " side stepping with B UE support, SBA  x11 sit to stand from gold chair with B UE support, SBA occ assist to keep chair from moving       Written Home Exercises: continue with previous HEP  Pt demo fair understanding of the education provided. Brigid demonstrated fair return demonstration of activities.      Education provided re: perform Long arc quads frequently at home to address L knee extension weakness     Pt has no cultural, educational or language barriers to learning provided.     Assessment       Phani tolerated session well today. She continues to demonstrate fear of falling, especially when standing on L leg. She does report improved compliance with performing HEP outside of therapy. Pt demonstrated improved balance with SBQC ambulation today. She remains appropriate for skilled PT services.     Pt is progressing towards goals.   Prognosis is fair to good.     Barriers to progress: transportation, motivation for exercises     Activity limitations/ Participation Restrictions:   Fall Risk - impaired balance   Weakness  Impaired muscle tone  Poor head control  Decreased ROM  Gait deviations   Decreased ambulation   Decreased activity tolerance   Decreased independence with daily activities   Requires skilled supervision to complete and progress HEP   Requires skilled PT for DME         Pt's spiritual, cultural and educational needs considered and pt agreeable to plan of care and GOALS as stated below:    Short term goals: 6 weeks, pt agrees to goals set. (as of 1/22/2020)  1. Pt will perform chin tucks in supine with supervision to improve independent with HEP. Met 7/5/17  2. Assist pt with obtaining appropriate cervical brace to improve head control. Met 7/23/2019-   3. Pt will demonstrate improved knee ext strength on left to 4/5 to improve safety with ambulation and sit<>stand. Met 9/28/17   4. Pt will demonstrate improved cervical rotation PROM to 20 degrees to improve functional head control. Met  "7/5/17  5. Pt will perform sit<>stand consistently from 22" height surface with supervision to improve independence. Met 7/5/17                               Revised 8/31/17:  Pt will perform sit<>stand from 19" height chair to improve independence with sitting in various chairs around her home and decreased need for "pop up seat". Met 8/12/19- pt able to perform from 19" height with supervision     Long term goals: 12 weeks, pt agrees to goals set  6. Pt will perform basic HEP for gross strengthening with supervision to deter worsening of functional limitations. Met 8/31/17, inconsistent  7. Pt will demonstrate improved bilateral hip strength to 3/5 to improve balance and independence with mobility. Met 7/5/17  8. New 7/5/17: Pt will demonstrate improved bilateral hip strength to 4/5 to improve balance and independence with mobility. MET 1/22/2020  9. Pt will demonstrate improved cervical extension strength to 3+/5 to improve head control. Met 7/31/17  10. Pt will demonstrate improved cervical deep flexor strength to 3+/5 to improve head control. Met 7/31/17   11. Pt will demonstrate improve balance and gait by scoring 14/28 on Tinetti. Met 8/31/17- 17/28                                      Revised 8/31/17: pt will demonstrate improved balance/ gait and decreased fall risk to moderate by scoring 19/28 on Tinetti Assessment. inconsistent- 17/28 today                          12. Pt will demonstrate improved cervical PROM for rotation to 40 degrees to improve functional head control. inconsistent-  L= 34 deg, R= 20 deg,                          13. New 8/1/18- pt will ambulate 50ft with LBQC and supervision over level tile to demonstrate improved household mobility with decreased AD- improving, 62' with CGA and LBQC    14. New 12/23/2019: Pt will decrease score on TUG to at least 45 seconds with use of LBQC to demonstrate improved mobility with cane. ongoing    15. New 12/23/2019: Pt will improve SSWS to at least .7 " m/s in order to improve community mobility with RW. ongoing        Plan     Extend POC through 3/31/2020  Continue to work in standing during sessions, address balance as able, ambulation with SBQC and stair negotiation.    Kirby Magdaleno, PT  1/30/2020

## 2020-02-11 NOTE — PROGRESS NOTES
"Physical Therapy Progress Note      Name: Brigid Jernigan  Clinic Number: [id number]  Diagnosis: G71.11 (ICD-10-CM) - Myotonic dystrophy  Physician: Lloyd Mojica MD  Treatment Orders: PT Eval and Treat          Past Medical History:   Diagnosis Date    Abnormal EKG      Myotonic dystrophy      Pacemaker      PAF (paroxysmal atrial fibrillation)      Sleep apnea           Precautions: standard  auth period: 7/1/19 to 12/31/19  Visit #: 4/19     Time in: 1700  Time out: 1743  Total billable treatment time: 43 minutes     (visit total 2018= 73)  (Visit total 2017= 56)  Date of Eval: 5/9/2017          POC: 9/3/2019 to 11/1/2019  New POC: 11/2/2019 to 12/28/2019  Extend POC through 1/31/2020  Extend through 3/31/2020    Subjective   Pt reports: "I'm okay."   Pt's sister: "We have to leave at 5:30 today because her c-pap keeps making her face break out."    Pt reports some compliance with HEP. (3x within the last week)     Pain Scale:  0/10    Objective     Pt ambulates into gym with RW and is not wearing her cervical collar. Pt does not don her collar at all during the therapy session. Pt arrives on time to session today.    Patient received individual therapy to increase strength, endurance, ROM, flexibility, posture, core stabilization and head control with activities as follows:      Pt participated in therapeutic exercises x 43 minutes to address ROM, strength, balance, and mobility which includes:    Ambulation with SBQC 132', supervision only, uneven step lengths and variety of step to and step through gait, pt occaisionally holds cane with 2 UE and continues to require cues to stand taller, no LOB during trial today, able to maintain balance despite a few incidences of feeling off balance    X5, 4pt fitter, 15 sec standing on fitter with no UE support, CGA to Pia  2 x 10 reps of alternating med cone taps with 1 UE support, occasional 2 UE, CGA  3 laps of side stepping with B UE support, SBA  x10 sit " to stand from gold chair with B UE support, SBA occ assist to keep chair from moving   2 x 10 reps of standing hip flexion with 2 UE support (marching)   2 x 10 reps of mini squats, with B UE support, cues and CGA      Written Home Exercises: continue with previous HEP  Pt demo fair understanding of the education provided. Brigid demonstrated fair return demonstration of activities.      Education provided re: perform Long arc quads frequently at home to address L knee extension weakness     Pt has no cultural, educational or language barriers to learning provided.     Assessment       Phani tolerated session well today. She tolerates more standing but reports that she is pretty fatigued at conclusion of session. Pt continues to demonstrate poor tolerance to standing on L LE, and struggles with balance without UE support. Pt continues to demonstrate a fear of falling and struggles with balance tasks because of this. She remains appropriate for skilled PT services.     Pt is progressing towards goals.   Prognosis is fair to good.     Barriers to progress: transportation, motivation for exercises     Activity limitations/ Participation Restrictions:   Fall Risk - impaired balance   Weakness  Impaired muscle tone  Poor head control  Decreased ROM  Gait deviations   Decreased ambulation   Decreased activity tolerance   Decreased independence with daily activities   Requires skilled supervision to complete and progress HEP   Requires skilled PT for DME         Pt's spiritual, cultural and educational needs considered and pt agreeable to plan of care and GOALS as stated below:    Short term goals: 6 weeks, pt agrees to goals set. (as of 1/22/2020)  1. Pt will perform chin tucks in supine with supervision to improve independent with HEP. Met 7/5/17  2. Assist pt with obtaining appropriate cervical brace to improve head control. Met 7/23/2019-   3. Pt will demonstrate improved knee ext strength on left to 4/5 to improve  "safety with ambulation and sit<>stand. Met 9/28/17   4. Pt will demonstrate improved cervical rotation PROM to 20 degrees to improve functional head control. Met 7/5/17  5. Pt will perform sit<>stand consistently from 22" height surface with supervision to improve independence. Met 7/5/17                               Revised 8/31/17:  Pt will perform sit<>stand from 19" height chair to improve independence with sitting in various chairs around her home and decreased need for "pop up seat". Met 8/12/19- pt able to perform from 19" height with supervision     Long term goals: 12 weeks, pt agrees to goals set  6. Pt will perform basic HEP for gross strengthening with supervision to deter worsening of functional limitations. Met 8/31/17, inconsistent  7. Pt will demonstrate improved bilateral hip strength to 3/5 to improve balance and independence with mobility. Met 7/5/17  8. New 7/5/17: Pt will demonstrate improved bilateral hip strength to 4/5 to improve balance and independence with mobility. MET 1/22/2020  9. Pt will demonstrate improved cervical extension strength to 3+/5 to improve head control. Met 7/31/17  10. Pt will demonstrate improved cervical deep flexor strength to 3+/5 to improve head control. Met 7/31/17   11. Pt will demonstrate improve balance and gait by scoring 14/28 on Tinetti. Met 8/31/17- 17/28                                      Revised 8/31/17: pt will demonstrate improved balance/ gait and decreased fall risk to moderate by scoring 19/28 on Tinetti Assessment. inconsistent- 17/28 today                          12. Pt will demonstrate improved cervical PROM for rotation to 40 degrees to improve functional head control. inconsistent-  L= 34 deg, R= 20 deg,                          13. New 8/1/18- pt will ambulate 50ft with LBQC and supervision over level tile to demonstrate improved household mobility with decreased AD- improving, 62' with CGA and LBQC    14. New 12/23/2019: Pt will decrease " score on TUG to at least 45 seconds with use of LBQC to demonstrate improved mobility with cane. ongoing    15. New 12/23/2019: Pt will improve SSWS to at least .7 m/s in order to improve community mobility with RW. ongoing        Plan     Extend POC through 3/31/2020  Continue to work in standing during sessions, address balance as able, ambulation with SBQC and stair negotiation.    Kirby Magdaleno, PT  2/12/2020

## 2020-02-12 ENCOUNTER — CLINICAL SUPPORT (OUTPATIENT)
Dept: REHABILITATION | Facility: HOSPITAL | Age: 55
End: 2020-02-12
Attending: STUDENT IN AN ORGANIZED HEALTH CARE EDUCATION/TRAINING PROGRAM
Payer: COMMERCIAL

## 2020-02-12 DIAGNOSIS — R29.898 DECREASED ROM OF NECK: ICD-10-CM

## 2020-02-12 DIAGNOSIS — Z74.09 IMPAIRED FUNCTIONAL MOBILITY, BALANCE, GAIT, AND ENDURANCE: Primary | ICD-10-CM

## 2020-02-12 DIAGNOSIS — R53.1 DECREASED STRENGTH: ICD-10-CM

## 2020-02-12 PROCEDURE — 97110 THERAPEUTIC EXERCISES: CPT | Mod: PO

## 2020-02-26 ENCOUNTER — CLINICAL SUPPORT (OUTPATIENT)
Dept: REHABILITATION | Facility: HOSPITAL | Age: 55
End: 2020-02-26
Attending: STUDENT IN AN ORGANIZED HEALTH CARE EDUCATION/TRAINING PROGRAM
Payer: COMMERCIAL

## 2020-02-26 DIAGNOSIS — R53.1 DECREASED STRENGTH: ICD-10-CM

## 2020-02-26 DIAGNOSIS — Z74.09 IMPAIRED FUNCTIONAL MOBILITY, BALANCE, GAIT, AND ENDURANCE: Primary | ICD-10-CM

## 2020-02-26 DIAGNOSIS — R29.898 DECREASED ROM OF NECK: ICD-10-CM

## 2020-02-26 PROCEDURE — 97110 THERAPEUTIC EXERCISES: CPT | Mod: PO

## 2020-02-26 NOTE — PROGRESS NOTES
"Physical Therapy Progress Note      Name: Brigid Jernigan  Clinic Number: [id number]  Diagnosis: G71.11 (ICD-10-CM) - Myotonic dystrophy  Physician: Lloyd Mojica MD  Treatment Orders: PT Eval and Treat          Past Medical History:   Diagnosis Date    Abnormal EKG      Myotonic dystrophy      Pacemaker      PAF (paroxysmal atrial fibrillation)      Sleep apnea           Precautions: standard  auth period: 7/1/19 to 12/31/19  Visit #: 5/19     Time in: 505  Time out: 545  Total billable treatment time: 40 minutes     (visit total 2018= 73)  (Visit total 2017= 56)  Date of Eval: 5/9/2017          POC: 9/3/2019 to 11/1/2019  New POC: 11/2/2019 to 12/28/2019  Extend POC through 1/31/2020  Extend through 3/31/2020    Subjective   Pt reports: "I'm not doing much at home."   Pt's sister: "We finally were able to get her back on the CPAP machine last night."    Pt reports no compliance with HEP.     Pain Scale:  0/10    Objective     Pt ambulates into gym with RW and is not wearing her cervical collar. Pt does not don her collar at all during the therapy session. Pt arrives 5 minutes late to session today, PT unable to accommodate.    Patient received individual therapy to increase strength, endurance, ROM, flexibility, posture, core stabilization and head control with activities as follows:      Pt participated in therapeutic exercises x 40 minutes to address ROM, strength, balance, and mobility which includes:    Ambulation with SBQC 124', CGA only, uneven step lengths and variety of step to and step through gait, pt occaisionally holds cane with 2 UE and continues to require cues to stand taller, no LOB during trial today, able to maintain balance despite a few incidences of feeling off balance    10 minutes SciFit StepOne Level 10 on sprints setting  Lower Extremity Strength (tested in seated)    RLE eval RLE  12/19 RLE 1/2020  R LE  2/20       LLE eval LLE 12/19 LLE 1/2020  LLE 2/20           Hip Flexion: " 3+/5 4/5 4/5  4+/5       3+/5 4/5 4+/5  5/5           Hip Extension:  2/5 4/5 4+/5 4+/5        2/5 4/5 4+/5  4+/5           Hip Abduction: NT 5/5 5/5  5/5       NT 5/5 5/5  5/5           Hip Adduction: NT 5/5 5/5  5/5       NT 5/5 5/5  5/5           Knee Extension: 4+/5 5/5 5/5  5/5       3+/5 3-/5 3-/5  3-/5           Knee Flexion: 4/5 4-/5 4-/5  4/5       4-/5 4-/5 4-/5  4-/5                   12/24/19 1/22/2020 2/26/2020   TUG w RW 26.6 s 29.2 s 26.2 s   TUG w SBQC 1:11 minutes 1:29 minutes 1:17 s   SSWS with RW .4 m/s .43 m/s (6m/14.1s) .45 m/s (6m/13.3s)   Tinetti 15/28 18/28 16/28     Tinetti Balance Assessment  Balance:  1. Sitting Balance 1   Leans/ slides in chair= 0   Steady= 1  2. Rises from chair 1   Unable without help= 0   Able, uses arms= 1   Able without use of arms= 2  3. Attempts to rise 2   Unable without help= 0   Able, >1 attmept required-= 1   Able, 1 attempt= 2  4. Immediate standing balance (1st 5 seconds) 1   Unsteady (swaggers, moves feet, trunk sway)= 0   Steady but uses walker or other support= 1   Narrow base of support without walker or support= 2  5. Nudged 1   Begins to fall= 0   Staggers, grabs, catches self= 1   Steady= 2  6. Standing balance 1   Unsteady= 0   Steady but wide LEYDI or uses AD=1   Narrow LEYDI without AD=2  7. Eyes closed 0   Unsteady= 0   Steady= 1  8. Turning 360 degrees 1   Discontinuous steps= 0   Continuous steps= 1   Unsteady= 0   Steady= 1  9. Sitting down 1   Unsafe= 0   Uses arms or not in a smooth motion= 1   Safe, smooth motion= 2  Balance score= 9  Gait:  1. Initiation 1   hesitates or multiple attempts to start= 0   No hesitancy= 1  2. Step length 1   Step to= 0   One foot passes= 1   reciprocal pattern= 2  3. Step height 2    Neither foot clears floor= 0   One foot clears floor= 1   Both feet clear floor= 2  4. Step symmetry 1   Not symmetrical= 0   Appears symmetrical= 1  5. Step continuity 1   Not continuous= 0   Appears continuous= 1  6. Path 1   Marked  deviation= 0   Mild/moderate deviation or uses A.D.= 1   Straight without A.D.= 2  7. Trunk 0   Marked sway or uses A.D.= 0   No sway, but flexes knees or back, spread arms out while walking= 1   No sway, no flexion, no use of UE, no use of A.D.= 2  8. Walking stance 0   Heels apart= 0   Heels almost touching while walking= 1  Gait score= 7  Total score= 16 , high fall risk    0-18= high fall risk  19-23= moderate fall risk  24-28= low fall risk             Written Home Exercises: continue with previous HEP  Pt demo fair understanding of the education provided. Brigid demonstrated fair return demonstration of activities.      Education provided re: perform Long arc quads frequently at home to address L knee extension weakness     Pt has no cultural, educational or language barriers to learning provided.     Assessment       Phani tolerated reassessment well today. She demonstrates little change within the last month and continues to demonstrate greatest deficits with L LE knee control and strength. She continues to require a significant amount of increased time for completion of standing tasks. It is of note that patient decreased to 1x/wk at the start of 2020 with little to no decline in status. PT to approach the subject of discharge with patient and her family at next reassessment if continued little to do decline or improvement. She remains appropriate for skilled PT services at this time.     Pt is progressing towards goals.   Prognosis is fair to good.     Barriers to progress: transportation, motivation for exercises     Activity limitations/ Participation Restrictions:   Fall Risk - impaired balance   Weakness  Impaired muscle tone  Poor head control  Decreased ROM  Gait deviations   Decreased ambulation   Decreased activity tolerance   Decreased independence with daily activities   Requires skilled supervision to complete and progress HEP   Requires skilled PT for DME         Pt's spiritual, cultural and  "educational needs considered and pt agreeable to plan of care and GOALS as stated below:    Short term goals: 6 weeks, pt agrees to goals set. (as of 2/26/2020)  1. Pt will perform chin tucks in supine with supervision to improve independent with HEP. Met 7/5/17  2. Assist pt with obtaining appropriate cervical brace to improve head control. Met 7/23/2019-   3. Pt will demonstrate improved knee ext strength on left to 4/5 to improve safety with ambulation and sit<>stand. Met 9/28/17   4. Pt will demonstrate improved cervical rotation PROM to 20 degrees to improve functional head control. Met 7/5/17  5. Pt will perform sit<>stand consistently from 22" height surface with supervision to improve independence. Met 7/5/17                               Revised 8/31/17:  Pt will perform sit<>stand from 19" height chair to improve independence with sitting in various chairs around her home and decreased need for "pop up seat". Met 8/12/19- pt able to perform from 19" height with supervision     Long term goals: 12 weeks, pt agrees to goals set  6. Pt will perform basic HEP for gross strengthening with supervision to deter worsening of functional limitations. Met 8/31/17, inconsistent  7. Pt will demonstrate improved bilateral hip strength to 3/5 to improve balance and independence with mobility. Met 7/5/17  8. New 7/5/17: Pt will demonstrate improved bilateral hip strength to 4/5 to improve balance and independence with mobility. MET 1/22/2020  9. Pt will demonstrate improved cervical extension strength to 3+/5 to improve head control. Met 7/31/17  10. Pt will demonstrate improved cervical deep flexor strength to 3+/5 to improve head control. Met 7/31/17   11. Pt will demonstrate improve balance and gait by scoring 14/28 on Tinetti. Met 8/31/17- 17/28                                      Revised 8/31/17: pt will demonstrate improved balance/ gait and decreased fall risk to moderate by scoring 19/28 on Tinetti " Assessment. inconsistent- 16/28 today                          12. Pt will demonstrate improved cervical PROM for rotation to 40 degrees to improve functional head control. Discontinue (2/26/2020)                          13. New 8/1/18- pt will ambulate 50ft with LBQC and supervision over level tile to demonstrate improved household mobility with decreased AD- improving, 124' with CGA and SBQC    14. New 12/23/2019: Pt will decrease score on TUG to at least 45 seconds with use of SBQC to demonstrate improved mobility with cane. ongoing    15. New 12/23/2019: Pt will improve SSWS to at least .7 m/s in order to improve community mobility with RW. Ongoing, improving        Plan     Extend POC through 3/31/2020.    Continue to work in standing during sessions, address balance as able, ambulation with SBQC and incorporate more endurance training at start to assist with speed/endurance with ambulation.    Kirby Magdaleno, PT  2/26/2020

## 2020-03-03 NOTE — PROGRESS NOTES
"Physical Therapy Progress Note      Name: Brigid Jernigan  Clinic Number: [id number]  Diagnosis: G71.11 (ICD-10-CM) - Myotonic dystrophy  Physician: Lloyd Mojica MD  Treatment Orders: PT Eval and Treat          Past Medical History:   Diagnosis Date    Abnormal EKG      Myotonic dystrophy      Pacemaker      PAF (paroxysmal atrial fibrillation)      Sleep apnea           Precautions: standard  auth period: 1/8/20 to 12/12/20  Visit #: 6/19     Time in: 1705  Time out: 1745  Total billable treatment time: 40 minutes     (visit total 2018= 73)  (Visit total 2017= 56)  Date of Eval: 5/9/2017          POC: 9/3/2019 to 11/1/2019  New POC: 11/2/2019 to 12/28/2019  Extend POC through 1/31/2020  Extend through 3/31/2020    Subjective   Pt reports: "I walked some this week."   Pt's sister: "Can we make more appointments?"    Pt reports no compliance with HEP.     Pain Scale:  0/10    Objective     Pt ambulates into gym with RW and is not wearing her cervical collar. Pt does not don her collar at all during the therapy session. Pt arrives 5 minutes late to session today, PT unable to accommodate.    Patient received individual therapy to increase strength, endurance, ROM, flexibility, posture, core stabilization and head control with activities as follows:      Pt participated in therapeutic exercises x 40 minutes to address ROM, strength, balance, and mobility which includes:    Ambulation with SBQC 138', SBA only, uneven step lengths and variety of step to and step through gait, pt occaisionally holds cane with 2 UE and continues to require cues to stand taller, no LOB during trial today, able to maintain balance with fairly consistent stepping today    10 minutes [employer] StepOne Level 10 on sprints setting     3 laps side stepping at ballet bar, SBA    2 x 10 reps of TKE with pink cook band resistance      Written Home Exercises: continue with previous HEP  Pt demo fair understanding of the education provided. " "Brigid demonstrated fair return demonstration of activities.      Education provided re: increasing activity at home     Pt has no cultural, educational or language barriers to learning provided.     Assessment       Phani tolerated session well today. She demonstrated improvement in ambulation today with SBA only from PT, though pt requires a significant amount of increased time to complete this ambulation trials. Pt continues to report little to no activity at home outside of therapy sessions, limiting her ability to progress in therapy. She tolerated addition of TKE activity in standing well today with some cues for completion. She remains appropriate for skilled PT services at this time.     Pt is progressing towards goals.   Prognosis is fair to good.     Barriers to progress: transportation, motivation for exercises     Activity limitations/ Participation Restrictions:   Fall Risk - impaired balance   Weakness  Impaired muscle tone  Poor head control  Decreased ROM  Gait deviations   Decreased ambulation   Decreased activity tolerance   Decreased independence with daily activities   Requires skilled supervision to complete and progress HEP   Requires skilled PT for DME         Pt's spiritual, cultural and educational needs considered and pt agreeable to plan of care and GOALS as stated below:    Short term goals: 6 weeks, pt agrees to goals set. (as of 2/26/2020)  1. Pt will perform chin tucks in supine with supervision to improve independent with HEP. Met 7/5/17  2. Assist pt with obtaining appropriate cervical brace to improve head control. Met 7/23/2019-   3. Pt will demonstrate improved knee ext strength on left to 4/5 to improve safety with ambulation and sit<>stand. Met 9/28/17   4. Pt will demonstrate improved cervical rotation PROM to 20 degrees to improve functional head control. Met 7/5/17  5. Pt will perform sit<>stand consistently from 22" height surface with supervision to improve independence. " "Met 7/5/17                               Revised 8/31/17:  Pt will perform sit<>stand from 19" height chair to improve independence with sitting in various chairs around her home and decreased need for "pop up seat". Met 8/12/19- pt able to perform from 19" height with supervision     Long term goals: 12 weeks, pt agrees to goals set  6. Pt will perform basic HEP for gross strengthening with supervision to deter worsening of functional limitations. Met 8/31/17, inconsistent  7. Pt will demonstrate improved bilateral hip strength to 3/5 to improve balance and independence with mobility. Met 7/5/17  8. New 7/5/17: Pt will demonstrate improved bilateral hip strength to 4/5 to improve balance and independence with mobility. MET 1/22/2020  9. Pt will demonstrate improved cervical extension strength to 3+/5 to improve head control. Met 7/31/17  10. Pt will demonstrate improved cervical deep flexor strength to 3+/5 to improve head control. Met 7/31/17   11. Pt will demonstrate improve balance and gait by scoring 14/28 on Tinetti. Met 8/31/17- 17/28                                      Revised 8/31/17: pt will demonstrate improved balance/ gait and decreased fall risk to moderate by scoring 19/28 on Tinetti Assessment. inconsistent- 16/28 today                          12. Pt will demonstrate improved cervical PROM for rotation to 40 degrees to improve functional head control. Discontinue (2/26/2020)                          13. New 8/1/18- pt will ambulate 50ft with LBQC and supervision over level tile to demonstrate improved household mobility with decreased AD- improving, 124' with CGA and SBQC    14. New 12/23/2019: Pt will decrease score on TUG to at least 45 seconds with use of SBQC to demonstrate improved mobility with cane. ongoing    15. New 12/23/2019: Pt will improve SSWS to at least .7 m/s in order to improve community mobility with RW. Ongoing, improving        Plan     Extend POC through 3/31/2020.    Continue " to work in standing during sessions, address balance as able, ambulation with SBQC and incorporate more endurance training at start to assist with speed/endurance with ambulation.    Kirby Magdaleno, PT  3/4/2020

## 2020-03-04 ENCOUNTER — CLINICAL SUPPORT (OUTPATIENT)
Dept: REHABILITATION | Facility: HOSPITAL | Age: 55
End: 2020-03-04
Attending: STUDENT IN AN ORGANIZED HEALTH CARE EDUCATION/TRAINING PROGRAM
Payer: COMMERCIAL

## 2020-03-04 DIAGNOSIS — R53.1 DECREASED STRENGTH: ICD-10-CM

## 2020-03-04 DIAGNOSIS — Z74.09 IMPAIRED FUNCTIONAL MOBILITY, BALANCE, GAIT, AND ENDURANCE: Primary | ICD-10-CM

## 2020-03-04 DIAGNOSIS — R29.898 DECREASED ROM OF NECK: ICD-10-CM

## 2020-03-04 PROCEDURE — 97110 THERAPEUTIC EXERCISES: CPT | Mod: PO

## 2020-03-10 NOTE — PROGRESS NOTES
"Physical Therapy Progress Note      Name: Brigid Jernigan  Clinic Number: [id number]  Diagnosis: G71.11 (ICD-10-CM) - Myotonic dystrophy  Physician: Lloyd Mojica MD  Treatment Orders: PT Eval and Treat          Past Medical History:   Diagnosis Date    Abnormal EKG      Myotonic dystrophy      Pacemaker      PAF (paroxysmal atrial fibrillation)      Sleep apnea           Precautions: standard  auth period: 1/8/20 to 12/12/20  Visit #: 7/19     Time in: 1701  Time out: 1745  Total billable treatment time: 45 minutes     (visit total 2018= 73)  (Visit total 2017= 56)  Date of Eval: 5/9/2017          POC: 9/3/2019 to 11/1/2019  New POC: 11/2/2019 to 12/28/2019  Extend POC through 1/31/2020  Extend through 3/31/2020    Subjective   Pt reports: "I guess I'm ready"   Pt's sister: "Can we make more appointments?"    Pt reports no compliance with HEP.     Pain Scale:  0/10    Objective     Pt ambulates into gym with RW and is not wearing her cervical collar. Pt does not don her collar at all during the therapy session. Pt arrives 1 minutes late to session today, PT unable to accommodate.    Patient received individual therapy to increase strength, endurance, ROM, flexibility, posture, core stabilization and head control with activities as follows:      Pt participated in therapeutic exercises x 40 minutes to address ROM, strength, balance, and mobility which includes:    Ambulation with SBQC 135', SBA only, uneven step lengths and variety of step to and step through gait, pt occaisionally holds cane with 2 UE and continues to require cues to stand taller, no LOB during trial today, able to maintain balance with fairly consistent stepping today    10 minutes [employer] StepOne Level 10 on hills sprints setting    2:47 minutes standing balloon toss    2 x 10 reps of TKE with pink cook band resistance    x10 sit to stand with B UE support, 5 sec standing without UE support following each sit>stand      Written Home " "Exercises: continue with previous HEP  Pt demo fair understanding of the education provided. Brigid demonstrated fair return demonstration of activities.      Education provided re: increasing activity at home     Pt has no cultural, educational or language barriers to learning provided.     Assessment       Phani tolerated session well today. She was again able to tolerate ambulation with SBA only. Pt was very reluctant to perform balloon toss activity and hits the majority of tosses with L UE, she was able to hit with R UE and B UE once after cueing and coaxing from PT. PT continues to demonstrate poor TKE on LLE and a strong fear of falling. She remains appropriate for skilled PT services at this time.     Pt is progressing towards goals.   Prognosis is fair to good.     Barriers to progress: transportation, motivation for exercises     Activity limitations/ Participation Restrictions:   Fall Risk - impaired balance   Weakness  Impaired muscle tone  Poor head control  Decreased ROM  Gait deviations   Decreased ambulation   Decreased activity tolerance   Decreased independence with daily activities   Requires skilled supervision to complete and progress HEP   Requires skilled PT for DME         Pt's spiritual, cultural and educational needs considered and pt agreeable to plan of care and GOALS as stated below:    Short term goals: 6 weeks, pt agrees to goals set. (as of 2/26/2020)  1. Pt will perform chin tucks in supine with supervision to improve independent with HEP. Met 7/5/17  2. Assist pt with obtaining appropriate cervical brace to improve head control. Met 7/23/2019-   3. Pt will demonstrate improved knee ext strength on left to 4/5 to improve safety with ambulation and sit<>stand. Met 9/28/17   4. Pt will demonstrate improved cervical rotation PROM to 20 degrees to improve functional head control. Met 7/5/17  5. Pt will perform sit<>stand consistently from 22" height surface with supervision to improve " "independence. Met 7/5/17                               Revised 8/31/17:  Pt will perform sit<>stand from 19" height chair to improve independence with sitting in various chairs around her home and decreased need for "pop up seat". Met 8/12/19- pt able to perform from 19" height with supervision     Long term goals: 12 weeks, pt agrees to goals set  6. Pt will perform basic HEP for gross strengthening with supervision to deter worsening of functional limitations. Met 8/31/17, inconsistent  7. Pt will demonstrate improved bilateral hip strength to 3/5 to improve balance and independence with mobility. Met 7/5/17  8. New 7/5/17: Pt will demonstrate improved bilateral hip strength to 4/5 to improve balance and independence with mobility. MET 1/22/2020  9. Pt will demonstrate improved cervical extension strength to 3+/5 to improve head control. Met 7/31/17  10. Pt will demonstrate improved cervical deep flexor strength to 3+/5 to improve head control. Met 7/31/17   11. Pt will demonstrate improve balance and gait by scoring 14/28 on Tinetti. Met 8/31/17- 17/28                                      Revised 8/31/17: pt will demonstrate improved balance/ gait and decreased fall risk to moderate by scoring 19/28 on Tinetti Assessment. inconsistent- 16/28 today                          12. Pt will demonstrate improved cervical PROM for rotation to 40 degrees to improve functional head control. Discontinue (2/26/2020)                          13. New 8/1/18- pt will ambulate 50ft with LBQC and supervision over level tile to demonstrate improved household mobility with decreased AD- improving, 124' with CGA and SBQC    14. New 12/23/2019: Pt will decrease score on TUG to at least 45 seconds with use of SBQC to demonstrate improved mobility with cane. ongoing    15. New 12/23/2019: Pt will improve SSWS to at least .7 m/s in order to improve community mobility with RW. Ongoing, improving        Plan     Extend POC through " 3/31/2020.    Continue to work in standing during sessions, address balance as able, ambulation with SBQC and incorporate more endurance training at start to assist with speed/endurance with ambulation.    Kirby Magdaleno, PT  3/11/2020

## 2020-03-11 ENCOUNTER — CLINICAL SUPPORT (OUTPATIENT)
Dept: REHABILITATION | Facility: HOSPITAL | Age: 55
End: 2020-03-11
Attending: STUDENT IN AN ORGANIZED HEALTH CARE EDUCATION/TRAINING PROGRAM
Payer: COMMERCIAL

## 2020-03-11 DIAGNOSIS — Z74.09 IMPAIRED FUNCTIONAL MOBILITY, BALANCE, GAIT, AND ENDURANCE: Primary | ICD-10-CM

## 2020-03-11 DIAGNOSIS — R53.1 DECREASED STRENGTH: ICD-10-CM

## 2020-03-11 DIAGNOSIS — R29.898 DECREASED ROM OF NECK: ICD-10-CM

## 2020-03-11 PROCEDURE — 97110 THERAPEUTIC EXERCISES: CPT | Mod: PO

## 2020-03-18 ENCOUNTER — TELEPHONE (OUTPATIENT)
Dept: REHABILITATION | Facility: HOSPITAL | Age: 55
End: 2020-03-18

## 2020-03-18 NOTE — TELEPHONE ENCOUNTER
PT spoke to pt's mother, Sobia, to check with patient regarding attendance of therapy today considering risk for COVID-19. Pt's mother reports that she will not be attending session today, but requests a call back next week to further decide if they will attend that apt. Pt's mother reports that she has exercises to complete at home and would not like a further HEP at this time. We will accommodate adding appointments to end of POC if appropriate following COVID-19 risk being lowered in the community.     Clinic return phone number [phone]

## 2020-03-20 ENCOUNTER — TELEPHONE (OUTPATIENT)
Dept: REHABILITATION | Facility: HOSPITAL | Age: 55
End: 2020-03-20

## 2020-03-20 NOTE — TELEPHONE ENCOUNTER
Postponed Appointments    Patient: Brigid Jernigan  Date: 3/20/2020  Diagnosis: No diagnosis found.  MRN: [id number]    Spoke with patient's mother concerning [facility] following updates regarding COVID-19 closely and taking every precaution to ensure the safety of our patients, staff and community.  In an abundance of caution and in an effort to help reduce risk and limit community spread, we have decided to temporarily postpone appointments for patients who may be at increased risk to attend in-person therapy or where therapy is not critically needed at this time. All questions were answered. Patient's mother verbalized understanding.    3/20/2020

## 2020-03-24 ENCOUNTER — TELEPHONE (OUTPATIENT)
Dept: REHABILITATION | Facility: HOSPITAL | Age: 55
End: 2020-03-24

## 2020-03-24 NOTE — TELEPHONE ENCOUNTER
Postponed Appointments    Patient: Brigid Jernigan  Date: 3/24/2020  MRN: [id number]    Spoke with patient's mother, Sobia, concerning [facility] following updates regarding COVID-19 closely and taking every precaution to ensure the safety of our patients, staff and community.  In an abundance of caution and in an effort to help reduce risk and limit community spread, we have decided to temporarily postpone all appointments for patients who may be at increased risk to attend in-person therapy or where therapy is not critically needed at this time. All patient questions were answered. PT offered telehealth as an option at this time, but pt's mother refuses as they are not interested in this form of therapy at this time. Patient's mother verbalized understanding to all.    3/24/2020

## 2020-03-31 ENCOUNTER — TELEPHONE (OUTPATIENT)
Dept: REHABILITATION | Facility: HOSPITAL | Age: 55
End: 2020-03-31

## 2020-03-31 NOTE — TELEPHONE ENCOUNTER
Therapy Postponed / COVID-19 pandemic   Patient Check-In Courtesy Call    Courtesy call to check in with patient today to ensure they are doing their home exercise program and to answer any questions. Brigid Jernigan's mother stated she is doing fine, but trying to get medication from MD currently. That is her only complaint currently. PT explains she is unable to assist with prescriptions at this time.  No new  exercise recommendations given today.     Kirby Magdaleno, PT  03/31/2020

## 2020-04-08 ENCOUNTER — DOCUMENTATION ONLY (OUTPATIENT)
Dept: REHABILITATION | Facility: HOSPITAL | Age: 55
End: 2020-04-08

## 2020-04-08 NOTE — PROGRESS NOTES
Therapy Postponed / COVID-19 pandemic   Patient Check-In Courtesy Call     Courtesy call to check in with patient today to ensure they are doing their home exercise program and to answer any questions. Brigid Jernigan's mother stated she is doing fine and that she would rather receive a call at when the clinic resume normal operations rather than receive a call every week.   No new  exercise recommendations given today.      Jesus Acosta, PTA  04/08/2020

## 2020-06-10 ENCOUNTER — DOCUMENTATION ONLY (OUTPATIENT)
Dept: REHABILITATION | Facility: HOSPITAL | Age: 55
End: 2020-06-10

## 2020-06-10 NOTE — PROGRESS NOTES
"  OUTPATIENT PHYSICAL THERAPY DISCHARGE SUMMARY     Name: Brigid Jernigan  Clinic Number: [id number]    Diagnosis: G71.11 (ICD-10-CM) - Myotonic dystrophy  Physician: Lloyd Mojica MD  Treatment Orders: PT Eval and Treat  Past Medical History:   Diagnosis Date    Abnormal EKG     Myotonic dystrophy     Pacemaker     PAF (paroxysmal atrial fibrillation)     Sleep apnea        Initial visit: 5/9/2017  Date of Last visit: 3/11/2020  Date of Discharge Note:  6/10/2020  Total Visits Received: 136  Total No Show: unknown  Total Cancelled Visits: unknown  ASSESSMENT   Status Towards Goals Met:      Pt's spiritual, cultural and educational needs considered and pt agreeable to plan of care and GOALS as stated below:    Short term goals: 6 weeks, pt agrees to goals set. (as of 2/26/2020)  1. Pt will perform chin tucks in supine with supervision to improve independent with HEP. Met 7/5/17  2. Assist pt with obtaining appropriate cervical brace to improve head control. Met 7/23/2019-   3. Pt will demonstrate improved knee ext strength on left to 4/5 to improve safety with ambulation and sit<>stand. Met 9/28/17   4. Pt will demonstrate improved cervical rotation PROM to 20 degrees to improve functional head control. Met 7/5/17  5. Pt will perform sit<>stand consistently from 22" height surface with supervision to improve independence. Met 7/5/17                               Revised 8/31/17:  Pt will perform sit<>stand from 19" height chair to improve independence with sitting in various chairs around her home and decreased need for "pop up seat". Met 8/12/19- pt able to perform from 19" height with supervision     Long term goals: 12 weeks, pt agrees to goals set  6. Pt will perform basic HEP for gross strengthening with supervision to deter worsening of functional limitations. Met 8/31/17, inconsistent  7. Pt will demonstrate improved bilateral hip strength to 3/5 to improve balance and independence with mobility. " Met 17  8. New 17: Pt will demonstrate improved bilateral hip strength to 4/5 to improve balance and independence with mobility. MET 2020  9. Pt will demonstrate improved cervical extension strength to 3+/5 to improve head control. Met 17  10. Pt will demonstrate improved cervical deep flexor strength to 3+/5 to improve head control. Met 17   11. Pt will demonstrate improve balance and gait by scoring 14/28 on Tinetti. Met 17-                                       Revised 17: pt will demonstrate improved balance/ gait and decreased fall risk to moderate by scoring /28 on Tinetti Assessment. inconsistent-  today                          12. Pt will demonstrate improved cervical PROM for rotation to 40 degrees to improve functional head control. Discontinue (2020)                          13. New 18- pt will ambulate 50ft with LBQC and supervision over level tile to demonstrate improved household mobility with decreased AD- improving, 124' with CGA and SBQC                          14. New 2019: Pt will decrease score on TUG to at least 45 seconds with use of SBQC to demonstrate improved mobility with cane. ongoing                          15. New 2019: Pt will improve SSWS to at least .7 m/s in order to improve community mobility with RW. Ongoing, improving    Goals Not achieved and why: Pt's last POC was interrupted by COVID-19 pandemic. Pt's condition is progressive and chronic, therefore not much progress made quickly.        Discharge reason : Pt has not re-scheduled further follow-up sessions and POC has     PLAN   This patient is discharged from Outpatient Physical Therapy Services.     Kirby Magdaleno, PT  06/10/2020

## 2021-01-15 ENCOUNTER — HOSPITAL ENCOUNTER (EMERGENCY)
Facility: OTHER | Age: 56
Discharge: HOME | End: 2021-01-15
Attending: EMERGENCY MEDICINE
Payer: COMMERCIAL

## 2021-01-15 VITALS
HEIGHT: 67 IN | DIASTOLIC BLOOD PRESSURE: 68 MMHG | OXYGEN SATURATION: 96 % | HEART RATE: 68 BPM | SYSTOLIC BLOOD PRESSURE: 108 MMHG | TEMPERATURE: 98 F | BODY MASS INDEX: 23.54 KG/M2 | WEIGHT: 150 LBS | RESPIRATION RATE: 16 BRPM

## 2021-01-15 DIAGNOSIS — R10.9 ABDOMINAL PAIN: ICD-10-CM

## 2021-01-15 LAB
ALBUMIN SERPL BCP-MCNC: 3.3 G/DL (ref 3.5–5.2)
ALP SERPL-CCNC: 224 U/L (ref 55–135)
ALT SERPL W/O P-5'-P-CCNC: 31 U/L (ref 10–44)
ANION GAP SERPL CALC-SCNC: 10 MMOL/L (ref 8–16)
AST SERPL-CCNC: 43 U/L (ref 10–40)
BASOPHILS # BLD AUTO: 0.06 K/UL (ref 0–0.2)
BASOPHILS NFR BLD: 1.2 % (ref 0–1.9)
BILIRUB SERPL-MCNC: 0.4 MG/DL (ref 0.1–1)
BUN SERPL-MCNC: 15 MG/DL (ref 6–20)
CALCIUM SERPL-MCNC: 9.1 MG/DL (ref 8.7–10.5)
CHLORIDE SERPL-SCNC: 108 MMOL/L (ref 95–110)
CO2 SERPL-SCNC: 27 MMOL/L (ref 23–29)
CREAT SERPL-MCNC: 0.5 MG/DL (ref 0.5–1.4)
DIFFERENTIAL METHOD: ABNORMAL
EOSINOPHIL # BLD AUTO: 0.3 K/UL (ref 0–0.5)
EOSINOPHIL NFR BLD: 6.3 % (ref 0–8)
ERYTHROCYTE [DISTWIDTH] IN BLOOD BY AUTOMATED COUNT: 18 % (ref 11.5–14.5)
EST. GFR  (AFRICAN AMERICAN): >60 ML/MIN/1.73 M^2
EST. GFR  (NON AFRICAN AMERICAN): >60 ML/MIN/1.73 M^2
GLUCOSE SERPL-MCNC: 94 MG/DL (ref 70–110)
HCT VFR BLD AUTO: 31.2 % (ref 37–48.5)
HGB BLD-MCNC: 9 G/DL (ref 12–16)
IMM GRANULOCYTES # BLD AUTO: 0.02 K/UL (ref 0–0.04)
IMM GRANULOCYTES NFR BLD AUTO: 0.4 % (ref 0–0.5)
LIPASE SERPL-CCNC: 34 U/L (ref 4–60)
LYMPHOCYTES # BLD AUTO: 1.2 K/UL (ref 1–4.8)
LYMPHOCYTES NFR BLD: 23.6 % (ref 18–48)
MCH RBC QN AUTO: 23.4 PG (ref 27–31)
MCHC RBC AUTO-ENTMCNC: 28.8 G/DL (ref 32–36)
MCV RBC AUTO: 81 FL (ref 82–98)
MONOCYTES # BLD AUTO: 0.4 K/UL (ref 0.3–1)
MONOCYTES NFR BLD: 7.7 % (ref 4–15)
NEUTROPHILS # BLD AUTO: 3.1 K/UL (ref 1.8–7.7)
NEUTROPHILS NFR BLD: 60.8 % (ref 38–73)
NRBC BLD-RTO: 0 /100 WBC
PLATELET # BLD AUTO: 269 K/UL (ref 150–350)
PMV BLD AUTO: 11.5 FL (ref 9.2–12.9)
POTASSIUM SERPL-SCNC: 4.7 MMOL/L (ref 3.5–5.1)
PROT SERPL-MCNC: 7.2 G/DL (ref 6–8.4)
RBC # BLD AUTO: 3.85 M/UL (ref 4–5.4)
SODIUM SERPL-SCNC: 145 MMOL/L (ref 136–145)
WBC # BLD AUTO: 5.09 K/UL (ref 3.9–12.7)

## 2021-01-15 PROCEDURE — 80053 COMPREHEN METABOLIC PANEL: CPT

## 2021-01-15 PROCEDURE — 93010 ELECTROCARDIOGRAM REPORT: CPT | Mod: ,,, | Performed by: INTERNAL MEDICINE

## 2021-01-15 PROCEDURE — 93010 EKG 12-LEAD: ICD-10-PCS | Mod: ,,, | Performed by: INTERNAL MEDICINE

## 2021-01-15 PROCEDURE — 93005 ELECTROCARDIOGRAM TRACING: CPT

## 2021-01-15 PROCEDURE — 25500020 PHARM REV CODE 255: Performed by: EMERGENCY MEDICINE

## 2021-01-15 PROCEDURE — 99285 EMERGENCY DEPT VISIT HI MDM: CPT | Mod: 25

## 2021-01-15 PROCEDURE — 83690 ASSAY OF LIPASE: CPT

## 2021-01-15 PROCEDURE — 85025 COMPLETE CBC W/AUTO DIFF WBC: CPT

## 2021-01-15 RX ORDER — FAMOTIDINE 20 MG/1
20 TABLET, FILM COATED ORAL 2 TIMES DAILY PRN
Qty: 30 TABLET | Refills: 0 | Status: SHIPPED | OUTPATIENT
Start: 2021-01-15 | End: 2022-01-15

## 2021-01-15 RX ADMIN — IOHEXOL 75 ML: 350 INJECTION, SOLUTION INTRAVENOUS at 07:01

## 2021-02-06 ENCOUNTER — OFFICE VISIT (OUTPATIENT)
Dept: URGENT CARE | Facility: CLINIC | Age: 56
End: 2021-02-06
Payer: COMMERCIAL

## 2021-02-06 ENCOUNTER — HOSPITAL ENCOUNTER (OUTPATIENT)
Dept: RADIOLOGY | Facility: OTHER | Age: 56
Discharge: HOME | End: 2021-02-06
Attending: FAMILY MEDICINE | Admitting: EMERGENCY MEDICINE
Payer: COMMERCIAL

## 2021-02-06 VITALS
WEIGHT: 149.94 LBS | DIASTOLIC BLOOD PRESSURE: 71 MMHG | TEMPERATURE: 98 F | OXYGEN SATURATION: 96 % | HEART RATE: 76 BPM | HEIGHT: 67 IN | BODY MASS INDEX: 23.53 KG/M2 | SYSTOLIC BLOOD PRESSURE: 123 MMHG

## 2021-02-06 DIAGNOSIS — I87.8 VENOUS STASIS OF LOWER EXTREMITY: Primary | ICD-10-CM

## 2021-02-06 DIAGNOSIS — M79.89 SWELLING OF LOWER LEG: ICD-10-CM

## 2021-02-06 DIAGNOSIS — L03.115 CELLULITIS OF RIGHT LOWER EXTREMITY: ICD-10-CM

## 2021-02-06 PROCEDURE — 93971 EXTREMITY STUDY: CPT | Mod: 26,RT,, | Performed by: RADIOLOGY

## 2021-02-06 PROCEDURE — 99214 OFFICE O/P EST MOD 30 MIN: CPT | Mod: S$GLB,,, | Performed by: FAMILY MEDICINE

## 2021-02-06 PROCEDURE — 3008F BODY MASS INDEX DOCD: CPT | Mod: CPTII,S$GLB,, | Performed by: FAMILY MEDICINE

## 2021-02-06 PROCEDURE — 93971 US LOWER EXTREMITY VEINS RIGHT: ICD-10-PCS | Mod: 26,RT,, | Performed by: RADIOLOGY

## 2021-02-06 PROCEDURE — 99214 PR OFFICE/OUTPT VISIT, EST, LEVL IV, 30-39 MIN: ICD-10-PCS | Mod: S$GLB,,, | Performed by: FAMILY MEDICINE

## 2021-02-06 PROCEDURE — 3008F PR BODY MASS INDEX (BMI) DOCUMENTED: ICD-10-PCS | Mod: CPTII,S$GLB,, | Performed by: FAMILY MEDICINE

## 2021-02-06 RX ORDER — CLINDAMYCIN HYDROCHLORIDE 300 MG/1
300 CAPSULE ORAL 3 TIMES DAILY
Qty: 30 CAPSULE | Refills: 0 | Status: SHIPPED | OUTPATIENT
Start: 2021-02-06 | End: 2021-02-16

## 2021-02-06 RX ORDER — FUROSEMIDE 20 MG/1
20 TABLET ORAL DAILY
Qty: 10 TABLET | Refills: 0 | Status: SHIPPED | OUTPATIENT
Start: 2021-02-06 | End: 2021-02-16

## 2021-02-06 RX ORDER — CLINDAMYCIN HYDROCHLORIDE 300 MG/1
300 CAPSULE ORAL 3 TIMES DAILY
Qty: 30 CAPSULE | Refills: 0 | Status: SHIPPED | OUTPATIENT
Start: 2021-02-06 | End: 2021-02-06

## 2021-02-07 ENCOUNTER — TELEPHONE (OUTPATIENT)
Dept: URGENT CARE | Facility: CLINIC | Age: 56
End: 2021-02-07

## 2021-06-02 ENCOUNTER — OFFICE VISIT (OUTPATIENT)
Dept: RHEUMATOLOGY | Facility: CLINIC | Age: 56
End: 2021-06-02
Payer: COMMERCIAL

## 2021-06-02 ENCOUNTER — LAB VISIT (OUTPATIENT)
Dept: LAB | Facility: HOSPITAL | Age: 56
End: 2021-06-02
Payer: COMMERCIAL

## 2021-06-02 VITALS — HEIGHT: 67 IN | WEIGHT: 170 LBS | BODY MASS INDEX: 26.68 KG/M2

## 2021-06-02 DIAGNOSIS — L40.50 PSORIATIC ARTHRITIS: ICD-10-CM

## 2021-06-02 DIAGNOSIS — L40.0 PLAQUE PSORIASIS: Primary | ICD-10-CM

## 2021-06-02 DIAGNOSIS — L40.0 PLAQUE PSORIASIS: ICD-10-CM

## 2021-06-02 LAB
CCP AB SER IA-ACNC: <0.5 U/ML
CRP SERPL-MCNC: 0.9 MG/L (ref 0–8.2)
ERYTHROCYTE [SEDIMENTATION RATE] IN BLOOD BY WESTERGREN METHOD: 56 MM/HR (ref 0–36)
HBV CORE AB SERPL QL IA: NEGATIVE
HBV SURFACE AB SER-ACNC: NEGATIVE M[IU]/ML
HBV SURFACE AG SERPL QL IA: NEGATIVE
HCV AB SERPL QL IA: NEGATIVE
HEPATITIS A ANTIBODY, IGG: NEGATIVE
HIV 1+2 AB+HIV1 P24 AG SERPL QL IA: NEGATIVE
RHEUMATOID FACT SERPL-ACNC: <10 IU/ML (ref 0–15)

## 2021-06-02 PROCEDURE — 86706 HEP B SURFACE ANTIBODY: CPT

## 2021-06-02 PROCEDURE — 99999 PR PBB SHADOW E&M-EST. PATIENT-LVL III: ICD-10-PCS | Mod: PBBFAC,,,

## 2021-06-02 PROCEDURE — 86790 VIRUS ANTIBODY NOS: CPT

## 2021-06-02 PROCEDURE — 86592 SYPHILIS TEST NON-TREP QUAL: CPT

## 2021-06-02 PROCEDURE — 99204 PR OFFICE/OUTPT VISIT, NEW, LEVL IV, 45-59 MIN: ICD-10-PCS | Mod: S$GLB,,,

## 2021-06-02 PROCEDURE — 86200 CCP ANTIBODY: CPT

## 2021-06-02 PROCEDURE — 85652 RBC SED RATE AUTOMATED: CPT

## 2021-06-02 PROCEDURE — 86431 RHEUMATOID FACTOR QUANT: CPT

## 2021-06-02 PROCEDURE — 86140 C-REACTIVE PROTEIN: CPT

## 2021-06-02 PROCEDURE — 86704 HEP B CORE ANTIBODY TOTAL: CPT

## 2021-06-02 PROCEDURE — 86803 HEPATITIS C AB TEST: CPT

## 2021-06-02 PROCEDURE — 86703 HIV-1/HIV-2 1 RESULT ANTBDY: CPT

## 2021-06-02 PROCEDURE — 87340 HEPATITIS B SURFACE AG IA: CPT

## 2021-06-02 PROCEDURE — 86682 HELMINTH ANTIBODY: CPT

## 2021-06-02 PROCEDURE — 99999 PR PBB SHADOW E&M-EST. PATIENT-LVL III: CPT | Mod: PBBFAC,,,

## 2021-06-02 PROCEDURE — 99204 OFFICE O/P NEW MOD 45 MIN: CPT | Mod: S$GLB,,,

## 2021-06-02 PROCEDURE — 86787 VARICELLA-ZOSTER ANTIBODY: CPT

## 2021-06-02 PROCEDURE — 86038 ANTINUCLEAR ANTIBODIES: CPT

## 2021-06-02 RX ORDER — NYSTATIN 100000 U/G
CREAM TOPICAL
COMMUNITY
Start: 2020-12-31

## 2021-06-02 RX ORDER — DESONIDE 0.5 MG/G
OINTMENT TOPICAL
COMMUNITY
Start: 2021-06-01

## 2021-06-02 RX ORDER — FLUCONAZOLE 200 MG/1
200 TABLET ORAL
COMMUNITY

## 2021-06-02 RX ORDER — MUPIROCIN 20 MG/G
OINTMENT TOPICAL
COMMUNITY
Start: 2021-04-06

## 2021-06-02 RX ORDER — CLOBETASOL PROPIONATE 0.5 MG/G
1 OINTMENT TOPICAL 2 TIMES DAILY
COMMUNITY
Start: 2021-06-01

## 2021-06-02 RX ORDER — PANTOPRAZOLE SODIUM 40 MG/1
40 TABLET, DELAYED RELEASE ORAL DAILY
COMMUNITY
Start: 2021-05-28

## 2021-06-02 ASSESSMENT — ROUTINE ASSESSMENT OF PATIENT INDEX DATA (RAPID3)
FATIGUE SCORE: 0
TOTAL RAPID3 SCORE: 3.67
AM STIFFNESS SCORE: 0, NO
PSYCHOLOGICAL DISTRESS SCORE: 2.2
PATIENT GLOBAL ASSESSMENT SCORE: 7
PAIN SCORE: 0
MDHAQ FUNCTION SCORE: 1.2

## 2021-06-03 PROBLEM — L40.50 PSORIATIC ARTHRITIS: Status: ACTIVE | Noted: 2021-06-03

## 2021-06-03 PROBLEM — L40.0 PLAQUE PSORIASIS: Status: ACTIVE | Noted: 2021-06-03

## 2021-06-03 LAB
ANA SER QL IF: NORMAL
RPR SER QL: NORMAL
STRONGYLOIDES ANTIBODY IGG: NEGATIVE

## 2021-06-05 LAB
VARICELLA INTERPRETATION: POSITIVE
VARICELLA ZOSTER IGG: 1.86 ISR (ref 0–0.9)

## 2021-06-22 ENCOUNTER — HOSPITAL ENCOUNTER (OUTPATIENT)
Dept: RADIOLOGY | Facility: HOSPITAL | Age: 56
Discharge: HOME | End: 2021-06-22
Payer: COMMERCIAL

## 2021-06-22 DIAGNOSIS — L40.50 PSORIATIC ARTHRITIS: ICD-10-CM

## 2021-06-22 DIAGNOSIS — L40.0 PLAQUE PSORIASIS: ICD-10-CM

## 2021-06-22 PROCEDURE — 77077 XR ARTHRITIS SURVEY: ICD-10-PCS | Mod: 26,,, | Performed by: RADIOLOGY

## 2021-06-22 PROCEDURE — 77077 JOINT SURVEY SINGLE VIEW: CPT | Mod: TC

## 2021-06-22 PROCEDURE — 77077 JOINT SURVEY SINGLE VIEW: CPT | Mod: 26,,, | Performed by: RADIOLOGY

## 2021-06-25 ENCOUNTER — HOSPITAL ENCOUNTER (OUTPATIENT)
Dept: RADIOLOGY | Facility: OTHER | Age: 56
Discharge: HOME | End: 2021-06-25
Payer: COMMERCIAL

## 2021-06-25 DIAGNOSIS — L40.50 PSORIATIC ARTHRITIS: ICD-10-CM

## 2021-06-25 DIAGNOSIS — L40.0 PLAQUE PSORIASIS: ICD-10-CM

## 2021-06-25 PROCEDURE — 77080 DEXA BONE DENSITY SPINE HIP: ICD-10-PCS | Mod: 26,,, | Performed by: RADIOLOGY

## 2021-06-25 PROCEDURE — 77080 DXA BONE DENSITY AXIAL: CPT | Mod: 26,,, | Performed by: RADIOLOGY

## 2021-06-25 PROCEDURE — 77080 DXA BONE DENSITY AXIAL: CPT | Mod: TC

## 2021-07-07 ENCOUNTER — OFFICE VISIT (OUTPATIENT)
Dept: RHEUMATOLOGY | Facility: CLINIC | Age: 56
End: 2021-07-07
Payer: COMMERCIAL

## 2021-07-07 VITALS
HEIGHT: 67 IN | WEIGHT: 170 LBS | SYSTOLIC BLOOD PRESSURE: 126 MMHG | DIASTOLIC BLOOD PRESSURE: 88 MMHG | HEART RATE: 72 BPM | BODY MASS INDEX: 26.68 KG/M2

## 2021-07-07 DIAGNOSIS — L40.50 PSORIATIC ARTHRITIS: Primary | ICD-10-CM

## 2021-07-07 DIAGNOSIS — L40.0 PLAQUE PSORIASIS: ICD-10-CM

## 2021-07-07 PROCEDURE — 99214 PR OFFICE/OUTPT VISIT, EST, LEVL IV, 30-39 MIN: ICD-10-PCS | Mod: S$GLB,,,

## 2021-07-07 PROCEDURE — 99999 PR PBB SHADOW E&M-EST. PATIENT-LVL III: CPT | Mod: PBBFAC,,,

## 2021-07-07 PROCEDURE — 99214 OFFICE O/P EST MOD 30 MIN: CPT | Mod: S$GLB,,,

## 2021-07-07 PROCEDURE — 99999 PR PBB SHADOW E&M-EST. PATIENT-LVL III: ICD-10-PCS | Mod: PBBFAC,,,

## 2021-07-07 RX ORDER — METHOTREXATE 2.5 MG/1
20 TABLET ORAL
Qty: 32 TABLET | Refills: 2 | Status: SHIPPED | OUTPATIENT
Start: 2021-07-07

## 2021-08-10 ENCOUNTER — LAB VISIT (OUTPATIENT)
Dept: LAB | Facility: HOSPITAL | Age: 56
End: 2021-08-10
Payer: COMMERCIAL

## 2021-08-10 DIAGNOSIS — L40.50 PSORIATIC ARTHRITIS: ICD-10-CM

## 2021-08-10 DIAGNOSIS — L40.0 PLAQUE PSORIASIS: ICD-10-CM

## 2021-08-10 LAB
BASOPHILS # BLD AUTO: 0.05 K/UL (ref 0–0.2)
BASOPHILS NFR BLD: 0.8 % (ref 0–1.9)
DIFFERENTIAL METHOD: ABNORMAL
EOSINOPHIL # BLD AUTO: 0.2 K/UL (ref 0–0.5)
EOSINOPHIL NFR BLD: 3 % (ref 0–8)
ERYTHROCYTE [DISTWIDTH] IN BLOOD BY AUTOMATED COUNT: 16.8 % (ref 11.5–14.5)
ERYTHROCYTE [SEDIMENTATION RATE] IN BLOOD BY WESTERGREN METHOD: 52 MM/HR (ref 0–36)
HCT VFR BLD AUTO: 40.3 % (ref 37–48.5)
HGB BLD-MCNC: 12.5 G/DL (ref 12–16)
IMM GRANULOCYTES # BLD AUTO: 0.01 K/UL (ref 0–0.04)
IMM GRANULOCYTES NFR BLD AUTO: 0.2 % (ref 0–0.5)
LYMPHOCYTES # BLD AUTO: 1.3 K/UL (ref 1–4.8)
LYMPHOCYTES NFR BLD: 22.4 % (ref 18–48)
MCH RBC QN AUTO: 29 PG (ref 27–31)
MCHC RBC AUTO-ENTMCNC: 31 G/DL (ref 32–36)
MCV RBC AUTO: 94 FL (ref 82–98)
MONOCYTES # BLD AUTO: 0.6 K/UL (ref 0.3–1)
MONOCYTES NFR BLD: 10.6 % (ref 4–15)
NEUTROPHILS # BLD AUTO: 3.7 K/UL (ref 1.8–7.7)
NEUTROPHILS NFR BLD: 63 % (ref 38–73)
NRBC BLD-RTO: 0 /100 WBC
PLATELET # BLD AUTO: 186 K/UL (ref 150–450)
PMV BLD AUTO: 11.9 FL (ref 9.2–12.9)
RBC # BLD AUTO: 4.31 M/UL (ref 4–5.4)
WBC # BLD AUTO: 5.93 K/UL (ref 3.9–12.7)

## 2021-08-10 PROCEDURE — 85652 RBC SED RATE AUTOMATED: CPT

## 2021-08-10 PROCEDURE — 86140 C-REACTIVE PROTEIN: CPT

## 2021-08-10 PROCEDURE — 36415 COLL VENOUS BLD VENIPUNCTURE: CPT | Mod: PO

## 2021-08-10 PROCEDURE — 85025 COMPLETE CBC W/AUTO DIFF WBC: CPT

## 2021-08-10 PROCEDURE — 80053 COMPREHEN METABOLIC PANEL: CPT

## 2021-08-11 LAB
ALBUMIN SERPL BCP-MCNC: 3.4 G/DL (ref 3.5–5.2)
ALP SERPL-CCNC: 209 U/L (ref 55–135)
ALT SERPL W/O P-5'-P-CCNC: 62 U/L (ref 10–44)
ANION GAP SERPL CALC-SCNC: 7 MMOL/L (ref 8–16)
AST SERPL-CCNC: 47 U/L (ref 10–40)
BILIRUB SERPL-MCNC: 0.3 MG/DL (ref 0.1–1)
BUN SERPL-MCNC: 16 MG/DL (ref 6–20)
CALCIUM SERPL-MCNC: 9.6 MG/DL (ref 8.7–10.5)
CHLORIDE SERPL-SCNC: 109 MMOL/L (ref 95–110)
CO2 SERPL-SCNC: 27 MMOL/L (ref 23–29)
CREAT SERPL-MCNC: 0.5 MG/DL (ref 0.5–1.4)
CRP SERPL-MCNC: 1 MG/L (ref 0–8.2)
EST. GFR  (AFRICAN AMERICAN): >60 ML/MIN/1.73 M^2
EST. GFR  (NON AFRICAN AMERICAN): >60 ML/MIN/1.73 M^2
GLUCOSE SERPL-MCNC: 84 MG/DL (ref 70–110)
POTASSIUM SERPL-SCNC: 4.4 MMOL/L (ref 3.5–5.1)
PROT SERPL-MCNC: 6.6 G/DL (ref 6–8.4)
SODIUM SERPL-SCNC: 143 MMOL/L (ref 136–145)

## 2021-08-26 ENCOUNTER — TELEPHONE (OUTPATIENT)
Dept: RHEUMATOLOGY | Facility: CLINIC | Age: 56
End: 2021-08-26